# Patient Record
Sex: MALE | Race: WHITE | NOT HISPANIC OR LATINO | Employment: OTHER | URBAN - METROPOLITAN AREA
[De-identification: names, ages, dates, MRNs, and addresses within clinical notes are randomized per-mention and may not be internally consistent; named-entity substitution may affect disease eponyms.]

---

## 2017-01-03 RX ORDER — TADALAFIL 5 MG/1
5 TABLET ORAL DAILY PRN
Qty: 30 TABLET | Refills: 11 | Status: SHIPPED | OUTPATIENT
Start: 2017-01-03 | End: 2017-01-27 | Stop reason: SDUPTHER

## 2017-01-13 ENCOUNTER — CLINICAL SUPPORT (OUTPATIENT)
Dept: INTERNAL MEDICINE | Facility: CLINIC | Age: 62
End: 2017-01-13
Payer: COMMERCIAL

## 2017-01-13 DIAGNOSIS — J30.2 SEASONAL ALLERGIC RHINITIS DUE TO FUNGAL SPORES: ICD-10-CM

## 2017-01-13 DIAGNOSIS — J30.81 ALLERGIC RHINITIS DUE TO ANIMAL (CAT) (DOG) HAIR AND DANDER: ICD-10-CM

## 2017-01-13 DIAGNOSIS — J30.1 SEASONAL ALLERGIC RHINITIS DUE TO POLLEN: ICD-10-CM

## 2017-01-13 PROCEDURE — 95117 IMMUNOTHERAPY INJECTIONS: CPT | Mod: S$GLB,,, | Performed by: ALLERGY & IMMUNOLOGY

## 2017-01-13 PROCEDURE — 99499 UNLISTED E&M SERVICE: CPT | Mod: S$GLB,,, | Performed by: ALLERGY & IMMUNOLOGY

## 2017-01-16 NOTE — PROGRESS NOTES
Pt. Here for allergy injection currently doing well without symptoms or sequela.  Vaccine #1 Vial #1 0.50ml sq left upper arm +1 reaction  Vaccine #2 Vial #1 0.50ml sq left lower arm +1 reaction  Vaccine #3 Vial #1 0.50ml sq right arm +1 reaction  Pt. Tolerated well.

## 2017-01-17 ENCOUNTER — OFFICE VISIT (OUTPATIENT)
Dept: ALLERGY | Facility: CLINIC | Age: 62
End: 2017-01-17
Payer: COMMERCIAL

## 2017-01-17 VITALS
WEIGHT: 179.44 LBS | BODY MASS INDEX: 25.69 KG/M2 | SYSTOLIC BLOOD PRESSURE: 126 MMHG | HEIGHT: 70 IN | DIASTOLIC BLOOD PRESSURE: 74 MMHG | OXYGEN SATURATION: 99 % | HEART RATE: 54 BPM

## 2017-01-17 DIAGNOSIS — K21.9 GASTROESOPHAGEAL REFLUX DISEASE WITHOUT ESOPHAGITIS: ICD-10-CM

## 2017-01-17 DIAGNOSIS — J30.2 SEASONAL ALLERGIC RHINITIS DUE TO FUNGAL SPORES: ICD-10-CM

## 2017-01-17 DIAGNOSIS — J30.1 SEASONAL ALLERGIC RHINITIS DUE TO POLLEN: Primary | ICD-10-CM

## 2017-01-17 DIAGNOSIS — J30.81 ALLERGIC RHINITIS DUE TO ANIMAL (CAT) (DOG) HAIR AND DANDER: ICD-10-CM

## 2017-01-17 PROCEDURE — 1159F MED LIST DOCD IN RCRD: CPT | Mod: S$GLB,,, | Performed by: ALLERGY & IMMUNOLOGY

## 2017-01-17 PROCEDURE — 99999 PR PBB SHADOW E&M-EST. PATIENT-LVL III: CPT | Mod: PBBFAC,,, | Performed by: ALLERGY & IMMUNOLOGY

## 2017-01-17 PROCEDURE — 99214 OFFICE O/P EST MOD 30 MIN: CPT | Mod: S$GLB,,, | Performed by: ALLERGY & IMMUNOLOGY

## 2017-01-17 RX ORDER — METAXALONE 800 MG/1
TABLET ORAL
COMMUNITY
Start: 2017-01-05 | End: 2017-01-27 | Stop reason: SDUPTHER

## 2017-01-17 NOTE — PROGRESS NOTES
Subjective:       Patient ID: Bulmaro Nascimento is a 61 y.o. male.    Chief Complaint: Follow-up (new extracts)    HPI Comments: Pt. Is known to me in private practice he is new to the [facility] system. He is followed for allergic rhinitis and allergic conjunctivitis. He is on allergy injections at a maintenance dose at a monthly interval. He is well controlled on allergy immunotherapy. He does not require additional medication for management of his allergic rhinitis or allergic conjunctivitis symptoms. Pt would like to continue monthly allergy immunotherapy. His vaccine is due for remmetraTec.    Review of Systems   Constitutional: Negative.  Negative for activity change, appetite change, chills, diaphoresis, fatigue, fever and unexpected weight change.   HENT: Negative.  Negative for congestion, dental problem, drooling, ear discharge, ear pain, facial swelling, hearing loss, mouth sores, nosebleeds, postnasal drip, rhinorrhea, sinus pressure, sneezing, sore throat, tinnitus, trouble swallowing and voice change.    Eyes: Negative.  Negative for photophobia, pain, discharge, redness, itching and visual disturbance.   Respiratory: Negative.  Negative for apnea, cough, choking, chest tightness, shortness of breath, wheezing and stridor.    Cardiovascular: Negative.  Negative for chest pain, palpitations and leg swelling.   Gastrointestinal: Negative.  Negative for abdominal distention, abdominal pain, constipation, diarrhea, nausea and vomiting.   Endocrine: Negative.  Negative for cold intolerance, heat intolerance, polydipsia, polyphagia and polyuria.   Genitourinary: Negative.  Negative for decreased urine volume, difficulty urinating, dysuria, enuresis, frequency and urgency.   Musculoskeletal: Negative.  Negative for arthralgias, back pain, gait problem, joint swelling, myalgias, neck pain and neck stiffness.   Skin: Negative.  Negative for color change, pallor, rash and wound.   Allergic/Immunologic: Negative.   Negative for environmental allergies, food allergies and immunocompromised state.   Neurological: Negative.  Negative for dizziness, tremors, seizures, syncope, facial asymmetry, speech difficulty, weakness, light-headedness, numbness and headaches.   Hematological: Negative.  Negative for adenopathy. Does not bruise/bleed easily.   Psychiatric/Behavioral: Negative.  Negative for agitation, behavioral problems, confusion, decreased concentration, dysphoric mood, hallucinations, self-injury, sleep disturbance and suicidal ideas. The patient is not nervous/anxious and is not hyperactive.      Objective:   Physical Exam   Constitutional: He is oriented to person, place, and time. He appears well-developed and well-nourished. He is active and cooperative.  Non-toxic appearance. He does not have a sickly appearance. He does not appear ill. No distress.   HENT:   Head: Normocephalic and atraumatic. Head is without abrasion, without right periorbital erythema and without left periorbital erythema.   Right Ear: Hearing, tympanic membrane, external ear and ear canal normal. No lacerations. No drainage, swelling or tenderness. No foreign bodies. No mastoid tenderness. Tympanic membrane is not injected, not scarred, not perforated, not erythematous, not retracted and not bulging. Tympanic membrane mobility is normal. No middle ear effusion. No decreased hearing is noted.   Left Ear: Hearing, tympanic membrane, external ear and ear canal normal. No lacerations. No drainage, swelling or tenderness. No foreign bodies. No mastoid tenderness. Tympanic membrane is not injected, not scarred, not perforated, not erythematous, not retracted and not bulging. Tympanic membrane mobility is normal.  No middle ear effusion. No decreased hearing is noted.   Nose: Nose normal. No mucosal edema, rhinorrhea, nose lacerations, sinus tenderness, nasal deformity, septal deviation or nasal septal hematoma. No epistaxis.  No foreign bodies. Right  sinus exhibits no maxillary sinus tenderness and no frontal sinus tenderness. Left sinus exhibits no maxillary sinus tenderness and no frontal sinus tenderness.   Mouth/Throat: Uvula is midline, oropharynx is clear and moist and mucous membranes are normal. He does not have dentures. No oral lesions. No trismus in the jaw. No dental abscesses, uvula swelling, lacerations or dental caries. No oropharyngeal exudate, posterior oropharyngeal edema, posterior oropharyngeal erythema or tonsillar abscesses. No tonsillar exudate.   Eyes: Conjunctivae, EOM and lids are normal. Pupils are equal, round, and reactive to light. Right eye exhibits no chemosis, no discharge and no exudate. Left eye exhibits no chemosis, no discharge and no exudate. Right conjunctiva is not injected. Right conjunctiva has no hemorrhage. Left conjunctiva is not injected. Left conjunctiva has no hemorrhage. No scleral icterus.   Neck: Trachea normal, normal range of motion, full passive range of motion without pain and phonation normal. No tracheal tenderness and no muscular tenderness present. No rigidity. No tracheal deviation, no edema, no erythema and normal range of motion present. No thyroid mass and no thyromegaly present.   Cardiovascular: Normal rate, regular rhythm, normal heart sounds and normal pulses.  Exam reveals no gallop and no friction rub.    No murmur heard.  Pulmonary/Chest: Effort normal and breath sounds normal. No accessory muscle usage or stridor. No apnea, no tachypnea and no bradypnea. He has no decreased breath sounds. He has no wheezes. He has no rhonchi. He has no rales. He exhibits no tenderness.   Abdominal: Soft. Normal appearance and bowel sounds are normal. He exhibits no distension. There is no tenderness. There is no rigidity, no rebound, no guarding and no CVA tenderness.   Musculoskeletal: Normal range of motion. He exhibits no edema, tenderness or deformity.   Lymphadenopathy:        Head (right side): No  submental, no submandibular, no tonsillar, no preauricular, no posterior auricular and no occipital adenopathy present.        Head (left side): No submental, no submandibular, no tonsillar, no preauricular, no posterior auricular and no occipital adenopathy present.     He has no cervical adenopathy.        Right cervical: No superficial cervical, no deep cervical and no posterior cervical adenopathy present.       Left cervical: No superficial cervical, no deep cervical and no posterior cervical adenopathy present.        Right: No supraclavicular and no epitrochlear adenopathy present.        Left: No supraclavicular and no epitrochlear adenopathy present.   Neurological: He is alert and oriented to person, place, and time. He has normal strength. He is not disoriented. No cranial nerve deficit. He exhibits normal muscle tone. Coordination and gait normal.   Skin: Skin is warm, dry and intact. No abrasion, no bruising, no laceration, no lesion, no petechiae, no purpura and no rash noted. Rash is not macular, not papular, not maculopapular, not nodular, not pustular, not vesicular and not urticarial. He is not diaphoretic. No cyanosis or erythema. No pallor. Nails show no clubbing.   Psychiatric: He has a normal mood and affect. His speech is normal and behavior is normal. Judgment and thought content normal. His mood appears not anxious. His affect is not inappropriate. Cognition and memory are normal. He does not express impulsivity or inappropriate judgment. He expresses no homicidal and no suicidal ideation. He is attentive.   Nursing note and vitals reviewed.    Assessment:     1. Seasonal allergic rhinitis due to pollen    2. allergic rhinitis due to animal cat and dog dander    3. Gastroesophageal reflux disease without esophagitis    4. Seasonal allergic rhinitis due to fungal spores      Plan:   Bulmaro was seen today for follow-up.    Diagnoses and all orders for this visit:    Seasonal allergic rhinitis  due to pollen    allergic rhinitis due to animal cat and dog dander    Gastroesophageal reflux disease without esophagitis    Seasonal allergic rhinitis due to fungal spores    Continue current treatment plan  Call office if problems occur  Return in about 6 months (around 7/17/2017).    Discussed options and strategies  Reviewed medications risk v. Benefit  Reviewed pathophysiology  All questions answered  Emotional support provided  Pt verbalized understanding of all the above and treatment plan.      XI Alcantara

## 2017-01-24 ENCOUNTER — PATIENT MESSAGE (OUTPATIENT)
Dept: FAMILY MEDICINE | Facility: CLINIC | Age: 62
End: 2017-01-24

## 2017-01-27 ENCOUNTER — OFFICE VISIT (OUTPATIENT)
Dept: FAMILY MEDICINE | Facility: CLINIC | Age: 62
End: 2017-01-27
Payer: COMMERCIAL

## 2017-01-27 VITALS
HEART RATE: 56 BPM | SYSTOLIC BLOOD PRESSURE: 110 MMHG | WEIGHT: 178.56 LBS | DIASTOLIC BLOOD PRESSURE: 64 MMHG | OXYGEN SATURATION: 98 % | HEIGHT: 70 IN | BODY MASS INDEX: 25.56 KG/M2

## 2017-01-27 DIAGNOSIS — J30.81 ALLERGIC RHINITIS DUE TO ANIMAL (CAT) (DOG) HAIR AND DANDER: ICD-10-CM

## 2017-01-27 DIAGNOSIS — Z00.00 ENCOUNTER FOR PREVENTIVE HEALTH EXAMINATION: ICD-10-CM

## 2017-01-27 DIAGNOSIS — J30.2 SEASONAL ALLERGIC RHINITIS DUE TO FUNGAL SPORES: ICD-10-CM

## 2017-01-27 DIAGNOSIS — L57.8 ACTINIC SKIN DAMAGE: ICD-10-CM

## 2017-01-27 DIAGNOSIS — Z13.220 LIPID SCREENING: Primary | ICD-10-CM

## 2017-01-27 DIAGNOSIS — F45.8 BRUXISM: ICD-10-CM

## 2017-01-27 DIAGNOSIS — N40.0 BENIGN PROSTATIC HYPERPLASIA, PRESENCE OF LOWER URINARY TRACT SYMPTOMS UNSPECIFIED, UNSPECIFIED MORPHOLOGY: ICD-10-CM

## 2017-01-27 DIAGNOSIS — Z12.5 PROSTATE CANCER SCREENING: ICD-10-CM

## 2017-01-27 DIAGNOSIS — K21.9 GASTROESOPHAGEAL REFLUX DISEASE WITHOUT ESOPHAGITIS: ICD-10-CM

## 2017-01-27 PROCEDURE — 99999 PR PBB SHADOW E&M-EST. PATIENT-LVL IV: CPT | Mod: PBBFAC,,,

## 2017-01-27 PROCEDURE — 99396 PREV VISIT EST AGE 40-64: CPT | Mod: S$GLB,,,

## 2017-01-27 RX ORDER — HYDROGEN PEROXIDE 3 %
20 SOLUTION, NON-ORAL MISCELLANEOUS
Qty: 30 CAPSULE | Refills: 11 | Status: SHIPPED | OUTPATIENT
Start: 2017-01-27 | End: 2017-02-06 | Stop reason: SDUPTHER

## 2017-01-27 RX ORDER — LORAZEPAM 1 MG/1
1 TABLET ORAL NIGHTLY
Qty: 90 TABLET | Refills: 1 | Status: SHIPPED | OUTPATIENT
Start: 2017-01-27 | End: 2017-06-05 | Stop reason: SDUPTHER

## 2017-01-27 RX ORDER — TADALAFIL 5 MG/1
5 TABLET ORAL DAILY PRN
Qty: 30 TABLET | Refills: 11 | Status: SHIPPED | OUTPATIENT
Start: 2017-01-27 | End: 2017-02-01 | Stop reason: SDUPTHER

## 2017-01-27 RX ORDER — METAXALONE 800 MG/1
800 TABLET ORAL
Qty: 30 TABLET | Refills: 11 | Status: SHIPPED | OUTPATIENT
Start: 2017-01-27 | End: 2018-03-21

## 2017-01-27 NOTE — MR AVS SNAPSHOT
[facility]  [address] [place] [address]  Phone: [phone]  Fax: [phone]                  Bulmaro Nascimento   2017 1:00 PM   Office Visit    Description:  Male : 1955   Provider:  Carl Hodge Jr., MD   Department:  [facility] Medicine           Reason for Visit     Annual Exam           Diagnoses this Visit        Comments    Lipid screening    -  Primary     Prostate cancer screening         Bruxism         Gastroesophageal reflux disease without esophagitis         Encounter for preventive health examination         Benign prostatic hyperplasia, presence of lower urinary tract symptoms unspecified, unspecified morphology         Allergic rhinitis due to animal (cat) (dog) hair and dander         Seasonal allergic rhinitis due to fungal spores         Actinic skin damage                To Do List           Future Appointments        Provider Department Dept Phone    2017 10:45 AM [place] INJE[facility] [place] [facility] [phone]    3/17/2017 10:45 AM [place] [place] [facility] [phone]      Goals (5 Years of Data)     None      Follow-Up and Disposition     Return in about 1 year (around 2018).    Follow-up and Disposition History       These Medications        Disp Refills Start End    tadalafil (CIALIS) 5 MG tablet 30 tablet 11 2017     Take 1 tablet (5 mg total) by mouth daily as needed for Erectile Dysfunction. - Oral    Pharmacy: [id number] [place] [address] Ph #: [phone]       esomeprazole (NEXIUM) 20 MG capsule 30 capsule 11 2017     Take 1 capsule (20 mg total) by mouth before breakfast. - Oral    Pharmacy: [id number] [place] [address] Ph #: [phone]       lorazepam (ATIVAN) 1 MG tablet 90 tablet 1 2017     Take 1 tablet (1 mg total) by mouth every evening. - Oral    Pharmacy: [id number] - [place] - [address]  #: [phone]       metaxalone (SKELAXIN) 800 MG tablet 30 tablet 11 1/27/2017     Take 1 tablet (800 mg total) by mouth as needed. - Oral    Pharmacy: [id number] - [place] - [address] [address]  #: [phone]       ranitidine (ZANTAC) 300 MG tablet 30 tablet 11 1/27/2017 1/27/2018    Take 1 tablet (300 mg total) by mouth every evening. - Oral    Pharmacy: [id number] - [place] - [address]  #: [phone]         [facility]     [facility] Nurse [facility] - 24/7 Assistance  Registered nurses in the [facility] Center provide clinical advisement, health education, appointment booking, and other advisory services.  Call for this free service at [phone].             Medications           Message regarding Medications     Verify the changes and/or additions to your medication regime listed below are the same as discussed with your clinician today.  If any of these changes or additions are incorrect, please notify your healthcare provider.        START taking these NEW medications        Refills    ranitidine (ZANTAC) 300 MG tablet 11    Sig: Take 1 tablet (300 mg total) by mouth every evening.    Class: Normal    Route: Oral      CHANGE how you are taking these medications     Start Taking Instead of    esomeprazole (NEXIUM) 20 MG capsule esomeprazole (NEXIUM) 20 MG capsule    Dosage:  Take 1 capsule (20 mg total) by mouth before breakfast. Dosage:  Take 20 mg by mouth before breakfast.    Reason for Change:  Reorder     metaxalone (SKELAXIN) 800 MG tablet metaxalone (SKELAXIN) 800 MG tablet    Dosage:  Take 1 tablet (800 mg total) by mouth as needed. Dosage:  as needed.    Reason for Change:  Reorder            Verify that the below list of medications is an accurate representation of the medications you are currently taking.  If none reported, the list may be blank. If incorrect, please contact your healthcare provider. Carry this list with you in case of emergency.  "          Current Medications     esomeprazole (NEXIUM) 20 MG capsule Take 1 capsule (20 mg total) by mouth before breakfast.    tadalafil (CIALIS) 5 MG tablet Take 1 tablet (5 mg total) by mouth daily as needed for Erectile Dysfunction.    lorazepam (ATIVAN) 1 MG tablet Take 1 tablet (1 mg total) by mouth every evening.    metaxalone (SKELAXIN) 800 MG tablet Take 1 tablet (800 mg total) by mouth as needed.    ranitidine (ZANTAC) 300 MG tablet Take 1 tablet (300 mg total) by mouth every evening.           Clinical Reference Information           Vital Signs - Last Recorded  Most recent update: 1/27/2017  1:04 PM by Erica Zarate MA    BP Pulse Ht Wt SpO2 BMI    110/64 (BP Location: Right arm, Patient Position: Sitting, BP Method: Manual) (!) 56 5' 10" (1.778 m) 81 kg (178 lb 9.2 oz) 98% 25.62 kg/m2      Blood Pressure          Most Recent Value    BP  110/64      Allergies as of 1/27/2017     No Known Allergies      Immunizations Administered on Date of Encounter - 1/27/2017     None      Orders Placed During Today's Visit      Normal Orders This Visit    Ambulatory referral to Allergy     Ambulatory referral to Dermatology     Ambulatory referral to Urology     Future Labs/Procedures Expected by Expires    CBC auto differential  1/27/2017 1/27/2018    Comprehensive metabolic panel  1/27/2017 1/27/2018    Lipid panel  1/27/2017 1/27/2018    PSA, Screening  1/27/2017 3/28/2018      "

## 2017-01-27 NOTE — PROGRESS NOTES
Subjective:       Patient ID: Bulmaro Nascimento is a 61 y.o. male.    Chief Complaint: Annual Exam    HPI Data obtained from Agency for Healthcare and Research Quality (AHRQ):    GRADE A -The USPSTF recommends the service. There is high certainty that the net benefit is substantial. Offer or provide this service.    GRADE B - The USPSTF recommends the service. There is high certainty that the net benefit is moderate or there is moderate certainty that the net benefit is moderate to substantial. Offer or provide this service.    View All      13 - Recommended (A, B)    Grade Title Risk Info. Details   UTD  Colorectal Cancer: Screening --Adults aged 50 to 75 years     Low Risk  HIV: Screening - Adolescents and Adults     110/64 High Blood Pressure: Screening -- Adults 18 and Over     Recommended High Blood Pressure: Screening and Home Monitoring -- Adults     Denies  Alcohol Misuse: Screening and Behavioral Counseling Interventions in Primary Care -- Adults     Denies  Depression: Screening -- General adult population, including pregnant and postpartum women     UTD  Diabetes Mellitus (Type 2) andAbnormal Blood Glucose: Screening -- Adults aged 40 to 70 years who are overweight or obese     Yes  Healthful Diet and Physical Activity for CVD Disease Prevention: Counseling -- Adults with CVD Risk Factors     Low  Hepatitis B: Screening -- Nonpregnant Adolescents and Adults At High Risk     UTD  Hepatitis C Virus Infection: Screening--Adults at High Risk and Adults born between 1945 and 1965     Low  Latent Tuberculosis Infection: Screening -- Asymptomatic adults at increased risk for infection     Normal  Obesity: Screening for and Management of-- All Adults       Not a candidate  Statin Use for the Primary Prevention of CVD: Preventive Medicine -- Adults age 40 to 75 years with no history of CVD, 1 or more CVD risk factors, and a calculated 10-year CVD event risk of 10% or greater.              Review of Systems    Constitutional: Negative.    HENT: Negative.    Eyes: Negative.    Respiratory: Negative.    Cardiovascular: Negative.    Gastrointestinal: Negative.    Endocrine: Negative.    Genitourinary: Negative.    Musculoskeletal: Negative.    Skin: Negative.    Allergic/Immunologic: Negative.    Neurological: Negative.    Hematological: Negative.    Psychiatric/Behavioral: Positive for sleep disturbance.   All other systems reviewed and are negative.      Objective:      Vitals:    01/27/17 1301   BP: 110/64   Pulse: (!) 56     Physical Exam   Constitutional: He is oriented to person, place, and time. He appears well-developed and well-nourished. He is cooperative. No distress.   HENT:   Head: Normocephalic and atraumatic.   Right Ear: Hearing, tympanic membrane, external ear and ear canal normal.   Left Ear: Hearing, external ear and ear canal normal.   Nose: Nose normal.   Mouth/Throat: Oropharynx is clear and moist.   Eyes: Conjunctivae are normal. Pupils are equal, round, and reactive to light.   Neck: Normal range of motion. Neck supple. No thyromegaly present.   Cardiovascular: Normal rate, regular rhythm, normal heart sounds and intact distal pulses.    Pulmonary/Chest: Effort normal and breath sounds normal. No respiratory distress.   Musculoskeletal: Normal range of motion. He exhibits no edema or tenderness.   Lymphadenopathy:     He has no cervical adenopathy.   Neurological: He is alert and oriented to person, place, and time. He has normal strength. Coordination and gait normal.   Skin: Skin is warm, dry and intact. No cyanosis. Nails show no clubbing.   Psychiatric: He has a normal mood and affect. His speech is normal and behavior is normal. Judgment and thought content normal. Cognition and memory are normal.   Vitals reviewed.      Assessment:       1. Lipid screening    2. Prostate cancer screening    3. Bruxism    4. Gastroesophageal reflux disease without esophagitis    5. Encounter for preventive  health examination    6. Benign prostatic hyperplasia, presence of lower urinary tract symptoms unspecified, unspecified morphology    7. allergic rhinitis due to animal cat and dog dander    8. Seasonal allergic rhinitis due to fungal spores    9. Actinic skin damage        Plan:       Lipid screening  -     CBC auto differential; Future; Expected date: 1/27/17  -     Comprehensive metabolic panel; Future; Expected date: 1/27/17  -     Lipid panel; Future; Expected date: 1/27/17    Prostate cancer screening  -     PSA, Screening; Future; Expected date: 1/27/17  -     Ambulatory referral to Urology    Bruxism  -     lorazepam (ATIVAN) 1 MG tablet; Take 1 tablet (1 mg total) by mouth every evening.  Dispense: 90 tablet; Refill: 1    Gastroesophageal reflux disease without esophagitis    Encounter for preventive health examination    Benign prostatic hyperplasia, presence of lower urinary tract symptoms unspecified, unspecified morphology    allergic rhinitis due to animal cat and dog dander  -     Ambulatory referral to Allergy    Seasonal allergic rhinitis due to fungal spores  -     Ambulatory referral to Allergy    Actinic skin damage  -     Ambulatory referral to Dermatology    Other orders  -     tadalafil (CIALIS) 5 MG tablet; Take 1 tablet (5 mg total) by mouth daily as needed for Erectile Dysfunction.  Dispense: 30 tablet; Refill: 11  -     esomeprazole (NEXIUM) 20 MG capsule; Take 1 capsule (20 mg total) by mouth before breakfast.  Dispense: 30 capsule; Refill: 11  -     metaxalone (SKELAXIN) 800 MG tablet; Take 1 tablet (800 mg total) by mouth as needed.  Dispense: 30 tablet; Refill: 11  -     ranitidine (ZANTAC) 300 MG tablet; Take 1 tablet (300 mg total) by mouth every evening.  Dispense: 30 tablet; Refill: 11      Return in about 1 year (around 1/27/2018).

## 2017-02-01 ENCOUNTER — PATIENT MESSAGE (OUTPATIENT)
Dept: FAMILY MEDICINE | Facility: CLINIC | Age: 62
End: 2017-02-01

## 2017-02-01 ENCOUNTER — TELEPHONE (OUTPATIENT)
Dept: FAMILY MEDICINE | Facility: CLINIC | Age: 62
End: 2017-02-01

## 2017-02-01 RX ORDER — TADALAFIL 5 MG/1
5 TABLET ORAL DAILY PRN
Qty: 90 TABLET | Refills: 3 | Status: SHIPPED | OUTPATIENT
Start: 2017-02-01 | End: 2017-09-29 | Stop reason: SDUPTHER

## 2017-02-05 ENCOUNTER — PATIENT MESSAGE (OUTPATIENT)
Dept: FAMILY MEDICINE | Facility: CLINIC | Age: 62
End: 2017-02-05

## 2017-02-05 DIAGNOSIS — K21.9 GASTROESOPHAGEAL REFLUX DISEASE WITHOUT ESOPHAGITIS: Primary | ICD-10-CM

## 2017-02-06 RX ORDER — ESOMEPRAZOLE MAGNESIUM 40 MG/1
40 CAPSULE, DELAYED RELEASE ORAL
Qty: 90 CAPSULE | Refills: 0 | Status: SHIPPED | OUTPATIENT
Start: 2017-02-06 | End: 2018-03-21

## 2017-02-08 ENCOUNTER — CLINICAL SUPPORT (OUTPATIENT)
Dept: ALLERGY | Facility: CLINIC | Age: 62
End: 2017-02-08
Payer: COMMERCIAL

## 2017-02-08 DIAGNOSIS — J30.1 SEASONAL ALLERGIC RHINITIS DUE TO POLLEN, UNSPECIFIED CHRONICITY: ICD-10-CM

## 2017-02-08 DIAGNOSIS — J30.9 ACUTE ALLERGIC RHINITIS, UNSPECIFIED SEASONALITY, UNSPECIFIED TRIGGER: Primary | ICD-10-CM

## 2017-02-08 PROCEDURE — 99499 UNLISTED E&M SERVICE: CPT | Mod: S$GLB,,, | Performed by: ALLERGY & IMMUNOLOGY

## 2017-02-08 PROCEDURE — 99999 PR PBB SHADOW E&M-EST. PATIENT-LVL II: CPT | Mod: PBBFAC,,,

## 2017-02-08 PROCEDURE — 95165 ANTIGEN THERAPY SERVICES: CPT | Mod: S$GLB,,, | Performed by: ALLERGY & IMMUNOLOGY

## 2017-02-10 ENCOUNTER — CLINICAL SUPPORT (OUTPATIENT)
Dept: INTERNAL MEDICINE | Facility: CLINIC | Age: 62
End: 2017-02-10
Payer: COMMERCIAL

## 2017-02-10 DIAGNOSIS — J30.1 SEASONAL ALLERGIC RHINITIS DUE TO POLLEN: ICD-10-CM

## 2017-02-10 DIAGNOSIS — J30.2 SEASONAL ALLERGIC RHINITIS DUE TO FUNGAL SPORES: ICD-10-CM

## 2017-02-10 DIAGNOSIS — J30.81 ALLERGIC RHINITIS DUE TO ANIMAL (CAT) (DOG) HAIR AND DANDER: ICD-10-CM

## 2017-02-10 PROCEDURE — 95117 IMMUNOTHERAPY INJECTIONS: CPT | Mod: S$GLB,,, | Performed by: ALLERGY & IMMUNOLOGY

## 2017-02-10 PROCEDURE — 99499 UNLISTED E&M SERVICE: CPT | Mod: S$GLB,,, | Performed by: ALLERGY & IMMUNOLOGY

## 2017-02-13 NOTE — PROGRESS NOTES
Patient here for allergy injection. Currently doing well without symptoms or sequela.   Vaccine #1 Vial #1 0.50ml sq left upper arm +1 reaction  Vaccine #2 Vial #1 0.50ml sq left middle arm +1 reaction  Vaccine #3 Vial #1 0.50ml sq right arm +1 reaction  Pt. Tolerated well.

## 2017-03-13 ENCOUNTER — PATIENT MESSAGE (OUTPATIENT)
Dept: FAMILY MEDICINE | Facility: CLINIC | Age: 62
End: 2017-03-13

## 2017-03-15 ENCOUNTER — OFFICE VISIT (OUTPATIENT)
Dept: UROLOGY | Facility: CLINIC | Age: 62
End: 2017-03-15
Payer: COMMERCIAL

## 2017-03-15 VITALS
HEART RATE: 60 BPM | BODY MASS INDEX: 25.88 KG/M2 | TEMPERATURE: 98 F | RESPIRATION RATE: 18 BRPM | DIASTOLIC BLOOD PRESSURE: 70 MMHG | HEIGHT: 70 IN | SYSTOLIC BLOOD PRESSURE: 110 MMHG | WEIGHT: 180.75 LBS

## 2017-03-15 DIAGNOSIS — R97.20 ELEVATED PSA: Primary | ICD-10-CM

## 2017-03-15 DIAGNOSIS — N40.0 BENIGN NON-NODULAR PROSTATIC HYPERPLASIA WITHOUT LOWER URINARY TRACT SYMPTOMS: ICD-10-CM

## 2017-03-15 DIAGNOSIS — R35.0 URINARY FREQUENCY: ICD-10-CM

## 2017-03-15 DIAGNOSIS — R35.1 NOCTURIA: ICD-10-CM

## 2017-03-15 PROCEDURE — 1160F RVW MEDS BY RX/DR IN RCRD: CPT | Mod: S$GLB,,, | Performed by: UROLOGY

## 2017-03-15 PROCEDURE — 99213 OFFICE O/P EST LOW 20 MIN: CPT | Mod: S$GLB,,, | Performed by: UROLOGY

## 2017-03-15 PROCEDURE — 99999 PR PBB SHADOW E&M-EST. PATIENT-LVL III: CPT | Mod: PBBFAC,,, | Performed by: UROLOGY

## 2017-03-15 NOTE — LETTER
March 15, 2017      Carl Hodge Jr., MD  [address] [place] [address]           [place] [facility]  [address] [place] [address] [address]  [facility] [address]  Phone: [phone]  Fax: [phone]          Patient: Bulmaro Nascimento   MR Number: [id number]   YOB: 1955   Date of Visit: 3/15/2017       Dear Dr. Carl Hodge Jr.:    Thank you for referring Bulmaro Nascimento to me for evaluation. Attached you will find relevant portions of my assessment and plan of care.    If you have questions, please do not hesitate to call me. I look forward to following Bulmaro Nascimento along with you.    Sincerely,    Jadiel Lara MD    Enclosure  CC:  No Recipients    If you would like to receive this communication electronically, please contact [email or web address] or [phone] to request more information on [employer] Link access.    For providers and/or their staff who would like to refer a patient to [facility], please contact us through our one-stop-shop provider referral line, [facility], at [phone].    If you feel you have received this communication in error or would no longer like to receive these types of communications, please e-mail [email or web address]

## 2017-03-15 NOTE — PROGRESS NOTES
Subjective:       Patient ID: Bulmaro Nascimento is a 61 y.o. male.    Chief Complaint: Elevated PSA    Benign Prostatic Hypertrophy   This is a chronic problem. The current episode started more than 1 year ago. The problem has been gradually improving since onset. Irritative symptoms include frequency (x5) and nocturia (x1). Irritative symptoms do not include urgency. Obstructive symptoms include a slower stream. Obstructive symptoms do not include dribbling, incomplete emptying, an intermittent stream, straining or a weak stream. Pertinent negatives include no chills, dysuria, genital pain, hematuria, hesitancy, nausea or vomiting. AUA score is 8-19. He is not sexually active. Treatments tried: cialis 5 mg daily. The treatment provided no relief. He has been using treatment for 2 or more years.     Review of Systems   Constitutional: Negative for activity change, appetite change, chills, diaphoresis, fatigue, fever and unexpected weight change.   HENT: Negative for congestion, hearing loss, sinus pressure and trouble swallowing.    Eyes: Negative for photophobia, pain, discharge and visual disturbance.   Respiratory: Negative for apnea, cough and shortness of breath.    Cardiovascular: Negative for chest pain, palpitations and leg swelling.   Gastrointestinal: Negative for abdominal distention, abdominal pain, anal bleeding, blood in stool, constipation, diarrhea, nausea, rectal pain and vomiting.   Endocrine: Negative for cold intolerance, heat intolerance, polydipsia, polyphagia and polyuria.   Genitourinary: Positive for frequency (x5) and nocturia (x1). Negative for decreased urine volume, difficulty urinating, discharge, dysuria, enuresis, flank pain, genital sores, hematuria, hesitancy, incomplete emptying, penile pain, penile swelling, scrotal swelling, testicular pain and urgency.   Musculoskeletal: Negative for arthralgias, back pain and myalgias.   Skin: Negative for color change, pallor, rash and  wound.   Allergic/Immunologic: Negative for environmental allergies, food allergies and immunocompromised state.   Neurological: Negative for dizziness, seizures, weakness and headaches.   Hematological: Negative for adenopathy. Does not bruise/bleed easily.   Psychiatric/Behavioral: Negative.        Objective:      Physical Exam   Nursing note and vitals reviewed.  Constitutional: He is oriented to person, place, and time. He appears well-developed and well-nourished.   HENT:   Head: Normocephalic.   Nose: Nose normal.   Mouth/Throat: Oropharynx is clear and moist.   Eyes: Conjunctivae and EOM are normal. Pupils are equal, round, and reactive to light.   Neck: Normal range of motion. Neck supple.   Cardiovascular: Normal rate, regular rhythm, normal heart sounds and intact distal pulses.    Pulmonary/Chest: Effort normal and breath sounds normal.   Abdominal: Soft. Bowel sounds are normal.   Genitourinary: Rectum normal, testes normal and penis normal. Prostate is enlarged. Prostate is not tender. Cremasteric reflex is present.   Musculoskeletal: Normal range of motion.   Neurological: He is alert and oriented to person, place, and time. He has normal reflexes.   Skin: Skin is warm and dry.     Psychiatric: He has a normal mood and affect. His behavior is normal. Judgment and thought content normal.       Assessment:       1. Elevated PSA    2. Benign non-nodular prostatic hyperplasia without lower urinary tract symptoms    3. Nocturia    4. Urinary frequency        Plan:       Patient Instructions   Re-check PSA.  F/U yearly  Continue daily Cialis 5 mg

## 2017-03-15 NOTE — MR AVS SNAPSHOT
[facility]  [address] [place] [address]  [facility] [address]  Phone: [phone]  Fax: [phone]                  Bulmaro Nascimento   3/15/2017 2:30 PM   Office Visit    Description:  Male : 1955   Provider:  Jadiel Lara MD   Department:  [facility]           Reason for Visit     Elevated PSA           Diagnoses this Visit        Comments    Elevated PSA    -  Primary     Benign non-nodular prostatic hyperplasia without lower urinary tract symptoms         Nocturia         Urinary frequency                To Do List           Future Appointments        Provider Department Dept Phone    3/17/2017 10:45 AM [place] [phone]    3/14/2018 8:00 AM Jadiel Lara MD [facility] [phone]      Goals (5 Years of Data)     None      Follow-Up and Disposition     Return in about 1 year (around 3/15/2018).    Follow-up and Disposition History      [facility] On Call     [facility] On Call Nurse Care Line -  Assistance  Registered nurses in the [facility] On Call Center provide clinical advisement, health education, appointment booking, and other advisory services.  Call for this free service at [phone].             Medications           Message regarding Medications     Verify the changes and/or additions to your medication regime listed below are the same as discussed with your clinician today.  If any of these changes or additions are incorrect, please notify your healthcare provider.             Verify that the below list of medications is an accurate representation of the medications you are currently taking.  If none reported, the list may be blank. If incorrect, please contact your healthcare provider. Carry this list with you in case of emergency.           Current Medications     esomeprazole (NEXIUM) 40 MG capsule Take 1 capsule (40 mg total) by mouth before breakfast.    lorazepam (ATIVAN) 1 MG tablet Take 1     01/24/17 1710   Discharge Reassessment   Assessment Type Discharge Planning Reassessment   Can the patient answer the patient profile reliably? Yes, cognitively intact   How does the patient rate their overall health at the present time? Fair   Describe the patient's ability to walk at the present time. Minor restrictions or changes   How often would a person be available to care for the patient? Occasionally   Number of comorbid conditions (as recorded on the chart) None   During the past month, has the patient often been bothered by feeling down, depressed or hopeless? No   During the past month, has the patient often been bothered by little interest or pleasure in doing things? No   Discharge plan remains the same: No   Provided patient/caregiver education on the expected discharge date and the discharge plan Yes   Discharge Plan A Home;Home Health   Discharge Plan B Home   Change in patient condition or support system No   Explained to the the patient/caregiver why the discharge planned changed: Yes   Involved the patient/caregiver in establishing a new discharge plan: Yes      "tablet (1 mg total) by mouth every evening.    metaxalone (SKELAXIN) 800 MG tablet Take 1 tablet (800 mg total) by mouth as needed.    tadalafil (CIALIS) 5 MG tablet Take 1 tablet (5 mg total) by mouth daily as needed for Erectile Dysfunction.           Clinical Reference Information           Your Vitals Were     BP Pulse Temp Resp Height Weight    110/70 60 98.3 °F (36.8 °C) 18 5' 10" (1.778 m) 82 kg (180 lb 12.4 oz)    BMI                25.94 kg/m2          Blood Pressure          Most Recent Value    BP  110/70      Allergies as of 3/15/2017     No Known Allergies      Immunizations Administered on Date of Encounter - 3/15/2017     None      Orders Placed During Today's Visit     Future Labs/Procedures Expected by Expires    PSA, total and free  9/11/2017 5/14/2018      Instructions    Re-check PSA.  F/U yearly  Continue daily Cialis 5 mg       Language Assistance Services     ATTENTION: Language assistance services are available, free of charge. Please call [phone].      ATENCIÓN: Si jake barcenas, tiene a lam disposición servicios gratuitos de asistencia lingüística. Llame al [phone].     ABBEY Ý: N?u b?n nói Ti?ng Vi?t, có các d?ch v? h? tr? ngôn ng? mi?n phí dành cho b?n. G?i s? [phone].         [facility] complies with applicable Federal civil rights laws and does not discriminate on the basis of race, color, national origin, age, disability, or sex.        "

## 2017-03-17 ENCOUNTER — CLINICAL SUPPORT (OUTPATIENT)
Dept: INTERNAL MEDICINE | Facility: CLINIC | Age: 62
End: 2017-03-17
Payer: COMMERCIAL

## 2017-03-17 DIAGNOSIS — J30.81 ALLERGIC RHINITIS DUE TO ANIMAL (CAT) (DOG) HAIR AND DANDER: ICD-10-CM

## 2017-03-17 PROCEDURE — 99499 UNLISTED E&M SERVICE: CPT | Mod: S$GLB,,, | Performed by: FAMILY MEDICINE

## 2017-03-17 PROCEDURE — 95115 IMMUNOTHERAPY ONE INJECTION: CPT | Mod: S$GLB,,, | Performed by: FAMILY MEDICINE

## 2017-03-17 PROCEDURE — 99999 PR PBB SHADOW E&M-EST. PATIENT-LVL I: CPT | Mod: PBBFAC,,,

## 2017-04-18 ENCOUNTER — PATIENT MESSAGE (OUTPATIENT)
Dept: FAMILY MEDICINE | Facility: CLINIC | Age: 62
End: 2017-04-18

## 2017-04-18 DIAGNOSIS — Z01.00 EYE EXAM, ROUTINE: Primary | ICD-10-CM

## 2017-04-28 ENCOUNTER — CLINICAL SUPPORT (OUTPATIENT)
Dept: INTERNAL MEDICINE | Facility: CLINIC | Age: 62
End: 2017-04-28
Payer: COMMERCIAL

## 2017-04-28 DIAGNOSIS — J30.81 ALLERGIC RHINITIS DUE TO ANIMAL (CAT) (DOG) HAIR AND DANDER: ICD-10-CM

## 2017-04-28 PROCEDURE — 99999 PR PBB SHADOW E&M-EST. PATIENT-LVL I: CPT | Mod: PBBFAC,,,

## 2017-04-28 PROCEDURE — 99499 UNLISTED E&M SERVICE: CPT | Mod: S$GLB,,, | Performed by: FAMILY MEDICINE

## 2017-04-28 PROCEDURE — 95117 IMMUNOTHERAPY INJECTIONS: CPT | Mod: S$GLB,,, | Performed by: FAMILY MEDICINE

## 2017-04-28 NOTE — PROGRESS NOTES
Patient in for allergy injection. Stated no new meds, no problems with last injection.    0.35  ml of 1:1    given SQ in  Left Upper arm. Tolerated well.     0.35 ml of 1:1  Given SQ in Right Upper arm . Tolerated well.     Patient assessed after 30 minutes . No reaction noted.     Patient voice no complaints

## 2017-06-05 ENCOUNTER — PATIENT MESSAGE (OUTPATIENT)
Dept: FAMILY MEDICINE | Facility: CLINIC | Age: 62
End: 2017-06-05

## 2017-06-05 ENCOUNTER — CLINICAL SUPPORT (OUTPATIENT)
Dept: INTERNAL MEDICINE | Facility: CLINIC | Age: 62
End: 2017-06-05
Payer: COMMERCIAL

## 2017-06-05 DIAGNOSIS — F45.8 BRUXISM: ICD-10-CM

## 2017-06-05 DIAGNOSIS — J30.81 ALLERGIC RHINITIS DUE TO ANIMAL (CAT) (DOG) HAIR AND DANDER: ICD-10-CM

## 2017-06-05 PROCEDURE — 99499 UNLISTED E&M SERVICE: CPT | Mod: S$GLB,,, | Performed by: FAMILY MEDICINE

## 2017-06-05 PROCEDURE — 95117 IMMUNOTHERAPY INJECTIONS: CPT | Mod: S$GLB,,, | Performed by: FAMILY MEDICINE

## 2017-06-05 RX ORDER — LORAZEPAM 1 MG/1
1 TABLET ORAL NIGHTLY
Qty: 90 TABLET | Refills: 1 | Status: SHIPPED | OUTPATIENT
Start: 2017-06-05 | End: 2018-01-02 | Stop reason: SDUPTHER

## 2017-06-05 NOTE — PROGRESS NOTES
Patient in for allergy injection. Stated no new meds, no problems with last injection.    0.30 ml of 1:1  1 of 3   given SQ in Left Upper arm. Tolerated well.  0.30 ml  Of 1:1 2 of 3   Given SQ in Left Lower arm. Tolerated well.  0.30 ml  Of 1;1  3 OF 3 GIVEN sq in Right Upper arm. Tolerated well.      Patient assessed after 30 minutes . No reaction noted.     Patient voice no complaints

## 2017-06-19 ENCOUNTER — CLINICAL SUPPORT (OUTPATIENT)
Dept: INTERNAL MEDICINE | Facility: CLINIC | Age: 62
End: 2017-06-19
Payer: COMMERCIAL

## 2017-06-19 DIAGNOSIS — J30.81 ALLERGIC RHINITIS DUE TO ANIMAL (CAT) (DOG) HAIR AND DANDER: ICD-10-CM

## 2017-06-19 PROCEDURE — 95117 IMMUNOTHERAPY INJECTIONS: CPT | Mod: S$GLB,,, | Performed by: FAMILY MEDICINE

## 2017-06-19 PROCEDURE — 99499 UNLISTED E&M SERVICE: CPT | Mod: S$GLB,,, | Performed by: FAMILY MEDICINE

## 2017-06-19 NOTE — PROGRESS NOTES
Patient in for allergy injection. Stated no new meds, no problems with last injection.    0.35  ml of 1:1  1 of 3   given SQ in  Left Upper arm. Tolerated well.   0.35 ml of  1:1  2 of 3   Given SQ in Left Lower arm. Tolerated well.  0.35 ml of  1:1  3 of 3   Given SQ in Right Upper arm. Tolerated well.    Patient assessed after 30 minutes . No reaction noted.     Patient voice no complaints

## 2017-06-26 ENCOUNTER — CLINICAL SUPPORT (OUTPATIENT)
Dept: INTERNAL MEDICINE | Facility: CLINIC | Age: 62
End: 2017-06-26
Payer: COMMERCIAL

## 2017-06-26 DIAGNOSIS — J30.81 ALLERGIC RHINITIS DUE TO ANIMAL (CAT) (DOG) HAIR AND DANDER: ICD-10-CM

## 2017-06-26 PROCEDURE — 99499 UNLISTED E&M SERVICE: CPT | Mod: S$GLB,,, | Performed by: FAMILY MEDICINE

## 2017-06-26 PROCEDURE — 95117 IMMUNOTHERAPY INJECTIONS: CPT | Mod: S$GLB,,, | Performed by: FAMILY MEDICINE

## 2017-06-26 NOTE — PROGRESS NOTES
Patient in for allergy injection. Stated no new meds, no problems with last injection.    0.40  ml of 1:1  1 of 3   given SQ in Left Upper  arm. Tolerated well.   0.40 ml of 1:1   2 of 3   Given SQ in Left Lower arm. Tolerated well.  040 ml of 1:1    3 Of 3  Given SQ in Right Mid arm.   Tolerated well.    Patient assessed after 30 minutes . No reaction noted.     Patient voice no complaints

## 2017-06-28 NOTE — PROGRESS NOTES
Patient in for allergy injection. Stated no new meds, no problems with last injection.    0.35 ml of 1:1   given SQ in Left Upper  arm. Tolerated well.     Patient assessed after 30 minutes . No reaction noted.     Patient voice no complaints

## 2017-07-03 ENCOUNTER — CLINICAL SUPPORT (OUTPATIENT)
Dept: INTERNAL MEDICINE | Facility: CLINIC | Age: 62
End: 2017-07-03
Payer: COMMERCIAL

## 2017-07-03 ENCOUNTER — OFFICE VISIT (OUTPATIENT)
Dept: FAMILY MEDICINE | Facility: CLINIC | Age: 62
End: 2017-07-03
Payer: COMMERCIAL

## 2017-07-03 VITALS
SYSTOLIC BLOOD PRESSURE: 128 MMHG | BODY MASS INDEX: 25.85 KG/M2 | WEIGHT: 180.56 LBS | HEIGHT: 70 IN | RESPIRATION RATE: 14 BRPM | OXYGEN SATURATION: 97 % | DIASTOLIC BLOOD PRESSURE: 78 MMHG | HEART RATE: 60 BPM

## 2017-07-03 DIAGNOSIS — F45.8 BRUXISM: Primary | ICD-10-CM

## 2017-07-03 DIAGNOSIS — Z29.9 PREVENTIVE MEASURE: ICD-10-CM

## 2017-07-03 DIAGNOSIS — K21.9 GASTROESOPHAGEAL REFLUX DISEASE WITHOUT ESOPHAGITIS: ICD-10-CM

## 2017-07-03 DIAGNOSIS — J30.1 SEASONAL ALLERGIC RHINITIS DUE TO POLLEN: ICD-10-CM

## 2017-07-03 PROBLEM — R35.0 URINARY FREQUENCY: Status: RESOLVED | Noted: 2017-03-15 | Resolved: 2017-07-03

## 2017-07-03 PROBLEM — R35.1 NOCTURIA: Status: RESOLVED | Noted: 2017-03-15 | Resolved: 2017-07-03

## 2017-07-03 PROCEDURE — 99214 OFFICE O/P EST MOD 30 MIN: CPT | Mod: S$GLB,,,

## 2017-07-03 PROCEDURE — 95117 IMMUNOTHERAPY INJECTIONS: CPT | Mod: S$GLB,,, | Performed by: FAMILY MEDICINE

## 2017-07-03 PROCEDURE — 99999 PR PBB SHADOW E&M-EST. PATIENT-LVL III: CPT | Mod: PBBFAC,,,

## 2017-07-03 PROCEDURE — 99499 UNLISTED E&M SERVICE: CPT | Mod: S$GLB,,, | Performed by: ALLERGY & IMMUNOLOGY

## 2017-07-03 NOTE — PROGRESS NOTES
Patient here for allergy injections.  No complaints after last injection, no new meds.    0.45mL given SQ  Of 1:1 in Upper Left arm.  1/3.  Tolerated well.  0.45mL given SQ  Of 1:1 in Lower left arm.  2/3.  Tolerated well.  0.45mL given SQ of 1:1 in Upper Right arm.  3/3  Tolerated well.    Patient advised to wait in lobby for 30 minutes.    Sites assessed after 30 minutes.  0 reaction at all three injection sites.  Patient voiced no complaints and was free to leave clinic.

## 2017-07-03 NOTE — PROGRESS NOTES
Subjective:       Patient ID: Bulmaro Nascimento is a 62 y.o. male.    Chief Complaint: Follow-up    HPI The patient presents with concerns about a false diagnosis of apnea. He had a sleep study done because of bruxism and he had an AHI of 1.6, This was a home study performed by [employer] on May 17, 2017. He does not snore or quit breathing or quit breathing.      THE EPWORTH SLEEPINESS SCALE        How likely are you to doze off or fall asleep in the following situations, in contrast to feeling just tired? This refers to your usual way of life in recent times. Even if you have not done some of these things recently try to work out how they would have affected you. Use the following scale to choose the most appropriate number for each situation:       0 = no chance of dozing   1 = slight chance of dozing   2 = moderate chance of dozing   3 = high chance of dozing             SITUATION CHANCE OF DOZING   Sitting and reading _______0_____   Watching TV _____ 1____   Sitting inactive in a public place (e.g a theater or a meeting) ____0___   As a passenger in a car for an hour without a break ______0_____   Lying down to rest in the afternoon when circumstances permit ______2_____   Sitting and talking to someone ______0______   Sitting quietly after a lunch without alcohol _____ 0______   In a car, while stopped for a few minutes in traffic _____0______           To check your sleepiness score, total the points . Check your total score to see how sleepy you are.     Score 3       His allergies are under control with allergy shots and after sinus surgery.   His GERD is under control and he is under the care of a GI.       Review of Systems   Constitutional: Negative.    Respiratory: Negative.  Negative for shortness of breath.    Cardiovascular: Negative for chest pain.   Psychiatric/Behavioral: Negative.    All other systems reviewed and are negative.      Objective:      Vitals:    07/03/17 0756   BP: 128/78    Pulse: 60   Resp: 14     Physical Exam   Constitutional: He is oriented to person, place, and time. He appears well-developed and well-nourished. He is cooperative. No distress.   HENT:   Head: Normocephalic and atraumatic.   Right Ear: Hearing, tympanic membrane, external ear and ear canal normal.   Left Ear: Hearing, external ear and ear canal normal.   Nose: Nose normal.   Mouth/Throat: Oropharynx is clear and moist.   Eyes: Conjunctivae are normal. Pupils are equal, round, and reactive to light.   Neck: Normal range of motion. Neck supple. No thyromegaly present.   Cardiovascular: Normal rate, regular rhythm, normal heart sounds and intact distal pulses.    Pulmonary/Chest: Effort normal and breath sounds normal. No respiratory distress.   Musculoskeletal: Normal range of motion. He exhibits no edema or tenderness.   Lymphadenopathy:     He has no cervical adenopathy.   Neurological: He is alert and oriented to person, place, and time. He has normal strength. Coordination and gait normal.   Skin: Skin is warm, dry and intact. No cyanosis. Nails show no clubbing.   Psychiatric: He has a normal mood and affect. His speech is normal and behavior is normal. Judgment and thought content normal. Cognition and memory are normal.   Vitals reviewed.      Assessment:       1. Bruxism    2. Seasonal allergic rhinitis due to pollen    3. Gastroesophageal reflux disease without esophagitis    4. Preventive measure        Plan:       Bruxism    Seasonal allergic rhinitis due to pollen    Gastroesophageal reflux disease without esophagitis    Preventive measure  -     zoster vaccine live, PF, (ZOSTAVAX, PF,) 19,400 unit/0.65 mL injection; Inject 19,400 Units into the skin once.  Dispense: 1 vial; Refill: 0      Return in about 6 months (around 1/3/2018), or if symptoms worsen or fail to improve.        This patient does not have obstructive sleep apnea and therefore does not need treatment for obstructive sleep apnea

## 2017-07-10 ENCOUNTER — CLINICAL SUPPORT (OUTPATIENT)
Dept: INTERNAL MEDICINE | Facility: CLINIC | Age: 62
End: 2017-07-10
Payer: COMMERCIAL

## 2017-07-10 DIAGNOSIS — J30.1 SEASONAL ALLERGIC RHINITIS DUE TO POLLEN, UNSPECIFIED CHRONICITY: ICD-10-CM

## 2017-07-10 PROCEDURE — 99499 UNLISTED E&M SERVICE: CPT | Mod: S$GLB,,, | Performed by: ALLERGY & IMMUNOLOGY

## 2017-07-10 PROCEDURE — 95117 IMMUNOTHERAPY INJECTIONS: CPT | Mod: S$GLB,,, | Performed by: FAMILY MEDICINE

## 2017-07-10 NOTE — PROGRESS NOTES
Patient here for Allergy injections.  No new meds, no problems after last injection.    0.5mL of 1:1 Red, 1 of 3 given SQ in Upper Left arm.  Tolerated well, no bleeding noted.  0.5mL of 1:1 Red, 2 of 3 given SQ in Upper Right Arm.  Tolerated well, no bleeding noted.  0.5mL of 1:1 Red, 3 of 3 given SQ in Mid-Left arm.  Tolerated well, no bleeding noted.    Sites assessed after 30 minutes.  0 reaction noted at all three sites.  No other complaints voiced.

## 2017-07-27 ENCOUNTER — TELEPHONE (OUTPATIENT)
Dept: ALLERGY | Facility: CLINIC | Age: 62
End: 2017-07-27

## 2017-07-28 ENCOUNTER — OFFICE VISIT (OUTPATIENT)
Dept: ALLERGY | Facility: CLINIC | Age: 62
End: 2017-07-28
Payer: COMMERCIAL

## 2017-07-28 VITALS
WEIGHT: 178 LBS | BODY MASS INDEX: 25.48 KG/M2 | SYSTOLIC BLOOD PRESSURE: 120 MMHG | TEMPERATURE: 98 F | DIASTOLIC BLOOD PRESSURE: 70 MMHG | HEIGHT: 70 IN

## 2017-07-28 DIAGNOSIS — J32.0 MAXILLARY SINUSITIS, UNSPECIFIED CHRONICITY: ICD-10-CM

## 2017-07-28 DIAGNOSIS — J30.1 SEASONAL ALLERGIC RHINITIS DUE TO POLLEN, UNSPECIFIED CHRONICITY: ICD-10-CM

## 2017-07-28 DIAGNOSIS — J30.89 ALLERGIC RHINITIS DUE TO OTHER ALLERGEN: Primary | ICD-10-CM

## 2017-07-28 PROCEDURE — 99999 PR PBB SHADOW E&M-EST. PATIENT-LVL III: CPT | Mod: PBBFAC,,, | Performed by: ALLERGY & IMMUNOLOGY

## 2017-07-28 PROCEDURE — 99214 OFFICE O/P EST MOD 30 MIN: CPT | Mod: S$GLB,,, | Performed by: ALLERGY & IMMUNOLOGY

## 2017-07-28 RX ORDER — FLUTICASONE PROPIONATE 50 MCG
2 SPRAY, SUSPENSION (ML) NASAL 2 TIMES DAILY
Qty: 1 BOTTLE | Refills: 11 | Status: SHIPPED | OUTPATIENT
Start: 2017-07-28 | End: 2021-05-05

## 2017-07-28 RX ORDER — CEFDINIR 300 MG/1
CAPSULE ORAL
Qty: 60 CAPSULE | Refills: 0 | Status: SHIPPED | OUTPATIENT
Start: 2017-07-28 | End: 2018-03-21

## 2017-07-28 NOTE — PROGRESS NOTES
Subjective:       Patient ID: Bulmaro Nascimento is a 62 y.o. male.    Chief Complaint: Sinus Problem    Patient is known to me from private practice and the [facility] system. He is on allergy injections monthly with significant benefit. He would like to continue allergy injections as his rate of infection has significantly declined. However, he traveled to [place] in May to visit his son and got a sinus infection while there. He took Augmentin for 10 days and his symptoms resolved 100%. 7 days ago he began with symptoms of sinusitis again. He started Augmentin that he has for when he travels. He is currently doing well and symptoms are resolving he does have 3 additional days of Augmentin to finish the full 10 day course. He states he had spoken to Edwige Allan LPN. When he got his injection about getting sick twice. He stated Edwige told him about an injection he could get to boost his immune system so he doesn't get sick frequently. He is here to discuss this treatment plan. Please see intercommunications messages 7/5/17 with Edwige Allan LPN. Reviewed with patient his chart per [facility] policy is stored at [facility]. I would be able to retrieve his chart the following Wednesday and I would call to discuss what Edwige was referring to and review his past records. Patient verbalized understanding of all the above and treatment plan. Note: patient does internatinally travel frequently and has a history of recurrent chronic sinusitis. He was given Rx for Omnicef 30 days for travel today, for use only if needed. If needed the patient will immediately notify the office.       Review of Systems   Constitutional: Negative.  Negative for activity change, appetite change, chills, diaphoresis, fatigue, fever and unexpected weight change.   HENT: Negative.  Negative for congestion, dental problem, drooling, ear discharge, ear pain, facial swelling, hearing loss, mouth sores, nosebleeds, postnasal drip, rhinorrhea, sinus  pressure, sneezing, sore throat, tinnitus, trouble swallowing and voice change.    Eyes: Negative.  Negative for photophobia, pain, discharge, redness, itching and visual disturbance.   Respiratory: Negative.  Negative for apnea, cough, choking, chest tightness, shortness of breath, wheezing and stridor.    Cardiovascular: Negative.  Negative for chest pain, palpitations and leg swelling.   Gastrointestinal: Negative.  Negative for abdominal distention, abdominal pain, constipation, diarrhea, nausea and vomiting.   Endocrine: Negative.  Negative for cold intolerance, heat intolerance, polydipsia, polyphagia and polyuria.   Genitourinary: Negative.  Negative for decreased urine volume, difficulty urinating, dysuria, enuresis, frequency and urgency.   Musculoskeletal: Negative.  Negative for arthralgias, back pain, gait problem, joint swelling, myalgias, neck pain and neck stiffness.   Skin: Negative.  Negative for color change, pallor, rash and wound.   Allergic/Immunologic: Negative.  Negative for environmental allergies, food allergies and immunocompromised state.   Neurological: Negative.  Negative for dizziness, tremors, seizures, syncope, facial asymmetry, speech difficulty, weakness, light-headedness, numbness and headaches.   Hematological: Negative.  Negative for adenopathy. Does not bruise/bleed easily.   Psychiatric/Behavioral: Negative.  Negative for agitation, behavioral problems, confusion, decreased concentration, dysphoric mood, hallucinations, self-injury, sleep disturbance and suicidal ideas. The patient is not nervous/anxious and is not hyperactive.      Objective:   Physical Exam   Constitutional: He is oriented to person, place, and time. He appears well-developed and well-nourished. He is active and cooperative.  Non-toxic appearance. He does not have a sickly appearance. He does not appear ill. No distress.   HENT:   Head: Normocephalic and atraumatic. Head is without abrasion, without right  periorbital erythema and without left periorbital erythema.   Right Ear: Hearing, tympanic membrane, external ear and ear canal normal. No lacerations. No drainage, swelling or tenderness. No foreign bodies. No mastoid tenderness. Tympanic membrane is not injected, not scarred, not perforated, not erythematous, not retracted and not bulging. Tympanic membrane mobility is normal. No middle ear effusion. No decreased hearing is noted.   Left Ear: Hearing, tympanic membrane, external ear and ear canal normal. No lacerations. No drainage, swelling or tenderness. No foreign bodies. No mastoid tenderness. Tympanic membrane is not injected, not scarred, not perforated, not erythematous, not retracted and not bulging. Tympanic membrane mobility is normal.  No middle ear effusion. No decreased hearing is noted.   Nose: Nose normal. No mucosal edema, rhinorrhea, nose lacerations, sinus tenderness, nasal deformity, septal deviation or nasal septal hematoma. No epistaxis.  No foreign bodies. Right sinus exhibits no maxillary sinus tenderness and no frontal sinus tenderness. Left sinus exhibits no maxillary sinus tenderness and no frontal sinus tenderness.   Mouth/Throat: Uvula is midline, oropharynx is clear and moist and mucous membranes are normal. He does not have dentures. No oral lesions. No trismus in the jaw. No dental abscesses, uvula swelling, lacerations or dental caries. No oropharyngeal exudate, posterior oropharyngeal edema, posterior oropharyngeal erythema or tonsillar abscesses. No tonsillar exudate.   Eyes: Conjunctivae, EOM and lids are normal. Pupils are equal, round, and reactive to light. Right eye exhibits no chemosis, no discharge and no exudate. Left eye exhibits no chemosis, no discharge and no exudate. Right conjunctiva is not injected. Right conjunctiva has no hemorrhage. Left conjunctiva is not injected. Left conjunctiva has no hemorrhage. No scleral icterus.   Neck: Trachea normal, normal range of  motion, full passive range of motion without pain and phonation normal. No tracheal tenderness and no muscular tenderness present. No neck rigidity. No tracheal deviation, no edema, no erythema and normal range of motion present. No thyroid mass and no thyromegaly present.   Cardiovascular: Normal rate, regular rhythm, normal heart sounds and normal pulses.  Exam reveals no gallop and no friction rub.    No murmur heard.  Pulmonary/Chest: Effort normal and breath sounds normal. No accessory muscle usage or stridor. No apnea, no tachypnea and no bradypnea. He has no decreased breath sounds. He has no wheezes. He has no rhonchi. He has no rales. He exhibits no tenderness.   Abdominal: Soft. Normal appearance and bowel sounds are normal. He exhibits no distension. There is no tenderness. There is no rigidity, no rebound, no guarding and no CVA tenderness.   Musculoskeletal: Normal range of motion. He exhibits no edema, tenderness or deformity.   Lymphadenopathy:        Head (right side): No submental, no submandibular, no tonsillar, no preauricular, no posterior auricular and no occipital adenopathy present.        Head (left side): No submental, no submandibular, no tonsillar, no preauricular, no posterior auricular and no occipital adenopathy present.     He has no cervical adenopathy.        Right cervical: No superficial cervical, no deep cervical and no posterior cervical adenopathy present.       Left cervical: No superficial cervical, no deep cervical and no posterior cervical adenopathy present.        Right: No supraclavicular and no epitrochlear adenopathy present.        Left: No supraclavicular and no epitrochlear adenopathy present.   Neurological: He is alert and oriented to person, place, and time. He has normal strength. He is not disoriented. No cranial nerve deficit. He exhibits normal muscle tone. Coordination and gait normal.   Skin: Skin is warm and dry. No abrasion, no bruising, no laceration, no  lesion, no petechiae, no purpura and no rash noted. Rash is not macular, not papular, not maculopapular, not nodular, not pustular, not vesicular and not urticarial. He is not diaphoretic. No cyanosis or erythema. No pallor. Nails show no clubbing.   Psychiatric: He has a normal mood and affect. His speech is normal and behavior is normal. Judgment and thought content normal. His mood appears not anxious. His affect is not inappropriate. Cognition and memory are normal. He does not express impulsivity or inappropriate judgment. He expresses no homicidal and no suicidal ideation. He is attentive.   Nursing note and vitals reviewed.    Assessment:     1. Allergic rhinitis due to other allergen    2. Maxillary sinusitis, unspecified chronicity    3. Seasonal allergic rhinitis due to pollen, unspecified chronicity      Plan:   Bulmaro was seen today for sinus problem.    Diagnoses and all orders for this visit:    Allergic rhinitis due to other allergen  -     fluticasone (FLONASE) 50 mcg/actuation nasal spray; 2 sprays by Each Nare route 2 (two) times daily.    Maxillary sinusitis, unspecified chronicity  -     cefdinir (OMNICEF) 300 MG capsule; 2 tabs orally every 24 hours for 30 days    Seasonal allergic rhinitis due to pollen, unspecified chronicity      Return in about 3 months (around 10/28/2017) for as long as well, sooner if symptomatic.    Discussed options and strategies  Reviewed medications risk v. Benefit  Reviewed pathophysiology  All questions answered  Emotional support provided  Pt verbalized understanding of all the above and treatment plan.      XI Alcantara

## 2017-08-08 ENCOUNTER — TELEPHONE (OUTPATIENT)
Dept: ALLERGY | Facility: CLINIC | Age: 62
End: 2017-08-08

## 2017-08-08 NOTE — TELEPHONE ENCOUNTER
Reviewed with patient prior lab results of immune work up in 2009 from private practice. At that time patient IgG, IgM, IgA was normal. IgE was elevated consistent with allergic rhinitis. Hib, Tetanus, Diptheria titers were protective. Pneumococcal titer initially were not protective. Patient was given the pneumovax repeat titers post vaccination were protective.  In addition at that time he had chronic sinusitis with abnormal limited CT Scan of the sinus. The patient had sinus surgery which was successful. Symptoms of recurrent sinusitis resolved.    Currently Patient has had 2 episodes of sinusitis one occurring with over seas travel the second upon return to the [place] after being at a public venue. Both episodes of sinusitis resolved with 10 day course of antibiotics. It is normal for a person to have 1-2 infections a year.  Currently patient is feeling 100% well and all symptoms have resolved 100%. Discussed with patient should he have increased frequency of infection, difficult to resolve infection requiring longer courses of antibiotic. We should then consider repeat immune studies and limited CT scan of the sinus, if indicated. However at this time his symptoms do not give suspicion of immune dysfunction.    Patient verbalized understanding of the above. He agreed he felt comfortable evaluating symptoms for the rest of the year. Should things change we can then order additional immune lab work up and limited CT Scan of sinus.    Emotional support provided to patient with reassurance.    Alicia

## 2017-08-09 ENCOUNTER — CLINICAL SUPPORT (OUTPATIENT)
Dept: INTERNAL MEDICINE | Facility: CLINIC | Age: 62
End: 2017-08-09
Payer: COMMERCIAL

## 2017-08-09 DIAGNOSIS — J30.1 SEASONAL ALLERGIC RHINITIS DUE TO POLLEN, UNSPECIFIED CHRONICITY: ICD-10-CM

## 2017-08-09 PROCEDURE — 99499 UNLISTED E&M SERVICE: CPT | Mod: S$GLB,,, | Performed by: ALLERGY & IMMUNOLOGY

## 2017-08-09 PROCEDURE — 95117 IMMUNOTHERAPY INJECTIONS: CPT | Mod: S$GLB,,, | Performed by: FAMILY MEDICINE

## 2017-08-09 NOTE — PROGRESS NOTES
Patient here for Allergy injections.  No new meds, no problems after last injection.    0.5mL of 1:1 Red, 1 of 3 given SQ in Upper Left arm.  Tolerated well, no bleeding noted.  0.5mL of 1:1 Red, 2 of 3 given SQ in Upper Right Arm.  Tolerated well, no bleeding noted.  0.5mL of 1:1 Red, 3 of 3 given SQ in Mid-Left arm.  Tolerated well, no bleeding noted.    Sites assessed after 30 minutes.    1/3  Upper Left Arm, 2+ induration, no wheal, no erythema.  2/3  Upper Right Arm, 1+ induration, no wheal, no erythema.  3/3  Lower Left Arm, 1+ inudration, no wheal, no erythema.    No complaints voiced.

## 2017-09-08 ENCOUNTER — CLINICAL SUPPORT (OUTPATIENT)
Dept: INTERNAL MEDICINE | Facility: CLINIC | Age: 62
End: 2017-09-08
Payer: COMMERCIAL

## 2017-09-08 DIAGNOSIS — J30.1 SEASONAL ALLERGIC RHINITIS DUE TO POLLEN, UNSPECIFIED CHRONICITY: ICD-10-CM

## 2017-09-08 PROCEDURE — 95117 IMMUNOTHERAPY INJECTIONS: CPT | Mod: S$GLB,,, | Performed by: FAMILY MEDICINE

## 2017-09-08 PROCEDURE — 99499 UNLISTED E&M SERVICE: CPT | Mod: S$GLB,,, | Performed by: ALLERGY & IMMUNOLOGY

## 2017-09-08 NOTE — PROGRESS NOTES
Patient here for Allergy injections.  No new meds, no problems after last injection.    0.5mL of 1:1 Red, 1 of 3 given SQ in Upper Left arm.  Tolerated well, no bleeding noted.  0.5mL of 1:1 Red, 2 of 3 given SQ in Upper Right Arm.  Tolerated well, no bleeding noted.  0.5mL of 1:1 Red, 3 of 3 given SQ in Mid-Left arm.  Tolerated well, no bleeding noted.    Sites assessed after 30 minutes.    1/3  Upper Left Arm, 0 reaction noted.  2/3  Upper Right Arm, 0 reaction noted.  3/3  Lower Left Arm, o reaction noted.    No complaints voiced.

## 2017-09-29 RX ORDER — TADALAFIL 5 MG/1
5 TABLET ORAL DAILY PRN
Qty: 90 TABLET | Refills: 3 | Status: SHIPPED | OUTPATIENT
Start: 2017-09-29 | End: 2018-12-19 | Stop reason: SDUPTHER

## 2017-10-13 ENCOUNTER — CLINICAL SUPPORT (OUTPATIENT)
Dept: INTERNAL MEDICINE | Facility: CLINIC | Age: 62
End: 2017-10-13
Payer: COMMERCIAL

## 2017-10-13 DIAGNOSIS — J30.1 SEASONAL ALLERGIC RHINITIS DUE TO POLLEN, UNSPECIFIED CHRONICITY: ICD-10-CM

## 2017-10-13 PROCEDURE — 99499 UNLISTED E&M SERVICE: CPT | Mod: S$GLB,,, | Performed by: ALLERGY & IMMUNOLOGY

## 2017-10-13 PROCEDURE — 95117 IMMUNOTHERAPY INJECTIONS: CPT | Mod: S$GLB,,, | Performed by: FAMILY MEDICINE

## 2017-10-13 NOTE — PROGRESS NOTES
Patient here for Allergy injections.  No new meds, no problems after last injection.    0.5mL of 1:1 Red, 1 of 3 given SQ in Upper Left arm.  Tolerated well, no bleeding noted.  0.5mL of 1:1 Red, 2 of 3 given SQ in Mid Left Arm.  Tolerated well, no bleeding noted.  0.5mL of 1:1 Red, 3 of 3 given SQ in Upper Right arm.  Tolerated well, no bleeding noted.    Sites assessed after 30 minutes.    1/3  Upper Left Arm, 0 reaction noted.  2/3  Mid left arm 0 reaction noted.  3/3  Upper right arm, o reaction noted.    No complaints voiced.

## 2017-11-01 ENCOUNTER — PATIENT MESSAGE (OUTPATIENT)
Dept: FAMILY MEDICINE | Facility: CLINIC | Age: 62
End: 2017-11-01

## 2017-11-02 ENCOUNTER — PATIENT MESSAGE (OUTPATIENT)
Dept: FAMILY MEDICINE | Facility: CLINIC | Age: 62
End: 2017-11-02

## 2017-11-03 RX ORDER — ZOLPIDEM TARTRATE 5 MG/1
5 TABLET ORAL NIGHTLY PRN
Qty: 30 TABLET | Refills: 2 | Status: SHIPPED | OUTPATIENT
Start: 2017-11-03 | End: 2018-03-21

## 2017-11-13 ENCOUNTER — CLINICAL SUPPORT (OUTPATIENT)
Dept: INTERNAL MEDICINE | Facility: CLINIC | Age: 62
End: 2017-11-13
Payer: COMMERCIAL

## 2017-11-13 DIAGNOSIS — J30.1 SEASONAL ALLERGIC RHINITIS DUE TO POLLEN, UNSPECIFIED CHRONICITY: ICD-10-CM

## 2017-11-13 PROCEDURE — 99499 UNLISTED E&M SERVICE: CPT | Mod: S$GLB,,, | Performed by: ALLERGY & IMMUNOLOGY

## 2017-11-13 PROCEDURE — 95117 IMMUNOTHERAPY INJECTIONS: CPT | Mod: S$GLB,,, | Performed by: FAMILY MEDICINE

## 2017-11-13 NOTE — PROGRESS NOTES
Patient here for Allergy injections.  No new meds, no problems after last injection.    0.5mL of 1:1 Red, 1 of 3 given SQ in mid right arm.  Tolerated well, no bleeding noted.  0.5mL of 1:1 Red, 2 of 3 given SQ in Upper left Arm.  Tolerated well, no bleeding noted.  0.5mL of 1:1 Red, 3 of 3 given SQ in Mid left arm.  Tolerated well, no bleeding noted.    Sites assessed after 30 minutes.    1/3  Mid right  0 reaction noted.  2/3  Upper left arm 1+ reaction noted.  3/3  Mid left arm,0 reaction noted.    No complaints voiced.

## 2017-12-06 ENCOUNTER — PATIENT MESSAGE (OUTPATIENT)
Dept: FAMILY MEDICINE | Facility: CLINIC | Age: 62
End: 2017-12-06

## 2017-12-06 DIAGNOSIS — M79.604 RIGHT LEG PAIN: Primary | ICD-10-CM

## 2017-12-11 ENCOUNTER — CLINICAL SUPPORT (OUTPATIENT)
Dept: INTERNAL MEDICINE | Facility: CLINIC | Age: 62
End: 2017-12-11
Payer: COMMERCIAL

## 2017-12-11 DIAGNOSIS — J30.1 SEASONAL ALLERGIC RHINITIS DUE TO POLLEN, UNSPECIFIED CHRONICITY: ICD-10-CM

## 2017-12-11 PROCEDURE — 99499 UNLISTED E&M SERVICE: CPT | Mod: S$GLB,,, | Performed by: ALLERGY & IMMUNOLOGY

## 2017-12-11 PROCEDURE — 95117 IMMUNOTHERAPY INJECTIONS: CPT | Mod: S$GLB,,, | Performed by: FAMILY MEDICINE

## 2017-12-11 NOTE — PROGRESS NOTES
Patient here for Allergy injections.  No new meds, no problems after last injection.    No complaints voiced    0.5mL of 1:1 Red, 1 of 3 given SQ in left upper arm  Tolerated well, no bleeding noted.  0.5mL of 1:1 Red, 2 of 3 given SQ in left mid arm.  Tolerated well, no bleeding noted.  0.5mL of 1:1 Red, 3 of 3 given SQ in right upper arm  Tolerated well, no bleeding noted.    After 30 minutes of observation, no reactions noted from either site.          .

## 2017-12-12 ENCOUNTER — OFFICE VISIT (OUTPATIENT)
Dept: SPORTS MEDICINE | Facility: CLINIC | Age: 62
End: 2017-12-12
Payer: COMMERCIAL

## 2017-12-12 ENCOUNTER — HOSPITAL ENCOUNTER (OUTPATIENT)
Dept: RADIOLOGY | Facility: HOSPITAL | Age: 62
Discharge: HOME | End: 2017-12-12
Attending: ORTHOPAEDIC SURGERY
Payer: COMMERCIAL

## 2017-12-12 VITALS
DIASTOLIC BLOOD PRESSURE: 68 MMHG | SYSTOLIC BLOOD PRESSURE: 148 MMHG | BODY MASS INDEX: 25.48 KG/M2 | HEART RATE: 59 BPM | WEIGHT: 178 LBS | HEIGHT: 70 IN

## 2017-12-12 DIAGNOSIS — M25.561 RIGHT KNEE PAIN, UNSPECIFIED CHRONICITY: ICD-10-CM

## 2017-12-12 DIAGNOSIS — M79.671 RIGHT FOOT PAIN: ICD-10-CM

## 2017-12-12 DIAGNOSIS — M17.11 PRIMARY OSTEOARTHRITIS OF RIGHT KNEE: ICD-10-CM

## 2017-12-12 DIAGNOSIS — M25.561 RIGHT KNEE PAIN, UNSPECIFIED CHRONICITY: Primary | ICD-10-CM

## 2017-12-12 DIAGNOSIS — M19.079 1ST MTP ARTHRITIS: ICD-10-CM

## 2017-12-12 DIAGNOSIS — M11.20 CHONDROCALCINOSIS: ICD-10-CM

## 2017-12-12 PROCEDURE — 99204 OFFICE O/P NEW MOD 45 MIN: CPT | Mod: S$GLB,,, | Performed by: ORTHOPAEDIC SURGERY

## 2017-12-12 PROCEDURE — 73564 X-RAY EXAM KNEE 4 OR MORE: CPT | Mod: 26,RT,, | Performed by: RADIOLOGY

## 2017-12-12 PROCEDURE — 73630 X-RAY EXAM OF FOOT: CPT | Mod: 26,RT,, | Performed by: RADIOLOGY

## 2017-12-12 PROCEDURE — 73562 X-RAY EXAM OF KNEE 3: CPT | Mod: 26,59,LT, | Performed by: RADIOLOGY

## 2017-12-12 PROCEDURE — 73630 X-RAY EXAM OF FOOT: CPT | Mod: TC,PO,RT

## 2017-12-12 PROCEDURE — 73564 X-RAY EXAM KNEE 4 OR MORE: CPT | Mod: TC,PO,RT

## 2017-12-12 PROCEDURE — 99999 PR PBB SHADOW E&M-EST. PATIENT-LVL III: CPT | Mod: PBBFAC,,, | Performed by: ORTHOPAEDIC SURGERY

## 2017-12-12 RX ORDER — INDOMETHACIN 75 MG/1
75 CAPSULE, EXTENDED RELEASE ORAL 2 TIMES DAILY WITH MEALS
Qty: 60 CAPSULE | Refills: 2 | Status: SHIPPED | OUTPATIENT
Start: 2017-12-12 | End: 2019-01-08

## 2017-12-12 NOTE — PROGRESS NOTES
"CC: Right knee and Right toe pain    62 y.o. Male who presents as a new patient to me. [employer] . Complaint today is right knee pain x 3 weeks with atraumatic onset. He swims 3xWeek for 30 minutes and believes he has "overdone it". Uses fins when he swims. He stopped swimming 2wk ago due to pain. Diffuse and achey in nature. No pain with daily activity otherwise, but general soreness afterwards. Better with rest. Denies injection history to the right knee. History of BL Knee arthroscopic debridements - years ago.  Current pain is more arthritic in nature. He also complains of pain along the dorsal 1st MTPJ that began 3 weeks ago as well. This pain has become daily with walking.  Partially attributed to use a fins when swimming. Here today to discuss diagnosis and treatment options.      Negative Mechanical symptoms  Negative Subjective instability.    Negative for smoking.   Negative for diabetes.     Pain Score:   5    REVIEW OF SYSTEMS:   Constitution: Negative. Negative for chills, fever and night sweats.    Hematologic/Lymphatic: Negative for bleeding problem. Does not bruise/bleed easily.   Skin: Negative for dry skin, itching and rash.   Musculoskeletal: Negative for falls. Positive for right knee and great toe pain.     PAST MEDICAL HISTORY:   Past Medical History:   Diagnosis Date    Allergy     Anxiety 7/8/2015    Bruxism     Gastroesophageal reflux disease without esophagitis 6/8/2015       PAST SURGICAL HISTORY:   Past Surgical History:   Procedure Laterality Date    COLONOSCOPY      KNEE SURGERY Bilateral     SHOULDER SURGERY Right     SINUS SURGERY         FAMILY HISTORY:   Family History   Problem Relation Age of Onset    Thyroid disease Mother     Diabetes Father     Cancer Father      Prostate     Heart disease Neg Hx        SOCIAL HISTORY:   Social History     Social History    Marital status:      Spouse name: N/A    Number of children: N/A    Years " "of education: N/A     Occupational History    Not on file.     Social History Main Topics    Smoking status: Never Smoker    Smokeless tobacco: Not on file    Alcohol use 0.6 oz/week     1 Cans of beer per week      Comment: socially 2 per month    Drug use: No    Sexual activity: Not Currently     Partners: Female     Other Topics Concern    Not on file     Social History Narrative    Lives in [place] alone. 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 He swims for 30 minutes about 3 days or 4 days a weeks.        MEDICATIONS:     Current Outpatient Prescriptions:     cefdinir (OMNICEF) 300 MG capsule, 2 tabs orally every 24 hours for 30 days, Disp: 60 capsule, Rfl: 0    esomeprazole (NEXIUM) 40 MG capsule, Take 1 capsule (40 mg total) by mouth before breakfast., Disp: 90 capsule, Rfl: 0    fluticasone (FLONASE) 50 mcg/actuation nasal spray, 2 sprays by Each Nare route 2 (two) times daily., Disp: 1 Bottle, Rfl: 11    indomethacin (INDOCIN SR) 75 mg CpSR CR capsule, Take 1 capsule (75 mg total) by mouth 2 (two) times daily with meals., Disp: 60 capsule, Rfl: 2    lorazepam (ATIVAN) 1 MG tablet, Take 1 tablet (1 mg total) by mouth every evening., Disp: 90 tablet, Rfl: 1    metaxalone (SKELAXIN) 800 MG tablet, Take 1 tablet (800 mg total) by mouth as needed., Disp: 30 tablet, Rfl: 11    tadalafil (CIALIS) 5 MG tablet, Take 1 tablet (5 mg total) by mouth daily as needed for Erectile Dysfunction., Disp: 90 tablet, Rfl: 3    zolpidem (AMBIEN) 5 MG Tab, Take 1 tablet (5 mg total) by mouth nightly as needed., Disp: 30 tablet, Rfl: 2    Current Facility-Administered Medications:     Allergy Mix, , Subcutaneous, 1 time in Clinic/HOD, Lacy Perez MD    ALLERGIES:   Review of patient's allergies indicates:  No Known Allergies     PHYSICAL EXAMINATION:  BP (!) 148/68   Pulse (!) 59   Ht 5' 10" (1.778 m)   Wt 80.7 kg (178 lb)   BMI 25.54 kg/m² "   General: Well-developed well-nourished 62 y.o. malein no acute distress   Cardiovascular: Regular rhythm by palpation of distal pulse, normal color and temperature, no concerning varicosities on symptomatic side   Lungs: No labored breathing or wheezing appreciated   Neuro: Alert and oriented ×3   Psychiatric: well oriented to person, place and time, demonstrates normal mood and affect   Skin: No rashes, lesions or ulcers, normal temperature, turgor, and texture on involved extremity    Ortho/SPM Exam   Focal examination of the right knee demonstrates no swelling or effusion.  Good quadriceps bulk and strength.  Neutral standing alignment.  Active range of motion is full from 0-135°.  Ligamentously stable.  Central patellofemoral tracking.  Negative patellar grind.  Minimal tenderness over the medial and lateral joint spaces.  Negative Slava's test.  Negative Apley grind test.    Physical examination of the right forefoot and great toe demonstrates swelling, bony prominence and mild erythema over the dorsal first MTP joint.  Pain with passive range of motion at the terminal limits only otherwise denies mid range pain.  Positive first MTP grind test.  Mild tenderness over the dorsal bony prominence.  Ligament is stable.  No tenderness over the plantar plate.  Pes planus on standing    IMAGING:    X-rays including standing, weight bearing AP and flexion bilateral knees, RIGHT knee lateral and sunrise views and 3 standing views of the foot were ordered and images reviewed by me show:    Mild to moderate tricompartmental degenerative arthritis with chondrocalcinosis    X-rays of the right foot were obtained today and reviewed by me showing: Degenerative arthritis of the first MTP joint with joint space loss greater than 50%, small dorsal osteophyte; pes planus, mild hallux valgus      ASSESSMENT:      ICD-10-CM ICD-9-CM   1. Right knee pain, unspecified chronicity M25.561 719.46   2. Right foot pain M79.671 729.5    3. 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 Primary osteoarthritis of right knee M17.11 715.16   5. Chondrocalcinosis M11.20 275.49     712.30     PLAN:     Findings were discussed with the patient.  In regards to his knee, treatment options were reviewed.  No concern for symptomatic unstable meniscus pathology at this time. We discussed the potential therapeutic benefit of steroid injection.  Also discussed modification of exercise activity to include low-impact cardio exercise - elliptical, stationary or mobile bike, or swimming without fins.  The patient elects to use a stronger anti-inflammatory medication with GI precautions.  Can consider therapeutic injection in the future.    In regards to his great toe, treatment options also discussed.  Both operative and nonoperative.  We have elected for a Salgado's extension rigid orthotic insert, oral anti-inflammatory medication, and modification of exercise activity to include discontinuation of his swelling fins.  Can consider injection in the future.     All questions answered.  I'll see him back in 6-8 weeks as needed.        Procedures

## 2018-01-02 DIAGNOSIS — F45.8 BRUXISM: ICD-10-CM

## 2018-01-02 RX ORDER — LORAZEPAM 1 MG/1
1 TABLET ORAL NIGHTLY
Qty: 90 TABLET | Refills: 1 | Status: SHIPPED | OUTPATIENT
Start: 2018-01-02 | End: 2018-03-21 | Stop reason: SDUPTHER

## 2018-01-15 ENCOUNTER — CLINICAL SUPPORT (OUTPATIENT)
Dept: INTERNAL MEDICINE | Facility: CLINIC | Age: 63
End: 2018-01-15
Payer: COMMERCIAL

## 2018-01-15 DIAGNOSIS — J30.1 SEASONAL ALLERGIC RHINITIS DUE TO POLLEN, UNSPECIFIED CHRONICITY: ICD-10-CM

## 2018-01-15 PROCEDURE — 99499 UNLISTED E&M SERVICE: CPT | Mod: S$GLB,,, | Performed by: ALLERGY & IMMUNOLOGY

## 2018-01-15 PROCEDURE — 95117 IMMUNOTHERAPY INJECTIONS: CPT | Mod: S$GLB,,, | Performed by: FAMILY MEDICINE

## 2018-01-22 NOTE — PROGRESS NOTES
CC: Right knee and Right toe pain    62 y.o. Male who returns today as follow up. [employer] . LOV his history and exam was consistent with 1st MTP arthritis and R Knee OA. He is taking the indomethacin PRN. D/C'd prior swimming activities and is doing a low-impact cardio regiment. Right MTP arthritis is his main complaint today which has become a daily issue with walking; achey in nature.  No Salgado's extension orthotic, although previously discussed. Right knee pain is tolerable, but the discomfort is daily and generally medially based pain. Both complaints better with rest. Denies injection history to the right knee or toe. History of BL Knee arthroscopic debridements - years ago.     Negative for smoking.   Negative for diabetes.     REVIEW OF SYSTEMS:   Constitution: Negative. Negative for chills, fever and night sweats.    Hematologic/Lymphatic: Negative for bleeding problem. Does not bruise/bleed easily.   Skin: Negative for dry skin, itching and rash.   Musculoskeletal: Negative for falls. Positive for right knee and great toe pain.     PAST MEDICAL HISTORY:   Past Medical History:   Diagnosis Date    Allergy     Anxiety 7/8/2015    Bruxism     Gastroesophageal reflux disease without esophagitis 6/8/2015       PAST SURGICAL HISTORY:   Past Surgical History:   Procedure Laterality Date    COLONOSCOPY      KNEE SURGERY Bilateral     SHOULDER SURGERY Right     SINUS SURGERY         FAMILY HISTORY:   Family History   Problem Relation Age of Onset    Thyroid disease Mother     Diabetes Father     Cancer Father      Prostate     Heart disease Neg Hx        SOCIAL HISTORY:   Social History     Social History    Marital status:      Spouse name: N/A    Number of children: N/A    Years of education: N/A     Occupational History    Not on file.     Social History Main Topics    Smoking status: Never Smoker    Smokeless tobacco: Not on file    Alcohol use 0.6 oz/week  "    1 Cans of beer per week      Comment: socially 2 per month    Drug use: No    Sexual activity: Not Currently     Partners: Female     Other Topics Concern    Not on file     Social History Narrative    Lives in [place] alone. 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 He swims for 30 minutes about 3 days or 4 days a weeks.        MEDICATIONS:     Current Outpatient Prescriptions:     cefdinir (OMNICEF) 300 MG capsule, 2 tabs orally every 24 hours for 30 days, Disp: 60 capsule, Rfl: 0    esomeprazole (NEXIUM) 40 MG capsule, Take 1 capsule (40 mg total) by mouth before breakfast., Disp: 90 capsule, Rfl: 0    fluticasone (FLONASE) 50 mcg/actuation nasal spray, 2 sprays by Each Nare route 2 (two) times daily., Disp: 1 Bottle, Rfl: 11    indomethacin (INDOCIN SR) 75 mg CpSR CR capsule, Take 1 capsule (75 mg total) by mouth 2 (two) times daily with meals., Disp: 60 capsule, Rfl: 2    LORazepam (ATIVAN) 1 MG tablet, Take 1 tablet (1 mg total) by mouth every evening., Disp: 90 tablet, Rfl: 1    metaxalone (SKELAXIN) 800 MG tablet, Take 1 tablet (800 mg total) by mouth as needed., Disp: 30 tablet, Rfl: 11    tadalafil (CIALIS) 5 MG tablet, Take 1 tablet (5 mg total) by mouth daily as needed for Erectile Dysfunction., Disp: 90 tablet, Rfl: 3    zolpidem (AMBIEN) 5 MG Tab, Take 1 tablet (5 mg total) by mouth nightly as needed., Disp: 30 tablet, Rfl: 2    Current Facility-Administered Medications:     Allergy Mix, , Subcutaneous, 1 time in Clinic/HOD, Lacy Perez MD    ALLERGIES:   Review of patient's allergies indicates:  No Known Allergies     PHYSICAL EXAMINATION:  Ht 5' 10" (1.778 m)   Wt 80.7 kg (178 lb)   BMI 25.54 kg/m²   General: Well-developed well-nourished 62 y.o. malein no acute distress   Cardiovascular: Regular rhythm by palpation of distal pulse, normal color and temperature, no concerning varicosities on symptomatic side "   Lungs: No labored breathing or wheezing appreciated   Neuro: Alert and oriented ×3   Psychiatric: well oriented to person, place and time, demonstrates normal mood and affect   Skin: No rashes, lesions or ulcers, normal temperature, turgor, and texture on involved extremity    Ortho/SPM Exam   Focal examination of the right knee demonstrates no swelling or effusion.  Good quadriceps bulk and strength.  Neutral standing alignment.  Active range of motion is full from 0-135°.  Ligamentously stable.  Central patellofemoral tracking.  Negative patellar grind.  Moderate tenderness over the medial joint line.  Positive Slava's test for medially based pain.  No mechanical symptoms    Physical examination of the right forefoot and great toe demonstrates swelling, bony prominence and mild erythema over the dorsal first MTP joint.  Pain with passive range of motion at the terminal limits only otherwise denies mid range pain.  Positive first MTP grind test.  Mild tenderness over the dorsal bony prominence.  Ligament is stable.  No tenderness over the plantar plate.  Pes planus on standing    IMAGING:    X-rays including standing, weight bearing AP and flexion bilateral knees, RIGHT knee lateral and sunrise views and 3 standing views of the foot were ordered and images reviewed by me show:    Mild to moderate tricompartmental degenerative arthritis with chondrocalcinosis    X-rays of the right foot were obtained today and reviewed by me showing:    Degenerative arthritis of the first MTP joint with joint space loss greater than 50%, small dorsal osteophyte; pes planus, mild hallux valgus    ASSESSMENT:      ICD-10-CM ICD-9-CM   1. 1st MTP arthritis M19.079 716.97   2. Primary osteoarthritis of right knee M17.11 715.16   3. Pseudogout of knee, right M11.261 275.49     712.16         PLAN:     Again discussed treatment options for both the right knee and right first MTP joint, both operative and nonoperative.  Discussed  potential utility of MRI for the right knee.  He wishes to proceed with a steroid injection in the knee with continued activity modification and quadriceps strengthening exercises.  In regards to his great toe, he will follow up on the Salgado's extension orthotic and a local steroid injection was provided at that joint as well.  If he continues to have problems with the toe, I will get him over to see our foot and ankle specialist Dr. Suraj Clark to discuss surgical options.    As discussed before, swimming activities with fins in particular are not recommended given his first MTP diagnosis.      Large Joint Aspiration/Injection  Date/Time: 1/23/2018 11:27 AM  Performed by: RIANA WEI  Authorized by: RIANA WEI     Consent Done?:  Yes (Verbal)  Indications:  Pain  Procedure site marked: Yes    Timeout: Prior to procedure the correct patient, procedure, and site was verified      Location:  Knee  Site:  R knee  Prep: Patient was prepped and draped in usual sterile fashion    Ultrasonic Guidance for needle placement: No  Needle size:  22 G  Approach:  Lateral  Medications:  40 mg triamcinolone acetonide 40 mg/mL  Patient tolerance:  Patient tolerated the procedure well with no immediate complications  Small Joint Injection - Right 1st MTP joint  Date/Time: 1/23/2018 11:27 AM  Performed by: RIANA WEI  Authorized by: RIANA WEI     Indications:  Pain  Site marked: The procedure site was marked    Timeout: Prior to procedure the correct patient, procedure, and site was verified      Location:  Great toe  Site:  R great MTP  Prep: Patient was prepped and draped in usual sterile fashion    Needle size:  25 G  Approach:  Dorsal  Medications:  10 mg triamcinolone acetonide 10 mg/mL; 10 mg triamcinolone acetonide 10 mg/mL  Patient tolerance:  Patient tolerated the procedure well with no immediate complications

## 2018-01-23 ENCOUNTER — OFFICE VISIT (OUTPATIENT)
Dept: SPORTS MEDICINE | Facility: CLINIC | Age: 63
End: 2018-01-23
Payer: COMMERCIAL

## 2018-01-23 VITALS — WEIGHT: 178 LBS | BODY MASS INDEX: 25.48 KG/M2 | HEIGHT: 70 IN

## 2018-01-23 DIAGNOSIS — M17.11 PRIMARY OSTEOARTHRITIS OF RIGHT KNEE: ICD-10-CM

## 2018-01-23 DIAGNOSIS — M19.079 1ST MTP ARTHRITIS: Primary | ICD-10-CM

## 2018-01-23 DIAGNOSIS — M11.261 PSEUDOGOUT OF KNEE, RIGHT: ICD-10-CM

## 2018-01-23 PROCEDURE — 20610 DRAIN/INJ JOINT/BURSA W/O US: CPT | Mod: RT,S$GLB,, | Performed by: ORTHOPAEDIC SURGERY

## 2018-01-23 PROCEDURE — 99999 PR PBB SHADOW E&M-EST. PATIENT-LVL III: CPT | Mod: PBBFAC,,, | Performed by: ORTHOPAEDIC SURGERY

## 2018-01-23 PROCEDURE — 20600 DRAIN/INJ JOINT/BURSA W/O US: CPT | Mod: 51,T5,S$GLB, | Performed by: ORTHOPAEDIC SURGERY

## 2018-01-23 PROCEDURE — 99213 OFFICE O/P EST LOW 20 MIN: CPT | Mod: 25,S$GLB,, | Performed by: ORTHOPAEDIC SURGERY

## 2018-01-23 RX ORDER — TRIAMCINOLONE ACETONIDE 40 MG/ML
40 INJECTION, SUSPENSION INTRA-ARTICULAR; INTRAMUSCULAR
Status: DISCONTINUED | OUTPATIENT
Start: 2018-01-23 | End: 2018-01-23 | Stop reason: HOSPADM

## 2018-01-23 RX ADMIN — TRIAMCINOLONE ACETONIDE 40 MG: 40 INJECTION, SUSPENSION INTRA-ARTICULAR; INTRAMUSCULAR at 11:01

## 2018-02-05 ENCOUNTER — TELEPHONE (OUTPATIENT)
Dept: FAMILY MEDICINE | Facility: CLINIC | Age: 63
End: 2018-02-05

## 2018-02-05 ENCOUNTER — PATIENT MESSAGE (OUTPATIENT)
Dept: ALLERGY | Facility: CLINIC | Age: 63
End: 2018-02-05

## 2018-02-05 DIAGNOSIS — J30.1 ACUTE SEASONAL ALLERGIC RHINITIS DUE TO POLLEN: Primary | ICD-10-CM

## 2018-02-05 NOTE — TELEPHONE ENCOUNTER
Pt requesting annual allergy referral:   Lacy Perez MD @ [facility]  Dx: allergy vaccines

## 2018-02-07 ENCOUNTER — OFFICE VISIT (OUTPATIENT)
Dept: ALLERGY | Facility: CLINIC | Age: 63
End: 2018-02-07
Payer: COMMERCIAL

## 2018-02-07 DIAGNOSIS — B34.9 ACUTE VIRAL SYNDROME: Primary | ICD-10-CM

## 2018-02-07 DIAGNOSIS — J30.1 CHRONIC SEASONAL ALLERGIC RHINITIS DUE TO POLLEN: Chronic | ICD-10-CM

## 2018-02-07 DIAGNOSIS — H69.93 DISORDER OF BOTH EUSTACHIAN TUBES: ICD-10-CM

## 2018-02-07 DIAGNOSIS — Z51.6 ALLERGY DESENSITIZATION THERAPY: Chronic | ICD-10-CM

## 2018-02-07 DIAGNOSIS — R51.9 FREQUENT HEADACHES: ICD-10-CM

## 2018-02-07 DIAGNOSIS — J30.81 CHRONIC ALLERGIC RHINITIS DUE TO ANIMAL HAIR AND DANDER: Chronic | ICD-10-CM

## 2018-02-07 DIAGNOSIS — J30.2 CHRONIC SEASONAL ALLERGIC RHINITIS DUE TO FUNGAL SPORES: Chronic | ICD-10-CM

## 2018-02-07 PROCEDURE — 99999 PR PBB SHADOW E&M-EST. PATIENT-LVL II: CPT | Mod: PBBFAC,,, | Performed by: ALLERGY & IMMUNOLOGY

## 2018-02-07 PROCEDURE — 3008F BODY MASS INDEX DOCD: CPT | Mod: S$GLB,,, | Performed by: ALLERGY & IMMUNOLOGY

## 2018-02-07 PROCEDURE — 99214 OFFICE O/P EST MOD 30 MIN: CPT | Mod: S$GLB,,, | Performed by: ALLERGY & IMMUNOLOGY

## 2018-02-07 RX ORDER — METHYLPREDNISOLONE 4 MG/1
TABLET ORAL
Qty: 1 PACKAGE | Refills: 0 | Status: SHIPPED | OUTPATIENT
Start: 2018-02-07 | End: 2018-02-28

## 2018-02-07 RX ORDER — CEFDINIR 300 MG/1
300 CAPSULE ORAL 2 TIMES DAILY
Qty: 60 CAPSULE | Refills: 0 | Status: SHIPPED | OUTPATIENT
Start: 2018-02-07 | End: 2018-03-09

## 2018-02-07 NOTE — LETTER
February 7, 2018      Carl Hodge Jr., MD  [address] [facility] [address]           [place] [facility]  [address] [facility] [facility] [address]  [place] [address]  Phone: [phone]          Patient: Bulmaro Nascimento   MR Number: [id number]   YOB: 1955   Date of Visit: 2/7/2018       Dear Dr. Carl Hodge Jr.:    Thank you for referring Bulmaro Nascimento to me for evaluation. Attached you will find relevant portions of my assessment and plan of care.    If you have questions, please do not hesitate to call me. I look forward to following Bulmaro Nascimento along with you.    Sincerely,    Lacy Perez MD    Enclosure  CC:  No Recipients    If you would like to receive this communication electronically, please contact [email or web address] or [phone] to request more information on [employer] access.    For providers and/or their staff who would like to refer a patient to [facility], please contact us through our one-stop-shop provider referral line, [facility], at [phone].    If you feel you have received this communication in error or would no longer like to receive these types of communications, please e-mail [email or web address]

## 2018-02-07 NOTE — PROGRESS NOTES
Referring physician: Dr. Carl Hodge *    Primary Care Physician: Carl Hodge MD    Chief Complaint: Other (feels he is coming down with an sinus infection.  Nasal congestion, h/a, sinus pressure, ears popping)    Patient is known to me. The last visit was a lot of 2017  Patient is accompanied: No  Diagnosis at previous visit:  1. Allergic rhinitis due to other allergen    2. Maxillary sinusitis, unspecified chronicity    3. Seasonal allergic rhinitis due to pollen, unspecified chronicity          Subjective:         HPI    Bulmaro Nascimento is a 62 y.o. male here for a follow-up visit related to recent exacerbations of respiratory symptoms.  Patient states that he thinks he caught something from [place]. On 2017 he started having nasal congestion, sinus pressure, headaches, ears popping, and scratchy throat.  His symptoms have persisted so he started on Omnicef which he had left over from a previous sinus infection and Qnasl 80 which was .  He is here to be evaluated because he wants to be well for his trip to [place] on 2018 to visit her son who has been living there for the past 3 years.  Prior to the onset of these symptoms patient states he was doing well and on no medicine and he was getting his allergen specific immunotherapy injections on a monthly basis.  His allergen vaccines  tomorrow so he will need these remixed by [facility].    Summary of 2017 visit with [facility]:  Patient is known to me from private practice and the [facility] system. He is on allergy injections monthly with significant benefit. He would like to continue allergy injections as his rate of infection has significantly declined. However, he traveled to [place] in May to visit his son and got a sinus infection while there. He took Augmentin for 10 days and his symptoms resolved 100%. 7 days ago he began with symptoms of sinusitis again. He started  Augmentin that he has for when he travels. He is currently doing well and symptoms are resolving he does have 3 additional days of Augmentin to finish the full 10 day course. He states he had spoken to Edwige Allan LPN. When he got his injection about getting sick twice. He stated Edwige told him about an injection he could get to boost his immune system so he doesn't get sick frequently. He is here to discuss this treatment plan. Please see intercommunications messages 7/5/17 with Edwige Allan LPN. Reviewed with patient his chart per [facility] policy is stored at [facility]. I would be able to retrieve his chart the following Wednesday and I would call to discuss what Edwige was referring to and review his past records. Patient verbalized understanding of all the above and treatment plan. Note: patient does internatinally travel frequently and has a history of recurrent chronic sinusitis. He was given Rx for Omnicef 30 days for travel today, for use only if needed. If needed the patient will immediately notify the office.   Assessment:  1. Allergic rhinitis due to other allergen    2. Maxillary sinusitis, unspecified chronicity    3. Seasonal allergic rhinitis due to pollen, unspecified chronicity    Plan:  Allergic rhinitis due to other allergen  -     fluticasone (FLONASE) 50 mcg/actuation nasal spray; 2 sprays by Each Nare route 2 (two) times daily.  Maxillary sinusitis, unspecified chronicity  -     cefdinir (OMNICEF) 300 MG capsule; 2 tabs orally every 24 hours for 30 days  Seasonal allergic rhinitis due to pollen, unspecified chronicity         Review of Systems  Constitutional: Negative for changes in appetite, unintentional weight loss, fever, chills and fatigue.   HENT: Negative for facial pain, nose bleeds, , postnasal drip, throat clearing, and voice change. Negative for ear discharge, ear pain, facial swelling, sore throat and trouble swallowing. positive for nasal congestion, sinus pressure and ears  popping; positive for scratchy throat   Eyes: Negative for occular discharge, redness, itching and visual disturbance.  Respiratory: Negative for chest tightness, shortness of breath, wheezing, dyspnea on exertion, sputum production and cough.   Cardiovascular: Negative for chest pain, palpatations and leg swelling.  Gastrointestinal: Negative for abdominal distension, abdominal pain, constipation, diarrhea, nausea,and vomiting.   Genitourinary: Negative for difficulty urinating.   Musculoskeletal: Negative for arthralgias, gait problems, joint swelling, myalgias and back pain.   Neurological: Negative for dizziness, syncope, weakness, light-headedness.  positive for headache   Hematological: Negative for adenopathy, does not bruise or bleed easily.  Psychiatric/Behavioral: Negative for agitation, anxiety, behavioral problems, confusion, and insomnia.  Skin: Negative for rash.     PMH:  Reviewed with the patient and current for this visit    Family History:  Reviewed with the patient and current for this visit    Social History:  Reviewed with the patient and current for this visit     Environmental History:  Reviewed with the patient and current for this visit          Objective:      Skin Test results: patient had had positive prick skin test to cat, tree and grass pollens, and cat allergen.      Immunotherapy: patient has been on allergen specific immunotherapy since 12/2005; his vaccines were reformulated to stronger allergen concentrations of August 2009 because of frequent symptoms.  He is on a maintenance dose of vial #1 red top 1:1 of 3 vaccines at 0.5 cc at a monthly interval.  His last injection was today at this visit.      Physical Exam  General:patient is well developed and well nourished, in no acute distress.  Mental Status:  Alert, oriented and cooperative  Head and Face: normocephalic   Allergic shiners: No  Eyes:   Pupils: ERRLA: Scleral conjunctiva: clear; Cornea: clear; Palprebal conjunctiva:  normal: Eyelid Skin: normal  Ears:Tympanic membrane Left:intact and normal light reflex; Right:intact and normal light reflex; External canals normal bilaterally.  Nose:  Nares: patent; Mucosa : Boggy and congested; Nasal septum: midline;Turbinates are enlarged but do not occlude the nasal airway.  Mouth/Pharynx: tonsils present; posterior pharyngeal wall normal; tongue normal; teeth normal; voice quality normal.  Neck:  Cervical lymph nodes: small, non-tender, freely moveable both anterior and posterior cervical chain; Trachea: midline; Masses: none  Lungs: Air movement is good; respiratory effort is good; no respiratory distress; breath sounds are vesicular in all lung fields; no wheezing; normal expiratory time; no cough.  Heart: regular rate and rhythm with mild respiratory variation; A1 and P2 are normal; no murmurs or gallops.  Abdomen:exam not done  Extremities: no cyanosis, clubbing or edema  Skin:no rashes or lesions present; skin hydrated and supple.            Assessment:       1. Acute viral syndrome     2. Chronic allergic rhinitis due to animal hair and dander: Exacerbation related to viral syndrome     3. Chronic seasonal allergic rhinitis due to fungal spores: Exacerbation related to viral syndrome     4. Chronic seasonal allergic rhinitis due to pollen: Exacerbation related to viral syndrome     5. Disorder of both eustachian tubes: Symptomatic related to viral syndrome     6. Frequent headaches     7. Allergy desensitization therapy: With significant benefit             Plan:         Return for re-visit: Follow-up in about 1 year (around 2/7/2019).    Immunotherapy: Yes; patient will continue his immunotherapy monthly for the next year.  He understands that the first dose of the remixed vaccine is at 0.35 cc; he understands return in the next week to receive the full dose of 0.5 cc and then his interval may be extended to monthly again.  With    Lab or X-ray: No    Outside medical records  requested: No        Patient Instructions   1.  Patient is to start Flonase 2 sprays in each nostril twice daily until symptoms resolve.  Then the patient is to continue 2 sprays in each nostril every 24 hours for the next 3-4 weeks.  2.  Patient is to continue Omnicef 300 mg 2 tablets every 24 hours for the next 10 days.  3.  If his problems persist, patient is to contact me.  I have ordered oral steroids with the patient to take to [place] on this visit in case he has problems there.  He will also take his left over Omnicef with him.  4.  A revisit in approximately one year.  Patient understands I will repeat retiring in June 2018.  We will make that appointment in one year to see Dr. Kirkpatrick.      25 minutes spent face to face with this patient. 50% of time spent counseling this patient about the medical conditions (pathophysiology), the options and strategies for treatments, and the risks and benefits of treatments.    Patient education content included:  the impact of viral infections on nasal/sinus physiology was reviewed.  Nasal sinus physiology reviewed.  The role of inflammatory changes in symptom production was reviewed.  The role of sinusitis in producing postnasal drip and cough was reviewed.  The role of sinobronchial pathways in producing cough was reviewed. The role of reflux disease or gastroesophageal or laryngeal pharyngeal in producing cough was reviewed.  The role of using anti-inflammatory drugs to reduce the level of inflammation and thus the symptoms was reviewed.  The handout for rhinitis and sinusitis was reviewed with the patient.  Patient expressed understanding of these concepts and questions were answered.          Lacy Perez MD

## 2018-02-07 NOTE — PATIENT INSTRUCTIONS
1.  Patient is to start Flonase 2 sprays in each nostril twice daily until symptoms resolve.  Then the patient is to continue 2 sprays in each nostril every 24 hours for the next 3-4 weeks.  2.  Patient is to continue Omnicef 300 mg 2 tablets every 24 hours for the next 10 days.  3.  If his problems persist, patient is to contact me.  I have ordered oral steroids with the patient to take to [place] on this visit in case he has problems there.  He will also take his left over Omnicef with him.  4.  A revisit in approximately one year.  Patient understands I will repeat retiring in June 2018.  We will make that appointment in one year to see Dr. Kirkpatrick.

## 2018-02-22 ENCOUNTER — OFFICE VISIT (OUTPATIENT)
Dept: ALLERGY | Facility: CLINIC | Age: 63
End: 2018-02-22
Payer: COMMERCIAL

## 2018-02-22 DIAGNOSIS — J30.2 CHRONIC SEASONAL ALLERGIC RHINITIS DUE TO FUNGAL SPORES: ICD-10-CM

## 2018-02-22 DIAGNOSIS — J30.1 SEASONAL ALLERGIC RHINITIS DUE TO POLLEN, UNSPECIFIED CHRONICITY: ICD-10-CM

## 2018-02-22 DIAGNOSIS — J30.81 CHRONIC ALLERGIC RHINITIS DUE TO ANIMAL HAIR AND DANDER: ICD-10-CM

## 2018-02-22 PROCEDURE — 99499 UNLISTED E&M SERVICE: CPT | Mod: S$GLB,,, | Performed by: ALLERGY & IMMUNOLOGY

## 2018-02-22 PROCEDURE — 99999 PR PBB SHADOW E&M-EST. PATIENT-LVL I: CPT | Mod: PBBFAC,,, | Performed by: ALLERGY & IMMUNOLOGY

## 2018-02-22 PROCEDURE — 95165 ANTIGEN THERAPY SERVICES: CPT | Mod: S$GLB,,, | Performed by: ALLERGY & IMMUNOLOGY

## 2018-02-22 NOTE — PROGRESS NOTES
Patient is currently receiving allergen specific immunotherapy with significant benefit and wants to continue immunotherapy at this time. Therefore a request to remix the current vaccine formulas was entered into [facility];  Mixing will be done by [facility].Three vaccines with 36 doses to be mixed.

## 2018-02-23 ENCOUNTER — PATIENT MESSAGE (OUTPATIENT)
Dept: FAMILY MEDICINE | Facility: CLINIC | Age: 63
End: 2018-02-23

## 2018-02-23 DIAGNOSIS — D22.9 MELANOCYTIC NEVUS, UNSPECIFIED LOCATION: ICD-10-CM

## 2018-02-23 DIAGNOSIS — N40.0 BENIGN NON-NODULAR PROSTATIC HYPERPLASIA WITHOUT LOWER URINARY TRACT SYMPTOMS: Primary | ICD-10-CM

## 2018-03-14 ENCOUNTER — CLINICAL SUPPORT (OUTPATIENT)
Dept: INTERNAL MEDICINE | Facility: CLINIC | Age: 63
End: 2018-03-14
Payer: COMMERCIAL

## 2018-03-14 ENCOUNTER — OFFICE VISIT (OUTPATIENT)
Dept: UROLOGY | Facility: CLINIC | Age: 63
End: 2018-03-14
Payer: COMMERCIAL

## 2018-03-14 VITALS
HEART RATE: 64 BPM | WEIGHT: 178 LBS | DIASTOLIC BLOOD PRESSURE: 67 MMHG | HEIGHT: 70 IN | BODY MASS INDEX: 25.48 KG/M2 | RESPIRATION RATE: 17 BRPM | SYSTOLIC BLOOD PRESSURE: 119 MMHG | OXYGEN SATURATION: 98 %

## 2018-03-14 DIAGNOSIS — J30.1 SEASONAL ALLERGIC RHINITIS DUE TO POLLEN, UNSPECIFIED CHRONICITY: ICD-10-CM

## 2018-03-14 DIAGNOSIS — Z80.42 FAMILY HISTORY OF PROSTATE CANCER IN FATHER: ICD-10-CM

## 2018-03-14 DIAGNOSIS — Z51.6 ALLERGY DESENSITIZATION THERAPY: Primary | ICD-10-CM

## 2018-03-14 DIAGNOSIS — R39.198 SLOW URINARY STREAM: ICD-10-CM

## 2018-03-14 DIAGNOSIS — R35.1 NOCTURIA: ICD-10-CM

## 2018-03-14 DIAGNOSIS — N40.0 BENIGN NON-NODULAR PROSTATIC HYPERPLASIA WITHOUT LOWER URINARY TRACT SYMPTOMS: Primary | ICD-10-CM

## 2018-03-14 LAB
BILIRUB UR QL STRIP: NEGATIVE
CLARITY UR REFRACT.AUTO: CLEAR
COLOR UR AUTO: YELLOW
GLUCOSE UR QL STRIP: NEGATIVE
HGB UR QL STRIP: NEGATIVE
KETONES UR QL STRIP: NEGATIVE
LEUKOCYTE ESTERASE UR QL STRIP: NEGATIVE
NITRITE UR QL STRIP: NEGATIVE
PH UR STRIP: 7 [PH] (ref 5–8)
PROT UR QL STRIP: NEGATIVE
SP GR UR STRIP: 1.01 (ref 1–1.03)
URN SPEC COLLECT METH UR: NORMAL
UROBILINOGEN UR STRIP-ACNC: NEGATIVE EU/DL

## 2018-03-14 PROCEDURE — 99214 OFFICE O/P EST MOD 30 MIN: CPT | Mod: S$GLB,,, | Performed by: UROLOGY

## 2018-03-14 PROCEDURE — 95117 IMMUNOTHERAPY INJECTIONS: CPT | Mod: S$GLB,,, | Performed by: FAMILY MEDICINE

## 2018-03-14 PROCEDURE — 99999 PR PBB SHADOW E&M-EST. PATIENT-LVL III: CPT | Mod: PBBFAC,,, | Performed by: UROLOGY

## 2018-03-14 PROCEDURE — 81003 URINALYSIS AUTO W/O SCOPE: CPT

## 2018-03-14 PROCEDURE — 99499 UNLISTED E&M SERVICE: CPT | Mod: S$GLB,,, | Performed by: ALLERGY & IMMUNOLOGY

## 2018-03-14 RX ORDER — EPINEPHRINE 0.3 MG/.3ML
1 INJECTION SUBCUTANEOUS ONCE
Qty: 0.3 ML | Refills: 0 | Status: SHIPPED | OUTPATIENT
Start: 2018-03-14 | End: 2019-04-05 | Stop reason: ALTCHOICE

## 2018-03-14 NOTE — LETTER
March 14, 2018      Carl Hodge Jr., MD  [address] [place] [address]           [place] - Urology  [address] [place] [address] [address]  [facility] [address]  Phone: [phone]  Fax: [phone]          Patient: Bulmaro Nascimento   MR Number: [id number]   YOB: 1955   Date of Visit: 3/14/2018       Dear Dr. Carl Hodge Jr.:    Thank you for referring Bulmaro Nascimento to me for evaluation. Attached you will find relevant portions of my assessment and plan of care.    If you have questions, please do not hesitate to call me. I look forward to following Bulmaro Nascimento along with you.    Sincerely,    Jadiel Lara MD    Enclosure  CC:  No Recipients    If you would like to receive this communication electronically, please contact [email or web address] or [phone] to request more information on [employer] Link access.    For providers and/or their staff who would like to refer a patient to [facility], please contact us through our one-stop-shop provider referral line, [facility], at [phone].    If you feel you have received this communication in error or would no longer like to receive these types of communications, please e-mail [email or web address]

## 2018-03-14 NOTE — PROGRESS NOTES
Subjective:       Patient ID: Bulmaro Nascimento is a 62 y.o. male.    Chief Complaint: Elevated PSA    63 yo WM with history of BPH.  Doing well with daily Cialis.      Benign Prostatic Hypertrophy   This is a chronic problem. The current episode started more than 1 year ago. The problem has been waxing and waning since onset. Irritative symptoms include frequency (q 2-3 hrs) and nocturia (0-1 x). Irritative symptoms do not include urgency. Obstructive symptoms include dribbling and a slower stream. Obstructive symptoms do not include incomplete emptying, an intermittent stream, straining or a weak stream. Pertinent negatives include no chills, dysuria, genital pain, hematuria, hesitancy, nausea or vomiting. AUA score is 0-7. He is sexually active. Nothing aggravates the symptoms. Treatments tried: Daily Cialis. The treatment provided significant relief. He has been using treatment for 2 or more years.     Review of Systems   Constitutional: Negative for activity change, appetite change, chills, diaphoresis, fatigue, fever and unexpected weight change.   HENT: Negative for congestion, hearing loss, sinus pressure and trouble swallowing.    Eyes: Negative for photophobia, pain, discharge and visual disturbance.   Respiratory: Negative for apnea, cough and shortness of breath.    Cardiovascular: Negative for chest pain, palpitations and leg swelling.   Gastrointestinal: Negative for abdominal distention, abdominal pain, anal bleeding, blood in stool, constipation, diarrhea, nausea, rectal pain and vomiting.   Endocrine: Negative for cold intolerance, heat intolerance, polydipsia, polyphagia and polyuria.   Genitourinary: Positive for frequency (q 2-3 hrs) and nocturia (0-1 x). Negative for decreased urine volume, difficulty urinating, discharge, dysuria, enuresis, flank pain, genital sores, hematuria, hesitancy, incomplete emptying, penile pain, penile swelling, scrotal swelling, testicular pain and urgency.    Musculoskeletal: Negative for arthralgias, back pain and myalgias.   Skin: Negative for color change, pallor, rash and wound.   Allergic/Immunologic: Negative for environmental allergies, food allergies and immunocompromised state.   Neurological: Negative for dizziness, seizures, weakness and headaches.   Hematological: Negative for adenopathy. Does not bruise/bleed easily.   Psychiatric/Behavioral: Negative.        Objective:      Physical Exam   Nursing note and vitals reviewed.  Constitutional: He is oriented to person, place, and time. He appears well-developed and well-nourished.   HENT:   Head: Normocephalic.   Nose: Nose normal.   Mouth/Throat: Oropharynx is clear and moist.   Eyes: Conjunctivae and EOM are normal. Pupils are equal, round, and reactive to light.   Neck: Normal range of motion. Neck supple.   Cardiovascular: Normal rate, regular rhythm, normal heart sounds and intact distal pulses.    Pulmonary/Chest: Effort normal and breath sounds normal.   Abdominal: Soft. Bowel sounds are normal.   Genitourinary: Rectum normal, testes normal and penis normal. Prostate is enlarged. Prostate is not tender. Circumcised.   Musculoskeletal: Normal range of motion.   Neurological: He is alert and oriented to person, place, and time. He has normal reflexes.   Skin: Skin is warm and dry.     Psychiatric: He has a normal mood and affect. His behavior is normal. Judgment and thought content normal.       Assessment:       1. Benign non-nodular prostatic hyperplasia without lower urinary tract symptoms    2. Slow urinary stream        Plan:       Patient Instructions   Check PSA  Continue Daily Cialis 5 mg

## 2018-03-14 NOTE — PROGRESS NOTES
Patient here for Allergy injections.  No new meds, no problems after last injection.  New vials used.  Backed up to 0.3mL of all vaccines        0.3mL of 1:1 Red, 1 of 3 given SQ in left upper arm  Tolerated well, no bleeding noted.  0.3mL of 1:1 Red, 2 of 3 given SQ in left mid arm.  Tolerated well, no bleeding noted.  0.3mL of 1:1 Red, 3 of 3 given SQ in right upper arm  Tolerated well, no bleeding noted.    After 30 minutes of observation, Sites 1/3 and 3/3 no reaction.  Site 2/3 1+ erythema only.    No complaints voiced.

## 2018-03-15 ENCOUNTER — PATIENT MESSAGE (OUTPATIENT)
Dept: ALLERGY | Facility: CLINIC | Age: 63
End: 2018-03-15

## 2018-03-20 ENCOUNTER — TELEPHONE (OUTPATIENT)
Dept: ALLERGY | Facility: CLINIC | Age: 63
End: 2018-03-20

## 2018-03-20 NOTE — TELEPHONE ENCOUNTER
Called pharmacy.  Had questions re:  Number of Auvi Q to dispense.  Told them one krissy of two, but also added 3 refills.  Pharmacist told me she would process the request.\

## 2018-03-20 NOTE — TELEPHONE ENCOUNTER
----- Message from Denise Palma MA sent at 3/20/2018 11:05 AM CDT -----  Contact: [phone]/[facility]  Pharmacy would to speak with someone regarding clarification on the pt's Auvi Q rx. Please advise.    Thanks

## 2018-03-21 ENCOUNTER — CLINICAL SUPPORT (OUTPATIENT)
Dept: INTERNAL MEDICINE | Facility: CLINIC | Age: 63
End: 2018-03-21
Payer: COMMERCIAL

## 2018-03-21 ENCOUNTER — OFFICE VISIT (OUTPATIENT)
Dept: FAMILY MEDICINE | Facility: CLINIC | Age: 63
End: 2018-03-21
Payer: COMMERCIAL

## 2018-03-21 VITALS
DIASTOLIC BLOOD PRESSURE: 80 MMHG | WEIGHT: 178.19 LBS | HEIGHT: 70 IN | HEART RATE: 60 BPM | SYSTOLIC BLOOD PRESSURE: 120 MMHG | BODY MASS INDEX: 25.51 KG/M2 | OXYGEN SATURATION: 99 %

## 2018-03-21 DIAGNOSIS — F45.8 BRUXISM: ICD-10-CM

## 2018-03-21 DIAGNOSIS — G89.29 CHRONIC PAIN OF RIGHT KNEE: ICD-10-CM

## 2018-03-21 DIAGNOSIS — M25.561 CHRONIC PAIN OF RIGHT KNEE: ICD-10-CM

## 2018-03-21 DIAGNOSIS — K21.9 GASTROESOPHAGEAL REFLUX DISEASE WITHOUT ESOPHAGITIS: ICD-10-CM

## 2018-03-21 DIAGNOSIS — Z00.00 ENCOUNTER FOR PREVENTIVE HEALTH EXAMINATION: Primary | ICD-10-CM

## 2018-03-21 DIAGNOSIS — Z13.220 LIPID SCREENING: ICD-10-CM

## 2018-03-21 DIAGNOSIS — J30.1 SEASONAL ALLERGIC RHINITIS DUE TO POLLEN, UNSPECIFIED CHRONICITY: ICD-10-CM

## 2018-03-21 DIAGNOSIS — J30.1 ACUTE SEASONAL ALLERGIC RHINITIS DUE TO POLLEN: ICD-10-CM

## 2018-03-21 PROCEDURE — 99396 PREV VISIT EST AGE 40-64: CPT | Mod: S$GLB,,,

## 2018-03-21 PROCEDURE — 99499 UNLISTED E&M SERVICE: CPT | Mod: S$GLB,,, | Performed by: ALLERGY & IMMUNOLOGY

## 2018-03-21 PROCEDURE — 95117 IMMUNOTHERAPY INJECTIONS: CPT | Mod: S$GLB,,, | Performed by: FAMILY MEDICINE

## 2018-03-21 PROCEDURE — 99999 PR PBB SHADOW E&M-EST. PATIENT-LVL IV: CPT | Mod: PBBFAC,,,

## 2018-03-21 RX ORDER — LORAZEPAM 1 MG/1
1 TABLET ORAL NIGHTLY
Qty: 90 TABLET | Refills: 1 | Status: SHIPPED | OUTPATIENT
Start: 2018-03-21 | End: 2018-06-14 | Stop reason: SDUPTHER

## 2018-03-21 NOTE — PROGRESS NOTES
Subjective:       Patient ID: Bulmaro Nascimento is a 62 y.o. male.    Chief Complaint: Annual Exam    HPI Data obtained from Agency for [employer] and Research Quality (AHRQ):    GRADE A -The USPSTF recommends the service. There is high certainty that the net benefit is substantial. Offer or provide this service.    GRADE B - The USPSTF recommends the service. There is high certainty that the net benefit is moderate or there is moderate certainty that the net benefit is moderate to substantial. Offer or provide this service.     View All     14 - Recommended (A, B)    Grade Title Risk Info. Details   UTD  Colorectal Cancer: Screening --Adults aged 50 to 75 years     Low  HIV: Screening - Adolescents and Adults     120/80 High Blood Pressure: Screening and Home Monitoring -- Adults     Low  Syphilis: Screening --Asymptomatic, nonpregnant adults and adolescents who are at increased risk for syphilis infection     Denies  Alcohol Misuse: Screening and Behavioral Counseling Interventions in Primary Care -- Adults     Denies  Depression: Screening -- General adult population, including pregnant and postpartum women     UTD  Diabetes Mellitus (Type 2) andAbnormal Blood Glucose: Screening -- Adults aged 40 to 70 years who are overweight or obese     Yes  Healthful Diet and Physical Activity for CVD Disease Prevention: Counseling -- Adults with CVD Risk Factors     Low  Hepatitis B: Screening -- Nonpregnant Adolescents and Adults At High Risk     UTD  Hepatitis C Virus Infection: Screening--Adults at High Risk and Adults born between 1945 and 1965     Low  Latent Tuberculosis Infection: Screening -- Asymptomatic adults at increased risk for infection     Normal  Obesity: Screening for and Management of-- All Adults       Denies  Sexually Transmitted Infections: Behavioral Counseling -- Sexually Active Adolescents and Adults     Not a escobar Statin Use for the Primary Prevention of CVD: Preventive Medicine -- Adults age 40  to 75 years with no history of CVD, 1 or more CVD risk factors, and a calculated 10-year CVD event risk of 10% or greater.             Review of Systems   Constitutional: Negative.    HENT: Negative.    Eyes: Negative.    Respiratory: Negative.    Cardiovascular: Negative.    Gastrointestinal: Negative.    Endocrine: Negative.    Genitourinary: Negative.    Musculoskeletal: Negative.    Skin: Negative.    Allergic/Immunologic: Negative.    Neurological: Negative.    Hematological: Negative.    Psychiatric/Behavioral: Negative.    All other systems reviewed and are negative.      Objective:      Vitals:    03/21/18 0745   BP: 120/80   Pulse: 60     Physical Exam   Constitutional: He is oriented to person, place, and time. He appears well-developed and well-nourished. He is cooperative. No distress.   HENT:   Head: Normocephalic and atraumatic.   Right Ear: Hearing, tympanic membrane, external ear and ear canal normal.   Left Ear: Hearing, external ear and ear canal normal.   Nose: Nose normal.   Mouth/Throat: Oropharynx is clear and moist.   Eyes: Conjunctivae are normal. Pupils are equal, round, and reactive to light.   Neck: Normal range of motion. Neck supple. No thyromegaly present.   Cardiovascular: Normal rate, regular rhythm, normal heart sounds and intact distal pulses.    Pulmonary/Chest: Effort normal and breath sounds normal. No respiratory distress.   Musculoskeletal: Normal range of motion. He exhibits no edema or tenderness.   Lymphadenopathy:     He has no cervical adenopathy.   Neurological: He is alert and oriented to person, place, and time. He has normal strength. Coordination and gait normal.   Skin: Skin is warm, dry and intact. No cyanosis. Nails show no clubbing.   Psychiatric: He has a normal mood and affect. His speech is normal and behavior is normal. Judgment and thought content normal. Cognition and memory are normal.   Vitals reviewed.      Assessment:       1. Encounter for preventive  health examination    2. Bruxism    3. Acute seasonal allergic rhinitis due to pollen    4. Gastroesophageal reflux disease without esophagitis    5. Chronic pain of right knee    6. Lipid screening        Plan:       Encounter for preventive health examination    Bruxism  -     LORazepam (ATIVAN) 1 MG tablet; Take 1 tablet (1 mg total) by mouth every evening.  Dispense: 90 tablet; Refill: 1    Acute seasonal allergic rhinitis due to pollen    Gastroesophageal reflux disease without esophagitis    Chronic pain of right knee  -     Ambulatory Referral to Physical/Occupational Therapy    Lipid screening  -     CBC auto differential; Future; Expected date: 03/21/2018  -     Comprehensive metabolic panel; Future; Expected date: 03/21/2018  -     Lipid panel; Future; Expected date: 03/21/2018    Other orders  -     ranitidine (ZANTAC) 150 MG tablet; Take 1 tablet (150 mg total) by mouth 2 (two) times daily.  Dispense: 180 tablet; Refill: 3      Follow-up in about 1 year (around 3/21/2019).

## 2018-03-21 NOTE — PROGRESS NOTES
Patient here for Allergy injections.  No new meds, no problems after last injection.  New vials used.  Backed up to 0.3mL of all vaccines.  OK to progress to 0.5mL per Dr. Perez.    0.5mL of 1:1 Red, 1 of 3 given SQ in left upper arm  Tolerated well, no bleeding noted.  0.5mL of 1:1 Red, 2 of 3 given SQ in left mid arm.  Tolerated well, no bleeding noted.  0.5mL of 1:1 Red, 3 of 3 given SQ in right upper arm  Tolerated well, sm. Amt of bleeding noted.  Band Aid applied.      After 30 minutes of observation, Sites 1/3, 1+, Site 2/3 2+, Site 3/3, 1+.    No complaints voiced.

## 2018-03-28 NOTE — PROGRESS NOTES
CC: Right knee and Right toe pain    62 y.o. Male who returns today for follow up. [employer] . LOV his history and exam was consistent with 1st MTP arthritis and R Knee OA. Operative and non operative treatments for both were discussed previously. 1st MTP steroid injection his given him 90% relief which is longstanding. Has not pursued getting a Salgado's extension orthotic. As for the the knee injection, this gave him 80% relief for about 8 weeks. Gradual return of medially based symptoms recently. Here today requesting repeat knee injection prior to his trip to [place] next Friday. Following a low-impact cardio regiment.    History of BL Knee arthroscopic debridements - years ago.   Negative for smoking.   Negative for diabetes.     REVIEW OF SYSTEMS:   Constitution: Negative. Negative for chills, fever and night sweats.    Hematologic/Lymphatic: Negative for bleeding problem. Does not bruise/bleed easily.   Skin: Negative for dry skin, itching and rash.   Musculoskeletal: Negative for falls. Positive for right knee and great toe pain.     PAST MEDICAL HISTORY:   Past Medical History:   Diagnosis Date    Allergy     Anxiety 7/8/2015    Bruxism     Gastroesophageal reflux disease without esophagitis 6/8/2015       PAST SURGICAL HISTORY:   Past Surgical History:   Procedure Laterality Date    COLONOSCOPY      KNEE SURGERY Bilateral     SHOULDER SURGERY Right     SINUS SURGERY         FAMILY HISTORY:   Family History   Problem Relation Age of Onset    Thyroid disease Mother     Diabetes Father     Cancer Father      Prostate     Heart disease Neg Hx     Prostate cancer Neg Hx     Kidney disease Neg Hx        SOCIAL HISTORY:   Social History     Social History    Marital status:      Spouse name: N/A    Number of children: N/A    Years of education: N/A     Occupational History    Not on file.     Social History Main Topics    Smoking status: Never Smoker     "Smokeless tobacco: Never Used    Alcohol use 0.6 oz/week     1 Cans of beer per week      Comment: socially 2 per month    Drug use: No    Sexual activity: Not Currently     Partners: Female     Other Topics Concern    Not on file     Social History Narrative    Lives in [place] alone. 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 He swam for minutes with flippers about 3 days or 4 days a weeks until he was advised to stop in 2018.        MEDICATIONS:     Current Outpatient Prescriptions:     fluticasone (FLONASE) 50 mcg/actuation nasal spray, 2 sprays by Each Nare route 2 (two) times daily., Disp: 1 Bottle, Rfl: 11    indomethacin (INDOCIN SR) 75 mg CpSR CR capsule, Take 1 capsule (75 mg total) by mouth 2 (two) times daily with meals., Disp: 60 capsule, Rfl: 2    LORazepam (ATIVAN) 1 MG tablet, Take 1 tablet (1 mg total) by mouth every evening., Disp: 90 tablet, Rfl: 1    ranitidine (ZANTAC) 150 MG tablet, Take 1 tablet (150 mg total) by mouth 2 (two) times daily., Disp: 180 tablet, Rfl: 3    tadalafil (CIALIS) 5 MG tablet, Take 1 tablet (5 mg total) by mouth daily as needed for Erectile Dysfunction., Disp: 90 tablet, Rfl: 3    EPINEPHrine (AUVI-Q) 0.3 mg/0.3 mL AtIn, Inject 0.3 mLs (0.3 mg total) into the muscle once., Disp: 0.3 mL, Rfl: 0    Current Facility-Administered Medications:     Allergy Mix, , Subcutaneous, 1 time in Clinic/HOD, Lacy Perez MD    ALLERGIES:   Review of patient's allergies indicates:  No Known Allergies     PHYSICAL EXAMINATION:  /70   Pulse 60   Ht 5' 10" (1.778 m)   Wt 80.7 kg (178 lb)   BMI 25.54 kg/m²   General: Well-developed well-nourished 62 y.o. malein no acute distress   Cardiovascular: Regular rhythm by palpation of distal pulse, normal color and temperature, no concerning varicosities on symptomatic side   Lungs: No labored breathing or wheezing appreciated   Neuro: Alert and oriented ×3 "   Psychiatric: well oriented to person, place and time, demonstrates normal mood and affect   Skin: No rashes, lesions or ulcers, normal temperature, turgor, and texture on involved extremity    Ortho/SPM Exam   Repeat examination of the right knee demonstrates no swelling or effusion.  Good quadriceps bulk and strength.  Neutral standing alignment.  Active range of motion is full from 0-135°.  Ligamentously stable.  Central patellofemoral tracking.  Negative patellar grind.  Moderate tenderness over the medial joint line.  Positive Slava's test for medially based pain.     Minimal pain on exam to the right great toe.  Dorsal bony prominence as previously noted.  No pain in mid range.    IMAGING:    X-rays including standing, weight bearing AP and flexion bilateral knees, RIGHT knee lateral and sunrise views and 3 standing views of the foot were ordered and images reviewed by me show:    Mild to moderate tricompartmental degenerative arthritis with chondrocalcinosis    X-rays of the right foot were obtained today and reviewed by me showing:    Degenerative arthritis of the first MTP joint with joint space loss greater than 50%, small dorsal osteophyte; pes planus, mild hallux valgus    ASSESSMENT:      ICD-10-CM ICD-9-CM   1. Primary osteoarthritis of right knee M17.11 715.16   2. Pseudogout of knee, right M11.261 275.49     712.16   3. 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 RTC after upcoming trip. If pain persists or recurs despite these measures, next step is MRI to assess for sig recurrent meniscal pathology    If he continues to have problems with the toe, I will get him over to see our foot and ankle specialist Dr. Suraj Clark to discuss surgical options.        Large Joint Aspiration/Injection  Date/Time: 3/29/2018 2:07 PM  Performed by: RIANA WEI  Authorized by: RIANA WEI     Consent Done?:  Yes (Verbal)  Indications:  Pain  Procedure site  marked: Yes    Timeout: Prior to procedure the correct patient, procedure, and site was verified      Location:  Knee  Site:  R knee  Prep: Patient was prepped and draped in usual sterile fashion    Ultrasonic Guidance for needle placement: No  Needle size:  22 G  Approach:  Lateral  Medications:  40 mg triamcinolone acetonide 40 mg/mL  Patient tolerance:  Patient tolerated the procedure well with no immediate complications

## 2018-03-29 ENCOUNTER — OFFICE VISIT (OUTPATIENT)
Dept: SPORTS MEDICINE | Facility: CLINIC | Age: 63
End: 2018-03-29
Payer: COMMERCIAL

## 2018-03-29 VITALS
HEART RATE: 60 BPM | BODY MASS INDEX: 25.48 KG/M2 | WEIGHT: 178 LBS | SYSTOLIC BLOOD PRESSURE: 125 MMHG | HEIGHT: 70 IN | DIASTOLIC BLOOD PRESSURE: 70 MMHG

## 2018-03-29 DIAGNOSIS — M19.079 1ST MTP ARTHRITIS: ICD-10-CM

## 2018-03-29 DIAGNOSIS — M11.261 PSEUDOGOUT OF KNEE, RIGHT: ICD-10-CM

## 2018-03-29 DIAGNOSIS — M17.11 PRIMARY OSTEOARTHRITIS OF RIGHT KNEE: Primary | ICD-10-CM

## 2018-03-29 PROCEDURE — 20610 DRAIN/INJ JOINT/BURSA W/O US: CPT | Mod: RT,S$GLB,, | Performed by: ORTHOPAEDIC SURGERY

## 2018-03-29 PROCEDURE — 99213 OFFICE O/P EST LOW 20 MIN: CPT | Mod: 25,S$GLB,, | Performed by: ORTHOPAEDIC SURGERY

## 2018-03-29 PROCEDURE — 99999 PR PBB SHADOW E&M-EST. PATIENT-LVL III: CPT | Mod: PBBFAC,,, | Performed by: ORTHOPAEDIC SURGERY

## 2018-03-29 RX ADMIN — TRIAMCINOLONE ACETONIDE 40 MG: 40 INJECTION, SUSPENSION INTRA-ARTICULAR; INTRAMUSCULAR at 02:03

## 2018-03-31 RX ORDER — TRIAMCINOLONE ACETONIDE 40 MG/ML
40 INJECTION, SUSPENSION INTRA-ARTICULAR; INTRAMUSCULAR
Status: DISCONTINUED | OUTPATIENT
Start: 2018-03-29 | End: 2018-03-31 | Stop reason: HOSPADM

## 2018-04-30 ENCOUNTER — CLINICAL SUPPORT (OUTPATIENT)
Dept: INTERNAL MEDICINE | Facility: CLINIC | Age: 63
End: 2018-04-30
Payer: COMMERCIAL

## 2018-04-30 DIAGNOSIS — J30.1 SEASONAL ALLERGIC RHINITIS DUE TO POLLEN: ICD-10-CM

## 2018-04-30 PROCEDURE — 99499 UNLISTED E&M SERVICE: CPT | Mod: S$GLB,,, | Performed by: ALLERGY & IMMUNOLOGY

## 2018-04-30 PROCEDURE — 95117 IMMUNOTHERAPY INJECTIONS: CPT | Mod: S$GLB,,, | Performed by: FAMILY MEDICINE

## 2018-04-30 NOTE — PROGRESS NOTES
Patient here for Allergy injections.  No new meds, no problems after last injection.    0.5mL of 1:1 Red, 1 of 3 given SQ in left upper arm  Tolerated well, no bleeding noted.  0.5mL of 1:1 Red, 2 of 3 given SQ in left mid arm.  Tolerated well, no bleeding noted.  0.5mL of 1:1 Red, 3 of 3 given SQ in right upper arm  Tolerated well, sm. Amt of bleeding noted.  Band Aid applied.      After 30 minutes of observation, Sites 1/3, 1+, Site 2/3 1+, Site 3/3, 0 reaction.    No complaints voiced.

## 2018-06-01 ENCOUNTER — CLINICAL SUPPORT (OUTPATIENT)
Dept: INTERNAL MEDICINE | Facility: CLINIC | Age: 63
End: 2018-06-01
Payer: COMMERCIAL

## 2018-06-01 DIAGNOSIS — J30.1 SEASONAL ALLERGIC RHINITIS DUE TO POLLEN: ICD-10-CM

## 2018-06-01 PROCEDURE — 95117 IMMUNOTHERAPY INJECTIONS: CPT | Mod: S$GLB,,, | Performed by: FAMILY MEDICINE

## 2018-06-01 PROCEDURE — 99499 UNLISTED E&M SERVICE: CPT | Mod: S$GLB,,, | Performed by: ALLERGY & IMMUNOLOGY

## 2018-06-01 NOTE — PROGRESS NOTES
Patient here for Allergy injections.  No new meds, no problems after last injection.    0.5mL of 1:1 Red, 1 of 3 given SQ in left upper arm  Tolerated well  0.5mL of 1:1 Red, 2 of 3 given SQ in left mid arm.  Tolerated well.  0.5mL of 1:1 Red, 3 of 3 given SQ in right upper arm  Tolerated well.    After 30 minutes of observation, Sites 1/3, 2+ erythema, Site 2/3 1+ erythema, Site 3/3, 0 reaction.    No complaints voiced.

## 2018-06-14 ENCOUNTER — OFFICE VISIT (OUTPATIENT)
Dept: FAMILY MEDICINE | Facility: CLINIC | Age: 63
End: 2018-06-14
Payer: COMMERCIAL

## 2018-06-14 VITALS
BODY MASS INDEX: 26.12 KG/M2 | HEIGHT: 70 IN | OXYGEN SATURATION: 98 % | SYSTOLIC BLOOD PRESSURE: 136 MMHG | DIASTOLIC BLOOD PRESSURE: 70 MMHG | TEMPERATURE: 98 F | WEIGHT: 182.44 LBS | HEART RATE: 55 BPM

## 2018-06-14 DIAGNOSIS — F45.8 BRUXISM: Primary | ICD-10-CM

## 2018-06-14 DIAGNOSIS — K21.9 GASTROESOPHAGEAL REFLUX DISEASE WITHOUT ESOPHAGITIS: ICD-10-CM

## 2018-06-14 DIAGNOSIS — M25.561 CHRONIC PAIN OF RIGHT KNEE: ICD-10-CM

## 2018-06-14 DIAGNOSIS — G47.00 INSOMNIA, UNSPECIFIED TYPE: ICD-10-CM

## 2018-06-14 DIAGNOSIS — N40.0 BENIGN NON-NODULAR PROSTATIC HYPERPLASIA WITHOUT LOWER URINARY TRACT SYMPTOMS: ICD-10-CM

## 2018-06-14 DIAGNOSIS — B00.1 RECURRENT COLD SORES: ICD-10-CM

## 2018-06-14 DIAGNOSIS — J30.1 ACUTE SEASONAL ALLERGIC RHINITIS DUE TO POLLEN: ICD-10-CM

## 2018-06-14 DIAGNOSIS — G89.29 CHRONIC PAIN OF RIGHT KNEE: ICD-10-CM

## 2018-06-14 PROBLEM — R97.20 ELEVATED PSA MEASUREMENT: Status: ACTIVE | Noted: 2017-02-06

## 2018-06-14 PROBLEM — R97.20 ELEVATED PSA MEASUREMENT: Status: RESOLVED | Noted: 2017-02-06 | Resolved: 2018-06-14

## 2018-06-14 PROCEDURE — 3008F BODY MASS INDEX DOCD: CPT | Mod: CPTII,S$GLB,, | Performed by: NURSE PRACTITIONER

## 2018-06-14 PROCEDURE — 99999 PR PBB SHADOW E&M-EST. PATIENT-LVL IV: CPT | Mod: PBBFAC,,, | Performed by: NURSE PRACTITIONER

## 2018-06-14 PROCEDURE — 99214 OFFICE O/P EST MOD 30 MIN: CPT | Mod: S$GLB,,, | Performed by: NURSE PRACTITIONER

## 2018-06-14 RX ORDER — LORAZEPAM 1 MG/1
1 TABLET ORAL NIGHTLY
Qty: 90 TABLET | Refills: 1 | Status: SHIPPED | OUTPATIENT
Start: 2018-06-14 | End: 2018-12-14 | Stop reason: SDUPTHER

## 2018-06-14 RX ORDER — HYDROGEN PEROXIDE 3 %
20 SOLUTION, NON-ORAL MISCELLANEOUS
COMMUNITY
End: 2018-07-24 | Stop reason: SDUPTHER

## 2018-06-14 RX ORDER — VALACYCLOVIR HYDROCHLORIDE 1 G/1
TABLET, FILM COATED ORAL
COMMUNITY
Start: 2018-06-08 | End: 2020-08-04

## 2018-06-14 NOTE — PROGRESS NOTES
Subjective:       Patient ID: Bulmaro Nascimento is a 63 y.o. male.    Chief Complaint: Establish Care and Medication Refill    ###NEW PATIENT to me - old patient of Dr. Hodge####      Patient is a 63-year-old white male with BPH and a history of elevated PSA that is followed by urology, chronic ALLERGIES that is followed by ALLERGY specialist, chronic right knee pain that is followed by orthopedic specialist, recurrent cold sores, chronic GERD, bruxism and insomnia that is here today to establish care and get medication refill.    Patient has BPH with a history of elevated PSA that is followed by urology, Dr. Lara.  Elevated PSA was in February 2017 and has had a normal measurement since that time.  He had his follow-up with Dr. Lara in March 2018 however his PSA level is not up-to-date.  Advised patient we will have to get blood drawn for PSA if he does not have a level done at home - patient states he thought he saw PSA level for this year.    Patient has chronic ALLERGIES that is managed by [facility] ALLERGY specialist.    Patient has bruxism and insomnia that is controlled on Ativan 1 mg at bedtime.    Patient has recurrent cold sores and takes Valtrex as needed.    Patient has chronic right knee pain that is being followed by a specialist.    Patient has chronic GERD.  He reports in the past Dr. Hodge had him rotating Zantac 150 twice daily for a few months and then Nexium 20 mg for a few months.  Patient reports that the Zantac is no longer effective.  Patient reports he did have an EGD done in 2012 that was normal.  Advised patient to stop the Zantac and just continue with Nexium 20 mg daily.              Previous Medications    EPINEPHRINE (AUVI-Q) 0.3 MG/0.3 ML ATIN    Inject 0.3 mLs (0.3 mg total) into the muscle once.    ESOMEPRAZOLE (NEXIUM) 20 MG CAPSULE    Take 20 mg by mouth before breakfast.    FLUTICASONE (FLONASE) 50 MCG/ACTUATION NASAL SPRAY    2 sprays by Each Nare route 2 (two) times  daily.    INDOMETHACIN (INDOCIN SR) 75 MG CPSR CR CAPSULE    Take 1 capsule (75 mg total) by mouth 2 (two) times daily with meals.    LORAZEPAM (ATIVAN) 1 MG TABLET    Take 1 tablet (1 mg total) by mouth every evening.    RANITIDINE (ZANTAC) 150 MG TABLET    Take 1 tablet (150 mg total) by mouth 2 (two) times daily.    TADALAFIL (CIALIS) 5 MG TABLET    Take 1 tablet (5 mg total) by mouth daily as needed for Erectile Dysfunction.    VALACYCLOVIR (VALTREX) 1000 MG TABLET           Past Medical History:   Diagnosis Date    Allergy     Anxiety 7/8/2015    Benign non-nodular prostatic hyperplasia without lower urinary tract symptoms 2/11/2016    Bruxism     Chronic pain of right knee 3/21/2018    Elevated PSA measurement 02/06/2017    Followed by Dr. Lara    Elevated PSA measurement     Family history of prostate cancer in father 3/14/2018    Gastroesophageal reflux disease without esophagitis 6/8/2015    Insomnia 6/8/2015    Recurrent cold sores 6/14/2018    Seasonal allergic rhinitis due to pollen 12/17/2016       Past Surgical History:   Procedure Laterality Date    COLONOSCOPY  07/11/2012    normal - Dr. Regan - repeat in 10 years    KNEE SURGERY Bilateral 1992    knee cap alignment with clean up    SHOULDER SURGERY Right     SINUS SURGERY         Family History   Problem Relation Age of Onset    Thyroid disease Mother     Diabetes Father     Cancer Father 75        Prostate     Thyroid disease Sister     Cancer Brother 50        bladder cancer    No Known Problems Sister     No Known Problems Brother     No Known Problems Brother     Heart disease Neg Hx     Prostate cancer Neg Hx     Kidney disease Neg Hx        Social History     Social History    Marital status:      Spouse name: N/A    Number of children: N/A    Years of education: N/A     Occupational History          Social History Main Topics    Smoking status: Never Smoker    Smokeless tobacco: Never Used  "   Alcohol use 0.6 oz/week     1 Cans of beer per week      Comment: once a month    Drug use: No    Sexual activity: Not Currently     Partners: Female     Other Topics Concern    None     Social History Narrative    Lives in [place] alone. 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       Review of Systems   Constitutional: Negative for activity change and unexpected weight change.   HENT: Negative for hearing loss, rhinorrhea and trouble swallowing.    Eyes: Negative for discharge and visual disturbance.   Respiratory: Negative for chest tightness and wheezing.    Cardiovascular: Negative for chest pain and palpitations.   Gastrointestinal: Negative for blood in stool, constipation, diarrhea and vomiting.   Endocrine: Negative for polydipsia and polyuria.   Genitourinary: Negative for difficulty urinating, hematuria and urgency.   Musculoskeletal: Positive for arthralgias. Negative for joint swelling and neck pain.   Neurological: Negative for weakness and headaches.   Psychiatric/Behavioral: Negative for confusion and dysphoric mood.         Objective:     Vitals:    06/14/18 0840   BP: 136/70   BP Location: Left arm   Patient Position: Sitting   BP Method: Medium (Manual)   Pulse: (!) 55   Temp: 97.8 °F (36.6 °C)   TempSrc: Oral   SpO2: 98%   Weight: 82.7 kg (182 lb 6.9 oz)   Height: 5' 10" (1.778 m)          Physical Exam   Constitutional: He is oriented to person, place, and time. He appears well-developed and well-nourished. He is cooperative. No distress.   HENT:   Head: Normocephalic and atraumatic.   Right Ear: Hearing, tympanic membrane, external ear and ear canal normal.   Left Ear: Hearing, external ear and ear canal normal.   Nose: Nose normal.   Mouth/Throat: Oropharynx is clear and moist. No oropharyngeal exudate. "   Eyes: Conjunctivae and EOM are normal. Pupils are equal, round, and reactive to light. Right eye exhibits no discharge. Left eye exhibits no discharge. No scleral icterus.   Neck: Normal range of motion. Neck supple. No tracheal deviation present. No thyromegaly present.   Cardiovascular: Normal rate, regular rhythm, normal heart sounds and intact distal pulses.    No murmur heard.  Pulmonary/Chest: Effort normal and breath sounds normal. No respiratory distress.   Abdominal: Soft. He exhibits no distension.   Musculoskeletal: Normal range of motion. He exhibits no edema or tenderness.   Lymphadenopathy:     He has no cervical adenopathy.   Neurological: He is alert and oriented to person, place, and time. He has normal strength. Coordination and gait normal.   Skin: Skin is warm, dry and intact. No rash noted. No cyanosis. Nails show no clubbing.   Psychiatric: He has a normal mood and affect. His speech is normal and behavior is normal. Judgment and thought content normal. Cognition and memory are normal.   Vitals reviewed.        Assessment:         ICD-10-CM ICD-9-CM   1. Bruxism F45.8 306.8   2. Recurrent cold sores B00.1 054.9   3. Insomnia, unspecified type G47.00 780.52   4. Acute seasonal allergic rhinitis due to pollen J30.1 477.0   5. Gastroesophageal reflux disease without esophagitis K21.9 530.81   6. Chronic pain of right knee M25.561 719.46    G89.29 338.29   7. Benign non-nodular prostatic hyperplasia without lower urinary tract symptoms N40.0 600.90       Plan:       Bruxism  -  Controlled on Ativan at present dose  -     LORazepam (ATIVAN) 1 MG tablet; Take 1 tablet (1 mg total) by mouth every evening.  Dispense: 90 tablet; Refill: 1    Recurrent cold sores  -  Takes Valtrex when necessary    Insomnia, unspecified type  -  Controlled on Ativan at present dose  -     LORazepam (ATIVAN) 1 MG tablet; Take 1 tablet (1 mg total) by mouth every evening.  Dispense: 90 tablet; Refill: 1    Acute seasonal  allergic rhinitis due to pollen  -  Followed by ALLERGY specialist    Gastroesophageal reflux disease without esophagitis  -  Stop the Zantac due to ineffectiveness and take Nexium daily    Chronic pain of right knee  -  Followed by specialist    Benign non-nodular prostatic hyperplasia without lower urinary tract symptoms  -  Followed by specialists.  Patient did see his urologist in March 2018 but the PSA level was not done according to lab reports.  Patient thinks that he saw a PSA level in his lab results at home so he will double check and send me the report if he had one.  If not, patient will return to lab for PSA level.      Follow-up in about 6 months (around 12/14/2018) for med check only.     Patient's Medications   New Prescriptions    No medications on file   Previous Medications    EPINEPHRINE (AUVI-Q) 0.3 MG/0.3 ML ATIN    Inject 0.3 mLs (0.3 mg total) into the muscle once.    ESOMEPRAZOLE (NEXIUM) 20 MG CAPSULE    Take 20 mg by mouth before breakfast.    FLUTICASONE (FLONASE) 50 MCG/ACTUATION NASAL SPRAY    2 sprays by Each Nare route 2 (two) times daily.    INDOMETHACIN (INDOCIN SR) 75 MG CPSR CR CAPSULE    Take 1 capsule (75 mg total) by mouth 2 (two) times daily with meals.    TADALAFIL (CIALIS) 5 MG TABLET    Take 1 tablet (5 mg total) by mouth daily as needed for Erectile Dysfunction.    VALACYCLOVIR (VALTREX) 1000 MG TABLET       Modified Medications    Modified Medication Previous Medication    LORAZEPAM (ATIVAN) 1 MG TABLET LORazepam (ATIVAN) 1 MG tablet       Take 1 tablet (1 mg total) by mouth every evening.    Take 1 tablet (1 mg total) by mouth every evening.   Discontinued Medications    RANITIDINE (ZANTAC) 150 MG TABLET    Take 1 tablet (150 mg total) by mouth 2 (two) times daily.

## 2018-06-15 ENCOUNTER — PATIENT MESSAGE (OUTPATIENT)
Dept: FAMILY MEDICINE | Facility: CLINIC | Age: 63
End: 2018-06-15

## 2018-07-02 ENCOUNTER — CLINICAL SUPPORT (OUTPATIENT)
Dept: INTERNAL MEDICINE | Facility: CLINIC | Age: 63
End: 2018-07-02
Payer: COMMERCIAL

## 2018-07-02 DIAGNOSIS — J30.1 SEASONAL ALLERGIC RHINITIS DUE TO POLLEN: ICD-10-CM

## 2018-07-02 PROCEDURE — 95117 IMMUNOTHERAPY INJECTIONS: CPT | Mod: S$GLB,,, | Performed by: FAMILY MEDICINE

## 2018-07-02 PROCEDURE — 99499 UNLISTED E&M SERVICE: CPT | Mod: S$GLB,,, | Performed by: ALLERGY & IMMUNOLOGY

## 2018-07-02 NOTE — PROGRESS NOTES
Patient here for Allergy injections.  No new meds, no problems after last injection.    0.5mL of 1:1 Red, 1 of 3 given SQ in left upper arm  Tolerated well  0.5mL of 1:1 Red, 2 of 3 given SQ in left mid arm.  Tolerated well.  0.5mL of 1:1 Red, 3 of 3 given SQ in right upper arm  Tolerated well.    After 30 minutes of observation, Sites 1/3, 1+ erythema, Site 2/3 1+ erythema, Site 3/3, 0 reaction.    No complaints voiced.

## 2018-07-03 ENCOUNTER — TELEPHONE (OUTPATIENT)
Dept: UROLOGY | Facility: CLINIC | Age: 63
End: 2018-07-03

## 2018-07-03 NOTE — TELEPHONE ENCOUNTER
----- Message from Kesha Gambino NP sent at 7/3/2018 12:48 PM CDT -----  Normal PSA of 2.4 ng/mL. Repeat in 1 year. Please inform patient.

## 2018-07-23 ENCOUNTER — PATIENT MESSAGE (OUTPATIENT)
Dept: SPORTS MEDICINE | Facility: CLINIC | Age: 63
End: 2018-07-23

## 2018-07-24 ENCOUNTER — HOSPITAL ENCOUNTER (OUTPATIENT)
Dept: RADIOLOGY | Facility: HOSPITAL | Age: 63
Discharge: HOME | End: 2018-07-24
Attending: ORTHOPAEDIC SURGERY
Payer: COMMERCIAL

## 2018-07-24 ENCOUNTER — OFFICE VISIT (OUTPATIENT)
Dept: SPORTS MEDICINE | Facility: CLINIC | Age: 63
End: 2018-07-24
Payer: COMMERCIAL

## 2018-07-24 ENCOUNTER — HOSPITAL ENCOUNTER (OUTPATIENT)
Dept: RADIOLOGY | Facility: HOSPITAL | Age: 63
Discharge: HOME | End: 2018-07-24
Attending: STUDENT IN AN ORGANIZED HEALTH CARE EDUCATION/TRAINING PROGRAM
Payer: COMMERCIAL

## 2018-07-24 VITALS
HEIGHT: 70 IN | HEART RATE: 55 BPM | SYSTOLIC BLOOD PRESSURE: 151 MMHG | DIASTOLIC BLOOD PRESSURE: 77 MMHG | BODY MASS INDEX: 25.77 KG/M2 | WEIGHT: 180 LBS

## 2018-07-24 DIAGNOSIS — K21.9 GASTROESOPHAGEAL REFLUX DISEASE WITHOUT ESOPHAGITIS: ICD-10-CM

## 2018-07-24 DIAGNOSIS — M54.50 LUMBAR PAIN: ICD-10-CM

## 2018-07-24 DIAGNOSIS — M47.816 LUMBAR SPONDYLOSIS: Primary | ICD-10-CM

## 2018-07-24 PROCEDURE — 72120 X-RAY BEND ONLY L-S SPINE: CPT | Mod: TC,FY,PO

## 2018-07-24 PROCEDURE — 99999 PR PBB SHADOW E&M-EST. PATIENT-LVL III: CPT | Mod: PBBFAC,,, | Performed by: ORTHOPAEDIC SURGERY

## 2018-07-24 PROCEDURE — 99213 OFFICE O/P EST LOW 20 MIN: CPT | Mod: 59,S$GLB,, | Performed by: ORTHOPAEDIC SURGERY

## 2018-07-24 PROCEDURE — 3008F BODY MASS INDEX DOCD: CPT | Mod: CPTII,S$GLB,, | Performed by: ORTHOPAEDIC SURGERY

## 2018-07-24 PROCEDURE — 72100 X-RAY EXAM L-S SPINE 2/3 VWS: CPT | Mod: 26,,, | Performed by: RADIOLOGY

## 2018-07-24 PROCEDURE — 72120 X-RAY BEND ONLY L-S SPINE: CPT | Mod: 26,,, | Performed by: RADIOLOGY

## 2018-07-24 RX ORDER — HYDROGEN PEROXIDE 3 %
20 SOLUTION, NON-ORAL MISCELLANEOUS
Qty: 30 CAPSULE | Refills: 1 | Status: SHIPPED | OUTPATIENT
Start: 2018-07-24 | End: 2019-01-08 | Stop reason: ALTCHOICE

## 2018-07-24 RX ORDER — CELECOXIB 200 MG/1
200 CAPSULE ORAL 2 TIMES DAILY
Qty: 60 CAPSULE | Refills: 2 | Status: SHIPPED | OUTPATIENT
Start: 2018-07-24 | End: 2019-01-08

## 2018-07-24 RX ORDER — METHOCARBAMOL 500 MG/1
1000 TABLET, FILM COATED ORAL 3 TIMES DAILY PRN
Qty: 90 TABLET | Refills: 0 | OUTPATIENT
Start: 2018-07-24 | End: 2018-07-24 | Stop reason: SDUPTHER

## 2018-07-24 RX ORDER — METHOCARBAMOL 500 MG/1
1000 TABLET, FILM COATED ORAL 3 TIMES DAILY PRN
Qty: 90 TABLET | Refills: 0 | Status: SHIPPED | OUTPATIENT
Start: 2018-07-24 | End: 2018-08-23

## 2018-07-24 NOTE — PROGRESS NOTES
CC: Low back pain      63 y.o. Male who is well known to me. Previously seen for knee and great toe pain. Doing well in that regard. Primary complaint today is low back pain x 2 weeks. Atraumatic onset. Pain localizes lumbar spine without any radicular component. Worse with extension. Better with rest, and heat. Denies neck pain or radicular symptoms. Treatment thus far has included activity modifications, rest, and oral medication.  Here today to discuss diagnosis and treatment options.     PMHx notable for prior knee and foot pain.   Negative for tobacco.   Negative for diabetes.     Pain Score:   6    PAST MEDICAL HISTORY:   Past Medical History:   Diagnosis Date    Allergy     Anxiety 7/8/2015    Benign non-nodular prostatic hyperplasia without lower urinary tract symptoms 2/11/2016    Bruxism     Chronic pain of right knee 3/21/2018    Elevated PSA measurement 02/06/2017    Followed by Dr. Lara    Elevated PSA measurement     Family history of prostate cancer in father 3/14/2018    Gastroesophageal reflux disease without esophagitis 6/8/2015    Insomnia 6/8/2015    Recurrent cold sores 6/14/2018    Seasonal allergic rhinitis due to pollen 12/17/2016       PAST SURGICAL HISTORY:  Past Surgical History:   Procedure Laterality Date    COLONOSCOPY  07/11/2012    normal - Dr. Regan - repeat in 10 years    KNEE SURGERY Bilateral 1992    knee cap alignment with clean up    SHOULDER SURGERY Right     SINUS SURGERY         FAMILY HISTORY:  Family History   Problem Relation Age of Onset    Thyroid disease Mother     Diabetes Father     Cancer Father 75        Prostate     Thyroid disease Sister     Cancer Brother 50        bladder cancer    No Known Problems Sister     No Known Problems Brother     No Known Problems Brother     Heart disease Neg Hx     Prostate cancer Neg Hx     Kidney disease Neg Hx        MEDICATIONS:    Current Outpatient Prescriptions:     esomeprazole (NEXIUM) 20 MG  "capsule, Take 1 capsule (20 mg total) by mouth before breakfast., Disp: 30 capsule, Rfl: 1    fluticasone (FLONASE) 50 mcg/actuation nasal spray, 2 sprays by Each Nare route 2 (two) times daily., Disp: 1 Bottle, Rfl: 11    indomethacin (INDOCIN SR) 75 mg CpSR CR capsule, Take 1 capsule (75 mg total) by mouth 2 (two) times daily with meals., Disp: 60 capsule, Rfl: 2    LORazepam (ATIVAN) 1 MG tablet, Take 1 tablet (1 mg total) by mouth every evening., Disp: 90 tablet, Rfl: 1    tadalafil (CIALIS) 5 MG tablet, Take 1 tablet (5 mg total) by mouth daily as needed for Erectile Dysfunction., Disp: 90 tablet, Rfl: 3    valACYclovir (VALTREX) 1000 MG tablet, , Disp: , Rfl:     celecoxib (CELEBREX) 200 MG capsule, Take 1 capsule (200 mg total) by mouth 2 (two) times daily., Disp: 60 capsule, Rfl: 2    EPINEPHrine (AUVI-Q) 0.3 mg/0.3 mL AtIn, Inject 0.3 mLs (0.3 mg total) into the muscle once., Disp: 0.3 mL, Rfl: 0    methocarbamol (ROBAXIN) 500 MG Tab, Take 2 tablets (1,000 mg total) by mouth 3 (three) times daily as needed., Disp: 90 tablet, Rfl: 0    Current Facility-Administered Medications:     Allergy Mix, , Subcutaneous, 1 time in Clinic/HOD, Lacy Perez MD    ALLERGIES:  Review of patient's allergies indicates:  No Known Allergies    REVIEW OF SYSTEMS:  Constitution: Negative. Negative for chills, fever and night sweats.    Hematologic/Lymphatic: Negative for bleeding problem. Does not bruise/bleed easily.   Skin: Negative for dry skin, itching and rash.   Musculoskeletal: Negative for falls. Positive for right shoulder pain and  muscle weakness.     PHYSICAL EXAMINATION:  Vitals:  BP (!) 151/77   Pulse (!) 55   Ht 5' 10" (1.778 m)   Wt 81.6 kg (180 lb)   BMI 25.83 kg/m²    General: Well-developed well-nourished 63 y.o. malein no acute distress   Cardiovascular: Regular rhythm by palpation of distal pulse, normal color and temperature, no concerning varicosities on symptomatic side   Lungs: No " labored breathing or wheezing appreciated   Neuro: Alert and oriented ×3   Psychiatric: well oriented to person, place and time, demonstrates normal mood and affect   Skin: No rashes, lesions or ulcers, normal temperature, turgor, and texture on uninvolved extremity    Ortho/SPM Exam    Examination of the lumbar spine demonstrates moderate paraspinal lumbar tenderness to palpation. Normal range of motion to the L-spine, mild limitation with lumbar flexion while standing. Popliteal angle is 10` bilateral. BLE motor strength 5/5 in all myotomes. SILT +ve. Normal symmetric reflexes 2+ to the bilateral lower extremities      IMAGING:  Xrays including AP, Lat of the L/S are ordered / images reviewed by me: Mild DJD predominantly at the L3-4 level with endplate changes..  The lumbosacral disc space is narrowed.  The other disc spaces are well maintained.  No fracture, spondylolisthesis or bone destruction identified.       ASSESSMENT:      ICD-10-CM ICD-9-CM   1. Lumbar spondylosis M47.816 721.3   2. Lumbar pain M54.5 724.2   3. Gastroesophageal reflux disease without esophagitis K21.9 530.81       PLAN:     -Findings and treatment options were discussed with the patient - symptoms likely related to paraspinal muscle spasms and lumbar DDD without radicular component.  -Plan to begin Celebrex / Methocarbamol / PT for ROM and strength  -RTC 4 weeks for repeat evaluation  -Discussed right knee pain in detail as well - will consider MRI if patient remains symptomatic upon RTC  -All questions answered      Procedures

## 2018-07-25 ENCOUNTER — PATIENT MESSAGE (OUTPATIENT)
Dept: SPORTS MEDICINE | Facility: CLINIC | Age: 63
End: 2018-07-25

## 2018-07-30 PROBLEM — M53.3 SI (SACROILIAC) PAIN: Status: ACTIVE | Noted: 2018-07-30

## 2018-08-03 ENCOUNTER — CLINICAL SUPPORT (OUTPATIENT)
Dept: INTERNAL MEDICINE | Facility: CLINIC | Age: 63
End: 2018-08-03
Payer: COMMERCIAL

## 2018-08-03 DIAGNOSIS — J30.1 SEASONAL ALLERGIC RHINITIS DUE TO POLLEN: ICD-10-CM

## 2018-08-03 PROCEDURE — 99499 UNLISTED E&M SERVICE: CPT | Mod: S$GLB,,, | Performed by: ALLERGY & IMMUNOLOGY

## 2018-08-03 PROCEDURE — 95117 IMMUNOTHERAPY INJECTIONS: CPT | Mod: S$GLB,,, | Performed by: FAMILY MEDICINE

## 2018-08-03 NOTE — PROGRESS NOTES
Patient here for Allergy injections.  No new meds, no problems after last injection.    0.5mL of 1:1 Red, 1 of 3 given SQ in left upper arm  Tolerated well  0.5mL of 1:1 Red, 2 of 3 given SQ in left mid arm.  Tolerated well.  0.5mL of 1:1 Red, 3 of 3 given SQ in right upper arm  Tolerated well.    After 30 minutes of observation, Sites 1/3, 2+ induration, Site 2/3 2+ induration, Site 3/3, 0 reaction.    No complaints voiced.

## 2018-08-08 ENCOUNTER — TELEPHONE (OUTPATIENT)
Dept: SPORTS MEDICINE | Facility: CLINIC | Age: 63
End: 2018-08-08

## 2018-08-08 NOTE — TELEPHONE ENCOUNTER
Patient adv he is doing better will cb if needed.      ----- Message from Yared Myers sent at 8/8/2018 11:41 AM CDT -----  Contact: Self/ [phone]  Patient was retuning a missed phone call.

## 2018-08-31 ENCOUNTER — CLINICAL SUPPORT (OUTPATIENT)
Dept: INTERNAL MEDICINE | Facility: CLINIC | Age: 63
End: 2018-08-31
Payer: COMMERCIAL

## 2018-08-31 DIAGNOSIS — J30.1 SEASONAL ALLERGIC RHINITIS DUE TO POLLEN: ICD-10-CM

## 2018-08-31 PROCEDURE — 95117 IMMUNOTHERAPY INJECTIONS: CPT | Mod: S$GLB,,, | Performed by: FAMILY MEDICINE

## 2018-08-31 PROCEDURE — 99499 UNLISTED E&M SERVICE: CPT | Mod: S$GLB,,, | Performed by: ALLERGY & IMMUNOLOGY

## 2018-08-31 NOTE — PROGRESS NOTES
Patient here for Allergy injections.  No new meds, no problems after last injection.    0.5mL of 1:1 Red, 1 of 3 given SQ in left upper arm  Tolerated well  0.5mL of 1:1 Red, 2 of 3 given SQ in right arm.  Tolerated well.  0.5mL of 1:1 Red, 3 of 3 given SQ in mid left arm. Tolerated well.    After 30 minutes of observation, Sites 1/3, 1+ induration, Site 2/3 2+ induration, Site 3/3, 1+ reaction.    No complaints voiced.

## 2018-09-28 ENCOUNTER — CLINICAL SUPPORT (OUTPATIENT)
Dept: INTERNAL MEDICINE | Facility: CLINIC | Age: 63
End: 2018-09-28
Payer: COMMERCIAL

## 2018-09-28 DIAGNOSIS — J30.9 CHRONIC ALLERGIC RHINITIS: ICD-10-CM

## 2018-09-28 PROCEDURE — 95117 IMMUNOTHERAPY INJECTIONS: CPT | Mod: S$GLB,,, | Performed by: FAMILY MEDICINE

## 2018-09-28 PROCEDURE — 99499 UNLISTED E&M SERVICE: CPT | Mod: S$GLB,,, | Performed by: ALLERGY & IMMUNOLOGY

## 2018-09-28 NOTE — PROGRESS NOTES
Patient here for Allergy injections.  No new meds, no problems after last injection.    0.5mL of 1:1 Red, 1 of 3 given SQ in left upper arm  Tolerated well  0.5mL of 1:1 Red, 2 of 3 given SQ in right arm.  Tolerated well.  0.5mL of 1:1 Red, 3 of 3 given SQ in mid left arm. Tolerated well.    After 30 minutes of observation, Sites 1/3, 1+ induration, Site 2/3 2+ induration, Site 3/3, 0 reaction.    No complaints voiced.

## 2018-10-01 ENCOUNTER — PATIENT MESSAGE (OUTPATIENT)
Dept: FAMILY MEDICINE | Facility: CLINIC | Age: 63
End: 2018-10-01

## 2018-10-01 DIAGNOSIS — Z11.3 SCREEN FOR STD (SEXUALLY TRANSMITTED DISEASE): Primary | ICD-10-CM

## 2018-10-01 NOTE — TELEPHONE ENCOUNTER
Betty - contact patient to schedule lab appt for STD screening - he will need blood and urine appt.

## 2018-10-03 DIAGNOSIS — Z20.2 EXPOSURE TO STD: Primary | ICD-10-CM

## 2018-10-17 ENCOUNTER — PATIENT MESSAGE (OUTPATIENT)
Dept: FAMILY MEDICINE | Facility: CLINIC | Age: 63
End: 2018-10-17

## 2018-10-26 ENCOUNTER — CLINICAL SUPPORT (OUTPATIENT)
Dept: INTERNAL MEDICINE | Facility: CLINIC | Age: 63
End: 2018-10-26
Payer: COMMERCIAL

## 2018-10-26 DIAGNOSIS — J30.1 SEASONAL ALLERGIC RHINITIS DUE TO POLLEN: ICD-10-CM

## 2018-10-26 PROCEDURE — 95117 IMMUNOTHERAPY INJECTIONS: CPT | Mod: S$GLB,,, | Performed by: FAMILY MEDICINE

## 2018-10-26 PROCEDURE — 99499 UNLISTED E&M SERVICE: CPT | Mod: S$GLB,,, | Performed by: ALLERGY & IMMUNOLOGY

## 2018-10-26 NOTE — PROGRESS NOTES
Patient here for Allergy injections.  No new meds, no problems after last injection.    0.5mL of 1:1 Red, 1 of 3 given SQ in left upper arm  Tolerated well  0.5mL of 1:1 Red, 2 of 3 given SQ in mid left arm.  Tolerated well.  0.5mL of 1:1 Red, 3 of 3 given SQ in right arm. Tolerated well.    After 30 minutes of observation, Sites 1/3, 0 reaction noted, Site 2/3 2+ induration, Site 3/3, 0 reaction.    No complaints voiced.

## 2018-11-30 ENCOUNTER — CLINICAL SUPPORT (OUTPATIENT)
Dept: INTERNAL MEDICINE | Facility: CLINIC | Age: 63
End: 2018-11-30
Payer: COMMERCIAL

## 2018-11-30 DIAGNOSIS — J30.1 SEASONAL ALLERGIC RHINITIS DUE TO POLLEN: ICD-10-CM

## 2018-11-30 PROCEDURE — 95117 IMMUNOTHERAPY INJECTIONS: CPT | Mod: S$GLB,,, | Performed by: FAMILY MEDICINE

## 2018-11-30 PROCEDURE — 99499 UNLISTED E&M SERVICE: CPT | Mod: S$GLB,,, | Performed by: ALLERGY & IMMUNOLOGY

## 2018-12-14 ENCOUNTER — PATIENT MESSAGE (OUTPATIENT)
Dept: UROLOGY | Facility: CLINIC | Age: 63
End: 2018-12-14

## 2018-12-14 DIAGNOSIS — G47.00 INSOMNIA, UNSPECIFIED TYPE: ICD-10-CM

## 2018-12-14 DIAGNOSIS — F45.8 BRUXISM: ICD-10-CM

## 2018-12-14 RX ORDER — LORAZEPAM 1 MG/1
1 TABLET ORAL NIGHTLY
Qty: 30 TABLET | Refills: 0 | Status: SHIPPED | OUTPATIENT
Start: 2018-12-14 | End: 2019-01-08 | Stop reason: ALTCHOICE

## 2018-12-19 ENCOUNTER — TELEPHONE (OUTPATIENT)
Dept: ALLERGY | Facility: CLINIC | Age: 63
End: 2018-12-19

## 2018-12-19 ENCOUNTER — TELEPHONE (OUTPATIENT)
Dept: UROLOGY | Facility: CLINIC | Age: 63
End: 2018-12-19

## 2018-12-19 RX ORDER — TADALAFIL 5 MG/1
5 TABLET ORAL DAILY PRN
Qty: 90 TABLET | Refills: 3 | Status: SHIPPED | OUTPATIENT
Start: 2018-12-19 | End: 2020-01-13

## 2018-12-19 NOTE — TELEPHONE ENCOUNTER
Called pt, no answer, left  msg.  Patient has IT inj appt on 12./28.  Clinic is not open that day.  Offered 12/27 at 9:45 at [place].  Cancelled appt on 12/28 and scheduled 9:45 on 12/27.  Asked pt to call us to confirm.

## 2018-12-20 ENCOUNTER — PATIENT MESSAGE (OUTPATIENT)
Dept: UROLOGY | Facility: CLINIC | Age: 63
End: 2018-12-20

## 2019-01-02 ENCOUNTER — CLINICAL SUPPORT (OUTPATIENT)
Dept: INTERNAL MEDICINE | Facility: CLINIC | Age: 64
End: 2019-01-02
Payer: COMMERCIAL

## 2019-01-02 DIAGNOSIS — J30.1 SEASONAL ALLERGIC RHINITIS DUE TO POLLEN: ICD-10-CM

## 2019-01-02 PROCEDURE — 95117 IMMUNOTHERAPY INJECTIONS: CPT | Mod: S$GLB,,, | Performed by: FAMILY MEDICINE

## 2019-01-02 PROCEDURE — 95117 PR IMMU2THERAPY, 2+ INJECTIONS: ICD-10-PCS | Mod: S$GLB,,, | Performed by: FAMILY MEDICINE

## 2019-01-02 PROCEDURE — 99499 NO LOS: ICD-10-PCS | Mod: S$GLB,,, | Performed by: ALLERGY & IMMUNOLOGY

## 2019-01-02 PROCEDURE — 99499 UNLISTED E&M SERVICE: CPT | Mod: S$GLB,,, | Performed by: ALLERGY & IMMUNOLOGY

## 2019-01-03 ENCOUNTER — PATIENT MESSAGE (OUTPATIENT)
Dept: FAMILY MEDICINE | Facility: CLINIC | Age: 64
End: 2019-01-03

## 2019-01-08 ENCOUNTER — OFFICE VISIT (OUTPATIENT)
Dept: FAMILY MEDICINE | Facility: CLINIC | Age: 64
End: 2019-01-08
Payer: COMMERCIAL

## 2019-01-08 VITALS
HEIGHT: 70 IN | TEMPERATURE: 98 F | DIASTOLIC BLOOD PRESSURE: 72 MMHG | OXYGEN SATURATION: 98 % | SYSTOLIC BLOOD PRESSURE: 126 MMHG | BODY MASS INDEX: 25.73 KG/M2 | WEIGHT: 179.69 LBS | HEART RATE: 60 BPM | RESPIRATION RATE: 16 BRPM

## 2019-01-08 DIAGNOSIS — N40.0 BENIGN PROSTATIC HYPERPLASIA WITHOUT LOWER URINARY TRACT SYMPTOMS: ICD-10-CM

## 2019-01-08 DIAGNOSIS — Z13.1 DIABETES MELLITUS SCREENING: ICD-10-CM

## 2019-01-08 DIAGNOSIS — Z13.220 SCREENING CHOLESTEROL LEVEL: ICD-10-CM

## 2019-01-08 DIAGNOSIS — Z13.0 SCREENING FOR DEFICIENCY ANEMIA: ICD-10-CM

## 2019-01-08 DIAGNOSIS — F45.8 BRUXISM: Primary | ICD-10-CM

## 2019-01-08 DIAGNOSIS — K21.9 CHRONIC GERD: ICD-10-CM

## 2019-01-08 DIAGNOSIS — G47.00 INSOMNIA, UNSPECIFIED TYPE: ICD-10-CM

## 2019-01-08 DIAGNOSIS — Z13.29 THYROID DISORDER SCREEN: ICD-10-CM

## 2019-01-08 PROCEDURE — 99999 PR PBB SHADOW E&M-EST. PATIENT-LVL V: CPT | Mod: PBBFAC,,, | Performed by: NURSE PRACTITIONER

## 2019-01-08 PROCEDURE — 3008F BODY MASS INDEX DOCD: CPT | Mod: CPTII,S$GLB,, | Performed by: NURSE PRACTITIONER

## 2019-01-08 PROCEDURE — 99999 PR PBB SHADOW E&M-EST. PATIENT-LVL V: ICD-10-PCS | Mod: PBBFAC,,, | Performed by: NURSE PRACTITIONER

## 2019-01-08 PROCEDURE — 99214 PR OFFICE/OUTPT VISIT, EST, LEVL IV, 30-39 MIN: ICD-10-PCS | Mod: S$GLB,,, | Performed by: NURSE PRACTITIONER

## 2019-01-08 PROCEDURE — 99214 OFFICE O/P EST MOD 30 MIN: CPT | Mod: S$GLB,,, | Performed by: NURSE PRACTITIONER

## 2019-01-08 PROCEDURE — 3008F PR BODY MASS INDEX (BMI) DOCUMENTED: ICD-10-PCS | Mod: CPTII,S$GLB,, | Performed by: NURSE PRACTITIONER

## 2019-01-08 RX ORDER — MIRTAZAPINE 30 MG/1
30 TABLET, FILM COATED ORAL NIGHTLY
Qty: 30 TABLET | Refills: 2 | Status: SHIPPED | OUTPATIENT
Start: 2019-01-08 | End: 2019-02-12

## 2019-01-08 NOTE — PROGRESS NOTES
"Subjective:       Patient ID: Bulmaro Nascimento is a 63 y.o. male.    Chief Complaint: Follow-up (refill/discuss medications)    Patient is a 63-year-old white male with BPH and a history of elevated PSA that is followed by urology, chronic ALLERGIES that is followed by ALLERGY specialist, chronic right knee pain that is followed by orthopedic specialist, recurrent cold sores, chronic GERD, bruxism and insomnia that is here today to to discuss medication for Bruxism and Insomnia as well as refill for chronic GERD.    Patient has bruxism and insomnia that is controlled on Ativan 1 mg at bedtime. Patient is here today with the following concern that he messaged me with over portal: " to review my prescription/use of Ativan for sleep and to control my nighttime jaw clenching. My girlfriend is a Pediatric Radiologist at [facility] [employer] [facility] and her daughter is a Psychiatrist in [place]. She (daughter) cannot ethically treat me as a patient however she has recommended that I stop using Ativan because of future possible Alzheimer's issues associated with the use of this drug. She instead, suggested I try Mirtazapine to control the nighttime anxiety and my jaw clenching (with a slow measured dose withdrawal from the Ativan while initiating the Mirtazapine treatment. I would like to discuss this with you see if this new treatment would be a possibility with you and your opinion on the subject. 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 I'm concerned now about my long-term use of Ativan."    Advised patient that I am in agreement with the above discussion.  Long-term benzodiazepine use is NOT recommended as as he is getting old there are much safer alternatives available to replace the high risk medication as Ativan can increase risks of falls, high risk of " dependency, altered metabolism and daytime sedation.  Advised patient I would like to wean off the Ativan and start the replacement Mirtazapine.  Advised patient that if effective for sleep but having problems with muscle tension because of the bruxism - we could also add on a muscle relaxer but would like to wait and reassess first the current planned change in medication.    Patient has chronic GERD.  He reports in the past Dr. Hodge had him rotating Zantac 150 twice daily for a few months and then Nexium 20 mg for a few months.  Patient reports he did have an EGD done in 2012 that was normal.  Due for refill on the Zantac.  Advised patient that long-ter PPI use (Nexium) is discouraged as increases risk of osteoporosis and bone fractures so my suggestion is to only use the Zantac for chronic GERD for daily control and use OTC NEXIUM ONLY as needed for breakthrough symptoms.        Current Outpatient Medications   Medication Sig Dispense Refill    esomeprazole (NEXIUM) 20 MG capsule Take 1 capsule (20 mg total) by mouth before breakfast. 30 capsule 1    fluticasone (FLONASE) 50 mcg/actuation nasal spray 2 sprays by Each Nare route 2 (two) times daily. 1 Bottle 11    LORazepam (ATIVAN) 1 MG tablet Take 1 tablet (1 mg total) by mouth every evening. 30 tablet 0    tadalafil (CIALIS) 5 MG tablet Take 1 tablet (5 mg total) by mouth daily as needed for Erectile Dysfunction. 90 tablet 3    valACYclovir (VALTREX) 1000 MG tablet       EPINEPHrine (AUVI-Q) 0.3 mg/0.3 mL AtIn Inject 0.3 mLs (0.3 mg total) into the muscle once. 0.3 mL 0     Current Facility-Administered Medications   Medication Dose Route Frequency Provider Last Rate Last Dose    Allergy Mix   Subcutaneous 1 time in Clinic/HOD Lacy Perez MD           Past Medical History:   Diagnosis Date    Allergy     Anxiety 7/8/2015    Benign non-nodular prostatic hyperplasia without lower urinary tract symptoms 2/11/2016    Bruxism     Chronic pain of  right knee 3/21/2018    Elevated PSA measurement 02/06/2017    Followed by Dr. Lara    Elevated PSA measurement     Family history of prostate cancer in father 3/14/2018    Gastroesophageal reflux disease without esophagitis 6/8/2015    Insomnia 6/8/2015    Recurrent cold sores 6/14/2018    Seasonal allergic rhinitis due to pollen 12/17/2016       Past Surgical History:   Procedure Laterality Date    COLONOSCOPY  07/11/2012    normal - Dr. Regan - repeat in 10 years    KNEE SURGERY Bilateral 1992    knee cap alignment with clean up    SHOULDER SURGERY Right     SINUS SURGERY         Family History   Problem Relation Age of Onset    Thyroid disease Mother     Diabetes Father     Cancer Father 75        Prostate     Thyroid disease Sister     Cancer Brother 50        bladder cancer    No Known Problems Sister     No Known Problems Brother     No Known Problems Brother     Heart disease Neg Hx     Prostate cancer Neg Hx     Kidney disease Neg Hx        Social History     Socioeconomic History    Marital status:      Spouse name: None    Number of children: None    Years of education: None    Highest education level: None   Social Needs    Financial resource strain: None    Food insecurity - worry: None    Food insecurity - inability: None    Transportation needs - medical: None    Transportation needs - non-medical: None   Occupational History    Occupation:    Tobacco Use    Smoking status: Never Smoker    Smokeless tobacco: Never Used   Substance and Sexual Activity    Alcohol use: Yes     Alcohol/week: 0.6 oz     Types: 1 Cans of beer per week     Comment: once a month    Drug use: No    Sexual activity: Not Currently     Partners: Female   Other Topics Concern    None   Social History Narrative    Lives in [facility]. 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 He swam for minutes with flippers  "about 3 days or 4 days a weeks until he was advised to stop in 2018 due to knee problem.       Review of Systems   Constitutional: Negative for activity change, appetite change, fatigue, fever and unexpected weight change.   HENT: Negative for congestion, ear pain, mouth sores, nosebleeds, postnasal drip, rhinorrhea, sinus pressure, sneezing, sore throat, trouble swallowing and voice change.    Eyes: Negative.    Respiratory: Negative for cough, chest tightness and shortness of breath.    Cardiovascular: Negative for chest pain, palpitations and leg swelling.   Gastrointestinal: Negative.  Negative for abdominal pain, blood in stool, constipation, diarrhea, nausea and vomiting.   Endocrine: Negative.    Genitourinary: Negative for difficulty urinating, dysuria, flank pain, hematuria and urgency.   Musculoskeletal: Negative for arthralgias, back pain, gait problem, joint swelling, myalgias and neck pain.   Skin: Negative for color change, rash and wound.   Allergic/Immunologic: Negative for immunocompromised state.   Neurological: Negative for dizziness, tremors, seizures, syncope, speech difficulty and headaches.   Hematological: Negative for adenopathy. Does not bruise/bleed easily.   Psychiatric/Behavioral: Negative for behavioral problems, dysphoric mood, sleep disturbance and suicidal ideas. The patient is not nervous/anxious.          Objective:     Vitals:    01/08/19 1036   BP: 126/72   BP Location: Left arm   Patient Position: Sitting   BP Method: Large (Manual)   Pulse: 60   Resp: 16   Temp: 98.4 °F (36.9 °C)   TempSrc: Oral   SpO2: 98%   Weight: 81.5 kg (179 lb 10.8 oz)   Height: 5' 10" (1.778 m)          Physical Exam   Constitutional: He is oriented to person, place, and time. He appears well-developed and well-nourished. He is cooperative. No distress.   HENT:   Head: Normocephalic and atraumatic.   Right Ear: Hearing, tympanic membrane, external ear and ear canal normal.   Left Ear: Hearing, external " ear and ear canal normal.   Nose: Nose normal.   Mouth/Throat: Oropharynx is clear and moist. No oropharyngeal exudate.   Eyes: Conjunctivae and EOM are normal. Pupils are equal, round, and reactive to light. Right eye exhibits no discharge. Left eye exhibits no discharge. No scleral icterus.   Neck: Normal range of motion. Neck supple. No tracheal deviation present. No thyromegaly present.   Cardiovascular: Normal rate, regular rhythm, normal heart sounds and intact distal pulses.   No murmur heard.  Pulmonary/Chest: Effort normal and breath sounds normal. No respiratory distress.   Abdominal: Soft. He exhibits no distension.   Musculoskeletal: Normal range of motion. He exhibits no edema or tenderness.   Lymphadenopathy:     He has no cervical adenopathy.   Neurological: He is alert and oriented to person, place, and time. He has normal strength. Coordination and gait normal.   Skin: Skin is warm, dry and intact. No rash noted. No cyanosis. Nails show no clubbing.   Psychiatric: He has a normal mood and affect. His speech is normal and behavior is normal. Judgment and thought content normal. Cognition and memory are normal.   Vitals reviewed.        Assessment:         ICD-10-CM ICD-9-CM   1. Bruxism F45.8 306.8   2. Insomnia, unspecified type G47.00 780.52   3. Chronic GERD K21.9 530.81   4. Screening for deficiency anemia Z13.0 V78.1   5. Thyroid disorder screen Z13.29 V77.0   6. Diabetes mellitus screening Z13.1 V77.1   7. Screening cholesterol level Z13.220 V77.91   8. Benign prostatic hyperplasia without lower urinary tract symptoms N40.0 600.00       Plan:       Bruxism  -  Wean off the Ativan 1 mg daily and onto the Mirtazapine daily.  Decrease the Ativan 1 mg to 1/2 tablet daily for 1 week while starting the Mirtazapine 30 mg 1/2 tablet daily.  Then after 7 days, stop the Ativan completely and increase the Mirtazapine to whole tablet daily. Will take several weeks for the body to adjust to changes in  medication so please allow time for this.  Will reassess in 8 weeks.  May consider adding on a muscle relaxer if needed.  -     mirtazapine (REMERON) 30 MG tablet; Take 1 tablet (30 mg total) by mouth every evening.  Dispense: 30 tablet; Refill: 2    Insomnia, unspecified type  -     mirtazapine (REMERON) 30 MG tablet; Take 1 tablet (30 mg total) by mouth every evening.  Dispense: 30 tablet; Refill: 2    Chronic GERD  -  Stay on Zantac and use Nexium only for break-through  -     ranitidine (ZANTAC) 150 MG tablet; Take 1 tablet (150 mg total) by mouth 2 (two) times daily.  Dispense: 180 tablet; Refill: 3    Screening for deficiency anemia  -     CBC auto differential; Future; Expected date: 01/08/2019    Thyroid disorder screen  -     TSH; Future; Expected date: 01/08/2019    Diabetes mellitus screening  -     Comprehensive metabolic panel; Future; Expected date: 01/08/2019    Screening cholesterol level  -     Lipid panel; Future; Expected date: 01/08/2019    Benign prostatic hyperplasia without lower urinary tract symptoms  -  Followed by Dr. Lara but asking to have PSA at time of other wellness labs.  -     PSA, total and free; Future; Expected date: 01/08/2019      Follow-up in about 2 months (around 3/21/2019) for fasting labs and wellness exam.        Medication List           Accurate as of 1/8/19 11:49 AM. If you have any questions, ask your nurse or doctor.               START taking these medications    mirtazapine 30 MG tablet  Commonly known as:  REMERON  Take 1 tablet (30 mg total) by mouth every evening.  Started by:  Samira Pimentel NP     ranitidine 150 MG tablet  Commonly known as:  ZANTAC  Take 1 tablet (150 mg total) by mouth 2 (two) times daily.  Started by:  Samira Pimentel NP        CONTINUE taking these medications    EPINEPHrine 0.3 mg/0.3 mL Atin  Commonly known as:  AUVI-Q  Inject 0.3 mLs (0.3 mg total) into the muscle once.     fluticasone 50 mcg/actuation nasal spray  Commonly known as:   FLONASE  2 sprays by Each Nare route 2 (two) times daily.     tadalafil 5 MG tablet  Commonly known as:  CIALIS  Take 1 tablet (5 mg total) by mouth daily as needed for Erectile Dysfunction.     valACYclovir 1000 MG tablet  Commonly known as:  VALTREX        STOP taking these medications    celecoxib 200 MG capsule  Commonly known as:  CeleBREX  Stopped by:  Samira Pimentel NP     esomeprazole 20 MG capsule  Commonly known as:  NEXIUM  Stopped by:  Samira Pimentel NP     indomethacin 75 mg Cpsr CR capsule  Commonly known as:  INDOCIN SR  Stopped by:  Samira Pimentel NP     LORazepam 1 MG tablet  Commonly known as:  ATIVAN  Stopped by:  Samira Pimentel NP           Where to Get Your Medications      These medications were sent to [id number] - [place] - [id number] [id number]    Phone:  [phone]   · mirtazapine 30 MG tablet  · ranitidine 150 MG tablet

## 2019-01-10 ENCOUNTER — PATIENT MESSAGE (OUTPATIENT)
Dept: FAMILY MEDICINE | Facility: CLINIC | Age: 64
End: 2019-01-10

## 2019-01-10 RX ORDER — METAXALONE 800 MG/1
800 TABLET ORAL NIGHTLY PRN
Qty: 30 TABLET | Refills: 1 | Status: SHIPPED | OUTPATIENT
Start: 2019-01-10 | End: 2019-01-20

## 2019-01-11 ENCOUNTER — PATIENT MESSAGE (OUTPATIENT)
Dept: FAMILY MEDICINE | Facility: CLINIC | Age: 64
End: 2019-01-11

## 2019-01-14 ENCOUNTER — CLINICAL SUPPORT (OUTPATIENT)
Dept: ALLERGY | Facility: CLINIC | Age: 64
End: 2019-01-14
Payer: COMMERCIAL

## 2019-01-14 DIAGNOSIS — J30.9 CHRONIC ALLERGIC RHINITIS: ICD-10-CM

## 2019-01-14 PROCEDURE — 95165 PR PROFES SVC,IMMUNOTHER,SINGLE/MULT AGS: ICD-10-PCS | Mod: S$GLB,,, | Performed by: ALLERGY & IMMUNOLOGY

## 2019-01-14 PROCEDURE — 99499 UNLISTED E&M SERVICE: CPT | Mod: S$GLB,,, | Performed by: ALLERGY & IMMUNOLOGY

## 2019-01-14 PROCEDURE — 99499 NO LOS: ICD-10-PCS | Mod: S$GLB,,, | Performed by: ALLERGY & IMMUNOLOGY

## 2019-01-14 PROCEDURE — 95165 ANTIGEN THERAPY SERVICES: CPT | Mod: S$GLB,,, | Performed by: ALLERGY & IMMUNOLOGY

## 2019-02-01 ENCOUNTER — CLINICAL SUPPORT (OUTPATIENT)
Dept: INTERNAL MEDICINE | Facility: CLINIC | Age: 64
End: 2019-02-01
Payer: COMMERCIAL

## 2019-02-01 DIAGNOSIS — Z51.6 ALLERGY DESENSITIZATION THERAPY: ICD-10-CM

## 2019-02-01 PROCEDURE — 95117 IMMUNOTHERAPY INJECTIONS: CPT | Mod: S$GLB,,, | Performed by: FAMILY MEDICINE

## 2019-02-01 PROCEDURE — 95117 PR IMMU2THERAPY, 2+ INJECTIONS: ICD-10-PCS | Mod: S$GLB,,, | Performed by: FAMILY MEDICINE

## 2019-02-01 PROCEDURE — 99499 UNLISTED E&M SERVICE: CPT | Mod: S$GLB,,, | Performed by: ALLERGY & IMMUNOLOGY

## 2019-02-01 PROCEDURE — 99499 NO LOS: ICD-10-PCS | Mod: S$GLB,,, | Performed by: ALLERGY & IMMUNOLOGY

## 2019-02-08 ENCOUNTER — CLINICAL SUPPORT (OUTPATIENT)
Dept: INTERNAL MEDICINE | Facility: CLINIC | Age: 64
End: 2019-02-08
Payer: COMMERCIAL

## 2019-02-08 DIAGNOSIS — J30.1 SEASONAL ALLERGIC RHINITIS DUE TO POLLEN: ICD-10-CM

## 2019-02-08 PROCEDURE — 99499 UNLISTED E&M SERVICE: CPT | Mod: S$GLB,,, | Performed by: ALLERGY & IMMUNOLOGY

## 2019-02-08 PROCEDURE — 95117 IMMUNOTHERAPY INJECTIONS: CPT | Mod: S$GLB,,, | Performed by: FAMILY MEDICINE

## 2019-02-08 PROCEDURE — 95117 PR IMMU2THERAPY, 2+ INJECTIONS: ICD-10-PCS | Mod: S$GLB,,, | Performed by: FAMILY MEDICINE

## 2019-02-08 PROCEDURE — 99499 NO LOS: ICD-10-PCS | Mod: S$GLB,,, | Performed by: ALLERGY & IMMUNOLOGY

## 2019-02-10 ENCOUNTER — PATIENT MESSAGE (OUTPATIENT)
Dept: FAMILY MEDICINE | Facility: CLINIC | Age: 64
End: 2019-02-10

## 2019-02-10 DIAGNOSIS — F45.8 BRUXISM: Primary | ICD-10-CM

## 2019-02-10 DIAGNOSIS — R51.9 WORSENING HEADACHES: ICD-10-CM

## 2019-02-11 RX ORDER — LORAZEPAM 0.5 MG/1
0.5 TABLET ORAL NIGHTLY
Qty: 30 TABLET | Refills: 1 | Status: SHIPPED | OUTPATIENT
Start: 2019-02-11 | End: 2019-04-05 | Stop reason: ALTCHOICE

## 2019-02-12 ENCOUNTER — TELEPHONE (OUTPATIENT)
Dept: NEUROLOGY | Facility: CLINIC | Age: 64
End: 2019-02-12

## 2019-02-12 ENCOUNTER — OFFICE VISIT (OUTPATIENT)
Dept: ALLERGY | Facility: CLINIC | Age: 64
End: 2019-02-12
Payer: COMMERCIAL

## 2019-02-12 ENCOUNTER — PATIENT MESSAGE (OUTPATIENT)
Dept: FAMILY MEDICINE | Facility: CLINIC | Age: 64
End: 2019-02-12

## 2019-02-12 VITALS
HEIGHT: 70 IN | SYSTOLIC BLOOD PRESSURE: 110 MMHG | BODY MASS INDEX: 25.81 KG/M2 | DIASTOLIC BLOOD PRESSURE: 70 MMHG | WEIGHT: 180.31 LBS

## 2019-02-12 DIAGNOSIS — Z51.6 ALLERGY DESENSITIZATION THERAPY: ICD-10-CM

## 2019-02-12 DIAGNOSIS — J30.9 CHRONIC ALLERGIC RHINITIS: Primary | ICD-10-CM

## 2019-02-12 DIAGNOSIS — H10.13 ALLERGIC CONJUNCTIVITIS, BILATERAL: ICD-10-CM

## 2019-02-12 PROCEDURE — 99999 PR PBB SHADOW E&M-EST. PATIENT-LVL III: CPT | Mod: PBBFAC,,, | Performed by: ALLERGY & IMMUNOLOGY

## 2019-02-12 PROCEDURE — 99214 PR OFFICE/OUTPT VISIT, EST, LEVL IV, 30-39 MIN: ICD-10-PCS | Mod: S$GLB,,, | Performed by: ALLERGY & IMMUNOLOGY

## 2019-02-12 PROCEDURE — 99999 PR PBB SHADOW E&M-EST. PATIENT-LVL III: ICD-10-PCS | Mod: PBBFAC,,, | Performed by: ALLERGY & IMMUNOLOGY

## 2019-02-12 PROCEDURE — 3008F BODY MASS INDEX DOCD: CPT | Mod: CPTII,S$GLB,, | Performed by: ALLERGY & IMMUNOLOGY

## 2019-02-12 PROCEDURE — 99214 OFFICE O/P EST MOD 30 MIN: CPT | Mod: S$GLB,,, | Performed by: ALLERGY & IMMUNOLOGY

## 2019-02-12 PROCEDURE — 3008F PR BODY MASS INDEX (BMI) DOCUMENTED: ICD-10-PCS | Mod: CPTII,S$GLB,, | Performed by: ALLERGY & IMMUNOLOGY

## 2019-02-12 RX ORDER — CEFDINIR 300 MG/1
300 CAPSULE ORAL 2 TIMES DAILY
Qty: 20 CAPSULE | Refills: 0 | Status: SHIPPED | OUTPATIENT
Start: 2019-02-12 | End: 2019-02-22

## 2019-02-12 NOTE — TELEPHONE ENCOUNTER
----- Message from Samira Pimentel NP sent at 2/12/2019  9:41 AM CST -----  Hi Dr. 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 I had consulted you about him in October 2018 because he was asking about the role of botox with worsening headaches.  I had advised patient of your advice back in October 2018 (you can read previous message under chart review 10/17/2018) and he is now messaging me asking me for referral to see you that was offered back in October due the the headaches continuing to worsen and now interfering with work. Your next appointment is not available until July so I advised I would message you to see if you have any other suggestions or collegeaues that you would recommend if you can not get him in sooner?      Thanks,  MARSHALL Hogan

## 2019-02-12 NOTE — LETTER
February 12, 2019      Lacy Perez MD  [address]  [place] [address]           [place] [facility]  [address] [facility]  [place] [address]  Phone: [phone]          Patient: Bulmaro Nascimento   MR Number: [id number]   YOB: 1955   Date of Visit: 2/12/2019       Dear Dr. Lacy Perez:    Thank you for referring Bulmaro Nascimento to me for evaluation. Attached you will find relevant portions of my assessment and plan of care.    If you have questions, please do not hesitate to call me. I look forward to following Bulmaro Nascimento along with you.    Sincerely,    Maria Guadalupe Kirkpatrick MD    Enclosure  CC:  No Recipients    If you would like to receive this communication electronically, please contact [email or web address] or [phone] to request more information on [facility] access.    For providers and/or their staff who would like to refer a patient to [facility], please contact us through our one-stop-shop provider referral line, Beulah Martin, at [phone].    If you feel you have received this communication in error or would no longer like to receive these types of communications, please e-mail [email or web address]

## 2019-02-12 NOTE — TELEPHONE ENCOUNTER
I will try to see if I can see him sooner, but if the main question is still botox for the treatment of TMJ, I think Dr. Jaimes might be a better fit and have sooner availability.

## 2019-02-12 NOTE — PROGRESS NOTES
Chief Complaint: Allergic Rhinitis       Subjective:         HPI    Bulmaro Nascimento is a 63 y.o. male here for continued evaluation of chronic allergic rhinitis to pets, mold, trees, weeds, grass on IT maintenance vial 1:1 0.5cc monthly. He was last seen 2/7/18 by Dr Perez and is new to me. He gets 3 shots C/TR in upper left, M in lower left and GR/W in right. He has had no reactions to shots, no itch, no hives, no swelling, no SOB, no wheeze, no dizziness. He definitely feels they have helped. Been on for about 9 years. Prior would have 3-5 sinus infections per year not has less then one. He does not take any daily allergy meds. He uses Flonase prn if more congested or heavier drip. He has daily PND in AM for couple hours then clears but some days is worse. Does seem to be worse with weather change.  He is about to travel and in past would have antibiotics in case since has H/o such bad infections. He does not take unless has symptoms for more then 5 days not resolving.     Review of Systems  Constitutional: Negative for changes in appetite, unintentional weight loss, fever, chills and fatigue.   HENT: Negative for facial pain, nose bleeds, , postnasal drip, throat clearing, and voice change. Negative for ear discharge, ear pain, facial swelling, sore throat and trouble swallowing. positive for nasal congestion, sinus pressure and ears popping; positive for scratchy throat   Eyes: Negative for occular discharge, redness, itching and visual disturbance.  Respiratory: Negative for chest tightness, shortness of breath, wheezing, dyspnea on exertion, sputum production and cough.   Cardiovascular: Negative for chest pain, palpatations and leg swelling.  Gastrointestinal: Negative for abdominal distension, abdominal pain, constipation, diarrhea, nausea,and vomiting.   Genitourinary: Negative for difficulty urinating.   Musculoskeletal: Negative for arthralgias, gait problems, joint swelling, myalgias and back pain.    Neurological: Negative for dizziness, syncope, weakness, light-headedness.  positive for headache   Hematological: Negative for adenopathy, does not bruise or bleed easily.  Psychiatric/Behavioral: Negative for agitation, anxiety, behavioral problems, confusion, and insomnia.  Skin: Negative for rash.     PMH:  Reviewed with the patient and current for this visit    Family History:  Reviewed with the patient and current for this visit    Social History:  Reviewed with the patient and current for this visit     Environmental History:  Reviewed with the patient and current for this visit          Objective:      Skin Test results: patient had positive prick skin test to cat, tree and grass pollens, and cat allergen.      Immunotherapy: patient has been on allergen specific immunotherapy since 12/2005; his vaccines were reformulated to stronger allergen concentrations of August 2009 because of frequent symptoms.  He is on a maintenance dose of vial #1 red top 1:1 of 3 vaccines at 0.5 cc at a monthly interval.  .      Physical Exam  General:patient is well developed and well nourished, in no acute distress.  Mental Status:  Alert, oriented and cooperative  Head and Face: normocephalic   Allergic shiners: No  Eyes:   Pupils: ERRLA: Scleral conjunctiva: clear; Cornea: clear; Palprebal conjunctiva: normal: Eyelid Skin: normal  Ears:Tympanic membrane Left:intact and normal light reflex; Right:intact and normal light reflex; External canals normal bilaterally.  Nose:  Nares: patent; Mucosa : Boggy and congested; Nasal septum: midline;Turbinates are enlarged but do not occlude the nasal airway.  Mouth/Pharynx: tonsils present; posterior pharyngeal wall normal; tongue normal; teeth normal; voice quality normal.  Neck:  Cervical lymph nodes: small, non-tender, freely moveable both anterior and posterior cervical chain; Trachea: midline; Masses: none  Lungs: Air movement is good; respiratory effort is good; no respiratory  distress; breath sounds are vesicular in all lung fields; no wheezing; normal expiratory time; no cough.  Heart: regular rate and rhythm with mild respiratory variation; A1 and P2 are normal; no murmurs or gallops.  Abdomen:exam not done  Extremities: no cyanosis, clubbing or edema  Skin:no rashes or lesions present; skin hydrated and supple.            Assessment:       1. Chronic allergic rhinitis     2. Allergic conjunctivitis, bilateral     3. Allergy desensitization therapy             Plan:       1. Continue IT for this year paying close attention to any symptoms and consider stopping IT after this year  2. fluticasone 2 SEN daily as needed  3. omnicef prescribed to take out of town in case of sinus infection            Maria Guadalupe Kirkpatrick MD

## 2019-03-08 ENCOUNTER — CLINICAL SUPPORT (OUTPATIENT)
Dept: INTERNAL MEDICINE | Facility: CLINIC | Age: 64
End: 2019-03-08
Payer: COMMERCIAL

## 2019-03-08 DIAGNOSIS — J30.9 CHRONIC ALLERGIC RHINITIS: ICD-10-CM

## 2019-03-08 PROCEDURE — 99499 NO LOS: ICD-10-PCS | Mod: S$GLB,,, | Performed by: ALLERGY & IMMUNOLOGY

## 2019-03-08 PROCEDURE — 99499 UNLISTED E&M SERVICE: CPT | Mod: S$GLB,,, | Performed by: ALLERGY & IMMUNOLOGY

## 2019-03-08 PROCEDURE — 95117 PR IMMU2THERAPY, 2+ INJECTIONS: ICD-10-PCS | Mod: S$GLB,,, | Performed by: FAMILY MEDICINE

## 2019-03-08 PROCEDURE — 95117 IMMUNOTHERAPY INJECTIONS: CPT | Mod: S$GLB,,, | Performed by: FAMILY MEDICINE

## 2019-03-15 ENCOUNTER — CLINICAL SUPPORT (OUTPATIENT)
Dept: INTERNAL MEDICINE | Facility: CLINIC | Age: 64
End: 2019-03-15
Payer: COMMERCIAL

## 2019-03-15 DIAGNOSIS — J30.1 SEASONAL ALLERGIC RHINITIS DUE TO POLLEN: ICD-10-CM

## 2019-03-15 PROCEDURE — 99499 UNLISTED E&M SERVICE: CPT | Mod: S$GLB,,, | Performed by: ALLERGY & IMMUNOLOGY

## 2019-03-15 PROCEDURE — 95117 IMMUNOTHERAPY INJECTIONS: CPT | Mod: S$GLB,,, | Performed by: FAMILY MEDICINE

## 2019-03-15 PROCEDURE — 99499 NO LOS: ICD-10-PCS | Mod: S$GLB,,, | Performed by: ALLERGY & IMMUNOLOGY

## 2019-03-15 PROCEDURE — 95117 PR IMMU2THERAPY, 2+ INJECTIONS: ICD-10-PCS | Mod: S$GLB,,, | Performed by: FAMILY MEDICINE

## 2019-03-20 ENCOUNTER — OFFICE VISIT (OUTPATIENT)
Dept: UROLOGY | Facility: CLINIC | Age: 64
End: 2019-03-20
Payer: COMMERCIAL

## 2019-03-20 VITALS — BODY MASS INDEX: 25.77 KG/M2 | HEIGHT: 70 IN | WEIGHT: 180 LBS

## 2019-03-20 DIAGNOSIS — N40.0 BENIGN NON-NODULAR PROSTATIC HYPERPLASIA WITHOUT LOWER URINARY TRACT SYMPTOMS: Primary | ICD-10-CM

## 2019-03-20 DIAGNOSIS — R35.0 URINARY FREQUENCY: ICD-10-CM

## 2019-03-20 DIAGNOSIS — R35.1 NOCTURIA: ICD-10-CM

## 2019-03-20 PROCEDURE — 99214 PR OFFICE/OUTPT VISIT, EST, LEVL IV, 30-39 MIN: ICD-10-PCS | Mod: S$GLB,,, | Performed by: UROLOGY

## 2019-03-20 PROCEDURE — 3008F BODY MASS INDEX DOCD: CPT | Mod: CPTII,S$GLB,, | Performed by: UROLOGY

## 2019-03-20 PROCEDURE — 99999 PR PBB SHADOW E&M-EST. PATIENT-LVL III: CPT | Mod: PBBFAC,,, | Performed by: UROLOGY

## 2019-03-20 PROCEDURE — 99214 OFFICE O/P EST MOD 30 MIN: CPT | Mod: S$GLB,,, | Performed by: UROLOGY

## 2019-03-20 PROCEDURE — 3008F PR BODY MASS INDEX (BMI) DOCUMENTED: ICD-10-PCS | Mod: CPTII,S$GLB,, | Performed by: UROLOGY

## 2019-03-20 PROCEDURE — 99999 PR PBB SHADOW E&M-EST. PATIENT-LVL III: ICD-10-PCS | Mod: PBBFAC,,, | Performed by: UROLOGY

## 2019-03-20 RX ORDER — METAXALONE 800 MG/1
TABLET ORAL
COMMUNITY
Start: 2019-03-12 | End: 2019-04-05 | Stop reason: SDUPTHER

## 2019-03-20 RX ORDER — CELECOXIB 200 MG/1
CAPSULE ORAL
COMMUNITY
Start: 2019-02-12 | End: 2020-06-30 | Stop reason: ALTCHOICE

## 2019-03-20 NOTE — PROGRESS NOTES
Subjective:       Patient ID: Bulmaro Nascimento is a 63 y.o. male.    Chief Complaint: Benign Prostatic Hypertrophy    63 yo WM with history of BPH.  Doing well with daily Cialis.      Benign Prostatic Hypertrophy   This is a chronic problem. The current episode started more than 1 year ago. The problem has been gradually improving since onset. Irritative symptoms include frequency (x 5-6) and nocturia (x1). Irritative symptoms do not include urgency. Obstructive symptoms include a slower stream. Obstructive symptoms do not include dribbling, incomplete emptying, an intermittent stream, straining or a weak stream. Pertinent negatives include no chills, dysuria, genital pain, hematuria, hesitancy, nausea or vomiting. AUA score is 0-7. He is sexually active. Nothing aggravates the symptoms. Treatments tried: Daily Cialis. The treatment provided no relief. He has been using treatment for 2 or more years.     Review of Systems   Constitutional: Negative for activity change, appetite change, chills, diaphoresis, fatigue, fever and unexpected weight change.   HENT: Negative for congestion, hearing loss, sinus pressure and trouble swallowing.    Eyes: Negative for photophobia, pain, discharge and visual disturbance.   Respiratory: Negative for apnea, cough and shortness of breath.    Cardiovascular: Negative for chest pain, palpitations and leg swelling.   Gastrointestinal: Negative for abdominal distention, abdominal pain, anal bleeding, blood in stool, constipation, diarrhea, nausea, rectal pain and vomiting.   Endocrine: Negative for cold intolerance, heat intolerance, polydipsia, polyphagia and polyuria.   Genitourinary: Positive for frequency (x 5-6) and nocturia (x1). Negative for decreased urine volume, difficulty urinating, discharge, dysuria, enuresis, flank pain, genital sores, hematuria, hesitancy, incomplete emptying, penile pain, penile swelling, scrotal swelling, testicular pain and urgency.    Musculoskeletal: Negative for arthralgias, back pain and myalgias.   Skin: Negative for color change, pallor, rash and wound.   Allergic/Immunologic: Negative for environmental allergies, food allergies and immunocompromised state.   Neurological: Negative for dizziness, seizures, weakness and headaches.   Hematological: Negative for adenopathy. Does not bruise/bleed easily.   Psychiatric/Behavioral: Negative.        Objective:      Physical Exam   Nursing note and vitals reviewed.  Constitutional: He is oriented to person, place, and time. He appears well-developed and well-nourished.   HENT:   Head: Normocephalic.   Nose: Nose normal.   Mouth/Throat: Oropharynx is clear and moist.   Eyes: Conjunctivae and EOM are normal. Pupils are equal, round, and reactive to light.   Neck: Normal range of motion. Neck supple.   Cardiovascular: Normal rate, regular rhythm, normal heart sounds and intact distal pulses.    Pulmonary/Chest: Effort normal and breath sounds normal.   Abdominal: Soft. Bowel sounds are normal.   Genitourinary: Rectum normal, testes normal and penis normal. Prostate is enlarged. Prostate is not tender.   Musculoskeletal: Normal range of motion.   Neurological: He is alert and oriented to person, place, and time. He has normal reflexes.   Skin: Skin is warm and dry.     Psychiatric: He has a normal mood and affect. His behavior is normal. Judgment and thought content normal.       Assessment:       1. Benign non-nodular prostatic hyperplasia without lower urinary tract symptoms    2. Nocturia    3. Urinary frequency        Plan:       Patient Instructions   Continue Saw Palmetto and Cialis 5 mg daily.  F/U 1 year

## 2019-03-22 ENCOUNTER — CLINICAL SUPPORT (OUTPATIENT)
Dept: INTERNAL MEDICINE | Facility: CLINIC | Age: 64
End: 2019-03-22
Payer: COMMERCIAL

## 2019-03-22 DIAGNOSIS — J30.1 SEASONAL ALLERGIC RHINITIS DUE TO POLLEN: ICD-10-CM

## 2019-03-22 PROCEDURE — 95117 IMMUNOTHERAPY INJECTIONS: CPT | Mod: S$GLB,,, | Performed by: FAMILY MEDICINE

## 2019-03-22 PROCEDURE — 95117 PR IMMU2THERAPY, 2+ INJECTIONS: ICD-10-PCS | Mod: S$GLB,,, | Performed by: FAMILY MEDICINE

## 2019-03-22 PROCEDURE — 99499 NO LOS: ICD-10-PCS | Mod: S$GLB,,, | Performed by: ALLERGY & IMMUNOLOGY

## 2019-03-22 PROCEDURE — 99499 UNLISTED E&M SERVICE: CPT | Mod: S$GLB,,, | Performed by: ALLERGY & IMMUNOLOGY

## 2019-04-05 ENCOUNTER — CLINICAL SUPPORT (OUTPATIENT)
Dept: INTERNAL MEDICINE | Facility: CLINIC | Age: 64
End: 2019-04-05
Payer: COMMERCIAL

## 2019-04-05 ENCOUNTER — OFFICE VISIT (OUTPATIENT)
Dept: FAMILY MEDICINE | Facility: CLINIC | Age: 64
End: 2019-04-05
Payer: COMMERCIAL

## 2019-04-05 VITALS
WEIGHT: 182.56 LBS | DIASTOLIC BLOOD PRESSURE: 70 MMHG | SYSTOLIC BLOOD PRESSURE: 122 MMHG | BODY MASS INDEX: 25.56 KG/M2 | HEIGHT: 71 IN | HEART RATE: 60 BPM | OXYGEN SATURATION: 99 % | TEMPERATURE: 98 F

## 2019-04-05 DIAGNOSIS — G89.29 CHRONIC PAIN OF RIGHT KNEE: ICD-10-CM

## 2019-04-05 DIAGNOSIS — B00.1 RECURRENT COLD SORES: ICD-10-CM

## 2019-04-05 DIAGNOSIS — Z00.00 ANNUAL PHYSICAL EXAM: Primary | ICD-10-CM

## 2019-04-05 DIAGNOSIS — J30.1 SEASONAL ALLERGIC RHINITIS DUE TO POLLEN: ICD-10-CM

## 2019-04-05 DIAGNOSIS — E05.90 SUBCLINICAL HYPERTHYROIDISM: ICD-10-CM

## 2019-04-05 DIAGNOSIS — F45.8 BRUXISM: ICD-10-CM

## 2019-04-05 DIAGNOSIS — N40.0 BENIGN NON-NODULAR PROSTATIC HYPERPLASIA WITHOUT LOWER URINARY TRACT SYMPTOMS: ICD-10-CM

## 2019-04-05 DIAGNOSIS — K21.9 CHRONIC GERD: ICD-10-CM

## 2019-04-05 DIAGNOSIS — G89.29 CHRONIC NONINTRACTABLE HEADACHE, UNSPECIFIED HEADACHE TYPE: ICD-10-CM

## 2019-04-05 DIAGNOSIS — M25.561 CHRONIC PAIN OF RIGHT KNEE: ICD-10-CM

## 2019-04-05 DIAGNOSIS — R51.9 CHRONIC NONINTRACTABLE HEADACHE, UNSPECIFIED HEADACHE TYPE: ICD-10-CM

## 2019-04-05 PROBLEM — M53.3 SI (SACROILIAC) PAIN: Status: RESOLVED | Noted: 2018-07-30 | Resolved: 2019-04-05

## 2019-04-05 PROCEDURE — 99396 PR PREVENTIVE VISIT,EST,40-64: ICD-10-PCS | Mod: S$GLB,,, | Performed by: NURSE PRACTITIONER

## 2019-04-05 PROCEDURE — 99999 PR PBB SHADOW E&M-EST. PATIENT-LVL I: CPT | Mod: PBBFAC,,,

## 2019-04-05 PROCEDURE — 99999 PR PBB SHADOW E&M-EST. PATIENT-LVL IV: ICD-10-PCS | Mod: PBBFAC,,, | Performed by: NURSE PRACTITIONER

## 2019-04-05 PROCEDURE — 95117 PR IMMU2THERAPY, 2+ INJECTIONS: ICD-10-PCS | Mod: S$GLB,,, | Performed by: FAMILY MEDICINE

## 2019-04-05 PROCEDURE — 99396 PREV VISIT EST AGE 40-64: CPT | Mod: S$GLB,,, | Performed by: NURSE PRACTITIONER

## 2019-04-05 PROCEDURE — 95117 IMMUNOTHERAPY INJECTIONS: CPT | Mod: S$GLB,,, | Performed by: FAMILY MEDICINE

## 2019-04-05 PROCEDURE — 99999 PR PBB SHADOW E&M-EST. PATIENT-LVL I: ICD-10-PCS | Mod: PBBFAC,,,

## 2019-04-05 PROCEDURE — 99499 NO LOS: ICD-10-PCS | Mod: S$GLB,,, | Performed by: ALLERGY & IMMUNOLOGY

## 2019-04-05 PROCEDURE — 99999 PR PBB SHADOW E&M-EST. PATIENT-LVL IV: CPT | Mod: PBBFAC,,, | Performed by: NURSE PRACTITIONER

## 2019-04-05 PROCEDURE — 99499 UNLISTED E&M SERVICE: CPT | Mod: S$GLB,,, | Performed by: ALLERGY & IMMUNOLOGY

## 2019-04-05 RX ORDER — METAXALONE 800 MG/1
800 TABLET ORAL 2 TIMES DAILY PRN
Qty: 60 TABLET | Refills: 3 | Status: SHIPPED | OUTPATIENT
Start: 2019-04-05 | End: 2020-08-04 | Stop reason: SDUPTHER

## 2019-04-05 NOTE — PROGRESS NOTES
Subjective:       Patient ID: Bulmaro Nascimento is a 63 y.o. male.    Chief Complaint: Annual Exam    Patient is a 63-year-old white male with BPH and a history of elevated PSA that is followed by urology, chronic ALLERGIES that is followed by ALLERGY specialist, chronic right knee pain that is followed by orthopedic specialist, recurrent cold sores, chronic GERD, bruxism with chronic headaches and insomnia that is here today for wellness exam with fasting lab results.     Patient has bruxism and insomnia that was controlled on Ativan 1 mg at bedtime in the past. Patient was concerned with the risk of future possible Alzheimer's issues associated with the use of this drug so we tried Mirtazapine at night. Patient did not tolerate the Mirtazapine at night so I changed patient to Robaxin muscle relaxer at night for the Bruxism.  Patient reports he was able to wean off of the Ativan and has been off for 6 weeks and only taking the Robaxin prn.    Patient also has chronic headaches that he associated with Bruxism.  He reports seeing a dentist and trying the Botox injections to treat but reports the headaches are still present and the Botox did not help - has appointment with Neurologist to evaluate.     Patient has chronic GERD.  Patient reports he did have an EGD done in 2012 that was normal.  Due for refill on the Zantac.  Advised patient that long-ter PPI use (Nexium) is discouraged as increases risk of osteoporosis and bone fractures so my suggestion is to only use the Zantac for chronic GERD for daily control and use OTC NEXIUM ONLY as needed for breakthrough symptoms.    Patient has BPH with history of elevated PSA that is followed by [facility].    Patient has chronic Allergies that is managed by [facility] and reports he has an Allergy injection scheduled today.    Patient has chronic right knee pain that was followed by Orthopedic MD in past.  Reports that the knee pain is mostly resolved and  takes Celebrex only as needed.    Patient has cold sores that takes Valtrex prn.    WELLNESS LABS:  -  CBC WNL  -  CMP WNL  -  Cholesterol WNL  -  TSH is mildly low at 0.226 but free T4 is normal = Subclinical Hyperthroidism and asymptomatic.    Health Maintenance:  -  Up to date.          Lab Visit on 03/15/2019   Component Date Value Ref Range Status    WBC 03/15/2019 5.36  3.90 - 12.70 K/uL Final    RBC 03/15/2019 4.84  4.60 - 6.20 M/uL Final    Hemoglobin 03/15/2019 15.2  14.0 - 18.0 g/dL Final    Hematocrit 03/15/2019 44.4  40.0 - 54.0 % Final    MCV 03/15/2019 92  82 - 98 fL Final    MCH 03/15/2019 31.4* 27.0 - 31.0 pg Final    MCHC 03/15/2019 34.2  32.0 - 36.0 g/dL Final    RDW 03/15/2019 13.4  11.5 - 14.5 % Final    Platelets 03/15/2019 244  150 - 350 K/uL Final    MPV 03/15/2019 11.4  9.2 - 12.9 fL Final    Gran # (ANC) 03/15/2019 3.0  1.8 - 7.7 K/uL Final    Lymph # 03/15/2019 1.7  1.0 - 4.8 K/uL Final    Mono # 03/15/2019 0.5  0.3 - 1.0 K/uL Final    Eos # 03/15/2019 0.2  0.0 - 0.5 K/uL Final    Baso # 03/15/2019 0.03  0.00 - 0.20 K/uL Final    Gran% 03/15/2019 55.3  38.0 - 73.0 % Final    Lymph% 03/15/2019 31.2  18.0 - 48.0 % Final    Mono% 03/15/2019 10.1  4.0 - 15.0 % Final    Eosinophil% 03/15/2019 2.8  0.0 - 8.0 % Final    Basophil% 03/15/2019 0.6  0.0 - 1.9 % Final    Differential Method 03/15/2019 Automated   Final    Sodium 03/15/2019 143  136 - 145 mmol/L Final    Potassium 03/15/2019 4.4  3.5 - 5.1 mmol/L Final    Chloride 03/15/2019 106  95 - 110 mmol/L Final    CO2 03/15/2019 29  23 - 29 mmol/L Final    Glucose 03/15/2019 91  70 - 110 mg/dL Final    BUN, Bld 03/15/2019 19  2 - 20 mg/dL Final    Creatinine 03/15/2019 0.94  0.50 - 1.40 mg/dL Final    Calcium 03/15/2019 9.8  8.7 - 10.5 mg/dL Final    Total Protein 03/15/2019 6.9  6.0 - 8.4 g/dL Final    Albumin 03/15/2019 4.1  3.5 - 5.2 g/dL Final    Total Bilirubin 03/15/2019 0.9  0.1 - 1.0 mg/dL Final    Comment:  For infants and newborns, interpretation of results should be based  on gestational age, weight and in agreement with clinical  observations.  Premature Infant recommended reference ranges:  Up to 24 hours.............<8.0 mg/dL  Up to 48 hours............<12.0 mg/dL  3-5 days..................<15.0 mg/dL  6-29 days.................<15.0 mg/dL      Alkaline Phosphatase 03/15/2019 64  38 - 126 U/L Final    AST 03/15/2019 33  15 - 46 U/L Final    ALT 03/15/2019 21  10 - 44 U/L Final    Anion Gap 03/15/2019 8  8 - 16 mmol/L Final    eGFR if African American 03/15/2019 >60.0  >60 mL/min/1.73 m^2 Final    eGFR if non African American 03/15/2019 >60.0  >60 mL/min/1.73 m^2 Final    Comment: Calculation used to obtain the estimated glomerular filtration  rate (eGFR) is the CKD-EPI equation.       Cholesterol 03/15/2019 175  120 - 199 mg/dL Final    Comment: The National Cholesterol Education Program (NCEP) has set the  following guidelines (reference ranges) for Cholesterol:  Optimal.....................<200 mg/dL  Borderline High.............200-239 mg/dL  High........................> or = 240 mg/dL      Triglycerides 03/15/2019 91  30 - 150 mg/dL Final    Comment: The National Cholesterol Education Program (NCEP) has set the  following guidelines (reference values) for triglycerides:  Normal......................<150 mg/dL  Borderline High.............150-199 mg/dL  High........................200-499 mg/dL      HDL 03/15/2019 49  40 - 75 mg/dL Final    Comment: The National Cholesterol Education Program (NCEP) has set the  following guidelines (reference values) for HDL Cholesterol:  Low...............<40 mg/dL  Optimal...........>60 mg/dL      LDL Cholesterol 03/15/2019 107.8  63.0 - 159.0 mg/dL Final    Comment: The National Cholesterol Education Program (NCEP) has set the  following guidelines (reference values) for LDL Cholesterol:  Optimal.......................<130 mg/dL  Borderline  High...............130-159 mg/dL  High..........................160-189 mg/dL  Very High.....................>190 mg/dL      HDL/Chol Ratio 03/15/2019 28.0  20.0 - 50.0 % Final    Total Cholesterol/HDL Ratio 03/15/2019 3.6  2.0 - 5.0 Final    Non-HDL Cholesterol 03/15/2019 126  mg/dL Final    Comment: Risk category and Non-HDL cholesterol goals:  Coronary heart disease (CHD)or equivalent (10-year risk of CHD >20%):  Non-HDL cholesterol goal     <130 mg/dL  Two or more CHD risk factors and 10-year risk of CHD <= 20%:  Non-HDL cholesterol goal     <160 mg/dL  0 to 1 CHD risk factor:  Non-HDL cholesterol goal     <190 mg/dL      TSH 03/15/2019 0.226* 0.400 - 4.000 uIU/mL Final    Comment: Warning:  Heterophilic antibodies in serum or plasma of   certain individuals are known to cause interference with   immunoassays. These antibodies may be present in blood samples   from individuals regularly exposed to animal or who have been   treated with animal products.  Patients taking high doses of supplemental biotin may have  negatively biased results.       PSA Total 03/15/2019 2.7  0.00 - 4.00 ng/mL Final    Comment: PSA Expected levels:  Hormonal Therapy: <0.05 ng/ml  Prostatectomy: <0.01 ng/ml  Radiation Therapy: <1.00 ng/ml      PSA, Free 03/15/2019 0.78  0.01 - 1.50 ng/mL Final    PSA, Free Pct 03/15/2019 28.89  Not established % Final    Free T4 03/15/2019 1.02  0.71 - 1.51 ng/dL Final         Current Outpatient Medications   Medication Sig Dispense Refill    celecoxib (CELEBREX) 200 MG capsule       fluticasone (FLONASE) 50 mcg/actuation nasal spray 2 sprays by Each Nare route 2 (two) times daily. 1 Bottle 11    metaxalone (SKELAXIN) 800 MG tablet       ranitidine (ZANTAC) 150 MG tablet Take 1 tablet (150 mg total) by mouth 2 (two) times daily. 180 tablet 3    tadalafil (CIALIS) 5 MG tablet Take 1 tablet (5 mg total) by mouth daily as needed for Erectile Dysfunction. 90 tablet 3    valACYclovir (VALTREX)  1000 MG tablet        Current Facility-Administered Medications   Medication Dose Route Frequency Provider Last Rate Last Dose    Allergy Mix   Subcutaneous 1 time in Clinic/HOD Lacy Perez MD        Allergy Mix   Subcutaneous 1 time in Clinic/HOD Maria Guadalupe Kirkpatrick MD           Past Medical History:   Diagnosis Date    Allergy     Anxiety 7/8/2015    Benign non-nodular prostatic hyperplasia without lower urinary tract symptoms 2/11/2016    Bruxism     Chronic pain of right knee 3/21/2018    Elevated PSA measurement 02/06/2017    Followed by Dr. Lara    Elevated PSA measurement     Family history of prostate cancer in father 3/14/2018    Gastroesophageal reflux disease without esophagitis 6/8/2015    Insomnia 6/8/2015    Recurrent cold sores 6/14/2018    Seasonal allergic rhinitis due to pollen 12/17/2016       Past Surgical History:   Procedure Laterality Date    COLONOSCOPY  07/11/2012    normal - Dr. Regan - repeat in 10 years    KNEE SURGERY Bilateral 1992    knee cap alignment with clean up    SHOULDER SURGERY Right     SINUS SURGERY         Family History   Problem Relation Age of Onset    Thyroid disease Mother     Diabetes Father     Cancer Father 75        Prostate     Thyroid disease Sister     Cancer Brother 50        bladder cancer    No Known Problems Sister     No Known Problems Brother     No Known Problems Brother     Heart disease Neg Hx     Prostate cancer Neg Hx     Kidney disease Neg Hx        Social History     Socioeconomic History    Marital status:      Spouse name: Not on file    Number of children: Not on file    Years of education: Not on file    Highest education level: Not on file   Occupational History    Occupation:    Social Needs    Financial resource strain: Not on file    Food insecurity:     Worry: Not on file     Inability: Not on file    Transportation needs:     Medical: Not on file     Non-medical: Not on file    Tobacco Use    Smoking status: Never Smoker    Smokeless tobacco: Never Used   Substance and Sexual Activity    Alcohol use: Yes     Alcohol/week: 0.6 oz     Types: 1 Cans of beer per week     Comment: once a month    Drug use: No    Sexual activity: Not Currently     Partners: Female   Lifestyle    Physical activity:     Days per week: Not on file     Minutes per session: Not on file    Stress: Not on file   Relationships    Social connections:     Talks on phone: Not on file     Gets together: Not on file     Attends Cheondoism service: Not on file     Active member of club or organization: Not on file     Attends meetings of clubs or organizations: Not on file     Relationship status: Not on file   Other Topics Concern    Not on file   Social History Narrative    Lives in [place] alone. 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       Review of Systems   Constitutional: Negative for activity change and unexpected weight change.   HENT: Negative for hearing loss, rhinorrhea and trouble swallowing.    Eyes: Negative for discharge and visual disturbance.   Respiratory: Negative for chest tightness and wheezing.    Cardiovascular: Negative for chest pain and palpitations.   Gastrointestinal: Negative for blood in stool, constipation, diarrhea and vomiting.   Endocrine: Negative for polydipsia and polyuria.   Genitourinary: Negative for difficulty urinating, hematuria and urgency.   Musculoskeletal: Negative for arthralgias, joint swelling and neck pain.   Neurological: Positive for headaches. Negative for weakness.   Psychiatric/Behavioral: Negative for confusion and dysphoric mood.         Objective:     Vitals:    04/05/19 0955   BP: 122/70   Pulse: 60   Temp: 98.2 °F (36.8 °C)   TempSrc: Oral   SpO2: 99%   Weight: 82.8 kg  "(182 lb 8.7 oz)   Height: 5' 11" (1.803 m)          Physical Exam   Constitutional: He is oriented to person, place, and time. He appears well-developed and well-nourished. He is cooperative. No distress.   HENT:   Head: Normocephalic and atraumatic.   Right Ear: Hearing, tympanic membrane, external ear and ear canal normal.   Left Ear: Hearing, external ear and ear canal normal.   Nose: Nose normal.   Mouth/Throat: Oropharynx is clear and moist. No oropharyngeal exudate.   Eyes: Pupils are equal, round, and reactive to light. Conjunctivae and EOM are normal. Right eye exhibits no discharge. Left eye exhibits no discharge. No scleral icterus.   Neck: Normal range of motion. Neck supple. No tracheal deviation present. No thyromegaly present.   Cardiovascular: Normal rate, regular rhythm, normal heart sounds and intact distal pulses.   No murmur heard.  Pulmonary/Chest: Effort normal and breath sounds normal. No respiratory distress.   Abdominal: Soft. He exhibits no distension.   Musculoskeletal: Normal range of motion. He exhibits no edema or tenderness.   Lymphadenopathy:     He has no cervical adenopathy.   Neurological: He is alert and oriented to person, place, and time. He has normal strength. Coordination and gait normal.   Skin: Skin is warm, dry and intact. No rash noted. No cyanosis. Nails show no clubbing.   Psychiatric: He has a normal mood and affect. His speech is normal and behavior is normal. Judgment and thought content normal. Cognition and memory are normal.   Vitals reviewed.        Assessment:         ICD-10-CM ICD-9-CM   1. Annual physical exam Z00.00 V70.0   2. Bruxism F45.8 306.8   3. Chronic nonintractable headache, unspecified headache type R51 784.0   4. Benign non-nodular prostatic hyperplasia without lower urinary tract symptoms N40.0 600.90   5. Recurrent cold sores B00.1 054.9   6. Chronic pain of right knee M25.561 719.46    G89.29 338.29   7. Chronic GERD K21.9 530.81   8. Seasonal " allergic rhinitis due to pollen J30.1 477.0   9. Subclinical hyperthyroidism E05.90 242.90       Plan:       Annual physical exam  Health Maintenance Summary     PROSTATE-SPECIFIC ANTIGEN Next Due 3/15/2020     Done 3/15/2019 PSA, TOTAL AND FREE    Done 7/3/2018 PSA, TOTAL AND FREE    Done 3/31/2017 PSA, TOTAL AND FREE    Done 2/6/2017 PSA, SCREENING     Done 3/1/2016 PSA, SCREENING    Patient has more history with this topic...   Colonoscopy Next Due 7/11/2022     Done 7/11/2012 rpt in 10yrs   Lipid Panel Next Due 3/15/2024     Done 3/15/2019 LIPID PANEL    Done 3/27/2018 LIPID PANEL    Done 2/6/2017 LIPID PANEL    Done 3/2/2016 LIPID PANEL    Done 3/1/2016 LIPID PANEL    Patient has more history with this topic...   TETANUS VACCINE Next Due 11/7/2026     Done 11/7/2016 Imm Admin: Tdap   Hepatitis C Screening This plan is no longer active.     Done 10/2/2018 HEPATITIS PANEL, ACUTE    Done 4/27/2015    Influenza Vaccine This plan is no longer active.     Done 10/4/2018 Imm Admin: Influenza    Done 10/4/2018 Imm Admin: Influenza - Quadrivalent    Done 11/22/2017 Imm Admin: Influenza - Quadrivalent - MDCK - PF    Done 11/7/2016 Imm Admin: Influenza - Quadrivalent - PF    Done 10/27/2015 Imm Admin: Influenza - Quadrivalent    Patient has more history with this topic...   Zoster Vaccine This plan is no longer active.     Done 2/9/2019 Imm Admin: Zoster    Done 12/1/2014          Bruxism  -  May continue the Skelaxin as needed.  -     metaxalone (SKELAXIN) 800 MG tablet; Take 1 tablet (800 mg total) by mouth 2 (two) times daily as needed (bruxism).  Dispense: 60 tablet; Refill: 3    Chronic nonintractable headache, unspecified headache type  -  Tried Botox without relief.  Has appt with neurologist to further evaluate    Benign non-nodular prostatic hyperplasia without lower urinary tract symptoms  -  Followed by Dr. Lara.    Recurrent cold sores  -  Takes Valtrex prn    Chronic pain of right knee  -  Takes Celebrex  prn    Chronic GERD  -  Controlled on Zantac     Seasonal allergic rhinitis due to pollen  -  Followed by Allergist and has an injection scheduled for today    Subclinical hyperthyroidism  -  Asymptomatic.  Will monitor and recheck full thyroid panel in 6 months.  -     TSH; Future; Expected date: 04/05/2019  -     T4, free; Future; Expected date: 04/05/2019  -     T4; Future; Expected date: 04/05/2019  -     T3; Future; Expected date: 04/05/2019  -     T3, free; Future; Expected date: 04/05/2019  -     Thyroid peroxidase antibody; Future; Expected date: 04/05/2019  -     Thyrotropin receptor antibody; Future; Expected date: 04/05/2019      Follow up in about 6 months (around 10/5/2019) for fasting labs and follow up visit.     Patient's Medications   New Prescriptions    No medications on file   Previous Medications    CELECOXIB (CELEBREX) 200 MG CAPSULE        FLUTICASONE (FLONASE) 50 MCG/ACTUATION NASAL SPRAY    2 sprays by Each Nare route 2 (two) times daily.    RANITIDINE (ZANTAC) 150 MG TABLET    Take 1 tablet (150 mg total) by mouth 2 (two) times daily.    TADALAFIL (CIALIS) 5 MG TABLET    Take 1 tablet (5 mg total) by mouth daily as needed for Erectile Dysfunction.    VALACYCLOVIR (VALTREX) 1000 MG TABLET       Modified Medications    Modified Medication Previous Medication    METAXALONE (SKELAXIN) 800 MG TABLET metaxalone (SKELAXIN) 800 MG tablet       Take 1 tablet (800 mg total) by mouth 2 (two) times daily as needed (bruxism).       Discontinued Medications    EPINEPHRINE (AUVI-Q) 0.3 MG/0.3 ML ATIN    Inject 0.3 mLs (0.3 mg total) into the muscle once.    LORAZEPAM (ATIVAN) 0.5 MG TABLET    Take 1 tablet (0.5 mg total) by mouth every evening.

## 2019-05-03 ENCOUNTER — CLINICAL SUPPORT (OUTPATIENT)
Dept: INTERNAL MEDICINE | Facility: CLINIC | Age: 64
End: 2019-05-03
Payer: COMMERCIAL

## 2019-05-03 DIAGNOSIS — J30.9 CHRONIC ALLERGIC RHINITIS: ICD-10-CM

## 2019-05-03 PROCEDURE — 95117 IMMUNOTHERAPY INJECTIONS: CPT | Mod: S$GLB,,, | Performed by: FAMILY MEDICINE

## 2019-05-03 PROCEDURE — 99499 NO LOS: ICD-10-PCS | Mod: S$GLB,,, | Performed by: ALLERGY & IMMUNOLOGY

## 2019-05-03 PROCEDURE — 99499 UNLISTED E&M SERVICE: CPT | Mod: S$GLB,,, | Performed by: ALLERGY & IMMUNOLOGY

## 2019-05-03 PROCEDURE — 95117 PR IMMU2THERAPY, 2+ INJECTIONS: ICD-10-PCS | Mod: S$GLB,,, | Performed by: FAMILY MEDICINE

## 2019-05-15 NOTE — PROGRESS NOTES
CC: Right knee    64 y.o. Male who returns today for follow up. [employer] . Prior diagnosis of R Knee chondromalacia. Complaint today is recurrent R knee activity related pain and swelling. Localizes more laterally today. Intermittent mechanical symptoms. No instability. Additional treatment has included 2 steroid injections (mosrt recent injection provided approx 80% relief for 8wk), oral mediations and activity modifications.      History of BL Knee arthroscopic debridements - years ago.   Negative for smoking.   Negative for diabetes.     REVIEW OF SYSTEMS:   Constitution: Negative. Negative for chills, fever and night sweats.    Hematologic/Lymphatic: Negative for bleeding problem. Does not bruise/bleed easily.   Skin: Negative for dry skin, itching and rash.   Musculoskeletal: Negative for falls. Positive for right knee.    PAST MEDICAL HISTORY:   Past Medical History:   Diagnosis Date    Allergy     Anxiety 7/8/2015    Benign non-nodular prostatic hyperplasia without lower urinary tract symptoms 2/11/2016    Bruxism     Chronic pain of right knee 3/21/2018    Elevated PSA measurement 02/06/2017    Followed by Dr. 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 Regan - repeat in 10 years    KNEE SURGERY Bilateral 1992    knee cap alignment with clean up    SHOULDER SURGERY Right     SINUS SURGERY         FAMILY HISTORY:   Family History   Problem Relation Age of Onset    Thyroid disease Mother     Diabetes Father     Cancer Father 75        Prostate      Thyroid disease Sister     Cancer Brother 50        bladder cancer    No Known Problems Sister     No Known Problems Brother     No Known Problems Brother     Heart disease Neg Hx     Prostate cancer Neg Hx     Kidney disease Neg Hx        SOCIAL HISTORY:   Social History     Socioeconomic History    Marital status:      Spouse name: Not on file    Number of children: Not on file    Years of education: Not on file    Highest education level: Not on file   Occupational History    Occupation:    Social Needs    Financial resource strain: Not on file    Food insecurity:     Worry: Not on file     Inability: Not on file    Transportation needs:     Medical: Not on file     Non-medical: Not on file   Tobacco Use    Smoking status: Never Smoker    Smokeless tobacco: Never Used   Substance and Sexual Activity    Alcohol use: Yes     Alcohol/week: 0.6 oz     Types: 1 Cans of beer per week     Comment: once a month    Drug use: No    Sexual activity: Not Currently     Partners: Female   Lifestyle    Physical activity:     Days per week: Not on file     Minutes per session: Not on file    Stress: Not on file   Relationships    Social connections:     Talks on phone: Not on file     Gets together: Not on file     Attends Restorationism service: Not on file     Active member of club or organization: Not on file     Attends meetings of clubs or organizations: Not on file     Relationship status: Not on file   Other Topics Concern    Not on file   Social History Narrative    Lives in [place] alone. 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       MEDICATIONS:     Current Outpatient Medications:     celecoxib (CELEBREX) 200 MG capsule, , Disp: , Rfl:     esomeprazole (NEXIUM) 40 MG capsule, Take 1 capsule (40  "mg total) by mouth before breakfast., Disp: 30 capsule, Rfl: 1    fluticasone (FLONASE) 50 mcg/actuation nasal spray, 2 sprays by Each Nare route 2 (two) times daily., Disp: 1 Bottle, Rfl: 11    indomethacin (INDOCIN SR) 75 mg CpSR CR capsule, Take 1 capsule (75 mg total) by mouth 2 (two) times daily with meals. Take with food., Disp: 60 capsule, Rfl: 1    metaxalone (SKELAXIN) 800 MG tablet, Take 1 tablet (800 mg total) by mouth 2 (two) times daily as needed (bruxism)., Disp: 60 tablet, Rfl: 3    ranitidine (ZANTAC) 150 MG tablet, Take 1 tablet (150 mg total) by mouth 2 (two) times daily., Disp: 180 tablet, Rfl: 3    tadalafil (CIALIS) 5 MG tablet, Take 1 tablet (5 mg total) by mouth daily as needed for Erectile Dysfunction., Disp: 90 tablet, Rfl: 3    valACYclovir (VALTREX) 1000 MG tablet, , Disp: , Rfl:     Current Facility-Administered Medications:     Allergy Mix, , Subcutaneous, 1 time in Clinic/HOD, Lacy Perez MD    Allergy Mix, , Subcutaneous, 1 time in Clinic/HOD, Maria Guadalupe Kirkpatrick MD    ALLERGIES:   Review of patient's allergies indicates:  No Known Allergies     PHYSICAL EXAMINATION:  /71   Pulse (!) 58   Ht 5' 11" (1.803 m)   Wt 82.6 kg (182 lb)   BMI 25.38 kg/m²   General: Well-developed well-nourished 64 y.o. malein no acute distress   Cardiovascular: Regular rhythm by palpation of distal pulse, normal color and temperature, no concerning varicosities on symptomatic side   Lungs: No labored breathing or wheezing appreciated   Neuro: Alert and oriented ×3   Psychiatric: well oriented to person, place and time, demonstrates normal mood and affect   Skin: No rashes, lesions or ulcers, normal temperature, turgor, and texture on involved extremity    Ortho/SPM Exam   Repeat examination of the right knee trace effusion.  Good quadriceps bulk and strength.  Central patellofemoral tracking. Negative patellar grind. No significant crepitus.  Neutral standing alignment.  Active range of " motion is full from 0-135°, pain with forced flexion. No pain with forced extension.  Moderate tenderness over the medial joint line. Ligamentously stable.      IMAGING:  X-rays including standing, weight bearing AP and flexion bilateral knees, RIGHT knee lateral and sunrise views and 3 standing views of the foot were ordered and images reviewed by me show:    Mild degen changes with chondrocalcinosis. Well maintained joint space otherwise.     ASSESSMENT:      ICD-10-CM ICD-9-CM   1. Pseudogout of knee, right M11.261 275.49     712.16   2. Right knee pain, unspecified chronicity M25.561 719.46       PLAN:     -Findings and treatment options were discussed with the patient to include repeat injection vs advanced imaging.   -Due to extent and duration of symptoms despite conservative measures, we will proceed with an MRI at this time to assess for the degree of degenerative changes and for any meniscal involvement.   -Will follow up via telephone with results and discuss treatment options at that time.  -All questions answered       Procedures

## 2019-05-16 ENCOUNTER — OFFICE VISIT (OUTPATIENT)
Dept: SPORTS MEDICINE | Facility: CLINIC | Age: 64
End: 2019-05-16
Payer: COMMERCIAL

## 2019-05-16 ENCOUNTER — TELEPHONE (OUTPATIENT)
Dept: ALLERGY | Facility: CLINIC | Age: 64
End: 2019-05-16

## 2019-05-16 ENCOUNTER — HOSPITAL ENCOUNTER (OUTPATIENT)
Dept: RADIOLOGY | Facility: HOSPITAL | Age: 64
Discharge: HOME | End: 2019-05-16
Attending: ORTHOPAEDIC SURGERY
Payer: COMMERCIAL

## 2019-05-16 VITALS
WEIGHT: 182 LBS | HEIGHT: 71 IN | SYSTOLIC BLOOD PRESSURE: 121 MMHG | DIASTOLIC BLOOD PRESSURE: 71 MMHG | HEART RATE: 58 BPM | BODY MASS INDEX: 25.48 KG/M2

## 2019-05-16 DIAGNOSIS — M25.561 RIGHT KNEE PAIN, UNSPECIFIED CHRONICITY: ICD-10-CM

## 2019-05-16 DIAGNOSIS — M11.261 PSEUDOGOUT OF KNEE, RIGHT: Primary | ICD-10-CM

## 2019-05-16 PROCEDURE — 99213 OFFICE O/P EST LOW 20 MIN: CPT | Mod: S$GLB,,, | Performed by: ORTHOPAEDIC SURGERY

## 2019-05-16 PROCEDURE — 73564 X-RAY EXAM KNEE 4 OR MORE: CPT | Mod: 26,RT,, | Performed by: RADIOLOGY

## 2019-05-16 PROCEDURE — 73564 XR KNEE ORTHO RIGHT WITH FLEXION: ICD-10-PCS | Mod: 26,RT,, | Performed by: RADIOLOGY

## 2019-05-16 PROCEDURE — 73562 XR KNEE ORTHO RIGHT WITH FLEXION: ICD-10-PCS | Mod: 26,59,RT, | Performed by: RADIOLOGY

## 2019-05-16 PROCEDURE — 3008F PR BODY MASS INDEX (BMI) DOCUMENTED: ICD-10-PCS | Mod: CPTII,S$GLB,, | Performed by: ORTHOPAEDIC SURGERY

## 2019-05-16 PROCEDURE — 73562 X-RAY EXAM OF KNEE 3: CPT | Mod: TC,FY,PO,RT

## 2019-05-16 PROCEDURE — 99999 PR PBB SHADOW E&M-EST. PATIENT-LVL III: ICD-10-PCS | Mod: PBBFAC,,, | Performed by: ORTHOPAEDIC SURGERY

## 2019-05-16 PROCEDURE — 3008F BODY MASS INDEX DOCD: CPT | Mod: CPTII,S$GLB,, | Performed by: ORTHOPAEDIC SURGERY

## 2019-05-16 PROCEDURE — 99213 PR OFFICE/OUTPT VISIT, EST, LEVL III, 20-29 MIN: ICD-10-PCS | Mod: S$GLB,,, | Performed by: ORTHOPAEDIC SURGERY

## 2019-05-16 PROCEDURE — 73562 X-RAY EXAM OF KNEE 3: CPT | Mod: 26,59,RT, | Performed by: RADIOLOGY

## 2019-05-16 PROCEDURE — 99999 PR PBB SHADOW E&M-EST. PATIENT-LVL III: CPT | Mod: PBBFAC,,, | Performed by: ORTHOPAEDIC SURGERY

## 2019-05-16 NOTE — TELEPHONE ENCOUNTER
----- Message from Glenna Aguirre sent at 5/15/2019  5:52 PM CDT -----  Contact: PT Portal Request  Appointment Request From: Bulmaro Nascimento    With Provider: Allergy Shot    Preferred Date Range: 5/31/2019 - 5/31/2019    Preferred Times: Friday Morning    Reason for visit: allergy Shot    Comments:  monthly allergy shot scheduling

## 2019-05-22 ENCOUNTER — HOSPITAL ENCOUNTER (OUTPATIENT)
Dept: RADIOLOGY | Facility: HOSPITAL | Age: 64
Discharge: HOME | End: 2019-05-22
Attending: ORTHOPAEDIC SURGERY
Payer: COMMERCIAL

## 2019-05-22 DIAGNOSIS — M25.561 RIGHT KNEE PAIN, UNSPECIFIED CHRONICITY: ICD-10-CM

## 2019-05-22 PROCEDURE — 73721 MRI JNT OF LWR EXTRE W/O DYE: CPT | Mod: TC,RT

## 2019-05-22 PROCEDURE — 73721 MRI KNEE WITHOUT CONTRAST RIGHT: ICD-10-PCS | Mod: 26,RT,, | Performed by: RADIOLOGY

## 2019-05-22 PROCEDURE — 73721 MRI JNT OF LWR EXTRE W/O DYE: CPT | Mod: 26,RT,, | Performed by: RADIOLOGY

## 2019-05-23 ENCOUNTER — PATIENT MESSAGE (OUTPATIENT)
Dept: SPORTS MEDICINE | Facility: CLINIC | Age: 64
End: 2019-05-23

## 2019-05-23 RX ORDER — ESOMEPRAZOLE MAGNESIUM 40 MG/1
40 CAPSULE, DELAYED RELEASE ORAL
Qty: 30 CAPSULE | Refills: 1 | Status: SHIPPED | OUTPATIENT
Start: 2019-05-23 | End: 2019-12-12

## 2019-05-23 RX ORDER — INDOMETHACIN 75 MG/1
75 CAPSULE, EXTENDED RELEASE ORAL 2 TIMES DAILY WITH MEALS
Qty: 60 CAPSULE | Refills: 1 | Status: SHIPPED | OUTPATIENT
Start: 2019-05-23 | End: 2019-06-22

## 2019-05-28 ENCOUNTER — OFFICE VISIT (OUTPATIENT)
Dept: SPORTS MEDICINE | Facility: CLINIC | Age: 64
End: 2019-05-28
Payer: COMMERCIAL

## 2019-05-28 VITALS
SYSTOLIC BLOOD PRESSURE: 128 MMHG | WEIGHT: 182 LBS | BODY MASS INDEX: 25.48 KG/M2 | HEIGHT: 71 IN | DIASTOLIC BLOOD PRESSURE: 71 MMHG | HEART RATE: 56 BPM

## 2019-05-28 DIAGNOSIS — S83.231A COMPLEX TEAR OF MEDIAL MENISCUS OF RIGHT KNEE AS CURRENT INJURY, INITIAL ENCOUNTER: Primary | ICD-10-CM

## 2019-05-28 DIAGNOSIS — M22.41 PATELLA, CHONDROMALACIA, RIGHT: ICD-10-CM

## 2019-05-28 DIAGNOSIS — M17.11 PRIMARY OSTEOARTHRITIS OF RIGHT KNEE: ICD-10-CM

## 2019-05-28 PROCEDURE — 99999 PR PBB SHADOW E&M-EST. PATIENT-LVL III: ICD-10-PCS | Mod: PBBFAC,,, | Performed by: ORTHOPAEDIC SURGERY

## 2019-05-28 PROCEDURE — 3008F BODY MASS INDEX DOCD: CPT | Mod: CPTII,S$GLB,, | Performed by: ORTHOPAEDIC SURGERY

## 2019-05-28 PROCEDURE — 3008F PR BODY MASS INDEX (BMI) DOCUMENTED: ICD-10-PCS | Mod: CPTII,S$GLB,, | Performed by: ORTHOPAEDIC SURGERY

## 2019-05-28 PROCEDURE — 99213 OFFICE O/P EST LOW 20 MIN: CPT | Mod: S$GLB,,, | Performed by: ORTHOPAEDIC SURGERY

## 2019-05-28 PROCEDURE — 99999 PR PBB SHADOW E&M-EST. PATIENT-LVL III: CPT | Mod: PBBFAC,,, | Performed by: ORTHOPAEDIC SURGERY

## 2019-05-28 PROCEDURE — 99213 PR OFFICE/OUTPT VISIT, EST, LEVL III, 20-29 MIN: ICD-10-PCS | Mod: S$GLB,,, | Performed by: ORTHOPAEDIC SURGERY

## 2019-05-28 NOTE — PROGRESS NOTES
CC: Right knee    64 y.o. Male who returns today to review the MRI of his R Knee. No new complaints.     Previous History: Retired [employer] . Prior diagnosis of R Knee chondromalacia. Complaint today is recurrent R knee activity related pain and swelling. Localizes more laterally today. Intermittent mechanical symptoms. No instability. Additional treatment has included 2 steroid injections (mosrt recent injection provided approx 80% relief for 8wk), oral mediations and activity modifications.      History of BL Knee arthroscopic debridements - years ago.   Negative for smoking.   Negative for diabetes.     REVIEW OF SYSTEMS:   Constitution: Negative. Negative for chills, fever and night sweats.    Hematologic/Lymphatic: Negative for bleeding problem. Does not bruise/bleed easily.   Skin: Negative for dry skin, itching and rash.   Musculoskeletal: Negative for falls. Positive for right knee.    PAST MEDICAL HISTORY:   Past Medical History:   Diagnosis Date    Allergy     Anxiety 7/8/2015    Benign non-nodular prostatic hyperplasia without lower urinary tract symptoms 2/11/2016    Bruxism     Chronic pain of right knee 3/21/2018    Elevated PSA measurement 02/06/2017    Followed by Dr. 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 Regan - repeat in 10 years    KNEE SURGERY Bilateral 1992    knee cap alignment with clean up    SHOULDER SURGERY Right     SINUS SURGERY         FAMILY HISTORY:   Family History   Problem Relation Age of Onset    Thyroid disease Mother      Diabetes Father     Cancer Father 75        Prostate     Thyroid disease Sister     Cancer Brother 50        bladder cancer    No Known Problems Sister     No Known Problems Brother     No Known Problems Brother     Heart disease Neg Hx     Prostate cancer Neg Hx     Kidney disease Neg Hx        SOCIAL HISTORY:   Social History     Socioeconomic History    Marital status:      Spouse name: Not on file    Number of children: Not on file    Years of education: Not on file    Highest education level: Not on file   Occupational History    Occupation:    Social Needs    Financial resource strain: Not on file    Food insecurity:     Worry: Not on file     Inability: Not on file    Transportation needs:     Medical: Not on file     Non-medical: Not on file   Tobacco Use    Smoking status: Never Smoker    Smokeless tobacco: Never Used   Substance and Sexual Activity    Alcohol use: Yes     Alcohol/week: 0.6 oz     Types: 1 Cans of beer per week     Comment: once a month    Drug use: No    Sexual activity: Not Currently     Partners: Female   Lifestyle    Physical activity:     Days per week: Not on file     Minutes per session: Not on file    Stress: Not on file   Relationships    Social connections:     Talks on phone: Not on file     Gets together: Not on file     Attends Adventist service: Not on file     Active member of club or organization: Not on file     Attends meetings of clubs or organizations: Not on file     Relationship status: Not on file   Other Topics Concern    Not on file   Social History Narrative    Lives in [place] alone. 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       MEDICATIONS:     Current Outpatient Medications:     celecoxib (CELEBREX) 200 MG capsule, , Disp: , Rfl:  "    esomeprazole (NEXIUM) 40 MG capsule, Take 1 capsule (40 mg total) by mouth before breakfast., Disp: 30 capsule, Rfl: 1    fluticasone (FLONASE) 50 mcg/actuation nasal spray, 2 sprays by Each Nare route 2 (two) times daily., Disp: 1 Bottle, Rfl: 11    indomethacin (INDOCIN SR) 75 mg CpSR CR capsule, Take 1 capsule (75 mg total) by mouth 2 (two) times daily with meals. Take with food., Disp: 60 capsule, Rfl: 1    metaxalone (SKELAXIN) 800 MG tablet, Take 1 tablet (800 mg total) by mouth 2 (two) times daily as needed (bruxism)., Disp: 60 tablet, Rfl: 3    ranitidine (ZANTAC) 150 MG tablet, Take 1 tablet (150 mg total) by mouth 2 (two) times daily., Disp: 180 tablet, Rfl: 3    tadalafil (CIALIS) 5 MG tablet, Take 1 tablet (5 mg total) by mouth daily as needed for Erectile Dysfunction., Disp: 90 tablet, Rfl: 3    valACYclovir (VALTREX) 1000 MG tablet, , Disp: , Rfl:     Current Facility-Administered Medications:     Allergy Mix, , Subcutaneous, 1 time in Clinic/HOD, Lacy Perez MD    Allergy Mix, , Subcutaneous, 1 time in Clinic/HOD, Maria Guadalupe Kirkpatrick MD    ALLERGIES:   Review of patient's allergies indicates:  No Known Allergies     PHYSICAL EXAMINATION:  /71   Pulse (!) 56   Ht 5' 11" (1.803 m)   Wt 82.6 kg (182 lb)   BMI 25.38 kg/m²   General: Well-developed well-nourished 64 y.o. malein no acute distress   Cardiovascular: Regular rhythm by palpation of distal pulse, normal color and temperature, no concerning varicosities on symptomatic side   Lungs: No labored breathing or wheezing appreciated   Neuro: Alert and oriented ×3   Psychiatric: well oriented to person, place and time, demonstrates normal mood and affect   Skin: No rashes, lesions or ulcers, normal temperature, turgor, and texture on involved extremity    Ortho/SPM Exam   Repeat examination of the right knee shows trace effusion.  Point tenderness over the lateral patellofemoral articulation and lateral patellar facet.  Mild " lateral joint line tenderness.  Mild medial joint line tenderness. Generalized nonspecific pain with Slava's testing.  Good quadriceps bulk and strength.  Central patellofemoral tracking. + patellar grind. + patellofemoral crepitus.  Neutral standing alignment.  Active range of motion is full from 0-135°, pain anterolateral with forced flexion. No pain with forced extension. Ligamentously stable.      IMAGING:  X-rays including standing, weight bearing AP and flexion bilateral knees, RIGHT knee lateral and sunrise views and 3 standing views of the foot were ordered and images reviewed by me show:    Advanced patellofemoral degenerative changes with well-maintained medial and lateral joint spaces otherwise.  Chondrocalcinosis present.    MRI Right Knee:   1. Medial meniscus tear.   2. Full-thickness patellofemoral cartilage loss.   3. Small interstitial tear of PCL.   4. Small joint effusion with small partially ruptured Baker's cyst    ASSESSMENT:      ICD-10-CM ICD-9-CM   1. Complex tear of medial meniscus of right knee as current injury, initial encounter S83.231A 836.0   2. Patella, chondromalacia, right M22.41 717.7   3. Primary osteoarthritis of right knee M17.11 715.16       PLAN:     MRI was reviewed with the patient today. His primary symptomatic issue is advanced chondral wear and osteoarthritis of the patellofemoral compartment involving primarily the lateral patellar facet and central eminence.  He has positive lateral patellar tilt with a tight lateral pericapsular tissue. He has also been mildly symptomatic from his medial meniscus tear. Treatment options again reviewed.  These include arthroscopy for debridement, chondroplasty, and limited lateral parapatellar release.  This would be a limited goals option in light of his underlying degenerative arthritis in the PF compartment.  At this time he wishes to pursue continue non operative treatment.  We discussed modulation of his exercise regimen to  include limited deep knee bends.  Discussed quadriceps strengthening.  He has not tried viscosupplementation at this time I think he is a good candidate for that.  We will place an order for Euflexxa. Continue to take oral anti-inflammatory medication as needed. Return to clinic for the 1st Euflexxa injection.      Procedures

## 2019-05-31 ENCOUNTER — PATIENT MESSAGE (OUTPATIENT)
Dept: SPORTS MEDICINE | Facility: CLINIC | Age: 64
End: 2019-05-31

## 2019-05-31 ENCOUNTER — TELEPHONE (OUTPATIENT)
Dept: SPORTS MEDICINE | Facility: CLINIC | Age: 64
End: 2019-05-31

## 2019-05-31 ENCOUNTER — CLINICAL SUPPORT (OUTPATIENT)
Dept: INTERNAL MEDICINE | Facility: CLINIC | Age: 64
End: 2019-05-31
Payer: COMMERCIAL

## 2019-05-31 DIAGNOSIS — M17.11 PRIMARY OSTEOARTHRITIS OF RIGHT KNEE: Primary | ICD-10-CM

## 2019-05-31 DIAGNOSIS — J30.1 SEASONAL ALLERGIC RHINITIS DUE TO POLLEN: ICD-10-CM

## 2019-05-31 PROCEDURE — 99499 UNLISTED E&M SERVICE: CPT | Mod: S$GLB,,, | Performed by: ALLERGY & IMMUNOLOGY

## 2019-05-31 PROCEDURE — 95117 IMMUNOTHERAPY INJECTIONS: CPT | Mod: S$GLB,,, | Performed by: INTERNAL MEDICINE

## 2019-05-31 PROCEDURE — 99499 NO LOS: ICD-10-PCS | Mod: S$GLB,,, | Performed by: ALLERGY & IMMUNOLOGY

## 2019-05-31 PROCEDURE — 95117 PR IMMU2THERAPY, 2+ INJECTIONS: ICD-10-PCS | Mod: S$GLB,,, | Performed by: INTERNAL MEDICINE

## 2019-06-13 ENCOUNTER — PATIENT MESSAGE (OUTPATIENT)
Dept: FAMILY MEDICINE | Facility: CLINIC | Age: 64
End: 2019-06-13

## 2019-06-14 ENCOUNTER — PATIENT MESSAGE (OUTPATIENT)
Dept: FAMILY MEDICINE | Facility: CLINIC | Age: 64
End: 2019-06-14

## 2019-06-17 ENCOUNTER — OFFICE VISIT (OUTPATIENT)
Dept: FAMILY MEDICINE | Facility: CLINIC | Age: 64
End: 2019-06-17
Payer: COMMERCIAL

## 2019-06-17 VITALS
HEART RATE: 64 BPM | TEMPERATURE: 98 F | RESPIRATION RATE: 18 BRPM | BODY MASS INDEX: 25.48 KG/M2 | WEIGHT: 182 LBS | HEIGHT: 71 IN | SYSTOLIC BLOOD PRESSURE: 138 MMHG | DIASTOLIC BLOOD PRESSURE: 70 MMHG | OXYGEN SATURATION: 98 %

## 2019-06-17 DIAGNOSIS — F45.8 BRUXISM: ICD-10-CM

## 2019-06-17 DIAGNOSIS — G47.00 INSOMNIA, UNSPECIFIED TYPE: Primary | ICD-10-CM

## 2019-06-17 PROCEDURE — 99213 PR OFFICE/OUTPT VISIT, EST, LEVL III, 20-29 MIN: ICD-10-PCS | Mod: S$GLB,,, | Performed by: NURSE PRACTITIONER

## 2019-06-17 PROCEDURE — 99213 OFFICE O/P EST LOW 20 MIN: CPT | Mod: S$GLB,,, | Performed by: NURSE PRACTITIONER

## 2019-06-17 PROCEDURE — 99999 PR PBB SHADOW E&M-EST. PATIENT-LVL V: CPT | Mod: PBBFAC,,, | Performed by: NURSE PRACTITIONER

## 2019-06-17 PROCEDURE — 3008F BODY MASS INDEX DOCD: CPT | Mod: CPTII,S$GLB,, | Performed by: NURSE PRACTITIONER

## 2019-06-17 PROCEDURE — 3008F PR BODY MASS INDEX (BMI) DOCUMENTED: ICD-10-PCS | Mod: CPTII,S$GLB,, | Performed by: NURSE PRACTITIONER

## 2019-06-17 PROCEDURE — 99999 PR PBB SHADOW E&M-EST. PATIENT-LVL V: ICD-10-PCS | Mod: PBBFAC,,, | Performed by: NURSE PRACTITIONER

## 2019-06-17 RX ORDER — ESZOPICLONE 3 MG/1
3 TABLET, FILM COATED ORAL NIGHTLY
Qty: 30 TABLET | Refills: 2 | Status: SHIPPED | OUTPATIENT
Start: 2019-06-17 | End: 2019-07-17

## 2019-06-19 ENCOUNTER — OFFICE VISIT (OUTPATIENT)
Dept: SLEEP MEDICINE | Facility: CLINIC | Age: 64
End: 2019-06-19
Payer: COMMERCIAL

## 2019-06-19 VITALS
WEIGHT: 184.63 LBS | HEART RATE: 57 BPM | DIASTOLIC BLOOD PRESSURE: 74 MMHG | SYSTOLIC BLOOD PRESSURE: 137 MMHG | BODY MASS INDEX: 25.85 KG/M2 | HEIGHT: 71 IN

## 2019-06-19 DIAGNOSIS — G47.30 SLEEP APNEA, UNSPECIFIED TYPE: Primary | ICD-10-CM

## 2019-06-19 PROCEDURE — 3008F PR BODY MASS INDEX (BMI) DOCUMENTED: ICD-10-PCS | Mod: CPTII,S$GLB,, | Performed by: PSYCHIATRY & NEUROLOGY

## 2019-06-19 PROCEDURE — 99204 OFFICE O/P NEW MOD 45 MIN: CPT | Mod: S$GLB,,, | Performed by: PSYCHIATRY & NEUROLOGY

## 2019-06-19 PROCEDURE — 99999 PR PBB SHADOW E&M-EST. PATIENT-LVL III: CPT | Mod: PBBFAC,,, | Performed by: PSYCHIATRY & NEUROLOGY

## 2019-06-19 PROCEDURE — 3008F BODY MASS INDEX DOCD: CPT | Mod: CPTII,S$GLB,, | Performed by: PSYCHIATRY & NEUROLOGY

## 2019-06-19 PROCEDURE — 99999 PR PBB SHADOW E&M-EST. PATIENT-LVL III: ICD-10-PCS | Mod: PBBFAC,,, | Performed by: PSYCHIATRY & NEUROLOGY

## 2019-06-19 PROCEDURE — 99204 PR OFFICE/OUTPT VISIT, NEW, LEVL IV, 45-59 MIN: ICD-10-PCS | Mod: S$GLB,,, | Performed by: PSYCHIATRY & NEUROLOGY

## 2019-06-19 NOTE — PATIENT INSTRUCTIONS
SLEEP LAB (Larisa or [facility]) will contact you to schedulethe sleep study. Their number is [phone] (ext 2). Please call them if you do not hear from them in 2 weeks from now.  The [facility] Lab is located on 7th floor of the [facility]; [place] lab is located in [facility].    SLEEP CLINIC (my assistant) will call you when the sleep study results are ready - if you have not heard from us by 2 weeks from the date of the study, please call [phone] (ext 1) or you can use [facility] to contact me.    You are advised to abstain from driving should you feel sleepy or drowsy.

## 2019-06-19 NOTE — LETTER
June 20, 2019      [place]  [address] [address]  [address]  [facility] [address]           [facility]  [address] [address]  Phone: [phone]  Fax: [phone]          Patient: Bulmaro Nascimento   MR Number: [id number]   YOB: 1955   Date of Visit: 6/19/2019       Dear Samira Pimentel:    Thank you for referring Bulmaro Nascimento to me for evaluation. Attached you will find relevant portions of my assessment and plan of care.    If you have questions, please do not hesitate to call me. I look forward to following Bulmaro Nascimento along with you.    Sincerely,    Mindy Proctor MD    Enclosure  CC:  No Recipients    If you would like to receive this communication electronically, please contact [email or web address] or [phone] to request more information on [employer] access.    For providers and/or their staff who would like to refer a patient to [facility], please contact us through our one-stop-shop provider referral line, [facility] , at [phone].    If you feel you have received this communication in error or would no longer like to receive these types of communications, please e-mail [email or web address]

## 2019-06-19 NOTE — PROGRESS NOTES
Bulmaro Nascimento  was seen at the request of  Samira Pimentel NP for sleep evaluation.    06/19/2019 INITIAL HISTORY OF PRESENT ILLNESS:  Bulmaro Nascimento is a 64 y.o. male is here to be evaluated for a sleep disorder.       CHIEF COMPLAINT:      He presents with tooth grinding .  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     failed OA for bruxism; also failed Ativan (was addictive but helped); failed Lexapro, Mirtazapine,   Tizanidine. Failed Botox injections.     Skelaxine and Lunesta worked best.    The patient's complaints include excessive daytime sleepiness, fatigued  snoring and interrupted sleep since  .    Bulmaro Nascimento denied  excessive daytime sleepiness, excessive daytime fatigue and interrupted sleep.      For   EPWORTH SLEEPINESS SCALE 6/17/2019   Sitting and reading 0   Watching TV 1   Sitting, inactive in a public place (e.g. a theatre or a meeting) 0   As a passenger in a car for an hour without a break 1   Lying down to rest in the afternoon when circumstances permit 1   Sitting and talking to someone 0   Sitting quietly after a lunch without alcohol 0   In a car, while stopped for a few minutes in traffic 0   Total score 3       No flowsheet data found.  No flowsheet data found.      SLEEP ROUTINE AND LIFESTYLE 06/19/2019 :  Sleep Clinic New Patient 6/17/2019   What time do you go to bed on a week day? (Give a range) 9-10 PM   What time do you go to bed on a day off? (Give a range) 9-10 PM   How long does it take you to fall asleep? (Give a range) 15 min   On average, how many times per night do you wake up? 1-2   How long does it take you to fall back into sleep? (Give a range) 10 Minutes   What time do you wake up to start your day on a week day? (Give a range) 4-7 AM   What time do you wake up to start your day on a day off? (Give a range) 4-7 AM   What time do you get out of bed? (Give a range) 4-7 AM   On average, how many hours do you sleep? 4-6 hours   On average, how  many naps do you take per day? 1   Rate your sleep quality from 0 to 5 (0-poor, 5-great). 2   Have you experienced:  N/a   How much weight have you lost or gained (in lbs.) in the last year? 5   On average, how many times per night do you go to the bathroom?  1   Have you ever had a sleep study/CPAP machine/surgery for sleep apnea? Yes   Have you ever had a CPAP machine for sleep apnea? Yes   Have you ever had surgery for sleep apnea? No       Sleep Clinic ROS  6/17/2019   Difficulty breathing through the nose?  No   Sore throat or dry mouth in the morning? No   Irregular or very fast heart beat?  No   Shortness of breath?  No   Acid reflux? Yes   Body aches and pains?  Yes   Morning headaches? Yes   Dizziness? No   Mood changes?  Sometimes   Do you exercise?  Yes   Do you feel like moving your legs a lot?  No          Denies symptoms concerning for parasomnia        Social History:      Occupation: law  Bed partner: girlfriend  Exercise routine: yes  Caffeine:       PREVIOUS SLEEP STUDIES:     HST  5/2015: Significant Obstructive sleep apnea (JAROD) with AHI (apnea hypopnea Index) of 9.1 (at some portions 36 - does not specify position or stage) and SaO2 of 89% (weight  175 lbs).          DME:       PAST MEDICAL HISTORY:    Active Ambulatory Problems     Diagnosis Date Noted    Chronic GERD 06/08/2015    Insomnia 06/08/2015    Benign non-nodular prostatic hyperplasia without lower urinary tract symptoms 02/11/2016    Seasonal allergic rhinitis due to pollen 12/17/2016    Bruxism 01/27/2017    Nocturia 03/15/2017    Urinary frequency 03/15/2017    Slow urinary stream 03/14/2018    Family history of prostate cancer in father 03/14/2018    Chronic pain of right knee 03/21/2018    Recurrent cold sores 06/14/2018    Subclinical hyperthyroidism 04/05/2019     Resolved Ambulatory Problems     Diagnosis Date Noted    Sleep apnea 06/08/2015    Benign localized hyperplasia of prostate with urinary obstruction and  other lower urinary tract symptoms (LUTS)(600.21) 06/08/2015    Sinusitis, chronic 06/08/2015    Anxiety 07/08/2015    allergic rhinitis due to animal cat and dog dander 12/17/2016    Seasonal allergic rhinitis due to fungal spores 12/17/2016    Elevated PSA measurement 02/06/2017    SI (sacroiliac) pain 07/30/2018     Past Medical History:   Diagnosis Date    Allergy     Anxiety 7/8/2015    Benign non-nodular prostatic hyperplasia without lower urinary tract symptoms 2/11/2016    Bruxism     Chronic pain of right knee 3/21/2018    Elevated PSA measurement 02/06/2017    Elevated PSA measurement     Family history of prostate cancer in father 3/14/2018    Gastroesophageal reflux disease without esophagitis 6/8/2015    Insomnia 6/8/2015    Recurrent cold sores 6/14/2018    Seasonal allergic rhinitis due to pollen 12/17/2016    SI (sacroiliac) pain 7/30/2018    Subclinical hyperthyroidism 4/5/2019                PAST SURGICAL HISTORY:    Past Surgical History:   Procedure Laterality Date    COLONOSCOPY  07/11/2012    normal - Dr. Regan - repeat in 10 years    KNEE SURGERY Bilateral 1992    knee cap alignment with clean up    SHOULDER SURGERY Right     SINUS SURGERY           FAMILY HISTORY:                Family History   Problem Relation Age of Onset    Thyroid disease Mother     Diabetes Father     Cancer Father 75        Prostate     Thyroid disease Sister     Cancer Brother 50        bladder cancer    No Known Problems Sister     No Known Problems Brother     No Known Problems Brother     Heart disease Neg Hx     Prostate cancer Neg Hx     Kidney disease Neg Hx        SOCIAL HISTORY:          Tobacco:   Social History     Tobacco Use   Smoking Status Never Smoker   Smokeless Tobacco Never Used       alcohol use:    Social History     Substance and Sexual Activity   Alcohol Use Yes    Alcohol/week: 0.6 oz    Types: 1 Cans of beer per week    Frequency: Monthly or less     "Drinks per session: 1 or 2    Binge frequency: Never    Comment: once a month                   ALLERGIES:  Review of patient's allergies indicates:  No Known Allergies    CURRENT MEDICATIONS:    Current Outpatient Medications   Medication Sig Dispense Refill    celecoxib (CELEBREX) 200 MG capsule       esomeprazole (NEXIUM) 40 MG capsule Take 1 capsule (40 mg total) by mouth before breakfast. 30 capsule 1    eszopiclone (LUNESTA) 3 mg Tab Take 1 tablet (3 mg total) by mouth every evening. 30 tablet 2    fluticasone (FLONASE) 50 mcg/actuation nasal spray 2 sprays by Each Nare route 2 (two) times daily. 1 Bottle 11    indomethacin (INDOCIN SR) 75 mg CpSR CR capsule Take 1 capsule (75 mg total) by mouth 2 (two) times daily with meals. Take with food. 60 capsule 1    metaxalone (SKELAXIN) 800 MG tablet Take 1 tablet (800 mg total) by mouth 2 (two) times daily as needed (bruxism). 60 tablet 3    ranitidine (ZANTAC) 150 MG tablet Take 1 tablet (150 mg total) by mouth 2 (two) times daily. 180 tablet 3    tadalafil (CIALIS) 5 MG tablet Take 1 tablet (5 mg total) by mouth daily as needed for Erectile Dysfunction. 90 tablet 3    valACYclovir (VALTREX) 1000 MG tablet        Current Facility-Administered Medications   Medication Dose Route Frequency Provider Last Rate Last Dose    Allergy Mix   Subcutaneous 1 time in Clinic/HOD Maria Guadalupe Kirkpatrick MD                        H/o sinus surgery and allergy shots once a year    PHYSICAL EXAM:  /74 (BP Location: Left arm, Patient Position: Sitting, BP Method: Medium (Automatic))   Pulse (!) 57   Ht 5' 11" (1.803 m)   Wt 83.7 kg (184 lb 9.6 oz)   BMI 25.75 kg/m²   GENERAL: Overweight body habitus, well groomed.  HEENT:   HEENT:  Conjunctivae are non-erythematous; Pupils equal, round, and reactive to light; Nose is symmetrical; Nasal mucosa is pink and moist; Septum is midline; Inferior turbinates are hypertrophied; Nasal airflow is normal; Posterior pharynx is " "pink; Modified Mallampati:II; Posterior palate is normal; Tonsils not visualized; Uvula is normal and pink;Tongue is enlarged; Dentition is fair; No TMJ tenderness; Jaw opening and protrusion without click and without discomfort.  NECK: Supple. Neck circumference is 16 inches. No thyromegaly. No palpable nodes.     SKIN: On face and neck: No abrasions, no rashes, no lesions.  No subcutaneous nodules are palpable.  RESPIRATORY: Chest is clear to auscultation.  Normal chest expansion and non-labored breathing at rest.  CARDIOVASCULAR: Normal S1, S2.  No murmurs, gallops or rubs. No carotid bruits bilaterally.  No edema. No clubbing. No cyanosis.    NEURO: Oriented to time, place and person. Normal attention span and concentration. Gait normal.    PSYCH: Affect is full. Mood is normal  MUSCULOSKELETAL: Moves 4 extremities. Gait normal.         Using [facility]: yes      ASSESSMENT:    1. Sleep Apnea NEC. Previous mild JAROD in 2015. The patient symptomatically has  excessive daytime sleepiness, excessive daytime fatigue and interrupted sleep  with exam findings of "a crowded oral airway and elevated body mass index. The patient has medical co-morbidities of Bruxism,  which can be worsened by JAROD. This warrants further investigation for possible obstructive sleep apnea.    2. Bruxism - current on Lunesta 3 mg and Taking Skelaxin (finds it less sedating).      PLAN:    Diagnostic: HST (if fails - we do in lab). The nature of this procedure and its indication was discussed with the patient. he would  like to come discuss PSG results.    Weight loss strategies were discussed in detail         More than 15 minutes of this 30 minutes visit was spent in counseling: during our discussion today, we talked about the etiology of  JAROD as well as the potential ramifications of untreated sleep apnea, which could include daytime sleepiness, hypertension, heart disease and/or stroke.  We discussed potential treatment options, which " could include weight loss, body positioning, continuous positive airway pressure (CPAP), or referral for surgical consideration. Meanwhile, he  is urged to avoid supine sleep, weight gain and alcoholic beverages since all of these can worsen JAROD.     Precautions: The patient was advised to abstain from driving should he feel sleepy or drowsy.        Thank you for allowing me the opportunity to participate in the care of your patient.    This visit summary will be sent to referring provider via inbasket

## 2019-06-24 ENCOUNTER — TELEPHONE (OUTPATIENT)
Dept: SLEEP MEDICINE | Facility: OTHER | Age: 64
End: 2019-06-24

## 2019-06-28 ENCOUNTER — PATIENT MESSAGE (OUTPATIENT)
Dept: SLEEP MEDICINE | Facility: CLINIC | Age: 64
End: 2019-06-28

## 2019-06-28 ENCOUNTER — CLINICAL SUPPORT (OUTPATIENT)
Dept: INTERNAL MEDICINE | Facility: CLINIC | Age: 64
End: 2019-06-28
Payer: COMMERCIAL

## 2019-06-28 DIAGNOSIS — J30.9 CHRONIC ALLERGIC RHINITIS: ICD-10-CM

## 2019-06-28 PROCEDURE — 99499 UNLISTED E&M SERVICE: CPT | Mod: S$GLB,,, | Performed by: ALLERGY & IMMUNOLOGY

## 2019-06-28 PROCEDURE — 99499 NO LOS: ICD-10-PCS | Mod: S$GLB,,, | Performed by: ALLERGY & IMMUNOLOGY

## 2019-06-28 PROCEDURE — 95117 IMMUNOTHERAPY INJECTIONS: CPT | Mod: S$GLB,,, | Performed by: FAMILY MEDICINE

## 2019-06-28 PROCEDURE — 95117 PR IMMU2THERAPY, 2+ INJECTIONS: ICD-10-PCS | Mod: S$GLB,,, | Performed by: FAMILY MEDICINE

## 2019-07-01 ENCOUNTER — TELEPHONE (OUTPATIENT)
Dept: SLEEP MEDICINE | Facility: CLINIC | Age: 64
End: 2019-07-01

## 2019-07-01 ENCOUNTER — OFFICE VISIT (OUTPATIENT)
Dept: SPORTS MEDICINE | Facility: CLINIC | Age: 64
End: 2019-07-01
Payer: COMMERCIAL

## 2019-07-01 VITALS
HEART RATE: 56 BPM | WEIGHT: 180 LBS | SYSTOLIC BLOOD PRESSURE: 141 MMHG | BODY MASS INDEX: 25.77 KG/M2 | DIASTOLIC BLOOD PRESSURE: 71 MMHG | HEIGHT: 70 IN

## 2019-07-01 DIAGNOSIS — M17.11 PRIMARY OSTEOARTHRITIS OF RIGHT KNEE: Primary | ICD-10-CM

## 2019-07-01 PROCEDURE — 20610 PR DRAIN/INJECT LARGE JOINT/BURSA: ICD-10-PCS | Mod: RT,S$GLB,, | Performed by: PHYSICIAN ASSISTANT

## 2019-07-01 PROCEDURE — 99999 PR PBB SHADOW E&M-EST. PATIENT-LVL III: ICD-10-PCS | Mod: PBBFAC,,, | Performed by: PHYSICIAN ASSISTANT

## 2019-07-01 PROCEDURE — 99499 NO LOS: ICD-10-PCS | Mod: S$GLB,,, | Performed by: PHYSICIAN ASSISTANT

## 2019-07-01 PROCEDURE — 99499 UNLISTED E&M SERVICE: CPT | Mod: S$GLB,,, | Performed by: PHYSICIAN ASSISTANT

## 2019-07-01 PROCEDURE — 99999 PR PBB SHADOW E&M-EST. PATIENT-LVL III: CPT | Mod: PBBFAC,,, | Performed by: PHYSICIAN ASSISTANT

## 2019-07-01 PROCEDURE — 20610 DRAIN/INJ JOINT/BURSA W/O US: CPT | Mod: RT,S$GLB,, | Performed by: PHYSICIAN ASSISTANT

## 2019-07-01 NOTE — PROGRESS NOTES
Patient is here for follow up of knee arthritis. Pt is requesting right knee Euflexxa injection #1.  [facility] Has failed other conservative modalities including NSAIDS, activity modification, weight loss.    The prior shot was tolerated well.    PHYSICAL EXAMINATION:     General: The patient is alert and oriented x 3. Mood is pleasant.   Observation of ears, eyes and nose reveals no gross abnormalities. No   labored breathing observed.     No signs of infection or adverse reaction to knee.    PROCEDURE NOTE:.  Aspiration/Injection Procedure right knee    A time out was performed, including verification of patient ID, procedure, site and side, availability of information and equipment, review of safety issues, and agreement with consent, the procedure site was marked.    Aspiration:  After time out was performed, the patient was prepped aseptically with povidone-iodine swabsticks. 6cc's of normal joint fluid was aspirated from the Superolateral  aspect of the right Knee Joint using a 22g x 1.5 needle in sterile fashion without complication.     Injection:  Following aspiration, a diagnostic and therapeutic injection of 2cc Euflexxa was given under sterile technique in the supine position.     Bulmaro Nascimento had no adverse reactions to the medication. Pain decreased. He was instructed to apply ice to the joint for 20 minutes and avoid strenuous activities for 24-36 hours following the injection. He was warned of possible blood sugar and/or blood pressure changes during that time. Following that time, he can resume regular activities.    RTC 1 week for 2nd injection.  All questions were answered, pt will contact us for questions or concerns in the interim.

## 2019-07-05 ENCOUNTER — PATIENT MESSAGE (OUTPATIENT)
Dept: SLEEP MEDICINE | Facility: CLINIC | Age: 64
End: 2019-07-05

## 2019-07-08 ENCOUNTER — PATIENT MESSAGE (OUTPATIENT)
Dept: UROLOGY | Facility: CLINIC | Age: 64
End: 2019-07-08

## 2019-07-08 ENCOUNTER — OFFICE VISIT (OUTPATIENT)
Dept: SPORTS MEDICINE | Facility: CLINIC | Age: 64
End: 2019-07-08
Payer: COMMERCIAL

## 2019-07-08 VITALS
HEIGHT: 70 IN | SYSTOLIC BLOOD PRESSURE: 118 MMHG | HEART RATE: 57 BPM | BODY MASS INDEX: 25.77 KG/M2 | WEIGHT: 180 LBS | DIASTOLIC BLOOD PRESSURE: 64 MMHG

## 2019-07-08 DIAGNOSIS — M17.11 PRIMARY OSTEOARTHRITIS OF RIGHT KNEE: Primary | ICD-10-CM

## 2019-07-08 PROCEDURE — 20610 PR DRAIN/INJECT LARGE JOINT/BURSA: ICD-10-PCS | Mod: RT,S$GLB,, | Performed by: PHYSICIAN ASSISTANT

## 2019-07-08 PROCEDURE — 99999 PR PBB SHADOW E&M-EST. PATIENT-LVL III: CPT | Mod: PBBFAC,,, | Performed by: PHYSICIAN ASSISTANT

## 2019-07-08 PROCEDURE — 99999 PR PBB SHADOW E&M-EST. PATIENT-LVL III: ICD-10-PCS | Mod: PBBFAC,,, | Performed by: PHYSICIAN ASSISTANT

## 2019-07-08 PROCEDURE — 99499 UNLISTED E&M SERVICE: CPT | Mod: S$GLB,,, | Performed by: PHYSICIAN ASSISTANT

## 2019-07-08 PROCEDURE — 99499 NO LOS: ICD-10-PCS | Mod: S$GLB,,, | Performed by: PHYSICIAN ASSISTANT

## 2019-07-08 PROCEDURE — 20610 DRAIN/INJ JOINT/BURSA W/O US: CPT | Mod: RT,S$GLB,, | Performed by: PHYSICIAN ASSISTANT

## 2019-07-08 NOTE — PROGRESS NOTES
Patient is here for follow up of knee arthritis. Pt is requesting right knee Euflexxa injection #2.  [facility] Has failed other conservative modalities including NSAIDS, activity modification, weight loss.    The prior shot was tolerated well.    PHYSICAL EXAMINATION:     General: The patient is alert and oriented x 3. Mood is pleasant.   Observation of ears, eyes and nose reveals no gross abnormalities. No   labored breathing observed.     No signs of infection or adverse reaction to knee.    PROCEDURE NOTE:.  Injection Procedure right knee  A time out was performed, including verification of patient ID, procedure, site and side, availability of information and equipment, review of safety issues, and agreement with consent, the procedure site was marked.    After time out was performed, the patient was prepped aseptically with povidone-iodine swabsticks. A diagnostic and therapeutic injection of 2cc Euflexxa was given under sterile technique using a 22g x 1.5 needle from the Superolateral  aspect of the right Knee Joint in the supine position.      Bulmaro Nascimento had no adverse reactions to the medication. Pain decreased. He was instructed to apply ice to the joint for 20 minutes and avoid strenuous activities for 24-36 hours following the injection. He was warned of possible blood sugar and/or blood pressure changes during that time. Following that time, he can resume regular activities.    He was reminded to call the clinic immediately for any adverse side effects as explained in clinic today.    RTC 1 week for 3rd injection.  All questions were answered, pt will contact us for questions or concerns in the interim.

## 2019-07-09 RX ORDER — TADALAFIL 20 MG/1
20 TABLET ORAL DAILY
Qty: 10 TABLET | Refills: 11 | Status: SHIPPED | OUTPATIENT
Start: 2019-07-09 | End: 2019-07-09 | Stop reason: SDUPTHER

## 2019-07-09 RX ORDER — TADALAFIL 20 MG/1
20 TABLET ORAL DAILY
Qty: 10 TABLET | Refills: 11 | Status: SHIPPED | OUTPATIENT
Start: 2019-07-09 | End: 2020-07-08

## 2019-07-10 ENCOUNTER — TELEPHONE (OUTPATIENT)
Dept: SLEEP MEDICINE | Facility: CLINIC | Age: 64
End: 2019-07-10

## 2019-07-10 DIAGNOSIS — G47.33 OSA (OBSTRUCTIVE SLEEP APNEA): Primary | ICD-10-CM

## 2019-07-10 NOTE — TELEPHONE ENCOUNTER
I need a Full Face Mask ... (the nasal mask did not work well for me earlier). Thank you again.          You  Bulmaro Nascimento Yesterday (5:26 PM)               Dear Mr. Nascimento       I will be happy to do it for you.   Could you please specify if you would prefer a nasal or a full face mask? I need to indicate in my prescription.         Sincerely,     MD Kike Yu MA routed conversation to You 5 days ago      Bulmaro Nascimento  You 5 days ago         Dr. Proctor:     Can your office provide a prescription for CPAP Supplies to [facility] [place], [place]? Having returned to CPAP machine use after a three year absence my prior prescription from [facility] is outdated. Their fax number is [phone]. This request follows my 2019 point with you. I have a follow-up appointment with you on 2019.     If [facility] provides CPAP supplies at your [place] office location then I would prefer to obtain them there instead. Thank you for your help.    -Bulmaro Nascimento         1955; [address] [place] [id number]          Responsible Party

## 2019-07-15 ENCOUNTER — PATIENT MESSAGE (OUTPATIENT)
Dept: SLEEP MEDICINE | Facility: CLINIC | Age: 64
End: 2019-07-15

## 2019-07-15 ENCOUNTER — OFFICE VISIT (OUTPATIENT)
Dept: SPORTS MEDICINE | Facility: CLINIC | Age: 64
End: 2019-07-15
Payer: COMMERCIAL

## 2019-07-15 VITALS
HEIGHT: 70 IN | BODY MASS INDEX: 25.77 KG/M2 | HEART RATE: 62 BPM | WEIGHT: 180 LBS | SYSTOLIC BLOOD PRESSURE: 115 MMHG | DIASTOLIC BLOOD PRESSURE: 67 MMHG

## 2019-07-15 DIAGNOSIS — M17.11 PRIMARY OSTEOARTHRITIS OF RIGHT KNEE: Primary | ICD-10-CM

## 2019-07-15 PROCEDURE — 99999 PR PBB SHADOW E&M-EST. PATIENT-LVL III: ICD-10-PCS | Mod: PBBFAC,,, | Performed by: PHYSICIAN ASSISTANT

## 2019-07-15 PROCEDURE — 20610 DRAIN/INJ JOINT/BURSA W/O US: CPT | Mod: RT,S$GLB,, | Performed by: PHYSICIAN ASSISTANT

## 2019-07-15 PROCEDURE — 99999 PR PBB SHADOW E&M-EST. PATIENT-LVL III: CPT | Mod: PBBFAC,,, | Performed by: PHYSICIAN ASSISTANT

## 2019-07-15 PROCEDURE — 99499 UNLISTED E&M SERVICE: CPT | Mod: S$GLB,,, | Performed by: PHYSICIAN ASSISTANT

## 2019-07-15 PROCEDURE — 20610 PR DRAIN/INJECT LARGE JOINT/BURSA: ICD-10-PCS | Mod: RT,S$GLB,, | Performed by: PHYSICIAN ASSISTANT

## 2019-07-15 PROCEDURE — 99499 NO LOS: ICD-10-PCS | Mod: S$GLB,,, | Performed by: PHYSICIAN ASSISTANT

## 2019-07-15 NOTE — PROGRESS NOTES
Patient is here for follow up of knee arthritis. Pt is requesting right knee Euflexxa injection #3.  [facility] reviewed per encounter record. Has failed other conservative modalities including NSAIDS, activity modification, weight loss.    The prior shot was tolerated well.    PHYSICAL EXAMINATION:     General: The patient is alert and oriented x 3. Mood is pleasant.   Observation of ears, eyes and nose reveals no gross abnormalities. No   labored breathing observed.     No signs of infection or adverse reaction to knee.    PROCEDURE NOTE:.  Injection Procedure right knee  A time out was performed, including verification of patient ID, procedure, site and side, availability of information and equipment, review of safety issues, and agreement with consent, the procedure site was marked.    After time out was performed, the patient was prepped aseptically with povidone-iodine swabsticks. A diagnostic and therapeutic injection of 2cc Euflexxa was given under sterile technique using a 22g x 1.5 needle from the Superolateral  aspect of the right Knee Joint in the supine position.      Bulmaro Nascimento had no adverse reactions to the medication. Pain decreased. He was instructed to apply ice to the joint for 20 minutes and avoid strenuous activities for 24-36 hours following the injection. He was warned of possible blood sugar and/or blood pressure changes during that time. Following that time, he can resume regular activities.    He was reminded to call the clinic immediately for any adverse side effects as explained in clinic today.    RTC in 3 months with Juani Ellington PA-C for follow up right knee.  All questions were answered, pt will contact us for questions or concerns in the interim.

## 2019-07-17 ENCOUNTER — TELEPHONE (OUTPATIENT)
Dept: SLEEP MEDICINE | Facility: OTHER | Age: 64
End: 2019-07-17

## 2019-07-26 ENCOUNTER — CLINICAL SUPPORT (OUTPATIENT)
Dept: INTERNAL MEDICINE | Facility: CLINIC | Age: 64
End: 2019-07-26
Payer: COMMERCIAL

## 2019-07-26 DIAGNOSIS — J30.1 SEASONAL ALLERGIC RHINITIS DUE TO POLLEN: ICD-10-CM

## 2019-07-26 PROCEDURE — 99499 UNLISTED E&M SERVICE: CPT | Mod: S$GLB,,, | Performed by: ALLERGY & IMMUNOLOGY

## 2019-07-26 PROCEDURE — 99499 NO LOS: ICD-10-PCS | Mod: S$GLB,,, | Performed by: ALLERGY & IMMUNOLOGY

## 2019-07-26 PROCEDURE — 95117 PR IMMU2THERAPY, 2+ INJECTIONS: ICD-10-PCS | Mod: S$GLB,,, | Performed by: FAMILY MEDICINE

## 2019-07-26 PROCEDURE — 95117 IMMUNOTHERAPY INJECTIONS: CPT | Mod: S$GLB,,, | Performed by: FAMILY MEDICINE

## 2019-08-01 ENCOUNTER — TELEPHONE (OUTPATIENT)
Dept: UROLOGY | Facility: CLINIC | Age: 64
End: 2019-08-01

## 2019-08-28 ENCOUNTER — CLINICAL SUPPORT (OUTPATIENT)
Dept: INTERNAL MEDICINE | Facility: CLINIC | Age: 64
End: 2019-08-28
Payer: COMMERCIAL

## 2019-08-28 DIAGNOSIS — J30.1 SEASONAL ALLERGIC RHINITIS DUE TO POLLEN: ICD-10-CM

## 2019-08-28 PROCEDURE — 99499 UNLISTED E&M SERVICE: CPT | Mod: S$GLB,,, | Performed by: ALLERGY & IMMUNOLOGY

## 2019-08-28 PROCEDURE — 99499 NO LOS: ICD-10-PCS | Mod: S$GLB,,, | Performed by: ALLERGY & IMMUNOLOGY

## 2019-08-28 PROCEDURE — 95117 PR IMMU2THERAPY, 2+ INJECTIONS: ICD-10-PCS | Mod: S$GLB,,, | Performed by: FAMILY MEDICINE

## 2019-08-28 PROCEDURE — 95117 IMMUNOTHERAPY INJECTIONS: CPT | Mod: S$GLB,,, | Performed by: FAMILY MEDICINE

## 2019-09-04 ENCOUNTER — OFFICE VISIT (OUTPATIENT)
Dept: SLEEP MEDICINE | Facility: CLINIC | Age: 64
End: 2019-09-04
Payer: COMMERCIAL

## 2019-09-04 VITALS
HEART RATE: 58 BPM | SYSTOLIC BLOOD PRESSURE: 126 MMHG | WEIGHT: 184.5 LBS | BODY MASS INDEX: 26.41 KG/M2 | HEIGHT: 70 IN | DIASTOLIC BLOOD PRESSURE: 74 MMHG

## 2019-09-04 DIAGNOSIS — G47.00 INSOMNIA, UNSPECIFIED TYPE: Primary | ICD-10-CM

## 2019-09-04 PROCEDURE — 99999 PR PBB SHADOW E&M-EST. PATIENT-LVL III: ICD-10-PCS | Mod: PBBFAC,,, | Performed by: PSYCHIATRY & NEUROLOGY

## 2019-09-04 PROCEDURE — 99214 OFFICE O/P EST MOD 30 MIN: CPT | Mod: S$GLB,,, | Performed by: PSYCHIATRY & NEUROLOGY

## 2019-09-04 PROCEDURE — 3008F PR BODY MASS INDEX (BMI) DOCUMENTED: ICD-10-PCS | Mod: CPTII,S$GLB,, | Performed by: PSYCHIATRY & NEUROLOGY

## 2019-09-04 PROCEDURE — 99999 PR PBB SHADOW E&M-EST. PATIENT-LVL III: CPT | Mod: PBBFAC,,, | Performed by: PSYCHIATRY & NEUROLOGY

## 2019-09-04 PROCEDURE — 99214 PR OFFICE/OUTPT VISIT, EST, LEVL IV, 30-39 MIN: ICD-10-PCS | Mod: S$GLB,,, | Performed by: PSYCHIATRY & NEUROLOGY

## 2019-09-04 PROCEDURE — 3008F BODY MASS INDEX DOCD: CPT | Mod: CPTII,S$GLB,, | Performed by: PSYCHIATRY & NEUROLOGY

## 2019-09-04 RX ORDER — ESZOPICLONE 3 MG/1
3 TABLET, FILM COATED ORAL NIGHTLY
Qty: 30 TABLET | Refills: 5 | Status: SHIPPED | OUTPATIENT
Start: 2019-09-04 | End: 2019-10-04

## 2019-09-04 NOTE — PROGRESS NOTES
Bulmaro Nascimento  was seen at the request of  No ref. provider found for sleep evaluation.    06/19/2019 INITIAL HISTORY OF PRESENT ILLNESS:  Bulmaro Nascimento is a 64 y.o. male is here to be evaluated for a sleep disorder.       CHIEF COMPLAINT:      He presents with tooth grinding .  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     failed OA for bruxism; also failed Ativan (was addictive but helped); failed Lexapro, Mirtazapine,   Tizanidine. Failed Botox injections.     Skelaxine and Lunesta worked best.    Ambien - did not work.    The patient's complaints include excessive daytime sleepiness, fatigued  snoring and interrupted sleep since  .    Bulmaro Nascimento denied  excessive daytime sleepiness, excessive daytime fatigue and interrupted sleep.      EPWORTH SLEEPINESS SCALE 6/17/2019   Sitting and reading 0   Watching TV 1   Sitting, inactive in a public place (e.g. a theatre or a meeting) 0   As a passenger in a car for an hour without a break 1   Lying down to rest in the afternoon when circumstances permit 1   Sitting and talking to someone 0   Sitting quietly after a lunch without alcohol 0   In a car, while stopped for a few minutes in traffic 0   Total score 3       PHQ9 9/3/2019   Little interest or pleasure in doing things: Not at all   Feeling down, depressed or hopeless: Several days   Trouble falling asleep, staying asleep, or sleeping too much: Several days   Feeling tired or having little energy: Several days   Poor appetite or overeating: Several days   Feeling bad about yourself- or that you are a failure or have let yourself or family down Several days   Trouble concentrating on things, such as reading the newspaper or watching television: Not at all   Moving or speaking so slowly that other people could have noticed. Or the opposite- being so fidgety or restless that you have been moving around a lot more than usual: Not at all   Thoughts that you would be better off dead or  hurting yourself in some way: Not at all   If you indicated you have experienced any of the aforementioned problems, how difficult have these problems made it for you to do your work, take care of things at home or get along with other people? Very difficult   Total Score 5     GAD7 9/3/2019   Feeling nervous, anxious, on edge Not at all   Not being able to stop or control worrying Not at all   Worrying too much about different things Several days   Trouble relaxing Not at all   Being so restless that its hard to sit still Not at all   Becoming easily annoyed or irritable More than half the days   Feeling afraid as if something awful might happen Not at all   If you marked you are experiencing any of the aforementioned problems, how difficult have these made it for you to do your work, take care of things at home, or get along with other people? Very difficult   FLAQUITO-7 Score 3         SLEEP ROUTINE AND LIFESTYLE 09/04/2019 :  Sleep Clinic New Patient 6/17/2019   What time do you go to bed on a week day? (Give a range) 9-10 PM   What time do you go to bed on a day off? (Give a range) 9-10 PM   How long does it take you to fall asleep? (Give a range) 15 min   On average, how many times per night do you wake up? 1-2   How long does it take you to fall back into sleep? (Give a range) 10 Minutes   What time do you wake up to start your day on a week day? (Give a range) 4-7 AM   What time do you wake up to start your day on a day off? (Give a range) 4-7 AM   What time do you get out of bed? (Give a range) 4-7 AM   On average, how many hours do you sleep? 4-6 hours   On average, how many naps do you take per day? 1   Rate your sleep quality from 0 to 5 (0-poor, 5-great). 2   Have you experienced:  N/a   How much weight have you lost or gained (in lbs.) in the last year? 5   On average, how many times per night do you go to the bathroom?  1   Have you ever had a sleep study/CPAP machine/surgery for sleep apnea? Yes   Have  you ever had a CPAP machine for sleep apnea? Yes   Have you ever had surgery for sleep apnea? No       Sleep Clinic ROS  6/17/2019   Difficulty breathing through the nose?  No   Sore throat or dry mouth in the morning? No   Irregular or very fast heart beat?  No   Shortness of breath?  No   Acid reflux? Yes   Body aches and pains?  Yes   Morning headaches? Yes   Dizziness? No   Mood changes?  Sometimes   Do you exercise?  Yes   Do you feel like moving your legs a lot?  No          Denies symptoms concerning for parasomnia        INTERVAL HISTORY:    09/04/2019  The patient has not presented any new complaints since the previous visit.   Using 5 cm H2O (no APAP capability)  Used 4 hrs per night; tried it without the sleep aid  It did not help his bruxism unfortunately.  AHI 3.3 (improved from 9/hr on his 2015 AHI on HST)  Likes his Dreamwear full mask    DME: [facility]    Reports difficulty exhaling - APR was off.    CBD has not helped him.    Tried genetic tests for anxiety and sleep    He states that without Lunesta he feels he stays in a very superficial sleep        Social History:      Occupation: law  Bed partner: girlfriend  Exercise routine: yes  Caffeine:       PREVIOUS SLEEP STUDIES:     HST  5/2015: Significant Obstructive sleep apnea (JAROD) with AHI (apnea hypopnea Index) of 9.1 (at some portions 36 - does not specify position or stage) and SaO2 of 89% (weight  175 lbs).          DME:       PAST MEDICAL HISTORY:    Active Ambulatory Problems     Diagnosis Date Noted    Chronic GERD 06/08/2015    Insomnia 06/08/2015    Benign non-nodular prostatic hyperplasia without lower urinary tract symptoms 02/11/2016    Seasonal allergic rhinitis due to pollen 12/17/2016    Bruxism 01/27/2017    Nocturia 03/15/2017    Urinary frequency 03/15/2017    Slow urinary stream 03/14/2018    Family history of prostate cancer in father 03/14/2018    Chronic pain of right knee 03/21/2018    Recurrent cold sores 06/14/2018     Subclinical hyperthyroidism 04/05/2019     Resolved Ambulatory Problems     Diagnosis Date Noted    Sleep apnea 06/08/2015    Benign localized hyperplasia of prostate with urinary obstruction and other lower urinary tract symptoms (LUTS)(600.21) 06/08/2015    Sinusitis, chronic 06/08/2015    Anxiety 07/08/2015    allergic rhinitis due to animal cat and dog dander 12/17/2016    Seasonal allergic rhinitis due to fungal spores 12/17/2016    Elevated PSA measurement 02/06/2017    SI (sacroiliac) pain 07/30/2018     Past Medical History:   Diagnosis Date    Allergy     Anxiety 7/8/2015    Benign non-nodular prostatic hyperplasia without lower urinary tract symptoms 2/11/2016    Bruxism     Chronic pain of right knee 3/21/2018    Elevated PSA measurement 02/06/2017    Elevated PSA measurement     Family history of prostate cancer in father 3/14/2018    Gastroesophageal reflux disease without esophagitis 6/8/2015    Insomnia 6/8/2015    Recurrent cold sores 6/14/2018    Seasonal allergic rhinitis due to pollen 12/17/2016    SI (sacroiliac) pain 7/30/2018    Subclinical hyperthyroidism 4/5/2019                PAST SURGICAL HISTORY:    Past Surgical History:   Procedure Laterality Date    COLONOSCOPY  07/11/2012    normal - Dr. Regan - repeat in 10 years    KNEE SURGERY Bilateral 1992    knee cap alignment with clean up    SHOULDER SURGERY Right     SINUS SURGERY           FAMILY HISTORY:                Family History   Problem Relation Age of Onset    Thyroid disease Mother     Diabetes Father     Cancer Father 75        Prostate     Thyroid disease Sister     Cancer Brother 50        bladder cancer    No Known Problems Sister     No Known Problems Brother     No Known Problems Brother     Heart disease Neg Hx     Prostate cancer Neg Hx     Kidney disease Neg Hx        SOCIAL HISTORY:          Tobacco:   Social History     Tobacco Use   Smoking Status Never Smoker   Smokeless  "Tobacco Never Used       alcohol use:    Social History     Substance and Sexual Activity   Alcohol Use Yes    Alcohol/week: 0.6 oz    Types: 1 Cans of beer per week    Frequency: Monthly or less    Drinks per session: 1 or 2    Binge frequency: Never    Comment: once a month                   ALLERGIES:  Review of patient's allergies indicates:  No Known Allergies    CURRENT MEDICATIONS:    Current Outpatient Medications   Medication Sig Dispense Refill    celecoxib (CELEBREX) 200 MG capsule       esomeprazole (NEXIUM) 40 MG capsule Take 1 capsule (40 mg total) by mouth before breakfast. 30 capsule 1    fluticasone (FLONASE) 50 mcg/actuation nasal spray 2 sprays by Each Nare route 2 (two) times daily. 1 Bottle 11    metaxalone (SKELAXIN) 800 MG tablet Take 1 tablet (800 mg total) by mouth 2 (two) times daily as needed (bruxism). 60 tablet 3    ranitidine (ZANTAC) 150 MG tablet Take 1 tablet (150 mg total) by mouth 2 (two) times daily. 180 tablet 3    tadalafil (CIALIS) 20 MG Tab Take 1 tablet (20 mg total) by mouth once daily. 10 tablet 11    tadalafil (CIALIS) 5 MG tablet Take 1 tablet (5 mg total) by mouth daily as needed for Erectile Dysfunction. 90 tablet 3    valACYclovir (VALTREX) 1000 MG tablet        Current Facility-Administered Medications   Medication Dose Route Frequency Provider Last Rate Last Dose    Allergy Mix   Subcutaneous 1 time in Clinic/HOD Maria Guadalupe Kirkpatrick MD                        H/o sinus surgery and allergy shots once a year    PHYSICAL EXAM:  /74   Pulse (!) 58   Ht 5' 10" (1.778 m)   Wt 83.7 kg (184 lb 8.4 oz)   BMI 26.48 kg/m²   GENERAL: Overweight body habitus, well groomed.  HEENT:   HEENT:  Conjunctivae are non-erythematous; Pupils equal, round, and reactive to light; Nose is symmetrical; Nasal mucosa is pink and moist; Septum is midline; Inferior turbinates are hypertrophied; Nasal airflow is normal; Posterior pharynx is pink; Modified Mallampati:II; " "Posterior palate is normal; Tonsils not visualized; Uvula is normal and pink;Tongue is enlarged; Dentition is fair; No TMJ tenderness; Jaw opening and protrusion without click and without discomfort.  NECK: Supple. Neck circumference is 16 inches. No thyromegaly. No palpable nodes.     SKIN: On face and neck: No abrasions, no rashes, no lesions.  No subcutaneous nodules are palpable.  RESPIRATORY: Chest is clear to auscultation.  Normal chest expansion and non-labored breathing at rest.  CARDIOVASCULAR: Normal S1, S2.  No murmurs, gallops or rubs. No carotid bruits bilaterally.  No edema. No clubbing. No cyanosis.    NEURO: Oriented to time, place and person. Normal attention span and concentration. Gait normal.    PSYCH: Affect is full. Mood is normal  MUSCULOSKELETAL: Moves 4 extremities. Gait normal.         Using [facility]: yes      ASSESSMENT:    1. Sleep Apnea NEC. Previous mild JAROD in 2015. The patient symptomatically has  excessive daytime sleepiness, excessive daytime fatigue and interrupted sleep  with exam findings of "a crowded oral airway and elevated body mass index. The patient has medical co-morbidities of Bruxism,  which can be worsened by JAROD. Did not get improvement on CPAP 5, but only used for 4 hrs at a time - hard to exhale    2. Bruxism - current on Lunesta 3 mg and stopped Skelaxic      PLAN:    Let's re-introduce Lunesta 3 mg  till you can get the [facility]and till you get used to the machine  I turned EPR feature on at 3 to help w exhalation  Belsomra - please remind in one month    Gene sight - genetic test for meds for anxiety and depression (300$ gamal range)  ____________________________________________________________    Dr. Jagdeep Ortiz sees headache patients.  _________________________________________  Planning to do PSG in lab when get  Medicare next April        More than 15 minutes of this 30 minutes visit was spent in counseling: during our " discussion today, we talked about the etiology of  JAROD as well as the potential ramifications of untreated sleep apnea, which could include daytime sleepiness, hypertension, heart disease and/or stroke.  We discussed potential treatment options, which could include weight loss, body positioning, continuous positive airway pressure (CPAP), or referral for surgical consideration. Meanwhile, he  is urged to avoid supine sleep, weight gain and alcoholic beverages since all of these can worsen JAROD.     Precautions: The patient was advised to abstain from driving should he feel sleepy or drowsy.        Thank you for allowing me the opportunity to participate in the care of your patient.    This visit summary will be sent to referring provider via inbasket

## 2019-09-04 NOTE — PATIENT INSTRUCTIONS
Let's re-introduce Lunesta 3 mg  till you can get the sleep study (Medicare wuill start next April )and till you get used to the machine  I turned EPR feature on at 3 to help w exhalation  Belsomra - please remind in one month    Gene sight - genetic test for meds for anxiety and depression (300$ gamal range)  ____________________________________________________________    Dr. Jagdeep Ortiz sees headache patients.  ______________________________________________       Heidy Ricci, Ph.D.  [address]  [address]  [phone]  [email or web address]      9/4/2019      Dear Bulmaro Nascimento,    You have been referred to CBT-i (Cognitive Behavioral Therapy for Insomnia) by your Sleep Clinic provider.  CBT-i is a manualized evidence-based treatment (CBT-i) for adult patients with insomnia.  Evidence-based means that this treatment has been scientifically proven to be effective for treating insomnia.  Treatment with CBT-i will be offered in a group format.      Treatment Information:   Insomnia-focused.  This treatment specifically focuses primarily on insomnia.   Therapy only.  Medication management is not included in this treatment.  Please discuss your medications with your referring provider.   Time-limited. This means that services with Dr. Ricci are concluded when treatment ends.    CBT-i group will run for 6 weekly sessions.   Structured. CBT-i involves manualized treatment with structured and pre-determined topics, discussions, and practice assignments tailored to treat insomnia.   Active Treatment. Once admitted to CBT-i, treatment requires your consistent attendance and commitment to daily practice assignments.  Practice assignments include daily sleep diaries and changes in thoughts and behaviors related to sleep.      If you would like more detailed information about CBT-i, please visit the following  websites:   https://www.acponline.org/acp-newsroom/gij-ngghgqvtbl-oiffcqlhh-behavioral-therapy-as-initial-treatment-for-chronic-insomnia   https://www.sleepfoundation.org/articles/cognitive-behavioral-therapy-insomnia      How to Join CBT-i:  If you are interested in attending CBT-i, please call the Department of Psychiatry appointment line ([phone]) and request to speak to Ifeoma Toro RN.  Please indicate which cohort you would like to attend.  Each group will be capped at 6-8 members.    [facility] Start Date End Date List of Dates   5 09/04/2019 10/09/2019 09/04, 09/11, 09/18, 09/25, 10/02, 10/09     6 10/30/2019 12/11/2019 10/30, 11/06, 11/13, 11/20, (SKIP 11/27 THANKSGIVING WEEK), 12/4, 12/11         Billing & Insurance:  Please check with your insurance about coverage for the group therapy sessions.  The CPT code is 07019 for a group session.  You can call your insurance directly or contact our patient , Nataliia Funk, at [phone].      Location:   CBT-i will be located on the 4th floor of the [facility] in the Department of Psychiatry at [facility] main campus on [facility].     For the first session, Dr. Ricci will escort you from the waiting room to the group therapy room.   For subsequent sessions, you may walk to the group therapy room on your own.  Please check in at the  before you walk back to room #[id number].  When you exit the waiting room, you will take your second right past the soda machine through the fire door (you may open this).  You will cross the hallway above the atrium and open the door to a hallway with elevators.  Past the elevators to the left is a glass door with an intercom on the left.  Press the button so the  can buzz you in.  Room #[id number] is located in the back of the long hallway on the right.      Questions:  If you have any questions please call the Department of Psychiatry appointment line ([phone]) and request to  speak to Ifeoma Toro RN.  She will direct you to Dr. Ricci if needed.        We hope to speak to you soon!  _____________________________

## 2019-09-16 ENCOUNTER — PATIENT MESSAGE (OUTPATIENT)
Dept: SLEEP MEDICINE | Facility: CLINIC | Age: 64
End: 2019-09-16

## 2019-09-16 ENCOUNTER — PATIENT OUTREACH (OUTPATIENT)
Dept: ADMINISTRATIVE | Facility: OTHER | Age: 64
End: 2019-09-16

## 2019-09-16 ENCOUNTER — OFFICE VISIT (OUTPATIENT)
Dept: NEUROLOGY | Facility: CLINIC | Age: 64
End: 2019-09-16
Payer: COMMERCIAL

## 2019-09-16 ENCOUNTER — LAB VISIT (OUTPATIENT)
Dept: LAB | Facility: HOSPITAL | Age: 64
End: 2019-09-16
Attending: PSYCHIATRY & NEUROLOGY
Payer: COMMERCIAL

## 2019-09-16 VITALS
HEART RATE: 55 BPM | DIASTOLIC BLOOD PRESSURE: 75 MMHG | WEIGHT: 183 LBS | SYSTOLIC BLOOD PRESSURE: 140 MMHG | HEIGHT: 70 IN | BODY MASS INDEX: 26.2 KG/M2

## 2019-09-16 DIAGNOSIS — G89.29 CHRONIC NONINTRACTABLE HEADACHE, UNSPECIFIED HEADACHE TYPE: Primary | ICD-10-CM

## 2019-09-16 DIAGNOSIS — R51.9 CHRONIC NONINTRACTABLE HEADACHE, UNSPECIFIED HEADACHE TYPE: Primary | ICD-10-CM

## 2019-09-16 DIAGNOSIS — F41.9 ANXIETY: ICD-10-CM

## 2019-09-16 DIAGNOSIS — R51.9 CHRONIC NONINTRACTABLE HEADACHE, UNSPECIFIED HEADACHE TYPE: ICD-10-CM

## 2019-09-16 DIAGNOSIS — G89.29 CHRONIC NONINTRACTABLE HEADACHE, UNSPECIFIED HEADACHE TYPE: ICD-10-CM

## 2019-09-16 LAB
ALBUMIN SERPL BCP-MCNC: 4.1 G/DL (ref 3.5–5.2)
ALP SERPL-CCNC: 72 U/L (ref 55–135)
ALT SERPL W/O P-5'-P-CCNC: 18 U/L (ref 10–44)
ANION GAP SERPL CALC-SCNC: 6 MMOL/L (ref 8–16)
AST SERPL-CCNC: 24 U/L (ref 10–40)
BILIRUB SERPL-MCNC: 0.3 MG/DL (ref 0.1–1)
BUN SERPL-MCNC: 13 MG/DL (ref 8–23)
CALCIUM SERPL-MCNC: 9.7 MG/DL (ref 8.7–10.5)
CHLORIDE SERPL-SCNC: 106 MMOL/L (ref 95–110)
CO2 SERPL-SCNC: 28 MMOL/L (ref 23–29)
CREAT SERPL-MCNC: 1 MG/DL (ref 0.5–1.4)
EST. GFR  (AFRICAN AMERICAN): >60 ML/MIN/1.73 M^2
EST. GFR  (NON AFRICAN AMERICAN): >60 ML/MIN/1.73 M^2
GLUCOSE SERPL-MCNC: 99 MG/DL (ref 70–110)
POTASSIUM SERPL-SCNC: 4.5 MMOL/L (ref 3.5–5.1)
PROT SERPL-MCNC: 6.9 G/DL (ref 6–8.4)
SODIUM SERPL-SCNC: 140 MMOL/L (ref 136–145)

## 2019-09-16 PROCEDURE — 3008F BODY MASS INDEX DOCD: CPT | Mod: CPTII,S$GLB,, | Performed by: PSYCHIATRY & NEUROLOGY

## 2019-09-16 PROCEDURE — 99204 OFFICE O/P NEW MOD 45 MIN: CPT | Mod: S$GLB,,, | Performed by: PSYCHIATRY & NEUROLOGY

## 2019-09-16 PROCEDURE — 99999 PR PBB SHADOW E&M-EST. PATIENT-LVL IV: ICD-10-PCS | Mod: PBBFAC,,, | Performed by: PSYCHIATRY & NEUROLOGY

## 2019-09-16 PROCEDURE — 99204 PR OFFICE/OUTPT VISIT, NEW, LEVL IV, 45-59 MIN: ICD-10-PCS | Mod: S$GLB,,, | Performed by: PSYCHIATRY & NEUROLOGY

## 2019-09-16 PROCEDURE — 80053 COMPREHEN METABOLIC PANEL: CPT

## 2019-09-16 PROCEDURE — 3008F PR BODY MASS INDEX (BMI) DOCUMENTED: ICD-10-PCS | Mod: CPTII,S$GLB,, | Performed by: PSYCHIATRY & NEUROLOGY

## 2019-09-16 PROCEDURE — 36415 COLL VENOUS BLD VENIPUNCTURE: CPT

## 2019-09-16 PROCEDURE — 99999 PR PBB SHADOW E&M-EST. PATIENT-LVL IV: CPT | Mod: PBBFAC,,, | Performed by: PSYCHIATRY & NEUROLOGY

## 2019-09-16 RX ORDER — VENLAFAXINE HYDROCHLORIDE 37.5 MG/1
CAPSULE, EXTENDED RELEASE ORAL
Qty: 60 CAPSULE | Refills: 3 | Status: SHIPPED | OUTPATIENT
Start: 2019-09-16 | End: 2019-12-12

## 2019-09-16 NOTE — PROGRESS NOTES
Neurology Clinic Visit  Primary Care Provider: Samira Pimentel NP   Referring Provider: Other   Date of Visit: 09/16/2019       chief complaint:   Chief Complaint   Patient presents with    Headache       History of Present Illness  Bulmaro Nascimento is a 64 y.o. right handed male I have been asked to consult for evaluation of headaches.  Patient reports headaches that he have started to notice since around December 2018.  He reports that the headaches are primarily by temporal but sometimes bifrontal.  Headaches are daily and it is a constant pain that fluctuates throughout the day.  He reports rare occasions of nausea but no vomiting.  He felt that his headaches were related to mouth clenching at night for which he uses a mouth guard.  He has also tried CPAP but there has been no improvement of the headaches.  He has also had dental Botox which he did not feel improved the headaches either.  He denies any abnormal movements are mouth clenching during the day.  He does report that he was on Ativan at that time for 12 to 15 years the helped him sleep at night but he stopped that at the end of last year right before he started to pay attention to the headaches.  He does report having anxiety.  He denies depression.  He is currently seeing a sleep medicine  provider who started him on Lunesta for sleep.  He reports he sleeps about 6 hr per night with the Lunesta.  He does report some mild neck pain and discomfort that he describes mostly as tension but he does not notice any decreased range of motion.  He takes primarily in said reports headaches.      Patient Active Problem List    Diagnosis Date Noted    Subclinical hyperthyroidism 04/05/2019    Recurrent cold sores 06/14/2018    Chronic pain of right knee 03/21/2018    Slow urinary stream 03/14/2018    Family history of prostate cancer in father 03/14/2018    Nocturia 03/15/2017    Urinary frequency 03/15/2017    Bruxism 01/27/2017    Seasonal allergic  rhinitis due to pollen 12/17/2016    Benign non-nodular prostatic hyperplasia without lower urinary tract symptoms 02/11/2016    Chronic GERD 06/08/2015    Insomnia 06/08/2015     Past Medical History:   Diagnosis Date    Allergy     Anxiety 7/8/2015    Benign non-nodular prostatic hyperplasia without lower urinary tract symptoms 2/11/2016    Bruxism     Chronic pain of right knee 3/21/2018    Elevated PSA measurement 02/06/2017    Followed by Dr. 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 Regan - repeat in 10 years    KNEE SURGERY Bilateral 1992    knee cap alignment with clean up    SHOULDER SURGERY Right     SINUS SURGERY       Family History   Problem Relation Age of Onset    Thyroid disease Mother     Diabetes Father     Cancer Father 75        Prostate     Thyroid disease Sister     Cancer Brother 50        bladder cancer    No Known Problems Sister     No Known Problems Brother     No Known Problems Brother     Heart disease Neg Hx     Prostate cancer Neg Hx     Kidney disease Neg Hx          Current Outpatient Medications   Medication Sig    celecoxib (CELEBREX) 200 MG capsule     eszopiclone (LUNESTA) 3 mg Tab Take 1 tablet (3 mg total) by mouth every evening.    fluticasone (FLONASE) 50 mcg/actuation nasal spray 2 sprays by Each Nare route 2 (two) times daily.    ranitidine (ZANTAC) 150 MG tablet Take 1 tablet (150 mg total) by mouth 2 (two) times daily.    tadalafil (CIALIS) 20 MG Tab Take 1 tablet (20 mg total) by mouth once daily.    tadalafil (CIALIS) 5 MG tablet Take 1  tablet (5 mg total) by mouth daily as needed for Erectile Dysfunction.    valACYclovir (VALTREX) 1000 MG tablet     esomeprazole (NEXIUM) 40 MG capsule Take 1 capsule (40 mg total) by mouth before breakfast.    metaxalone (SKELAXIN) 800 MG tablet Take 1 tablet (800 mg total) by mouth 2 (two) times daily as needed (bruxism).    venlafaxine (EFFEXOR-XR) 37.5 MG 24 hr capsule Take 1 tablet by mouth at daily for one week; then increase to 2 tablets by mouth daily.     Current Facility-Administered Medications   Medication    Allergy Mix       Scheduled Meds:   Allergy Mix   Subcutaneous 1 time in Clinic/HOD         Review of patient's allergies indicates:  No Known Allergies  Social History     Socioeconomic History    Marital status:      Spouse name: Not on file    Number of children: Not on file    Years of education: Not on file    Highest education level: Not on file   Occupational History    Occupation:    Social Needs    Financial resource strain: Not hard at all    Food insecurity:     Worry: Never true     Inability: Never true    Transportation needs:     Medical: No     Non-medical: No   Tobacco Use    Smoking status: Never Smoker    Smokeless tobacco: Never Used   Substance and Sexual Activity    Alcohol use: Yes     Alcohol/week: 0.6 oz     Types: 1 Cans of beer per week     Frequency: Monthly or less     Drinks per session: 1 or 2     Binge frequency: Never     Comment: once a month    Drug use: No    Sexual activity: Not Currently     Partners: Female   Lifestyle    Physical activity:     Days per week: 3 days     Minutes per session: 120 min    Stress: Only a little   Relationships    Social connections:     Talks on phone: More than three times a week     Gets together: Once a week     Attends Rastafarian service: Not on file     Active member of club or organization: No     Attends meetings of clubs or organizations: Never     Relationship status:    Other  "Topics Concern    Not on file   Social History Narrative    Lives in [place] alone. 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       Review of Systems      Constitutional: negative  Eyes: negative  Ears, nose, mouth, throat, and face: negative  Respiratory: negative  Cardiovascular: negative  Gastrointestinal: negative  Genitourinary:negative  Integument/breast: negative  Hematologic/lymphatic: negative  Musculoskeletal:negative  Neurological: negative  Behavioral/Psych: negative  Endocrine: negative  Allergic/Immunologic: negative    Objective:  Vital signs in last 24 hours:    Vitals:    09/16/19 1005   BP: (!) 140/75   Pulse: (!) 55   Weight: 83 kg (182 lb 15.7 oz)   Height: 5' 10" (1.778 m)       Body mass index is 26.26 kg/m².     General: no acute distress, well nourished, well-groomed  CVS: RRR, no murmur, gallops or rubs  Respiratory: Clear to ausculation  Extremities: no edema    Neurological Examination:    HIGHER INTEGRATIVE FUNCTIONS:  -Normal attention span and concentration; immediately responds to questions and commands  -Oriented to time, place and person  -Recent and remote memory intact  -Language normal (no aphasia or dysarthria)  -Normal fund of knowledge    CN:  -PERRLA, visual fields full, unable to visualize optic discs due to small pupils on fundus exam   -EOMI with normal saccades and smooth pursuit  -Facial sensation intact bilaterally  -Facial strength/movement intact bilaterally  -Hearing intact to voice  -Palate elevates symmetrically  -Normal shoulder shrug and head turn  -Tongue protrudes midline    MOTOR: (left/right graded 1-5)  -UE: 5/5 deltoids; 5/5 biceps, triceps; 5/5 wrist flexors, extensors; 5/5 interosseous; 5/5   -LEs: 5/5 hip flexion, extension; 5/5 knee flexion, extension; 5/5 ankle " flexion, extension  -Tone: normal  -No pronator drift, no orbiting    SENSORY:  -Light touch, temperature sensation intact bilaterally    REFLEXES:  -2+ upper and lower bilaterally  -Flexor plantar reflex bilaterally  -No clonus    COORDINATION:  -FNF, HKS, TON intact bilaterally    GAIT:  -Normal casual gait         Assessment/Plan:    1. Chronic headaches  2. Anxiety    Plan:  MRI of the brain given the onset of headaches  Start Effexor for headaches and anxiety  Limit same NSAID or same prn to no more than 3 days a week to avoid the risk of rebound headache    Side effects discussed with patient. He understood.  I discussed assessment and plan with patient and answered the questions that he had.     Part of patient note might have been created using Dragon Dictation.  Any errors in syntax or even information may not have been identified and edited on initial review prior to signing this note.

## 2019-09-16 NOTE — LETTER
September 16, 2019      Other  [address]  [place]  [facility] [address]           [facility]  [address], [address]  [place] [address]  Phone: [phone]  Fax: [phone]          Patient: Bulmaro Nascimento   MR Number: [id number]   YOB: 1955   Date of Visit: 9/16/2019       Dear Other:    Thank you for referring Bulmaro Nascimento to me for evaluation. Attached you will find relevant portions of my assessment and plan of care.    If you have questions, please do not hesitate to call me. I look forward to following Bulmaro Nascimento along with you.    Sincerely,    Rico Gordon MD    Enclosure  CC:  No Recipients    If you would like to receive this communication electronically, please contact [email or web address] or [phone] to request more information on [employer] Link access.    For providers and/or their staff who would like to refer a patient to [facility], please contact us through our one-stop-shop provider referral line, [facility], at [phone].    If you feel you have received this communication in error or would no longer like to receive these types of communications, please e-mail [email or web address]

## 2019-09-21 ENCOUNTER — PATIENT MESSAGE (OUTPATIENT)
Dept: SLEEP MEDICINE | Facility: CLINIC | Age: 64
End: 2019-09-21

## 2019-09-24 ENCOUNTER — PATIENT MESSAGE (OUTPATIENT)
Dept: NEUROLOGY | Facility: CLINIC | Age: 64
End: 2019-09-24

## 2019-09-27 ENCOUNTER — CLINICAL SUPPORT (OUTPATIENT)
Dept: INTERNAL MEDICINE | Facility: CLINIC | Age: 64
End: 2019-09-27
Payer: COMMERCIAL

## 2019-09-27 DIAGNOSIS — Z51.6 ALLERGY DESENSITIZATION THERAPY: ICD-10-CM

## 2019-09-27 PROCEDURE — 95117 PR IMMU2THERAPY, 2+ INJECTIONS: ICD-10-PCS | Mod: S$GLB,,, | Performed by: FAMILY MEDICINE

## 2019-09-27 PROCEDURE — 95117 IMMUNOTHERAPY INJECTIONS: CPT | Mod: S$GLB,,, | Performed by: FAMILY MEDICINE

## 2019-09-27 PROCEDURE — 99499 NO LOS: ICD-10-PCS | Mod: S$GLB,,, | Performed by: ALLERGY & IMMUNOLOGY

## 2019-09-27 PROCEDURE — 99499 UNLISTED E&M SERVICE: CPT | Mod: S$GLB,,, | Performed by: ALLERGY & IMMUNOLOGY

## 2019-10-07 ENCOUNTER — PATIENT MESSAGE (OUTPATIENT)
Dept: ALLERGY | Facility: CLINIC | Age: 64
End: 2019-10-07

## 2019-10-08 RX ORDER — EPINEPHRINE 0.3 MG/.3ML
1 INJECTION SUBCUTANEOUS ONCE AS NEEDED
Qty: 2 DEVICE | Refills: 3 | Status: SHIPPED | OUTPATIENT
Start: 2019-10-08 | End: 2020-08-04

## 2019-10-09 ENCOUNTER — PATIENT MESSAGE (OUTPATIENT)
Dept: NEUROLOGY | Facility: CLINIC | Age: 64
End: 2019-10-09

## 2019-10-09 DIAGNOSIS — R51.9 NONINTRACTABLE HEADACHE, UNSPECIFIED CHRONICITY PATTERN, UNSPECIFIED HEADACHE TYPE: Primary | ICD-10-CM

## 2019-10-10 RX ORDER — LORAZEPAM 1 MG/1
TABLET ORAL
Qty: 2 TABLET | Refills: 0 | Status: SHIPPED | OUTPATIENT
Start: 2019-10-10 | End: 2019-12-12

## 2019-10-11 ENCOUNTER — PATIENT MESSAGE (OUTPATIENT)
Dept: NEUROLOGY | Facility: CLINIC | Age: 64
End: 2019-10-11

## 2019-10-11 DIAGNOSIS — M50.20 CERVICAL DISC HERNIATION: ICD-10-CM

## 2019-10-11 DIAGNOSIS — G89.29 CHRONIC NECK PAIN: Primary | ICD-10-CM

## 2019-10-11 DIAGNOSIS — M54.2 CHRONIC NECK PAIN: Primary | ICD-10-CM

## 2019-10-14 ENCOUNTER — TELEPHONE (OUTPATIENT)
Dept: ORTHOPEDICS | Facility: CLINIC | Age: 64
End: 2019-10-14

## 2019-10-14 DIAGNOSIS — M50.30 DDD (DEGENERATIVE DISC DISEASE), CERVICAL: Primary | ICD-10-CM

## 2019-10-30 ENCOUNTER — CLINICAL SUPPORT (OUTPATIENT)
Dept: INTERNAL MEDICINE | Facility: CLINIC | Age: 64
End: 2019-10-30
Payer: COMMERCIAL

## 2019-10-30 DIAGNOSIS — Z51.6 ALLERGY DESENSITIZATION THERAPY: ICD-10-CM

## 2019-10-30 PROCEDURE — 95117 IMMUNOTHERAPY INJECTIONS: CPT | Mod: S$GLB,,, | Performed by: FAMILY MEDICINE

## 2019-10-30 PROCEDURE — 99499 NO LOS: ICD-10-PCS | Mod: S$GLB,,, | Performed by: ALLERGY & IMMUNOLOGY

## 2019-10-30 PROCEDURE — 99499 UNLISTED E&M SERVICE: CPT | Mod: S$GLB,,, | Performed by: ALLERGY & IMMUNOLOGY

## 2019-10-30 PROCEDURE — 95117 PR IMMU2THERAPY, 2+ INJECTIONS: ICD-10-PCS | Mod: S$GLB,,, | Performed by: FAMILY MEDICINE

## 2019-10-31 ENCOUNTER — TELEPHONE (OUTPATIENT)
Dept: NEUROLOGY | Facility: CLINIC | Age: 64
End: 2019-10-31

## 2019-10-31 NOTE — TELEPHONE ENCOUNTER
I called patient in regards to where he wants to have his MRI done. There was no answer and I left a message.

## 2019-11-05 ENCOUNTER — PATIENT MESSAGE (OUTPATIENT)
Dept: NEUROLOGY | Facility: CLINIC | Age: 64
End: 2019-11-05

## 2019-11-13 ENCOUNTER — OFFICE VISIT (OUTPATIENT)
Dept: SPORTS MEDICINE | Facility: CLINIC | Age: 64
End: 2019-11-13
Payer: COMMERCIAL

## 2019-11-13 VITALS
WEIGHT: 175 LBS | HEIGHT: 70 IN | SYSTOLIC BLOOD PRESSURE: 118 MMHG | DIASTOLIC BLOOD PRESSURE: 68 MMHG | BODY MASS INDEX: 25.05 KG/M2 | HEART RATE: 66 BPM

## 2019-11-13 DIAGNOSIS — M17.11 PRIMARY OSTEOARTHRITIS OF RIGHT KNEE: Primary | ICD-10-CM

## 2019-11-13 DIAGNOSIS — M94.261 CHONDROMALACIA OF RIGHT KNEE: ICD-10-CM

## 2019-11-13 DIAGNOSIS — S83.241D OTHER TEAR OF MEDIAL MENISCUS OF RIGHT KNEE AS CURRENT INJURY, SUBSEQUENT ENCOUNTER: ICD-10-CM

## 2019-11-13 DIAGNOSIS — M17.12 PRIMARY OSTEOARTHRITIS OF LEFT KNEE: ICD-10-CM

## 2019-11-13 PROCEDURE — 99999 PR PBB SHADOW E&M-EST. PATIENT-LVL III: CPT | Mod: PBBFAC,,, | Performed by: PHYSICIAN ASSISTANT

## 2019-11-13 PROCEDURE — 99214 OFFICE O/P EST MOD 30 MIN: CPT | Mod: S$GLB,,, | Performed by: PHYSICIAN ASSISTANT

## 2019-11-13 PROCEDURE — 3008F BODY MASS INDEX DOCD: CPT | Mod: CPTII,S$GLB,, | Performed by: PHYSICIAN ASSISTANT

## 2019-11-13 PROCEDURE — 3008F PR BODY MASS INDEX (BMI) DOCUMENTED: ICD-10-PCS | Mod: CPTII,S$GLB,, | Performed by: PHYSICIAN ASSISTANT

## 2019-11-13 PROCEDURE — 99214 PR OFFICE/OUTPT VISIT, EST, LEVL IV, 30-39 MIN: ICD-10-PCS | Mod: S$GLB,,, | Performed by: PHYSICIAN ASSISTANT

## 2019-11-13 PROCEDURE — 99999 PR PBB SHADOW E&M-EST. PATIENT-LVL III: ICD-10-PCS | Mod: PBBFAC,,, | Performed by: PHYSICIAN ASSISTANT

## 2019-11-13 NOTE — PROGRESS NOTES
CC: Right knee    64 y.o. Male who returns today for follow up right knee. He finished right knee Euflexxa series on 7/15/19. He noticed an increase in swelling in his right knee 3 weeks ago, which is when he made his appointment for today.Swelling resolved with icing and taking celebrex for 3 days. He reports effusions in his right knee that increase with activity and are resolved by ice and NSAIDS. He reports receiving approximately 3 months of relief from the right knee Euflexxa series and would like to receive them again when they are approved.     Previous History: Retired [employer] and [employer] . Prior diagnosis of R Knee chondromalacia. Complaint today is recurrent R knee activity related pain and swelling. Localizes more laterally today. Intermittent mechanical symptoms. No instability. Additional treatment has included 2 steroid injections (mosrt recent injection provided approx 80% relief for 8wk), oral mediations and activity modifications.      History of BL Knee arthroscopic debridements - years ago.   Negative for smoking.   Negative for diabetes.     REVIEW OF SYSTEMS:   Constitution: Negative. Negative for chills, fever and night sweats.    Hematologic/Lymphatic: Negative for bleeding problem. Does not bruise/bleed easily.   Skin: Negative for dry skin, itching and rash.   Musculoskeletal: Negative for falls. Positive for right knee.    PAST MEDICAL HISTORY:   Past Medical History:   Diagnosis Date    Allergy     Anxiety 7/8/2015    Benign non-nodular prostatic hyperplasia without lower urinary tract symptoms 2/11/2016    Bruxism     Chronic pain of right knee 3/21/2018    Elevated PSA measurement 02/06/2017    Followed by Dr. Laar    Elevated PSA measurement     Family history of prostate cancer in father 3/14/2018    Gastroesophageal reflux disease without esophagitis 6/8/2015    Insomnia 6/8/2015    Recurrent cold sores 6/14/2018    Seasonal allergic rhinitis due to pollen  12/17/2016    SI (sacroiliac) pain 7/30/2018    Subclinical hyperthyroidism 4/5/2019       PAST SURGICAL HISTORY:   Past Surgical History:   Procedure Laterality Date    COLONOSCOPY  07/11/2012    normal - Dr. Regan - repeat in 10 years    KNEE SURGERY Bilateral 1992    knee cap alignment with clean up    SHOULDER SURGERY Right     SINUS SURGERY         FAMILY HISTORY:   Family History   Problem Relation Age of Onset    Thyroid disease Mother     Diabetes Father     Cancer Father 75        Prostate     Thyroid disease Sister     Cancer Brother 50        bladder cancer    No Known Problems Sister     No Known Problems Brother     No Known Problems Brother     Heart disease Neg Hx     Prostate cancer Neg Hx     Kidney disease Neg Hx        SOCIAL HISTORY:   Social History     Socioeconomic History    Marital status:      Spouse name: Not on file    Number of children: Not on file    Years of education: Not on file    Highest education level: Not on file   Occupational History    Occupation:    Social Needs    Financial resource strain: Not hard at all    Food insecurity:     Worry: Never true     Inability: Never true    Transportation needs:     Medical: No     Non-medical: No   Tobacco Use    Smoking status: Never Smoker    Smokeless tobacco: Never Used   Substance and Sexual Activity    Alcohol use: Yes     Alcohol/week: 1.0 standard drinks     Types: 1 Cans of beer per week     Frequency: Monthly or less     Drinks per session: 1 or 2     Binge frequency: Never     Comment: once a month    Drug use: No    Sexual activity: Not Currently     Partners: Female   Lifestyle    Physical activity:     Days per week: 3 days     Minutes per session: 120 min    Stress: Only a little   Relationships    Social connections:     Talks on phone: More than three times a week     Gets together: Once a week     Attends Mandaen service: Not on file     Active member of club or  organization: No     Attends meetings of clubs or organizations: Never     Relationship status:    Other Topics Concern    Not on file   Social History Narrative    Lives in [place] alone. 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       MEDICATIONS:     Current Outpatient Medications:     celecoxib (CELEBREX) 200 MG capsule, , Disp: , Rfl:     fluticasone (FLONASE) 50 mcg/actuation nasal spray, 2 sprays by Each Nare route 2 (two) times daily., Disp: 1 Bottle, Rfl: 11    ranitidine (ZANTAC) 150 MG tablet, Take 1 tablet (150 mg total) by mouth 2 (two) times daily., Disp: 180 tablet, Rfl: 3    tadalafil (CIALIS) 20 MG Tab, Take 1 tablet (20 mg total) by mouth once daily., Disp: 10 tablet, Rfl: 11    tadalafil (CIALIS) 5 MG tablet, Take 1 tablet (5 mg total) by mouth daily as needed for Erectile Dysfunction., Disp: 90 tablet, Rfl: 3    valACYclovir (VALTREX) 1000 MG tablet, , Disp: , Rfl:     EPINEPHrine (EPIPEN) 0.3 mg/0.3 mL AtIn, Inject 0.3 mLs (0.3 mg total) into the muscle once as needed., Disp: 2 Device, Rfl: 3    esomeprazole (NEXIUM) 40 MG capsule, Take 1 capsule (40 mg total) by mouth before breakfast., Disp: 30 capsule, Rfl: 1    LORazepam (ATIVAN) 1 MG tablet, Take 1 tablet by mouth 1 hour prior to MRI. May repeat once if needed., Disp: 2 tablet, Rfl: 0    metaxalone (SKELAXIN) 800 MG tablet, Take 1 tablet (800 mg total) by mouth 2 (two) times daily as needed (bruxism). (Patient not taking: Reported on 11/13/2019), Disp: 60 tablet, Rfl: 3    venlafaxine (EFFEXOR-XR) 37.5 MG 24 hr capsule, Take 1 tablet by mouth at daily for one week; then increase to 2 tablets by mouth daily., Disp: 60 capsule, Rfl: 3    Current Facility-Administered Medications:     Allergy Mix, , Subcutaneous, 1 time in Clinic/HOD,  "Maria Guadalupe Kirkpatrick MD    ALLERGIES:   Review of patient's allergies indicates:  No Known Allergies     PHYSICAL EXAMINATION:  /68   Pulse 66   Ht 5' 10" (1.778 m)   Wt 79.4 kg (175 lb)   BMI 25.11 kg/m²   General: Well-developed well-nourished 64 y.o. malein no acute distress   Cardiovascular: Regular rhythm by palpation of distal pulse, normal color and temperature, no concerning varicosities on symptomatic side   Lungs: No labored breathing or wheezing appreciated   Neuro: Alert and oriented ×3   Psychiatric: well oriented to person, place and time, demonstrates normal mood and affect   Skin: No rashes, lesions or ulcers, normal temperature, turgor, and texture on involved extremity    General    Vitals reviewed.  Constitutional: He is oriented to person, place, and time. He appears well-developed and well-nourished. No distress.   Neck: Normal range of motion.   Cardiovascular: Normal rate and regular rhythm.    Pulmonary/Chest: Effort normal. No respiratory distress.   Neurological: He is alert and oriented to person, place, and time.   Psychiatric: He has a normal mood and affect. His behavior is normal. Thought content normal.              Repeat examination of the right knee shows trace effusion.  Point tenderness over the lateral patellofemoral articulation and lateral patellar facet.  No joint line tenderness. +  Slava's testing for medial joint line pain.  Good quadriceps bulk and strength.  Central patellofemoral tracking. + patellar grind. + patellofemoral crepitus.  Neutral standing alignment.  Active range of motion is full from 0-135°, pain anterolateral with forced flexion. No pain with forced extension. Ligamentously stable.      IMAGING:  X-rays including standing, weight bearing AP and flexion bilateral knees, RIGHT knee lateral and sunrise views and 3 standing views of the foot were ordered and images reviewed by me show:    Advanced patellofemoral degenerative changes with " well-maintained medial and lateral joint spaces otherwise.  Chondrocalcinosis present.    MRI Right Knee:   1. Medial meniscus tear.   2. Full-thickness patellofemoral cartilage loss.   3. Small interstitial tear of PCL.   4. Small joint effusion with small partially ruptured Baker's cyst    ASSESSMENT:      ICD-10-CM ICD-9-CM   1. Primary osteoarthritis of right knee M17.11 715.16   2. Primary osteoarthritis of left knee M17.12 715.16   3. Chondromalacia of right knee M94.261 717.7   4. Other tear of medial meniscus of right knee as current injury, subsequent encounter S83.241D V58.89     836.0       PLAN:     1. I made the decision to obtain old records of the patient including previous notes and imaging. I independently reviewed and interpreted the radiographs and/or MRIs today as well as prior imaging.    2. His primary symptomatic issue is advanced chondral wear and osteoarthritis of the patellofemoral compartment involving primarily the lateral patellar facet and central eminence.  He has positive lateral patellar tilt with a tight lateral pericapsular tissue. He has also been mildly symptomatic from his medial meniscus tear. Treatment options again reviewed.  These include arthroscopy for debridement, chondroplasty, and limited lateral parapatellar release.  This would be a limited goals option in light of his underlying degenerative arthritis in the PF compartment.  At this time he wishes to pursue continue non operative treatment.  We discussed modulation of his exercise regimen to include limited deep knee bends.  Discussed quadriceps strengthening.      3.  placed for bilateral Euflexxa series    4. Continue to take oral anti-inflammatory medication as needed.     5. Return to clinic in 2 months for bilateral  Euflexxa series in January 6. The total face-to-face encounter time with this patient was 25 minutes and greater than 50% of of the encounter time was spent counseling the patient and  coordinating care. Significant time was also spent educating the patient, giving detail post-visit instructions, and discussing risks and benefits of treatment. Patient also had several specific questions that were answered during the visit today.

## 2019-11-19 ENCOUNTER — OFFICE VISIT (OUTPATIENT)
Dept: SPINE | Facility: CLINIC | Age: 64
End: 2019-11-19
Payer: COMMERCIAL

## 2019-11-19 ENCOUNTER — APPOINTMENT (OUTPATIENT)
Dept: RADIOLOGY | Facility: OTHER | Age: 64
End: 2019-11-19
Attending: PHYSICIAN ASSISTANT
Payer: COMMERCIAL

## 2019-11-19 VITALS — BODY MASS INDEX: 25.05 KG/M2 | WEIGHT: 175 LBS | HEIGHT: 70 IN

## 2019-11-19 DIAGNOSIS — M50.30 DDD (DEGENERATIVE DISC DISEASE), CERVICAL: ICD-10-CM

## 2019-11-19 DIAGNOSIS — M50.30 DDD (DEGENERATIVE DISC DISEASE), CERVICAL: Primary | ICD-10-CM

## 2019-11-19 DIAGNOSIS — M54.2 NECK PAIN: ICD-10-CM

## 2019-11-19 PROCEDURE — 99999 PR PBB SHADOW E&M-EST. PATIENT-LVL IV: CPT | Mod: PBBFAC,,, | Performed by: PHYSICIAN ASSISTANT

## 2019-11-19 PROCEDURE — 72050 XR CERVICAL SPINE AP LAT WITH FLEX EXTEN: ICD-10-PCS | Mod: 26,,, | Performed by: RADIOLOGY

## 2019-11-19 PROCEDURE — 99244 PR OFFICE CONSULTATION,LEVEL IV: ICD-10-PCS | Mod: S$GLB,,, | Performed by: PHYSICIAN ASSISTANT

## 2019-11-19 PROCEDURE — 99244 OFF/OP CNSLTJ NEW/EST MOD 40: CPT | Mod: S$GLB,,, | Performed by: PHYSICIAN ASSISTANT

## 2019-11-19 PROCEDURE — 99999 PR PBB SHADOW E&M-EST. PATIENT-LVL IV: ICD-10-PCS | Mod: PBBFAC,,, | Performed by: PHYSICIAN ASSISTANT

## 2019-11-19 PROCEDURE — 72050 X-RAY EXAM NECK SPINE 4/5VWS: CPT | Mod: TC

## 2019-11-19 PROCEDURE — 72050 X-RAY EXAM NECK SPINE 4/5VWS: CPT | Mod: 26,,, | Performed by: RADIOLOGY

## 2019-11-19 RX ORDER — METHOCARBAMOL 750 MG/1
750 TABLET, FILM COATED ORAL 3 TIMES DAILY
Qty: 60 TABLET | Refills: 0 | Status: SHIPPED | OUTPATIENT
Start: 2019-11-19 | End: 2019-12-09

## 2019-11-19 NOTE — LETTER
November 19, 2019      RIANA Tubbs MD  [address]  [address]  [facility] [address]           [facility]  [address]  [facility] [address]  Phone: [phone]  Fax: [phone]          Patient: Bulmaro Nascimento   MR Number: [id number]   YOB: 1955   Date of Visit: 11/19/2019       Dear Dr. RIANA Tubbs:    Thank you for referring Bulmaro Nascimento to me for evaluation. Attached you will find relevant portions of my assessment and plan of care.    If you have questions, please do not hesitate to call me. I look forward to following Bulmaro Nascimento along with you.    Sincerely,    RODRIGO Vazquez  CC:  No Recipients    If you would like to receive this communication electronically, please contact [email or web address] or [phone] to request more information on [employer] Link access.    For providers and/or their staff who would like to refer a patient to [facility], please contact us through our one-stop-shop provider referral line, [facility], at [phone].    If you feel you have received this communication in error or would no longer like to receive these types of communications, please e-mail [email or web address]

## 2019-11-19 NOTE — PROGRESS NOTES
DATE: 11/19/2019  PATIENT: Bulmaro Nascimento    Supervising Physician: Ebenezer Regalado M.D.    CHIEF COMPLAINT: neck pain    HISTORY:  Bulmaro Nascimento is a 64 y.o. male retired [employer] and [employer]  here for initial evaluation of neck pain (Neck - 5, Arm - 0). The pain has been present for several years but has progressively worsened over the last few months. The patient describes the pain as aching. The pain is worse in the morning and with activity and improved by nothing in particular. There is occasionally associated numbness and tingling with sitting at a desk. There is no subjective weakness. Prior treatments have included celebrex, a chiropractor and massage, but no PT, ESIs or surgery.     The patient denies myelopathic symptoms such as handwriting changes or difficulty with buttons/coins/keys. Denies perineal paresthesias, bowel/bladder dysfunction.    PAST MEDICAL/SURGICAL HISTORY:  Past Medical History:   Diagnosis Date    Allergy     Anxiety 7/8/2015    Benign non-nodular prostatic hyperplasia without lower urinary tract symptoms 2/11/2016    Bruxism     Chronic pain of right knee 3/21/2018    Elevated PSA measurement 02/06/2017    Followed by Dr. 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 Regan - repeat in 10 years    KNEE SURGERY Bilateral 1992    knee cap alignment with clean up    SHOULDER SURGERY Right     SINUS SURGERY         Medications:  Current Outpatient Medications on File Prior to Visit   Medication Sig Dispense Refill    celecoxib (CELEBREX) 200 MG capsule        EPINEPHrine (EPIPEN) 0.3 mg/0.3 mL AtIn Inject 0.3 mLs (0.3 mg total) into the muscle once as needed. 2 Device 3    esomeprazole (NEXIUM) 40 MG capsule Take 1 capsule (40 mg total) by mouth before breakfast. 30 capsule 1    fluticasone (FLONASE) 50 mcg/actuation nasal spray 2 sprays by Each Nare route 2 (two) times daily. 1 Bottle 11    LORazepam (ATIVAN) 1 MG tablet Take 1 tablet by mouth 1 hour prior to MRI. May repeat once if needed. 2 tablet 0    metaxalone (SKELAXIN) 800 MG tablet Take 1 tablet (800 mg total) by mouth 2 (two) times daily as needed (bruxism). (Patient not taking: Reported on 11/13/2019) 60 tablet 3    ranitidine (ZANTAC) 150 MG tablet Take 1 tablet (150 mg total) by mouth 2 (two) times daily. 180 tablet 3    tadalafil (CIALIS) 20 MG Tab Take 1 tablet (20 mg total) by mouth once daily. 10 tablet 11    tadalafil (CIALIS) 5 MG tablet Take 1 tablet (5 mg total) by mouth daily as needed for Erectile Dysfunction. 90 tablet 3    valACYclovir (VALTREX) 1000 MG tablet       venlafaxine (EFFEXOR-XR) 37.5 MG 24 hr capsule Take 1 tablet by mouth at daily for one week; then increase to 2 tablets by mouth daily. 60 capsule 3     Current Facility-Administered Medications on File Prior to Visit   Medication Dose Route Frequency Provider Last Rate Last Dose    Allergy Mix   Subcutaneous 1 time in Clinic/HOD Maria Guadalupe Kirkpatrick MD           Social History:   Social History     Socioeconomic History    Marital status:      Spouse name: Not on file    Number of children: Not on file    Years of education: Not on file    Highest education level: Not on file   Occupational History    Occupation:    Social Needs    Financial resource strain: Not hard at all    Food insecurity:     Worry: Never true     Inability: Never true    Transportation needs:     Medical: No     Non-medical: No   Tobacco Use    Smoking status: Never Smoker    Smokeless tobacco: Never Used   Substance and  "Sexual Activity    Alcohol use: Yes     Alcohol/week: 1.0 standard drinks     Types: 1 Cans of beer per week     Frequency: Monthly or less     Drinks per session: 1 or 2     Binge frequency: Never     Comment: once a month    Drug use: No    Sexual activity: Not Currently     Partners: Female   Lifestyle    Physical activity:     Days per week: 3 days     Minutes per session: 120 min    Stress: Only a little   Relationships    Social connections:     Talks on phone: More than three times a week     Gets together: Once a week     Attends Spiritism service: Not on file     Active member of club or organization: No     Attends meetings of clubs or organizations: Never     Relationship status:    Other Topics Concern    Not on file   Social History Narrative    Lives in [place] alone. 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       REVIEW OF SYSTEMS:  Constitution: Negative. Negative for chills, fever and night sweats.   Cardiovascular: Negative for chest pain and syncope.   Respiratory: Negative for cough and shortness of breath.   Gastrointestinal: See HPI. Negative for nausea/vomiting. Negative for abdominal pain.  Genitourinary: See HPI. Negative for discoloration or dysuria.  Skin: Negative for dry skin, itching and rash.   Hematologic/Lymphatic: Negative for bleeding problem. Does not bruise/bleed easily.   Musculoskeletal: Negative for falls and muscle weakness.   Neurological: See HPI. No seizures.   Endocrine: Negative for polydipsia, polyphagia and polyuria.   Allergic/Immunologic: Negative for hives and persistent infections.  Psychiatric/Behavioral: Negative for depression and insomnia.         EXAM:  Ht 5' 10" (1.778 m)   Wt 79.4 kg (175 lb)   BMI 25.11 kg/m²     General: The patient is a very pleasant 64 " y.o. male in no apparent distress, the patient is oriented to person, place and time.  Psych: Normal mood and affect  HEENT: Vision grossly intact, hearing intact to the spoken word.  Lungs: Respirations unlabored.  Gait: Normal station and gait, no difficulty with toe or heel walk.   Skin: Cervical skin negative for rashes, lesions, hairy patches and surgical scars.  Range of motion: Cervical range of motion is acceptable. There is mild tenderness to palpation.  Spinal Balance: Global saggital and coronal spinal balance acceptable, no significant for scoliosis and kyphosis.  Musculoskeletal: No pain with the range of motion of the bilateral shoulders and elbows. Normal bulk and contour of the bilateral hands.  Vascular: Bilateral hands warm and well perfused, radial pulses 2+ bilaterally.  Neurological: Normal strength and tone in all major motor groups in the bilateral upper and lower extremities. Normal sensation to light touch in the C5-T1 and L2-S1 dermatomes bilaterally.  Deep tendon reflexes symmetric 2+ in the bilateral upper and lower extremities.  Negative Inverted Radial Reflex and Phillips's bilaterally. Negative Babinski bilaterally.     IMAGING:   Today I personally reviewed AP, Lat and Flex/Ex  upright C-spine films that demonstrate C5/6/7 disc degeneration.     Body mass index is 25.11 kg/m².    No results found for: HGBA1C        ASSESSMENT/PLAN:    Bulmaro was seen today for neck pain.    Diagnoses and all orders for this visit:    DDD (degenerative disc disease), cervical  -     Ambulatory Referral to Physical/Occupational Therapy    Neck pain  -     Ambulatory Referral to Physical/Occupational Therapy    Other orders  -     methocarbamol (ROBAXIN) 750 MG Tab; Take 1 tablet (750 mg total) by mouth 3 (three) times daily. As needed for muscle spasms for 60 doses        The patient is having neck pain without radicular or myelopathic symptoms.    Today we discussed at length all of the different  treatment options including anti-inflammatories, acetaminophen, rest, ice, heat, physical therapy including strengthening and stretching exercises, home exercises, ROM, aerobic conditioning, aqua therapy, other modalities including ultrasound, massage, and dry needling, epidural steroid injections and finally surgical intervention.      The patient would like to try physical therapy before having an MRI.  Orders placed [facility] at [facility].  Follow up after therapy in about 6 weeks if symptoms persist.      Follow up if symptoms worsen or fail to improve.    This patient was referred by Dr. Tubbs for consult.  A copy of this report will be sent electronically.

## 2019-12-02 ENCOUNTER — TELEPHONE (OUTPATIENT)
Dept: ALLERGY | Facility: CLINIC | Age: 64
End: 2019-12-02

## 2019-12-02 NOTE — TELEPHONE ENCOUNTER
Called and left a message for the patient to give us a call back in regards to rescheduling his injection appointment of  12/04/19 as the injection room will be closed due to lack of staff.

## 2019-12-02 NOTE — TELEPHONE ENCOUNTER
----- Message from June Benjamin LPN sent at 12/2/2019 10:47 AM CST -----  Contact: pt       ----- Message -----  From: Mayra Qiu  Sent: 12/2/2019  10:16 AM CST  To: Casimiro NGUYEN Staff    Pt is returning the nurses phone call pt received a phone call about his allergy injection for Wed 12/4/2019 about them being closed this day.. Can you please call pt at [phone]    PHYLLIS

## 2019-12-09 ENCOUNTER — CLINICAL SUPPORT (OUTPATIENT)
Dept: INTERNAL MEDICINE | Facility: CLINIC | Age: 64
End: 2019-12-09
Payer: COMMERCIAL

## 2019-12-09 DIAGNOSIS — J30.9 CHRONIC ALLERGIC RHINITIS: ICD-10-CM

## 2019-12-09 PROCEDURE — 95117 PR IMMU2THERAPY, 2+ INJECTIONS: ICD-10-PCS | Mod: S$GLB,,, | Performed by: FAMILY MEDICINE

## 2019-12-09 PROCEDURE — 95117 IMMUNOTHERAPY INJECTIONS: CPT | Mod: S$GLB,,, | Performed by: FAMILY MEDICINE

## 2019-12-09 PROCEDURE — 99499 UNLISTED E&M SERVICE: CPT | Mod: S$GLB,,, | Performed by: ALLERGY & IMMUNOLOGY

## 2019-12-09 PROCEDURE — 99499 NO LOS: ICD-10-PCS | Mod: S$GLB,,, | Performed by: ALLERGY & IMMUNOLOGY

## 2019-12-12 ENCOUNTER — OFFICE VISIT (OUTPATIENT)
Dept: FAMILY MEDICINE | Facility: CLINIC | Age: 64
End: 2019-12-12
Payer: COMMERCIAL

## 2019-12-12 VITALS
HEART RATE: 58 BPM | TEMPERATURE: 98 F | BODY MASS INDEX: 26.35 KG/M2 | OXYGEN SATURATION: 99 % | SYSTOLIC BLOOD PRESSURE: 120 MMHG | DIASTOLIC BLOOD PRESSURE: 74 MMHG | HEIGHT: 70 IN | WEIGHT: 184.06 LBS | RESPIRATION RATE: 18 BRPM

## 2019-12-12 DIAGNOSIS — Z13.29 THYROID DISORDER SCREEN: ICD-10-CM

## 2019-12-12 DIAGNOSIS — R51.9 CHRONIC NONINTRACTABLE HEADACHE, UNSPECIFIED HEADACHE TYPE: ICD-10-CM

## 2019-12-12 DIAGNOSIS — N40.0 BENIGN PROSTATIC HYPERPLASIA WITHOUT LOWER URINARY TRACT SYMPTOMS: ICD-10-CM

## 2019-12-12 DIAGNOSIS — F45.8 BRUXISM: ICD-10-CM

## 2019-12-12 DIAGNOSIS — Z13.0 SCREENING FOR DEFICIENCY ANEMIA: ICD-10-CM

## 2019-12-12 DIAGNOSIS — G47.00 INSOMNIA, UNSPECIFIED TYPE: ICD-10-CM

## 2019-12-12 DIAGNOSIS — M50.30 DDD (DEGENERATIVE DISC DISEASE), CERVICAL: ICD-10-CM

## 2019-12-12 DIAGNOSIS — G89.29 CHRONIC NONINTRACTABLE HEADACHE, UNSPECIFIED HEADACHE TYPE: ICD-10-CM

## 2019-12-12 DIAGNOSIS — Z13.220 SCREENING CHOLESTEROL LEVEL: ICD-10-CM

## 2019-12-12 DIAGNOSIS — Z13.1 DIABETES MELLITUS SCREENING: ICD-10-CM

## 2019-12-12 DIAGNOSIS — M17.11 PRIMARY OSTEOARTHRITIS OF RIGHT KNEE: ICD-10-CM

## 2019-12-12 DIAGNOSIS — K21.9 CHRONIC GERD: Primary | ICD-10-CM

## 2019-12-12 PROCEDURE — 99999 PR PBB SHADOW E&M-EST. PATIENT-LVL V: CPT | Mod: PBBFAC,,, | Performed by: NURSE PRACTITIONER

## 2019-12-12 PROCEDURE — 99214 PR OFFICE/OUTPT VISIT, EST, LEVL IV, 30-39 MIN: ICD-10-PCS | Mod: S$GLB,,, | Performed by: NURSE PRACTITIONER

## 2019-12-12 PROCEDURE — 99999 PR PBB SHADOW E&M-EST. PATIENT-LVL V: ICD-10-PCS | Mod: PBBFAC,,, | Performed by: NURSE PRACTITIONER

## 2019-12-12 PROCEDURE — 3008F PR BODY MASS INDEX (BMI) DOCUMENTED: ICD-10-PCS | Mod: CPTII,S$GLB,, | Performed by: NURSE PRACTITIONER

## 2019-12-12 PROCEDURE — 3008F BODY MASS INDEX DOCD: CPT | Mod: CPTII,S$GLB,, | Performed by: NURSE PRACTITIONER

## 2019-12-12 PROCEDURE — 99214 OFFICE O/P EST MOD 30 MIN: CPT | Mod: S$GLB,,, | Performed by: NURSE PRACTITIONER

## 2019-12-12 RX ORDER — METHOCARBAMOL 750 MG/1
500 TABLET, FILM COATED ORAL 4 TIMES DAILY
COMMUNITY
End: 2020-08-04

## 2019-12-12 RX ORDER — ESZOPICLONE 3 MG/1
TABLET, FILM COATED ORAL
COMMUNITY
Start: 2019-11-19 | End: 2020-04-13 | Stop reason: SDUPTHER

## 2019-12-12 RX ORDER — CIMETIDINE 400 MG/1
400 TABLET, FILM COATED ORAL 2 TIMES DAILY
Qty: 60 TABLET | Refills: 2 | Status: SHIPPED | OUTPATIENT
Start: 2019-12-12 | End: 2020-08-04

## 2019-12-12 NOTE — PROGRESS NOTES
Subjective:       Patient ID: Bulmaro Nascimento is a 64 y.o. male.    Chief Complaint: Follow-up and Medication Problem (discuss changing Ranitidine)    Patient is a 64-year-old white male with BPH and a history of elevated PSA that is followed by urology, chronic ALLERGIES that is followed by ALLERGY specialist, chronic right knee pain that is followed by orthopedic specialist, recurrent cold sores, chronic GERD, bruxism with chronic headaches followed by Neurology, cervical disc disease followed by [facility], insomnia followed by [facility] and an abnormal TSH level in June 2019 that is here today for 6 month follow up with thyroid lab results.     Patient has bruxism and insomnia that was controlled on Ativan 1 mg at bedtime in the past. Patient was concerned with the risk of future possible Alzheimer's issues associated with the use of this drug so we tried Mirtazapine at night. Patient did not tolerate the Mirtazapine at night so I changed patient to Robaxin muscle relaxer at night for the Bruxism that was no longer effective.  Patient also has chronic headaches that he associated with Bruxism.  He reported seeing a dentist and trying the Botox injections to treat but reports the headaches were still present and the Botox did not help. He had tried Lunesta with some success in the past so I started patient back on Lunesta in June 2019 and reports now sleeping 6 to 7 hours per night. Patient was referred to Sleep Medicine MD for further assessment and management.  He reports that Dr. Proctor advised to continue the Lunesta at present dose and plans to do a sleep study in April 2020 at age 65.     Patient also has chronic headaches that he associated with Bruxism.  He reported seeing a dentist and trying the Botox injections to treat but reports the headaches were still present and the Botox did not help so he seen a neurologist Dr. Gordon.  He reports that Dr. Gordon ordered an MRI for  further evaluation BUT he did not have done because he reports that the headaches have resolved once he started treating chronic neck pain with physical therapy.    [facility] and diagnosed with Cervical DDD -sent for PT and the neck pain and headaches have improved - they prescribe Robaxin prn.     Patient has Primary OA of right knee followed by Orthopedic MD and prescribed Celebrex prn.     Patient has chronic GERD.  Patient reports he did have an EGD done in 2012 with Dr. Regan - will request records.  He was advised that long-term PPI use (Nexium) is discouraged as increases risk of osteoporosis and bone fractures so patient  Was on Zantac for chronic GERD for daily control and using OTC NEXIUM ONLY as needed for breakthrough symptoms. Patient now requesting change from Zantac due to recent recall - he also reports his chronic GERD symptoms are not really controlled on H2 Blocker alone - will change from Zantac to Tagamet and refer to GI MD for further evaluation and management.     Patient has BPH with history of elevated PSA that is followed by [facility].     Patient has chronic Allergies that is managed by [facility] and reports he has an Allergy injections monthly.      Patient has cold sores that takes Valtrex prn.    Patient had TSH that was mildly low at 0.226 but free T4 was normal = Subclinical Hyperthroidism and asymptomatic in June 2019 during wellness exam - REPEAT THYROID PANEL IS NORMAL.  See results below.           Component      Latest Ref Rng & Units 10/16/2019 3/15/2019 3/1/2016   TSH      0.400 - 4.000 uIU/mL 0.541 0.226 (L) 0.887   Free T4      0.71 - 1.51 ng/dL 1.15 1.02    T4 Total      4.5 - 11.5 ug/dL 9.5     T3, Total      60 - 180 ng/dL 117     T3, Free      2.3 - 4.2 pg/mL 3.3     Thyroperoxidase Antibodies      <6.0 IU/mL <6.0     Thyrotropin Receptor Ab      0.00 - 1.75 IU/L <1.00       Current Outpatient Medications   Medication  Sig Dispense Refill    celecoxib (CELEBREX) 200 MG capsule       eszopiclone (LUNESTA) 3 mg Tab       fluticasone (FLONASE) 50 mcg/actuation nasal spray 2 sprays by Each Nare route 2 (two) times daily. 1 Bottle 11    methocarbamol (ROBAXIN) 750 MG Tab Take 500 mg by mouth 4 (four) times daily.      tadalafil (CIALIS) 20 MG Tab Take 1 tablet (20 mg total) by mouth once daily. 10 tablet 11    tadalafil (CIALIS) 5 MG tablet Take 1 tablet (5 mg total) by mouth daily as needed for Erectile Dysfunction. 90 tablet 3    valACYclovir (VALTREX) 1000 MG tablet       EPINEPHrine (EPIPEN) 0.3 mg/0.3 mL AtIn Inject 0.3 mLs (0.3 mg total) into the muscle once as needed. 2 Device 3    metaxalone (SKELAXIN) 800 MG tablet Take 1 tablet (800 mg total) by mouth 2 (two) times daily as needed (bruxism). (Patient not taking: Reported on 11/13/2019) 60 tablet 3    ranitidine (ZANTAC) 150 MG tablet Take 1 tablet (150 mg total) by mouth 2 (two) times daily. (Patient not taking: Reported on 12/12/2019) 180 tablet 3     Current Facility-Administered Medications   Medication Dose Route Frequency Provider Last Rate Last Dose    Allergy Mix   Subcutaneous 1 time in Clinic/HOD Maria Guadalupe Kirkpatrick MD           Past Medical History:   Diagnosis Date    Allergy     Anxiety 7/8/2015    Benign non-nodular prostatic hyperplasia without lower urinary tract symptoms 2/11/2016    Bruxism     Chronic pain of right knee 3/21/2018    Elevated PSA measurement 02/06/2017    Followed by Dr. Lara    Elevated PSA measurement     Family history of prostate cancer in father 3/14/2018    Gastroesophageal reflux disease without esophagitis 6/8/2015    Insomnia 6/8/2015    Recurrent cold sores 6/14/2018    Seasonal allergic rhinitis due to pollen 12/17/2016    SI (sacroiliac) pain 7/30/2018    Subclinical hyperthyroidism 4/5/2019       Past Surgical History:   Procedure Laterality Date    COLONOSCOPY  07/11/2012    normal - Dr. Regan -  repeat in 10 years    KNEE SURGERY Bilateral 1992    knee cap alignment with clean up    SHOULDER SURGERY Right     SINUS SURGERY         Family History   Problem Relation Age of Onset    Thyroid disease Mother     Diabetes Father     Cancer Father 75        Prostate     Thyroid disease Sister     Cancer Brother 50        bladder cancer    No Known Problems Sister     No Known Problems Brother     No Known Problems Brother     Heart disease Neg Hx     Prostate cancer Neg Hx     Kidney disease Neg Hx        Social History     Socioeconomic History    Marital status:      Spouse name: Not on file    Number of children: Not on file    Years of education: Not on file    Highest education level: Not on file   Occupational History    Occupation:    Social Needs    Financial resource strain: Not hard at all    Food insecurity:     Worry: Never true     Inability: Never true    Transportation needs:     Medical: No     Non-medical: No   Tobacco Use    Smoking status: Never Smoker    Smokeless tobacco: Never Used   Substance and Sexual Activity    Alcohol use: Yes     Alcohol/week: 1.0 standard drinks     Types: 1 Cans of beer per week     Frequency: Monthly or less     Drinks per session: 1 or 2     Binge frequency: Never     Comment: once a month    Drug use: No    Sexual activity: Not Currently     Partners: Female   Lifestyle    Physical activity:     Days per week: 3 days     Minutes per session: 120 min    Stress: Only a little   Relationships    Social connections:     Talks on phone: More than three times a week     Gets together: Once a week     Attends Buddhist service: Not on file     Active member of club or organization: No     Attends meetings of clubs or organizations: Never     Relationship status:    Other Topics Concern    Not on file   Social History Narrative    Lives in [place] alone. 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 He was an  in the [employer]. He swam for minutes with flippers about 3 days or 4 days a weeks until he was advised to stop in 2018 due to knee problem.       Review of Systems   Constitutional: Negative for activity change and unexpected weight change.   HENT: Negative for hearing loss, rhinorrhea and trouble swallowing.    Eyes: Negative for discharge and visual disturbance.   Respiratory: Negative for chest tightness and wheezing.    Cardiovascular: Negative for chest pain and palpitations.   Gastrointestinal: Negative for blood in stool, constipation, diarrhea and vomiting.   Endocrine: Negative for polydipsia and polyuria.   Genitourinary: Negative for difficulty urinating, hematuria and urgency.   Musculoskeletal: Positive for arthralgias, joint swelling and neck pain.   Neurological: Positive for headaches. Negative for weakness.   Psychiatric/Behavioral: Negative for confusion and dysphoric mood.         Objective:     Vitals:    12/12/19 1351   BP: 120/74   BP Location: Left arm   Patient Position: Sitting   BP Method: Large (Manual)   Pulse: (!) 58   Resp: 18   Temp: 97.9 °F (36.6 °C)   TempSrc: Oral   SpO2: 99%   Weight: 83.5 kg (184 lb 1.4 oz)   Height: 5' 10" (1.778 m)          Physical Exam   Constitutional: He is oriented to person, place, and time. He appears well-developed and well-nourished. He is cooperative. No distress.   HENT:   Head: Normocephalic and atraumatic.   Right Ear: Hearing, tympanic membrane, external ear and ear canal normal.   Left Ear: Hearing, external ear and ear canal normal.   Nose: Nose normal.   Mouth/Throat: Oropharynx is clear and moist. No oropharyngeal exudate.   Eyes: Pupils are equal, round, and reactive to light. Conjunctivae and EOM are normal. Right eye exhibits no discharge. Left eye exhibits no discharge. No scleral icterus.   Neck: Normal range of motion. Neck supple. No tracheal deviation present. No thyromegaly present.   Cardiovascular: Normal " rate, regular rhythm, normal heart sounds and intact distal pulses.   No murmur heard.  Pulmonary/Chest: Effort normal and breath sounds normal. No respiratory distress.   Abdominal: Soft. He exhibits no distension.   Musculoskeletal: Normal range of motion. He exhibits no edema or tenderness.   Lymphadenopathy:     He has no cervical adenopathy.   Neurological: He is alert and oriented to person, place, and time. He has normal strength. Coordination and gait normal.   Skin: Skin is warm, dry and intact. No rash noted. No cyanosis. Nails show no clubbing.   Psychiatric: He has a normal mood and affect. His speech is normal and behavior is normal. Judgment and thought content normal. Cognition and memory are normal.   Vitals reviewed.        Assessment:         ICD-10-CM ICD-9-CM   1. Chronic GERD K21.9 530.81   2. Benign prostatic hyperplasia without lower urinary tract symptoms N40.0 600.00   3. Insomnia, unspecified type G47.00 780.52   4. Bruxism F45.8 306.8   5. Chronic nonintractable headache, unspecified headache type R51 784.0   6. Primary osteoarthritis of right knee M17.11 715.16   7. DDD (degenerative disc disease), cervical M50.30 722.4   8. Diabetes mellitus screening Z13.1 V77.1   9. Screening cholesterol level Z13.220 V77.91   10. Thyroid disorder screen Z13.29 V77.0   11. Screening for deficiency anemia Z13.0 V78.1       Plan:       Chronic GERD  -  stop the Zantac due to recent recall and changed to Tagamet 400 mg twice daily.  Will refer to [facility] for further evaluation to to chronic GERD not controlled on medication.  -     cimetidine (TAGAMET) 400 MG tablet; Take 1 tablet (400 mg total) by mouth 2 (two) times daily.  Dispense: 60 tablet; Refill: 2  -     Ambulatory Referral to Gastroenterology    Benign prostatic hyperplasia without lower urinary tract symptoms  -  followed by [facility]  -     PSA, total and free; Future; Expected date: 12/12/2019    Insomnia, unspecified type  -   controlled on Lunesta and followed by [facility].  Plans for a sleep study around April 2020    Bruxism  -  sleeps with     Chronic nonintractable headache, unspecified headache type  -  followed by [facility] and reports headaches have improved with treatment of cervical disc disease.    Primary osteoarthritis of right knee  -  followed [facility]    DDD (degenerative disc disease), cervical  -  followed by [facility] and going to physical therapy    Diabetes mellitus screening  -     Comprehensive metabolic panel; Future; Expected date: 12/12/2019    Screening cholesterol level  -     Lipid panel; Future; Expected date: 12/12/2019    Thyroid disorder screen  -     TSH; Future; Expected date: 12/12/2019    Screening for deficiency anemia  -     CBC auto differential; Future; Expected date: 12/12/2019      Follow up in about 6 months (around 6/12/2020) for fasting labs and INITIAL MEDICARE WELLNESS.     Patient's Medications   New Prescriptions    CIMETIDINE (TAGAMET) 400 MG TABLET    Take 1 tablet (400 mg total) by mouth 2 (two) times daily.   Previous Medications    CELECOXIB (CELEBREX) 200 MG CAPSULE        EPINEPHRINE (EPIPEN) 0.3 MG/0.3 ML ATIN    Inject 0.3 mLs (0.3 mg total) into the muscle once as needed.    ESZOPICLONE (LUNESTA) 3 MG TAB        FLUTICASONE (FLONASE) 50 MCG/ACTUATION NASAL SPRAY    2 sprays by Each Nare route 2 (two) times daily.    METAXALONE (SKELAXIN) 800 MG TABLET    Take 1 tablet (800 mg total) by mouth 2 (two) times daily as needed (bruxism).    METHOCARBAMOL (ROBAXIN) 750 MG TAB    Take 500 mg by mouth 4 (four) times daily.    TADALAFIL (CIALIS) 20 MG TAB    Take 1 tablet (20 mg total) by mouth once daily.    TADALAFIL (CIALIS) 5 MG TABLET    Take 1 tablet (5 mg total) by mouth daily as needed for Erectile Dysfunction.    VALACYCLOVIR (VALTREX) 1000 MG TABLET       Modified Medications    No medications on file   Discontinued  Medications    ESOMEPRAZOLE (NEXIUM) 40 MG CAPSULE    Take 1 capsule (40 mg total) by mouth before breakfast.    LORAZEPAM (ATIVAN) 1 MG TABLET    Take 1 tablet by mouth 1 hour prior to MRI. May repeat once if needed.    RANITIDINE (ZANTAC) 150 MG TABLET    Take 1 tablet (150 mg total) by mouth 2 (two) times daily.    VENLAFAXINE (EFFEXOR-XR) 37.5 MG 24 HR CAPSULE    Take 1 tablet by mouth at daily for one week; then increase to 2 tablets by mouth daily.

## 2019-12-18 ENCOUNTER — PATIENT MESSAGE (OUTPATIENT)
Dept: ALLERGY | Facility: CLINIC | Age: 64
End: 2019-12-18

## 2019-12-26 PROBLEM — R53.1 WEAKNESS: Status: ACTIVE | Noted: 2019-12-26

## 2019-12-26 PROBLEM — R51.9 GENERALIZED HEADACHES: Status: ACTIVE | Noted: 2019-12-26

## 2020-01-03 ENCOUNTER — TELEPHONE (OUTPATIENT)
Dept: ALLERGY | Facility: CLINIC | Age: 65
End: 2020-01-03

## 2020-01-03 ENCOUNTER — OFFICE VISIT (OUTPATIENT)
Dept: ALLERGY | Facility: CLINIC | Age: 65
End: 2020-01-03
Payer: COMMERCIAL

## 2020-01-03 VITALS — BODY MASS INDEX: 25.88 KG/M2 | HEIGHT: 70 IN | OXYGEN SATURATION: 99 % | HEART RATE: 74 BPM | WEIGHT: 180.75 LBS

## 2020-01-03 DIAGNOSIS — H10.13 ALLERGIC CONJUNCTIVITIS, BILATERAL: ICD-10-CM

## 2020-01-03 DIAGNOSIS — J30.9 CHRONIC ALLERGIC RHINITIS: Primary | ICD-10-CM

## 2020-01-03 DIAGNOSIS — Z51.6 ALLERGY DESENSITIZATION THERAPY: ICD-10-CM

## 2020-01-03 PROCEDURE — 99999 PR PBB SHADOW E&M-EST. PATIENT-LVL III: ICD-10-PCS | Mod: PBBFAC,,, | Performed by: ALLERGY & IMMUNOLOGY

## 2020-01-03 PROCEDURE — 99999 PR PBB SHADOW E&M-EST. PATIENT-LVL III: CPT | Mod: PBBFAC,,, | Performed by: ALLERGY & IMMUNOLOGY

## 2020-01-03 PROCEDURE — 99214 PR OFFICE/OUTPT VISIT, EST, LEVL IV, 30-39 MIN: ICD-10-PCS | Mod: S$GLB,,, | Performed by: ALLERGY & IMMUNOLOGY

## 2020-01-03 PROCEDURE — 99214 OFFICE O/P EST MOD 30 MIN: CPT | Mod: S$GLB,,, | Performed by: ALLERGY & IMMUNOLOGY

## 2020-01-03 NOTE — PROGRESS NOTES
Chief Complaint: No chief complaint on file.      Subjective:         HPI    Bulmaro Nascimento is a 64 y.o. male here for continued evaluation of chronic allergic rhinitis to pets, mold, trees, weeds, grass on IT maintenance vial 1:1 0.5cc monthly. He was last seen 2/12/19. He gets 3 shots C/TR in upper left, M in lower left and GR/W in right. Last injection 12/19/19. He has had no reactions to shots, no itch, no hives, no swelling, no SOB, no wheeze, no dizziness. He definitely feels they have helped. Been on for about 10 years. Prior would have 3-5 sinus infections per year not has less then one. He does not take any daily allergy meds. He uses Flonase prn if more congested or heavier drip. He has daily PND in AM for couple hours then clears. He has had no bad flares and no infections in years.     Review of Systems  Constitutional: Negative for changes in appetite, unintentional weight loss, fever, chills and fatigue.   HENT: Negative for facial pain, nose bleeds, , postnasal drip, throat clearing, and voice change. Negative for ear discharge, ear pain, facial swelling, sore throat and trouble swallowing. positive for nasal congestion, sinus pressure and ears popping; positive for scratchy throat   Eyes: Negative for occular discharge, redness, itching and visual disturbance.  Respiratory: Negative for chest tightness, shortness of breath, wheezing, dyspnea on exertion, sputum production and cough.   Cardiovascular: Negative for chest pain, palpitations and leg swelling.  Gastrointestinal: Negative for abdominal distension, abdominal pain, constipation, diarrhea, nausea,and vomiting.   Genitourinary: Negative for difficulty urinating.   Musculoskeletal: Negative for arthralgias, gait problems, joint swelling, myalgias and back pain.   Neurological: Negative for dizziness, syncope, weakness, light-headedness.  positive for headache   Hematological: Negative for adenopathy, does not bruise or bleed  easily.  Psychiatric/Behavioral: Negative for agitation, anxiety, behavioral problems, confusion, and insomnia.  Skin: Negative for rash.     PMH:  Reviewed with the patient and current for this visit    Family History:  Reviewed with the patient and current for this visit    Social History:  Reviewed with the patient and current for this visit     Environmental History:  Reviewed with the patient and current for this visit          Objective:      Skin Test results: patient had positive prick skin test to cat, tree and grass pollens, and cat allergen.      Immunotherapy: patient has been on allergen specific immunotherapy since 12/2005; his vaccines were reformulated to stronger allergen concentrations of August 2009 because of frequent symptoms.  He is on a maintenance dose of vial #1 red top 1:1 of 3 vaccines at 0.5 cc at a monthly interval.  .      Physical Exam  General:patient is well developed and well nourished, in no acute distress.  Mental Status:  Alert, oriented and cooperative  Head and Face: normocephalic   Allergic shiners: No  Eyes:   Pupils: ERRLA: Scleral conjunctiva: clear; Cornea: clear; Palprebal conjunctiva: normal: Eyelid Skin: normal  Ears:Tympanic membrane Left:intact and normal light reflex; Right:intact and normal light reflex; External canals normal bilaterally.  Nose:  Nares: patent; Mucosa : Boggy and congested; Nasal septum: midline;Turbinates are enlarged but do not occlude the nasal airway.  Mouth/Pharynx: tonsils present; posterior pharyngeal wall normal; tongue normal; teeth normal; voice quality normal.  Neck:  Cervical lymph nodes: small, non-tender, freely moveable both anterior and posterior cervical chain; Trachea: midline; Masses: none  Lungs: Air movement is good; respiratory effort is good; no respiratory distress; breath sounds are vesicular in all lung fields; no wheezing; normal expiratory time; no cough.  Heart: regular rate and rhythm with mild respiratory variation;  A1 and P2 are normal; no murmurs or gallops.  Abdomen:exam not done  Extremities: no cyanosis, clubbing or edema  Skin:no rashes or lesions present; skin hydrated and supple.            Assessment:       1. Chronic allergic rhinitis     2. Allergic conjunctivitis, bilateral     3. Allergy desensitization therapy             Plan:       1. Ok to stop IT  2. fluticasone 2 SEN daily as needed  3. RTC as needed            Maria Guadalupe Kirkpatrick MD

## 2020-01-13 ENCOUNTER — PATIENT MESSAGE (OUTPATIENT)
Dept: UROLOGY | Facility: CLINIC | Age: 65
End: 2020-01-13

## 2020-01-13 RX ORDER — TADALAFIL 5 MG/1
5 TABLET ORAL DAILY PRN
Qty: 90 TABLET | Refills: 3 | Status: SHIPPED | OUTPATIENT
Start: 2020-01-13 | End: 2021-02-01 | Stop reason: SDUPTHER

## 2020-01-14 ENCOUNTER — OFFICE VISIT (OUTPATIENT)
Dept: GASTROENTEROLOGY | Facility: CLINIC | Age: 65
End: 2020-01-14
Payer: COMMERCIAL

## 2020-01-14 VITALS
SYSTOLIC BLOOD PRESSURE: 120 MMHG | HEART RATE: 61 BPM | DIASTOLIC BLOOD PRESSURE: 82 MMHG | BODY MASS INDEX: 25.91 KG/M2 | WEIGHT: 180.63 LBS

## 2020-01-14 DIAGNOSIS — K21.9 CHRONIC GERD: Primary | ICD-10-CM

## 2020-01-14 PROCEDURE — 3008F PR BODY MASS INDEX (BMI) DOCUMENTED: ICD-10-PCS | Mod: CPTII,S$GLB,, | Performed by: INTERNAL MEDICINE

## 2020-01-14 PROCEDURE — 99203 OFFICE O/P NEW LOW 30 MIN: CPT | Mod: S$GLB,,, | Performed by: INTERNAL MEDICINE

## 2020-01-14 PROCEDURE — 3008F BODY MASS INDEX DOCD: CPT | Mod: CPTII,S$GLB,, | Performed by: INTERNAL MEDICINE

## 2020-01-14 PROCEDURE — 99203 PR OFFICE/OUTPT VISIT, NEW, LEVL III, 30-44 MIN: ICD-10-PCS | Mod: S$GLB,,, | Performed by: INTERNAL MEDICINE

## 2020-01-14 PROCEDURE — 99999 PR PBB SHADOW E&M-EST. PATIENT-LVL III: ICD-10-PCS | Mod: PBBFAC,,, | Performed by: INTERNAL MEDICINE

## 2020-01-14 PROCEDURE — 99999 PR PBB SHADOW E&M-EST. PATIENT-LVL III: CPT | Mod: PBBFAC,,, | Performed by: INTERNAL MEDICINE

## 2020-01-14 RX ORDER — FAMOTIDINE 20 MG/1
20 TABLET, FILM COATED ORAL 2 TIMES DAILY
COMMUNITY
End: 2020-08-04

## 2020-01-14 NOTE — LETTER
January 14, 2020      [place]  [address]  [address]  [facility] [address]           [facility]  [address], [address]   [facility] [address]  Phone: [phone]  Fax: [phone]          Patient: Bulmaro Nascimento   MR Number: [id number]   YOB: 1955   Date of Visit: 1/14/2020       Dear Samira Pimentel:    Thank you for referring Bulmaro Nascimento to me for evaluation. Attached you will find relevant portions of my assessment and plan of care.    If you have questions, please do not hesitate to call me. I look forward to following Bulmaro Nascimento along with you.    Sincerely,    Latasha Valero MD    Enclosure  CC:  No Recipients    If you would like to receive this communication electronically, please contact [email or web address] or [phone] to request more information on [employer] Link access.    For providers and/or their staff who would like to refer a patient to [facility], please contact us through our one-stop-shop provider referral line, [facility], at [phone].    If you feel you have received this communication in error or would no longer like to receive these types of communications, please e-mail [email or web address]

## 2020-01-23 ENCOUNTER — OFFICE VISIT (OUTPATIENT)
Dept: SPORTS MEDICINE | Facility: CLINIC | Age: 65
End: 2020-01-23
Payer: COMMERCIAL

## 2020-01-23 VITALS
SYSTOLIC BLOOD PRESSURE: 105 MMHG | BODY MASS INDEX: 25.77 KG/M2 | HEIGHT: 70 IN | WEIGHT: 180 LBS | DIASTOLIC BLOOD PRESSURE: 61 MMHG | HEART RATE: 64 BPM

## 2020-01-23 DIAGNOSIS — M17.12 PRIMARY OSTEOARTHRITIS OF LEFT KNEE: ICD-10-CM

## 2020-01-23 DIAGNOSIS — M17.11 PRIMARY OSTEOARTHRITIS OF RIGHT KNEE: Primary | ICD-10-CM

## 2020-01-23 PROCEDURE — 99999 PR PBB SHADOW E&M-EST. PATIENT-LVL III: CPT | Mod: PBBFAC,,, | Performed by: PHYSICIAN ASSISTANT

## 2020-01-23 PROCEDURE — 20610 DRAIN/INJ JOINT/BURSA W/O US: CPT | Mod: 50,S$GLB,, | Performed by: PHYSICIAN ASSISTANT

## 2020-01-23 PROCEDURE — 20610 PR DRAIN/INJECT LARGE JOINT/BURSA: ICD-10-PCS | Mod: 50,S$GLB,, | Performed by: PHYSICIAN ASSISTANT

## 2020-01-23 PROCEDURE — 99499 NO LOS: ICD-10-PCS | Mod: S$GLB,,, | Performed by: PHYSICIAN ASSISTANT

## 2020-01-23 PROCEDURE — 99499 UNLISTED E&M SERVICE: CPT | Mod: S$GLB,,, | Performed by: PHYSICIAN ASSISTANT

## 2020-01-23 PROCEDURE — 99999 PR PBB SHADOW E&M-EST. PATIENT-LVL III: ICD-10-PCS | Mod: PBBFAC,,, | Performed by: PHYSICIAN ASSISTANT

## 2020-01-23 NOTE — PROGRESS NOTES
Patient is here for follow up of knee arthritis. Pt is requesting bilateral Euflexxa #1.  [facility] Has failed other conservative modalities including NSAIDS, activity modification, weight loss.    The prior shot was tolerated well.    PHYSICAL EXAMINATION:     General: The patient is alert and oriented x 3. Mood is pleasant.   Observation of ears, eyes and nose reveals no gross abnormalities. No   labored breathing observed.     No signs of infection or adverse reaction to knee.    PROCEDURE NOTE:  Injection Procedure bilateral knee  A time out was performed, including verification of patient ID, procedure, site and side, availability of information and equipment, review of safety issues, and agreement with consent, the procedure site was marked.    After time out was performed, the patient was prepped aseptically with povidone-iodine swabsticks. A diagnostic and therapeutic injection of 2cc Euflexxa was given under sterile technique using a 22g x 1.5 needle from the Superolateral  aspect of the bilateral Knee Joint in the supine position.      Bulmaro Nascimento had no adverse reactions to the medication. Pain decreased. He was instructed to apply ice to the joint for 20 minutes and avoid strenuous activities for 24-36 hours following the injection. He was warned of possible blood sugar and/or blood pressure changes during that time. Following that time, he can resume regular activities.    He was reminded to call the clinic immediately for any adverse side effects as explained in clinic today.    RTC 1 week for 2nd injection.   All questions were answered, pt will contact us for questions or concerns in the interim.

## 2020-01-30 ENCOUNTER — OFFICE VISIT (OUTPATIENT)
Dept: SPORTS MEDICINE | Facility: CLINIC | Age: 65
End: 2020-01-30
Payer: COMMERCIAL

## 2020-01-30 VITALS
DIASTOLIC BLOOD PRESSURE: 62 MMHG | HEART RATE: 65 BPM | SYSTOLIC BLOOD PRESSURE: 106 MMHG | HEIGHT: 70 IN | BODY MASS INDEX: 25.77 KG/M2 | WEIGHT: 180 LBS

## 2020-01-30 DIAGNOSIS — M17.12 PRIMARY OSTEOARTHRITIS OF LEFT KNEE: ICD-10-CM

## 2020-01-30 DIAGNOSIS — M79.671 RIGHT FOOT PAIN: ICD-10-CM

## 2020-01-30 DIAGNOSIS — M17.11 PRIMARY OSTEOARTHRITIS OF RIGHT KNEE: Primary | ICD-10-CM

## 2020-01-30 PROCEDURE — 20610 PR DRAIN/INJECT LARGE JOINT/BURSA: ICD-10-PCS | Mod: 50,S$GLB,, | Performed by: PHYSICIAN ASSISTANT

## 2020-01-30 PROCEDURE — 99999 PR PBB SHADOW E&M-EST. PATIENT-LVL III: ICD-10-PCS | Mod: PBBFAC,,, | Performed by: PHYSICIAN ASSISTANT

## 2020-01-30 PROCEDURE — 99499 UNLISTED E&M SERVICE: CPT | Mod: S$GLB,,, | Performed by: PHYSICIAN ASSISTANT

## 2020-01-30 PROCEDURE — 99999 PR PBB SHADOW E&M-EST. PATIENT-LVL III: CPT | Mod: PBBFAC,,, | Performed by: PHYSICIAN ASSISTANT

## 2020-01-30 PROCEDURE — 20610 DRAIN/INJ JOINT/BURSA W/O US: CPT | Mod: 50,S$GLB,, | Performed by: PHYSICIAN ASSISTANT

## 2020-01-30 PROCEDURE — 99499 NO LOS: ICD-10-PCS | Mod: S$GLB,,, | Performed by: PHYSICIAN ASSISTANT

## 2020-01-30 NOTE — PROGRESS NOTES
Patient is here for follow up of knee arthritis. Pt is requesting bilateral Euflexxa #2.  [facility] Has failed other conservative modalities including NSAIDS, activity modification, weight loss.    The prior shot was tolerated well.    PHYSICAL EXAMINATION:     General: The patient is alert and oriented x 3. Mood is pleasant.   Observation of ears, eyes and nose reveals no gross abnormalities. No   labored breathing observed.     No signs of infection or adverse reaction to knee.    PROCEDURE NOTE:  Injection Procedure bilateral knee  A time out was performed, including verification of patient ID, procedure, site and side, availability of information and equipment, review of safety issues, and agreement with consent, the procedure site was marked.    After time out was performed, the patient was prepped aseptically with povidone-iodine swabsticks. A diagnostic and therapeutic injection of 2cc Euflexxa was given under sterile technique using a 22g x 1.5 needle from the Superolateral  aspect of the bilateral Knee Joint in the supine position.      Bulmaro Nascimento had no adverse reactions to the medication. Pain decreased. He was instructed to apply ice to the joint for 20 minutes and avoid strenuous activities for 24-36 hours following the injection. He was warned of possible blood sugar and/or blood pressure changes during that time. Following that time, he can resume regular activities.    He was reminded to call the clinic immediately for any adverse side effects as explained in clinic today.    RTC 1 week for 3rd  injection.   All questions were answered, pt will contact us for questions or concerns in the interim.    Referral to Dr. Clark for right foot pain.

## 2020-02-04 ENCOUNTER — HOSPITAL ENCOUNTER (OUTPATIENT)
Dept: RADIOLOGY | Facility: HOSPITAL | Age: 65
Discharge: HOME | End: 2020-02-04
Attending: STUDENT IN AN ORGANIZED HEALTH CARE EDUCATION/TRAINING PROGRAM
Payer: COMMERCIAL

## 2020-02-04 ENCOUNTER — OFFICE VISIT (OUTPATIENT)
Dept: ORTHOPEDICS | Facility: CLINIC | Age: 65
End: 2020-02-04
Payer: COMMERCIAL

## 2020-02-04 VITALS — HEIGHT: 70 IN | BODY MASS INDEX: 26.1 KG/M2 | WEIGHT: 182.31 LBS

## 2020-02-04 DIAGNOSIS — M19.079 ARTHRITIS OF METATARSOPHALANGEAL (MTP) JOINT OF GREAT TOE: ICD-10-CM

## 2020-02-04 DIAGNOSIS — M19.079 1ST MTP ARTHRITIS: ICD-10-CM

## 2020-02-04 DIAGNOSIS — M79.671 RIGHT FOOT PAIN: Primary | ICD-10-CM

## 2020-02-04 DIAGNOSIS — M79.671 RIGHT FOOT PAIN: ICD-10-CM

## 2020-02-04 PROCEDURE — 73630 X-RAY EXAM OF FOOT: CPT | Mod: TC,RT

## 2020-02-04 PROCEDURE — 99999 PR PBB SHADOW E&M-EST. PATIENT-LVL III: CPT | Mod: PBBFAC,,, | Performed by: ORTHOPAEDIC SURGERY

## 2020-02-04 PROCEDURE — 3008F BODY MASS INDEX DOCD: CPT | Mod: CPTII,S$GLB,, | Performed by: ORTHOPAEDIC SURGERY

## 2020-02-04 PROCEDURE — 3008F PR BODY MASS INDEX (BMI) DOCUMENTED: ICD-10-PCS | Mod: CPTII,S$GLB,, | Performed by: ORTHOPAEDIC SURGERY

## 2020-02-04 PROCEDURE — 73630 XR FOOT COMPLETE 3 VIEW RIGHT: ICD-10-PCS | Mod: 26,RT,, | Performed by: RADIOLOGY

## 2020-02-04 PROCEDURE — 73630 X-RAY EXAM OF FOOT: CPT | Mod: 26,RT,, | Performed by: RADIOLOGY

## 2020-02-04 PROCEDURE — 99999 PR PBB SHADOW E&M-EST. PATIENT-LVL III: ICD-10-PCS | Mod: PBBFAC,,, | Performed by: ORTHOPAEDIC SURGERY

## 2020-02-04 PROCEDURE — 99203 PR OFFICE/OUTPT VISIT, NEW, LEVL III, 30-44 MIN: ICD-10-PCS | Mod: S$GLB,,, | Performed by: ORTHOPAEDIC SURGERY

## 2020-02-04 PROCEDURE — 99203 OFFICE O/P NEW LOW 30 MIN: CPT | Mod: S$GLB,,, | Performed by: ORTHOPAEDIC SURGERY

## 2020-02-04 NOTE — PROGRESS NOTES
CHIEF COMPLAINT: Right 1st MTP pain                                                        HISTORY OF PRESENT ILLNESS:  The patient is a 64 y.o. male  who presents for evaluation of his right great toe MTP pain which has been going on for a few years, but worsened over the weekend. The patient states he walked 3 miles this weekend and since that time the joint became red, swollen and painful. The pain is about a 4/10 with walking and 6/10 with extremes of motion. The patient states he has had prior similar episodes in the past, usually related to swimming, walking or running. He states he is very active and uses the elliptical regularly, but is being limited somewhat by pain. Patient has tried plantar fasciitis inserts, which do not help. Denies N/T     History of Trauma: None  Pain Duration: 4 days  Pain Quality: achy  Pain Context:worsening  Pain Timing: persistent  Pain Location:superior 1st MTP  Pain Severity: moderate  Modifying Factors: Worst with activity, minimal at rest  Previous Treatments:rest, nsaids  Associated Signs and Symptoms:none        PAST MEDICAL HISTORY:   Past Medical History:   Diagnosis Date    Allergy     Anxiety 7/8/2015    Benign non-nodular prostatic hyperplasia without lower urinary tract symptoms 2/11/2016    Bruxism     Chronic pain of right knee 3/21/2018    DDD (degenerative disc disease), cervical 12/12/2019    Elevated PSA measurement 02/06/2017    Followed by Dr. 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 Regan - repeat in 10 years     ESOPHAGOGASTRODUODENOSCOPY  07/11/2012    Biopsy showed gastric mucosa with mild chronic inflammation.  Squamous mucosa with mild chronic inflammation including rare intraepithelial eosinophils.  Done by Dr. Regan - scanned to media file.    KNEE SURGERY Bilateral 1992    knee cap alignment with clean up    SHOULDER SURGERY Right     SINUS SURGERY       FAMILY HISTORY:   Family History   Problem Relation Age of Onset    Thyroid disease Mother     Diabetes Father     Cancer Father 75        Prostate     Thyroid disease Sister     Cancer Brother 50        bladder cancer    No Known Problems Sister     No Known Problems Brother     No Known Problems Brother     Heart disease Neg Hx     Prostate cancer Neg Hx     Kidney disease Neg Hx      SOCIAL HISTORY:   Social History     Socioeconomic History    Marital status:      Spouse name: Not on file    Number of children: Not on file    Years of education: Not on file    Highest education level: Not on file   Occupational History    Occupation:    Social Needs    Financial resource strain: Not hard at all    Food insecurity:     Worry: Never true     Inability: Never true    Transportation needs:     Medical: No     Non-medical: No   Tobacco Use    Smoking status: Never Smoker    Smokeless tobacco: Never Used   Substance and Sexual Activity    Alcohol use: Yes     Alcohol/week: 1.0 standard drinks     Types: 1 Cans of beer per week     Frequency: Monthly or less     Drinks per session: 1 or 2     Binge frequency: Never     Comment: once a month    Drug use: No    Sexual activity: Not Currently     Partners: Female   Lifestyle    Physical activity:     Days per week: 3 days     Minutes per session: 120 min    Stress: Only a little   Relationships    Social connections:     Talks on phone: More than three times a week     Gets together: Once a week     Attends Quaker service: Not on file     Active member of club or  "organization: No     Attends meetings of clubs or organizations: Never     Relationship status:    Other Topics Concern    Not on file   Social History Narrative    Lives in [place] alone. 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       MEDICATIONS:   Current Outpatient Medications:     celecoxib (CELEBREX) 200 MG capsule, , Disp: , Rfl:     cimetidine (TAGAMET) 400 MG tablet, Take 1 tablet (400 mg total) by mouth 2 (two) times daily., Disp: 60 tablet, Rfl: 2    eszopiclone (LUNESTA) 3 mg Tab, , Disp: , Rfl:     famotidine (PEPCID) 20 MG tablet, Take 20 mg by mouth 2 (two) times daily., Disp: , Rfl:     fluticasone (FLONASE) 50 mcg/actuation nasal spray, 2 sprays by Each Nare route 2 (two) times daily., Disp: 1 Bottle, Rfl: 11    metaxalone (SKELAXIN) 800 MG tablet, Take 1 tablet (800 mg total) by mouth 2 (two) times daily as needed (bruxism)., Disp: 60 tablet, Rfl: 3    methocarbamol (ROBAXIN) 750 MG Tab, Take 500 mg by mouth 4 (four) times daily., Disp: , Rfl:     tadalafil (CIALIS) 20 MG Tab, Take 1 tablet (20 mg total) by mouth once daily., Disp: 10 tablet, Rfl: 11    tadalafil (CIALIS) 5 MG tablet, Take 1 tablet (5 mg total) by mouth daily as needed for Erectile Dysfunction., Disp: 90 tablet, Rfl: 3    valACYclovir (VALTREX) 1000 MG tablet, , Disp: , Rfl:     EPINEPHrine (EPIPEN) 0.3 mg/0.3 mL AtIn, Inject 0.3 mLs (0.3 mg total) into the muscle once as needed., Disp: 2 Device, Rfl: 3    Current Facility-Administered Medications:     Allergy Mix, , Subcutaneous, 1 time in Clinic/HOD, Maria Guadalupe Kirkpatrick MD  ALLERGIES: Review of patient's allergies indicates:  No Known Allergies    VITAL SIGNS: Ht 5' 10" (1.778 m)   Wt 82.7 kg (182 lb 5.1 oz)   BMI 26.16 kg/m²      Review of Systems   Constitution: Negative " for chills, fever, weakness and weight loss.   HENT: Negative for congestion.   Cardiovascular: Negative for chest pain and dyspnea on exertion.   Respiratory: Negative for cough and shortness of breath.   Hematologic/Lymphatic: Does not bruise/bleed easily.   Skin: Negative for rash and suspicious lesions.   Musculoskeletal: see HPI  Gastrointestinal: Negative for bowel incontinence, constipation,diarrhea, vomiting.   Genitourinary: Negative for bladder incontinence.   Neurological: Negative for numbness, paresthesias and sensory change.           PHYSICAL EXAMINATION    General:  The patient is alert and oriented x 3.  Mood is pleasant.  Observation of ears, eyes and nose reveal no gross abnormalities.  No labored breathing observed.    Right Foot and Ankle Exam    INSPECTION:      ALIGNMENT:  Gait:    Normal    Hindfoot  Mild hindfoot valgus  Scars:   None    Midfoot: Pes planus  Swelling:  About R 1st MTP   Forefoot: Normal  Color:   Erythema about R 1st MTP     Atrophy:  None    Collective Ankle-Hindfoot Alignment    Heel / Toe Walking: No difficulty   Good -plantigrade (PG), well aligned           [Fair-PG, malaligned, asymptomatic]         [Poor-Non-PG,malaligned, has sxs]     TENDERNESS:  lATERAL:    anterior:  Sinus tarsi:  None  Anteromedial joint line:  none  Syndesmosis:  none  Anterolateral joint line:   none  ATFL:   none  Talonavicular:    none   CFL:   none  Anterior tibialis:   none  Anterolateral gutter: none  Extensor tendons:   none  Fibula:   none  Peroneal tendons: none  POSTERIOR:  Peroneal tubercle.  None  Medial/lateral achilles:   none       Medial/lateral achilles insertion: none  MEDIAL:      Deltoid:  none  CALCANEUS:  Malleolus:  none  Retrocalcaneal:   none  PTT:   Mild TTP  Medial achilles:   none  Navicular:  none  Lateral achilles:   none       Calcaneal tuberosity:   none  FOOT:    Calcaneal cuboid  none MT / MT heads:  none   Navicular   none  Medial cord origin  PF:  none  Cuneiforms:   none  Web space:   none  Lisfranc    none  Tarsal tunnel:   none  Base of the fifth metatarsal  none Tinels sign   neg        RANGE OF MOTION:  RIGHT/ LEFT   STRENGTH: (affected)  Ankle DF/PF:  15/45  15/45    Anterior tibialis: 5/5     Eversion/Inversion: 15/25 15/25  Posterior tibialis: 5/5   Midfoot ABD/ADD: 10/10 10/10  Gastroc-soleus: 5/5   First MTP DF/PF: 50/25 70/25  Peroneals:  5/5         EHL:   5/5   (* = pain)     FHL:   5/5         (* = pain)        NEUROLOGIC TESTING:  All dermatomes foot, ankle and leg have normal sensation light touch  Ankle Reflexes 2+, symmetric   Negative Babinski and No Clonus    VASCULAR:  2+ pulses PT/DT with brisk capillary refill toes.    Other Findings:  Pain with maximal dorsiflexion of R 1st MTP      XRAYS:  Right standing foot xrays were ordered and reviewed.   No evidence of any fracture or dislocation.  The osseous structures appear well mineralized and well aligned. No mortise displacement. Joint space narrowing and subchondral sclerosis of R first MTP joint.    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 We discussed both surgical and non-surgical options. The patient is interested in non-operative treatment at this time. Recommended spenco total contact max inserts. Recommend taking Celebrex prophylactically prior to excess exercise to prevent these sort of flairs. RTC PRN.    I have personally taken the history and examined this patient and agree with the residents note as stated above.

## 2020-02-04 NOTE — LETTER
February 4, 2020      Juani Ellington PA-C  [address]  [facility] [address]           [facility]  [address]  [facility] [address]  Phone: [phone]          Patient: Bulmaro Nascimento   MR Number: [id number]   YOB: 1955   Date of Visit: 2/4/2020       Dear Juani Ellington:    Thank you for referring Bulmaro Nascimento to me for evaluation. Attached you will find relevant portions of my assessment and plan of care.    If you have questions, please do not hesitate to call me. I look forward to following Bulmaro Nascimento along with you.    Sincerely,    Rafael Clark MD    Enclosure  CC:  No Recipients    If you would like to receive this communication electronically, please contact [email or web address] or [phone] to request more information on [employer] Link access.    For providers and/or their staff who would like to refer a patient to [facility], please contact us through our one-stop-shop provider referral line, [facility] , at [phone].    If you feel you have received this communication in error or would no longer like to receive these types of communications, please e-mail [email or web address]

## 2020-02-06 ENCOUNTER — OFFICE VISIT (OUTPATIENT)
Dept: SPORTS MEDICINE | Facility: CLINIC | Age: 65
End: 2020-02-06
Payer: COMMERCIAL

## 2020-02-06 VITALS
WEIGHT: 182 LBS | SYSTOLIC BLOOD PRESSURE: 103 MMHG | HEIGHT: 70 IN | DIASTOLIC BLOOD PRESSURE: 58 MMHG | BODY MASS INDEX: 26.05 KG/M2 | HEART RATE: 58 BPM

## 2020-02-06 DIAGNOSIS — M17.12 PRIMARY OSTEOARTHRITIS OF LEFT KNEE: ICD-10-CM

## 2020-02-06 DIAGNOSIS — M17.11 PRIMARY OSTEOARTHRITIS OF RIGHT KNEE: Primary | ICD-10-CM

## 2020-02-06 PROCEDURE — 20610 DRAIN/INJ JOINT/BURSA W/O US: CPT | Mod: 50,S$GLB,, | Performed by: PHYSICIAN ASSISTANT

## 2020-02-06 PROCEDURE — 99499 UNLISTED E&M SERVICE: CPT | Mod: S$GLB,,, | Performed by: PHYSICIAN ASSISTANT

## 2020-02-06 PROCEDURE — 99499 NO LOS: ICD-10-PCS | Mod: S$GLB,,, | Performed by: PHYSICIAN ASSISTANT

## 2020-02-06 PROCEDURE — 20610 PR DRAIN/INJECT LARGE JOINT/BURSA: ICD-10-PCS | Mod: 50,S$GLB,, | Performed by: PHYSICIAN ASSISTANT

## 2020-02-06 PROCEDURE — 99999 PR PBB SHADOW E&M-EST. PATIENT-LVL III: CPT | Mod: PBBFAC,,, | Performed by: PHYSICIAN ASSISTANT

## 2020-02-06 PROCEDURE — 99999 PR PBB SHADOW E&M-EST. PATIENT-LVL III: ICD-10-PCS | Mod: PBBFAC,,, | Performed by: PHYSICIAN ASSISTANT

## 2020-02-06 NOTE — PROGRESS NOTES
Patient is here for follow up of knee arthritis. Pt is requesting bilateral Euflexxa #3.  [facility] reviewed per encounter record. Has failed other conservative modalities including NSAIDS, activity modification, weight loss.    The prior shot was tolerated well.    PHYSICAL EXAMINATION:     General: The patient is alert and oriented x 3. Mood is pleasant.   Observation of ears, eyes and nose reveals no gross abnormalities. No   labored breathing observed.     No signs of infection or adverse reaction to knee.    PROCEDURE NOTE:  Injection Procedure bilateral knee  A time out was performed, including verification of patient ID, procedure, site and side, availability of information and equipment, review of safety issues, and agreement with consent, the procedure site was marked.    After time out was performed, the patient was prepped aseptically with povidone-iodine swabsticks. A diagnostic and therapeutic injection of 2cc Euflexxa was given under sterile technique using a 22g x 1.5 needle from the Superolateral  aspect of the bilateral Knee Joint in the supine position.      Bulmaro Nascimento had no adverse reactions to the medication. Pain decreased. He was instructed to apply ice to the joint for 20 minutes and avoid strenuous activities for 24-36 hours following the injection. He was warned of possible blood sugar and/or blood pressure changes during that time. Following that time, he can resume regular activities.    He was reminded to call the clinic immediately for any adverse side effects as explained in clinic today.    RTC prn for follow up   All questions were answered, pt will contact us for questions or concerns in the interim.

## 2020-03-08 ENCOUNTER — PATIENT MESSAGE (OUTPATIENT)
Dept: SLEEP MEDICINE | Facility: CLINIC | Age: 65
End: 2020-03-08

## 2020-03-09 ENCOUNTER — TELEPHONE (OUTPATIENT)
Dept: SLEEP MEDICINE | Facility: CLINIC | Age: 65
End: 2020-03-09

## 2020-03-09 DIAGNOSIS — G47.00 INSOMNIA, UNSPECIFIED TYPE: Primary | ICD-10-CM

## 2020-03-09 RX ORDER — ESZOPICLONE 3 MG/1
3 TABLET, FILM COATED ORAL NIGHTLY
Qty: 30 TABLET | Refills: 3 | Status: SHIPPED | OUTPATIENT
Start: 2020-03-09 | End: 2020-04-08

## 2020-03-09 NOTE — TELEPHONE ENCOUNTER
Will refill Lunesta        __________________      Can you issue a refill for my prescription for Lunesta 3 mg? I was unable to refill the last order on your prior prescription because it . Im pending an inpatient sleep study through [facility] and your office after my Medicare coverage begins 2020. I will be traveling in 2020 and will be unable to accommodate this study until I return -- May Probably. Can you refill this prescription to cover me through  and I will make an appointment with your office for April to discuss the sleep study?   Im dependent on the medication for sleeping at night this time and will not be able to function the following day without it. Im not currently using my CPAP machine because it simply doesnt help me sleep, especially with my nighttime jaw clenching (I've tried many times to uset it). And, I do not believe that my issue involves physical breathing blockages. The Lunesta works for six - seven hours of sleep. Please advise - thank you for your help.   Bulmaro Nascimento

## 2020-03-25 ENCOUNTER — PATIENT MESSAGE (OUTPATIENT)
Dept: ALLERGY | Facility: CLINIC | Age: 65
End: 2020-03-25

## 2020-03-25 RX ORDER — AMOXICILLIN AND CLAVULANATE POTASSIUM 875; 125 MG/1; MG/1
1 TABLET, FILM COATED ORAL EVERY 12 HOURS
Qty: 42 TABLET | Refills: 0 | Status: SHIPPED | OUTPATIENT
Start: 2020-03-25 | End: 2020-04-15

## 2020-04-11 ENCOUNTER — PATIENT MESSAGE (OUTPATIENT)
Dept: SLEEP MEDICINE | Facility: CLINIC | Age: 65
End: 2020-04-11

## 2020-04-13 ENCOUNTER — TELEPHONE (OUTPATIENT)
Dept: SLEEP MEDICINE | Facility: CLINIC | Age: 65
End: 2020-04-13

## 2020-04-13 DIAGNOSIS — G47.00 INSOMNIA, UNSPECIFIED TYPE: Primary | ICD-10-CM

## 2020-04-13 RX ORDER — ESZOPICLONE 3 MG/1
TABLET, FILM COATED ORAL
Qty: 90 TABLET | Refills: 2 | Status: SHIPPED | OUTPATIENT
Start: 2020-04-13 | End: 2020-10-06 | Stop reason: SDUPTHER

## 2020-04-13 NOTE — TELEPHONE ENCOUNTER
Prescription     Kike Salgado MA routed conversation to You 9 hours ago (7:06 AM)      Bulmaro Nascimento 2 days ago         Dr. Proctor:     I have three remaining refills on my Lunesta/Eszopicone prescription at my [facility] [address] in [place], [place]. Can your office modify that prescription to allow me to  these three refills (90 day supply) on my next pickup/refill date on around April 21, 2020? Im age 65 and Im attempting to limit all future outdoor trips during these times, that are not absolutely necessary. Thanks to your office for your help in this matter, if its possible/doable.     Bulmaro Nascimento          Responsible Party

## 2020-05-08 ENCOUNTER — PATIENT MESSAGE (OUTPATIENT)
Dept: FAMILY MEDICINE | Facility: CLINIC | Age: 65
End: 2020-05-08

## 2020-05-08 DIAGNOSIS — Z13.1 DIABETES MELLITUS SCREENING: ICD-10-CM

## 2020-05-08 DIAGNOSIS — R35.1 NOCTURIA: Primary | ICD-10-CM

## 2020-05-08 DIAGNOSIS — Z87.898 HISTORY OF ELEVATED PSA: ICD-10-CM

## 2020-05-08 DIAGNOSIS — Z13.220 SCREENING CHOLESTEROL LEVEL: ICD-10-CM

## 2020-05-08 DIAGNOSIS — Z13.6 ENCOUNTER FOR SCREENING FOR CARDIOVASCULAR DISORDERS: ICD-10-CM

## 2020-05-08 DIAGNOSIS — N40.0 BENIGN PROSTATIC HYPERPLASIA WITHOUT LOWER URINARY TRACT SYMPTOMS: ICD-10-CM

## 2020-05-12 NOTE — TELEPHONE ENCOUNTER
"Medicare does cover a version of an annual visit in the first year called the Welcome to Medicare visit.  I would recommend keeping the appt as scheduled and Samira can either work the visit as the Welcome to Medicare visit or a routine follow up of chronic conditions (both are covered by Medicare).  As for annual labs, he is correct in that Medicare does not cover "Annual labs" in the same way commercial insurance does.  However, his last labs showed a slightly elevated cholesterol panel with an ASCVD 10 yr risk of 8.2% and slightly elevated thyroid function.  Medicare would cover those labs (CMP, lipid, TSH, free T4) subject to copays or deductibles.  Medicare does cover screening PSA even in the first year.  Again, I'd recommend he keep the appt and discuss in person.  "

## 2020-05-13 NOTE — TELEPHONE ENCOUNTER
Called and spoke with pt in regards of your message. Pt states that he understands, but he is still unclear on the matter of the Medicare Wellness . Patient states that he has read his Medicare book several times, and it states that it does not cover Wellness for the first year.

## 2020-05-13 NOTE — TELEPHONE ENCOUNTER
Call patient  - tell him to keep appt for labs and MEDICARE WELLNESS exam as scheduled - labs are coded for medicare.  He needs to have PSA level due to BPH/follow up with brogle.  And medicare does cover CMP, lipid and CBC yearly according to Welcome to Medicare guidelines. Tell him see the message I sent over portal.

## 2020-05-13 NOTE — TELEPHONE ENCOUNTER
I called and spoke with patient.  Assured him that Medicare covers what they call a WELCOME to MEDICARE visit within the first 6 months of him being on Medicare - he can call insurance to confirm if he would like.  Patient states that he will keep appts as scheduled.

## 2020-05-28 ENCOUNTER — TELEPHONE (OUTPATIENT)
Dept: FAMILY MEDICINE | Facility: CLINIC | Age: 65
End: 2020-05-28

## 2020-05-28 DIAGNOSIS — N40.0 BENIGN PROSTATIC HYPERPLASIA WITHOUT LOWER URINARY TRACT SYMPTOMS: ICD-10-CM

## 2020-05-28 DIAGNOSIS — Z13.6 ENCOUNTER FOR SCREENING FOR CARDIOVASCULAR DISORDERS: ICD-10-CM

## 2020-05-28 DIAGNOSIS — Z13.1 DIABETES MELLITUS SCREENING: ICD-10-CM

## 2020-05-28 DIAGNOSIS — Z87.898 HISTORY OF ELEVATED PSA: Primary | ICD-10-CM

## 2020-05-28 NOTE — TELEPHONE ENCOUNTER
----- Message from Joo Dickson sent at 5/28/2020  7:37 AM CDT -----  Good Morning  ,  Please re-enter orders for this patients.  He came in today but did not have the labs drawn due to him wanting to call Medicare to confirm that these labs would be covered.  Once we cancelled the check in his orders were unlinked.  He stated that he will be back by next week to have the labs done.        Thanks

## 2020-06-16 ENCOUNTER — TELEPHONE (OUTPATIENT)
Dept: SPORTS MEDICINE | Facility: CLINIC | Age: 65
End: 2020-06-16

## 2020-06-16 ENCOUNTER — PATIENT OUTREACH (OUTPATIENT)
Dept: ADMINISTRATIVE | Facility: OTHER | Age: 65
End: 2020-06-16

## 2020-06-16 NOTE — TELEPHONE ENCOUNTER
----- Message from Shanice Rojo MA sent at 6/16/2020  3:54 PM CDT -----  Contact: self [phone]   Returning a call to set up an appt for an injection for fluid on the knees [phone]

## 2020-06-17 ENCOUNTER — OFFICE VISIT (OUTPATIENT)
Dept: SLEEP MEDICINE | Facility: CLINIC | Age: 65
End: 2020-06-17
Payer: MEDICARE

## 2020-06-17 DIAGNOSIS — M17.11 PRIMARY OSTEOARTHRITIS OF RIGHT KNEE: Primary | ICD-10-CM

## 2020-06-17 DIAGNOSIS — M17.12 PRIMARY OSTEOARTHRITIS OF LEFT KNEE: ICD-10-CM

## 2020-06-17 DIAGNOSIS — F45.8 BRUXISM: ICD-10-CM

## 2020-06-17 DIAGNOSIS — G47.33 OSA (OBSTRUCTIVE SLEEP APNEA): ICD-10-CM

## 2020-06-17 DIAGNOSIS — G47.00 INSOMNIA, UNSPECIFIED TYPE: Primary | ICD-10-CM

## 2020-06-17 PROCEDURE — 99214 PR OFFICE/OUTPT VISIT, EST, LEVL IV, 30-39 MIN: ICD-10-PCS | Mod: 95,,, | Performed by: PSYCHIATRY & NEUROLOGY

## 2020-06-17 PROCEDURE — 99214 OFFICE O/P EST MOD 30 MIN: CPT | Mod: 95,,, | Performed by: PSYCHIATRY & NEUROLOGY

## 2020-06-17 RX ORDER — SUVOREXANT 20 MG/1
TABLET, FILM COATED ORAL
Qty: 30 TABLET | Refills: 5 | Status: SHIPPED | OUTPATIENT
Start: 2020-06-17 | End: 2020-08-04

## 2020-06-17 NOTE — PROGRESS NOTES
The patient location is: home  The chief complaint leading to consultation is: follow uop  Visit type: audiovisual  Total time spent with patient: 30 min  Each patient to whom he or she provides medical services by telemedicine is:  (1) informed of the relationship between the physician and patient and the respective role of any other health care provider with respect to management of the patient; and (2) notified that he or she may decline to receive medical services by telemedicine and may withdraw from such care at any time.      INTERVAL HISTORY:    06/17/2020:  The patient has not presented any new complaints since the previous visit. Still taking Lunesta 3 mg - gets 6 hrs of sleep and less aware of tooth grinding at night.   Still reports bruxism/TMJ. He stopped got off Lorazepam.  He wears an OA for bruxism. He stopped wearing CPAP as he did not feel any additional benefit on his main problem (TMJ/bruxism), and it also aggravated his insomnia.  He is no longer using Lorazepam and Skelaxin. ESS 3/24. Denied excessive daytime sleepiness.  He still practices law during COVID lock down. He has just switched to Medicare. He is interested in switching to Belsomra, as it was specifically recommended for his age group based on safety profile.       EPWORTH SLEEPINESS SCALE TOTAL SCORE  6/15/2020 9/3/2019 6/17/2019   Total score 8 12 3           PREVIOUS HISTORY  06/19/2019 INITIAL HISTORY OF PRESENT ILLNESS:  Bulmaro Nascimento is a 65 y.o. male is here to be evaluated for a sleep disorder.       CHIEF COMPLAINT:      He presents with tooth grinding . 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     failed OA for bruxism; also failed Ativan (was addictive but helped); failed Lexapro, Mirtazapine,   Tizanidine. Failed Botox injections.     Skelaxine and Lunesta worked best.    Ambien - did not work.    The patient's complaints include excessive daytime sleepiness, fatigued  snoring and interrupted sleep  since  .    Bulmaro Nascimento denied  excessive daytime sleepiness, excessive daytime fatigue and interrupted sleep.  ESS 3/24, PHQ9 - 4, GAD7-5    SLEEP ROUTINE AND LIFESTYLE 06/17/2020 :  Sleep Clinic New Patient 6/17/2019   What time do you go to bed on a week day? (Give a range) 9-10 PM   What time do you go to bed on a day off? (Give a range) 9-10 PM   How long does it take you to fall asleep? (Give a range) 15 min   On average, how many times per night do you wake up? 1-2   How long does it take you to fall back into sleep? (Give a range) 10 Minutes   What time do you wake up to start your day on a week day? (Give a range) 4-7 AM   What time do you wake up to start your day on a day off? (Give a range) 4-7 AM   What time do you get out of bed? (Give a range) 4-7 AM   On average, how many hours do you sleep? 4-6 hours   On average, how many naps do you take per day? 1   Rate your sleep quality from 0 to 5 (0-poor, 5-great). 2   Have you experienced:  N/a   How much weight have you lost or gained (in lbs.) in the last year? 5   On average, how many times per night do you go to the bathroom?  1   Have you ever had a sleep study/CPAP machine/surgery for sleep apnea? Yes   Have you ever had a CPAP machine for sleep apnea? Yes   Have you ever had surgery for sleep apnea? No       Sleep Clinic ROS  6/17/2019   Difficulty breathing through the nose?  No   Sore throat or dry mouth in the morning? No   Irregular or very fast heart beat?  No   Shortness of breath?  No   Acid reflux? Yes   Body aches and pains?  Yes   Morning headaches? Yes   Dizziness? No   Mood changes?  Sometimes   Do you exercise?  Yes   Do you feel like moving your legs a lot?  No          Denies symptoms concerning for parasomnia    09/04/2019:  The patient has not presented any new complaints since the previous visit.   Using 5 cm H2O (no APAP capability)  Used 4 hrs per night; tried it without the sleep aid  It did not help his bruxism  unfortunately.  AHI 3.3 (improved from 9/hr on his 2015 AHI on HST)  Likes his Dreamwear full mask    DME: [facility]    Reports difficulty exhaling - APR was off.    CBD has not helped him.    Tried genetic tests for anxiety and sleep    He states that without Lunesta he feels he stays in a very superficial sleep        Social History:  Occupation: law  Bed partner: girlfriend  Exercise routine: yes  Caffeine:       PREVIOUS SLEEP STUDIES:     HST  5/2015: Significant Obstructive sleep apnea (JAROD) with AHI (apnea hypopnea Index) of 9.1 (at some portions 36 - does not specify position or stage) and SaO2 of 89% (weight  175 lbs).      DME:       PAST MEDICAL HISTORY:    Active Ambulatory Problems     Diagnosis Date Noted    Chronic GERD 06/08/2015    Insomnia 06/08/2015    Benign non-nodular prostatic hyperplasia without lower urinary tract symptoms 02/11/2016    Seasonal allergic rhinitis due to pollen 12/17/2016    Bruxism 01/27/2017    Nocturia 03/15/2017    Urinary frequency 03/15/2017    Slow urinary stream 03/14/2018    Family history of prostate cancer in father 03/14/2018    Chronic pain of right knee 03/21/2018    Recurrent cold sores 06/14/2018    Subclinical hyperthyroidism 04/05/2019    DDD (degenerative disc disease), cervical 12/12/2019    Primary osteoarthritis of right knee 12/12/2019    Chronic nonintractable headache 12/12/2019    Generalized headaches 12/26/2019    Weakness 12/26/2019    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     Resolved Ambulatory Problems     Diagnosis Date Noted    Sleep apnea 06/08/2015    Benign localized hyperplasia of prostate with urinary obstruction and other lower urinary tract symptoms (LUTS)(600.21) 06/08/2015    Sinusitis, chronic 06/08/2015    Anxiety 07/08/2015    allergic rhinitis due to animal cat and dog dander 12/17/2016    Seasonal allergic rhinitis due to fungal spores 12/17/2016    Elevated PSA measurement 02/06/2017    SI (sacroiliac) pain  07/30/2018     Past Medical History:   Diagnosis Date    Allergy     Gastroesophageal reflux disease without esophagitis 6/8/2015                PAST SURGICAL HISTORY:    Past Surgical History:   Procedure Laterality Date    COLONOSCOPY  07/11/2012    normal - Dr. Regan - repeat in 10 years    ESOPHAGOGASTRODUODENOSCOPY  07/11/2012    Biopsy showed gastric mucosa with mild chronic inflammation.  Squamous mucosa with mild chronic inflammation including rare intraepithelial eosinophils.  Done by Dr. Regan - scanned to media file.    KNEE SURGERY Bilateral 1992    knee cap alignment with clean up    SHOULDER SURGERY Right     SINUS SURGERY           FAMILY HISTORY:                Family History   Problem Relation Age of Onset    Thyroid disease Mother     Diabetes Father     Cancer Father 75        Prostate     Thyroid disease Sister     Cancer Brother 50        bladder cancer    No Known Problems Sister     No Known Problems Brother     No Known Problems Brother     Heart disease Neg Hx     Prostate cancer Neg Hx     Kidney disease Neg Hx        SOCIAL HISTORY:          Tobacco:   Social History     Tobacco Use   Smoking Status Never Smoker   Smokeless Tobacco Never Used       alcohol use:    Social History     Substance and Sexual Activity   Alcohol Use Yes    Alcohol/week: 1.0 standard drinks    Types: 1 Cans of beer per week    Frequency: Monthly or less    Drinks per session: 1 or 2    Binge frequency: Never    Comment: once a month                   ALLERGIES:  Review of patient's allergies indicates:  No Known Allergies    CURRENT MEDICATIONS:    Current Outpatient Medications   Medication Sig Dispense Refill    celecoxib (CELEBREX) 200 MG capsule       cimetidine (TAGAMET) 400 MG tablet Take 1 tablet (400 mg total) by mouth 2 (two) times daily. 60 tablet 2    EPINEPHrine (EPIPEN) 0.3 mg/0.3 mL AtIn Inject 0.3 mLs (0.3 mg total) into the muscle once as needed. 2 Device 3     eszopiclone (LUNESTA) 3 mg Tab 1 pill PO at bedtime as needed for insomnia. Please allow 3 months supply. 90 tablet 2    famotidine (PEPCID) 20 MG tablet Take 20 mg by mouth 2 (two) times daily.      fluticasone (FLONASE) 50 mcg/actuation nasal spray 2 sprays by Each Nare route 2 (two) times daily. 1 Bottle 11    metaxalone (SKELAXIN) 800 MG tablet Take 1 tablet (800 mg total) by mouth 2 (two) times daily as needed (bruxism). 60 tablet 3    methocarbamol (ROBAXIN) 750 MG Tab Take 500 mg by mouth 4 (four) times daily.      tadalafil (CIALIS) 20 MG Tab Take 1 tablet (20 mg total) by mouth once daily. 10 tablet 11    tadalafil (CIALIS) 5 MG tablet Take 1 tablet (5 mg total) by mouth daily as needed for Erectile Dysfunction. 90 tablet 3    valACYclovir (VALTREX) 1000 MG tablet        Current Facility-Administered Medications   Medication Dose Route Frequency Provider Last Rate Last Dose    Allergy Mix   Subcutaneous 1 time in Clinic/HOD Maria Guadalupe Kirkpatrick MD                        H/o sinus surgery and allergy shots once a year    PHYSICAL EXAM:  There were no vitals taken for this visit.  GENERAL: Overweight body habitus, well groomed.  HEENT:   HEENT:  Conjunctivae are non-erythematous; Pupils equal, round, and reactive to light; Nose is symmetrical; Nasal mucosa is pink and moist; Septum is midline; Inferior turbinates are hypertrophied; Nasal airflow is normal; Posterior pharynx is pink; Modified Mallampati:II; Posterior palate is normal; Tonsils not visualized; Uvula is normal and pink;Tongue is enlarged; Dentition is fair; No TMJ tenderness; Jaw opening and protrusion without click and without discomfort.  NECK: Supple. Neck circumference is 16 inches. No thyromegaly. No palpable nodes.     SKIN: On face and neck: No abrasions, no rashes, no lesions.  No subcutaneous nodules are palpable.  RESPIRATORY: Chest is clear to auscultation.  Normal chest expansion and non-labored breathing at  "rest.  CARDIOVASCULAR: Normal S1, S2.  No murmurs, gallops or rubs. No carotid bruits bilaterally.  No edema. No clubbing. No cyanosis.    NEURO: Oriented to time, place and person. Normal attention span and concentration. Gait normal.    PSYCH: Affect is full. Mood is normal  MUSCULOSKELETAL: Moves 4 extremities. Gait normal.         Using [facility]: yes      ASSESSMENT:    1. JAROD - mild on HST. The patient symptomatically has  excessive daytime sleepiness, excessive daytime fatigue and interrupted sleep  with exam findings of "a crowded oral airway and elevated body mass index. The patient has medical co-morbidities of Bruxism,  which can be worsened by JAROD. Did not get improvement on CPAP 5, but only used for 4 hrs at a time - hard to exhale; no improvement in sleep continuity or TMJ. He is interested in switching to Belsomra, as it was specifically recommended for his age group based on safety profile. I told him that Medicare coverage is limited for Belsomra, but he wished to use  for coverage.     2. Bruxism with TMJ (which was his main problem that made his seek  sleep disordered breathing evaluation) - currently mild improvement  on Lunesta 3 mg which makes him less aware of bruxism-related discomfort. No longer taking muscle relaxant or benzodiazepines.       PLAN:    Continue  Lunesta 3 mg    Belsomra - I will provide prescription.  Dental referral for OA for JAROD was given tot he patient.     May consider in lab PSG to evaluate for Bruxism and JAROD in lab in future    More than 15 minutes of this 30 minutes visit was spent in counseling: during our discussion today, we talked about the etiology of  JAROD as well as the potential ramifications of untreated sleep apnea, which could include daytime sleepiness, hypertension, heart disease and/or stroke.  We discussed potential treatment options, which could include weight loss, body positioning, continuous positive airway pressure (CPAP), or referral for " surgical consideration. Meanwhile, he  is urged to avoid supine sleep, weight gain and alcoholic beverages since all of these can worsen JAROD.     Precautions: The patient was advised to abstain from driving should he feel sleepy or drowsy.    Follow up: 6 months    Thank you for allowing me the opportunity to participate in the care of your patient.    This visit summary will be sent to referring provider via inbasket

## 2020-06-17 NOTE — PATIENT INSTRUCTIONS
For OA (oral appliance) for Obstructive sleep apnea (JAROD) please call   Please bring your sleep study report to the dentist:      Dr. Nidia HOLLY  [facility] - [phone]   Dr. Gume Polk [phone] -[place]     Or   Dr. Mook Persaud, DDS [phone] -[place]    Or Dr. Thanh Montano [phone] - [facility]

## 2020-06-23 ENCOUNTER — TELEPHONE (OUTPATIENT)
Dept: SLEEP MEDICINE | Facility: CLINIC | Age: 65
End: 2020-06-23

## 2020-06-24 NOTE — TELEPHONE ENCOUNTER
Dr. Proctor:  My pharmacy did not fill the Belsomra  prescription because it is not covered by my prescription medicine  provider,  and its cash cost is over $500. Instead, could you please continue me on my current prescription for Lunesta with a 90 day refill ( allows this 90 day prescription) so that I can minimize my trips to the grocery store/pharmacy. Thank you for your help with this.   Bulmaro Nascimento

## 2020-06-29 ENCOUNTER — PATIENT OUTREACH (OUTPATIENT)
Dept: ADMINISTRATIVE | Facility: OTHER | Age: 65
End: 2020-06-29

## 2020-06-29 NOTE — PROGRESS NOTES
Requested updates within Care Everywhere.  Patient's chart was reviewed for overdue TATI topics.  Immunizations reconciled.

## 2020-06-30 ENCOUNTER — OFFICE VISIT (OUTPATIENT)
Dept: PAIN MEDICINE | Facility: CLINIC | Age: 65
End: 2020-06-30
Payer: MEDICARE

## 2020-06-30 VITALS
SYSTOLIC BLOOD PRESSURE: 134 MMHG | OXYGEN SATURATION: 98 % | RESPIRATION RATE: 16 BRPM | TEMPERATURE: 99 F | HEART RATE: 64 BPM | HEIGHT: 70 IN | DIASTOLIC BLOOD PRESSURE: 70 MMHG | WEIGHT: 183.06 LBS | BODY MASS INDEX: 26.21 KG/M2

## 2020-06-30 DIAGNOSIS — M79.18 MYOFASCIAL PAIN: ICD-10-CM

## 2020-06-30 DIAGNOSIS — M47.812 CERVICAL SPONDYLOSIS: Primary | ICD-10-CM

## 2020-06-30 DIAGNOSIS — M50.30 DDD (DEGENERATIVE DISC DISEASE), CERVICAL: ICD-10-CM

## 2020-06-30 DIAGNOSIS — M54.2 CERVICALGIA: ICD-10-CM

## 2020-06-30 PROCEDURE — 99999 PR PBB SHADOW E&M-EST. PATIENT-LVL IV: ICD-10-PCS | Mod: PBBFAC,,, | Performed by: ANESTHESIOLOGY

## 2020-06-30 PROCEDURE — 99999 PR PBB SHADOW E&M-EST. PATIENT-LVL IV: CPT | Mod: PBBFAC,,, | Performed by: ANESTHESIOLOGY

## 2020-06-30 PROCEDURE — 99214 OFFICE O/P EST MOD 30 MIN: CPT | Mod: PBBFAC,PN | Performed by: ANESTHESIOLOGY

## 2020-06-30 PROCEDURE — 20553 NJX 1/MLT TRIGGER POINTS 3/>: CPT | Mod: PBBFAC,PN | Performed by: ANESTHESIOLOGY

## 2020-06-30 RX ORDER — DICLOFENAC SODIUM 75 MG/1
75 TABLET, DELAYED RELEASE ORAL 2 TIMES DAILY PRN
Qty: 60 TABLET | Refills: 2 | Status: SHIPPED | OUTPATIENT
Start: 2020-06-30 | End: 2020-07-02 | Stop reason: SINTOL

## 2020-06-30 RX ORDER — LIDOCAINE HYDROCHLORIDE 10 MG/ML
7 INJECTION INFILTRATION; PERINEURAL
Status: COMPLETED | OUTPATIENT
Start: 2020-06-30 | End: 2020-06-30

## 2020-06-30 RX ORDER — METAXALONE 800 MG/1
800 TABLET ORAL 3 TIMES DAILY PRN
Qty: 40 TABLET | Refills: 1 | Status: SHIPPED | OUTPATIENT
Start: 2020-06-30 | End: 2021-08-09

## 2020-06-30 RX ORDER — TRIAMCINOLONE ACETONIDE 40 MG/ML
40 INJECTION, SUSPENSION INTRA-ARTICULAR; INTRAMUSCULAR
Status: COMPLETED | OUTPATIENT
Start: 2020-06-30 | End: 2020-06-30

## 2020-06-30 RX ADMIN — TRIAMCINOLONE ACETONIDE 40 MG: 40 INJECTION, SUSPENSION INTRA-ARTICULAR; INTRAMUSCULAR at 04:06

## 2020-06-30 RX ADMIN — LIDOCAINE HYDROCHLORIDE 7 ML: 10 INJECTION, SOLUTION INFILTRATION; PERINEURAL at 04:06

## 2020-06-30 NOTE — PROGRESS NOTES
Chronic Pain - New Consult    Referring Physician: Self, Aaareferral      Chief Complaint   Patient presents with    Neck Pain     stiffness. gives consent headaches        SUBJECTIVE:    Bulmaro Nascimento presents to the clinic for the evaluation of neck pain. The pain started years ago and symptoms have been persistent. No recent trauma or inciting event. The pain is located in the bilateral cervicothoracic and upper trapezius area. There is no radiation down the arms. There are associated headaches located in the temporal region. The neck pain is described as aching and tight band and is rated as 7/10. There is associated stiffness. Symptoms interfere with daily activity and sleeping. The pain is constant and exacerbated by nothing in particular.  The pain is mitigated by chiropractic care, muscle relaxer and NSAIDS.     Patient denies urinary incontinence, bowel incontinence, significant weight loss, significant motor weakness and loss of sensations.    Physical Therapy/Home Exercise: yes, mild improvement      Pain Disability Index Review:  Last 3 PDI Scores 6/30/2020   Pain Disability Index (PDI) 24       Pain Medications:    - Robaxin as needed     report: Reviewed    Pain Procedures: none    Imaging:     XR CERVICAL SPINE AP LAT WITH FLEX EXTENSION (11/19/2019):     TECHNIQUE:  Three views of the cervical spine plus flexion and extension views were performed.     FINDINGS:  No acute fractures.  Straightening of normal cervical lordosis.  Flexion and extension views documented no instability.  Unremarkable predental space and paraspinal soft tissues.  Disc narrowing of a mild-to-moderate degree C5-C6 and of a moderate to severe degree C6-C7.  Associated endplate osteophytes and uncovertebral spurring at these narrowed disc spaces.  Some endplate osteophytes and uncovertebral spurring about preserved C3-C4 and C4-C5 levels.    Past Medical History:   Diagnosis Date    Allergy     Anxiety 7/8/2015     Benign non-nodular prostatic hyperplasia without lower urinary tract symptoms 2/11/2016    Bruxism     Chronic pain of right knee 3/21/2018    DDD (degenerative disc disease), cervical 12/12/2019    Elevated PSA measurement 02/06/2017    Followed by Dr. 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 Regan - repeat in 10 years    ESOPHAGOGASTRODUODENOSCOPY  07/11/2012    Biopsy showed gastric mucosa with mild chronic inflammation.  Squamous mucosa with mild chronic inflammation including rare intraepithelial eosinophils.  Done by Dr. Regan - scanned to media file.    KNEE SURGERY Bilateral 1992    knee cap alignment with clean up    SHOULDER SURGERY Right     SINUS SURGERY       Social History     Socioeconomic History    Marital status:      Spouse name: Not on file    Number of children: Not on file    Years of education: Not on file    Highest education level: Not on file   Occupational History    Occupation:    Social Needs    Financial resource strain: Not hard at all    Food insecurity     Worry: Never true     Inability: Never true    Transportation needs     Medical: No     Non-medical: No   Tobacco Use    Smoking status: Never Smoker    Smokeless tobacco: Never Used   Substance and Sexual Activity    Alcohol use: Yes     Alcohol/week: 1.0 standard drinks     Types: 1 Cans of beer per week     Frequency: Monthly or less     Drinks per session: 1 or 2     Binge frequency: Never     Comment: once a month    Drug use: No    Sexual activity: Not Currently     Partners: Female    Lifestyle    Physical activity     Days per week: 3 days     Minutes per session: 120 min    Stress: Only a little   Relationships    Social connections     Talks on phone: More than three times a week     Gets together: Once a week     Attends Holiness service: Not on file     Active member of club or organization: No     Attends meetings of clubs or organizations: Never     Relationship status:    Other Topics Concern    Not on file   Social History Narrative    Lives in [place] alone. 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     Family History   Problem Relation Age of Onset    Thyroid disease Mother     Diabetes Father     Cancer Father 75        Prostate     Thyroid disease Sister     Cancer Brother 50        bladder cancer    No Known Problems Sister     No Known Problems Brother     No Known Problems Brother     Heart disease Neg Hx     Prostate cancer Neg Hx     Kidney disease Neg Hx        Review of patient's allergies indicates:  No Known Allergies    Current Outpatient Medications   Medication Sig    eszopiclone (LUNESTA) 3 mg Tab 1 pill PO at bedtime as needed for insomnia. Please allow 3 months supply.    fluticasone (FLONASE) 50 mcg/actuation nasal spray 2 sprays by Each Nare route 2 (two) times daily.    suvorexant (BELSOMRA) 20 mg Tab 1 pill PO at bedtime PRN for Insomia    tadalafil (CIALIS) 5 MG tablet Take 1 tablet (5 mg total) by mouth daily as needed for Erectile Dysfunction.    cimetidine (TAGAMET) 400 MG tablet Take 1 tablet (400 mg total) by mouth 2 (two) times daily.    diclofenac (VOLTAREN) 75 MG EC tablet Take 1 tablet (75 mg total) by mouth 2 (two) times daily as needed.    EPINEPHrine (EPIPEN) 0.3 mg/0.3 mL AtIn Inject 0.3 mLs (0.3 mg total) into the muscle once as  "needed.    famotidine (PEPCID) 20 MG tablet Take 20 mg by mouth 2 (two) times daily.    metaxalone (SKELAXIN) 800 MG tablet Take 1 tablet (800 mg total) by mouth 2 (two) times daily as needed (bruxism).    metaxalone (SKELAXIN) 800 MG tablet Take 1 tablet (800 mg total) by mouth 3 (three) times daily as needed for Pain.    methocarbamol (ROBAXIN) 750 MG Tab Take 500 mg by mouth 4 (four) times daily.    tadalafil (CIALIS) 20 MG Tab Take 1 tablet (20 mg total) by mouth once daily. (Patient not taking: Reported on 6/30/2020)    valACYclovir (VALTREX) 1000 MG tablet      Current Facility-Administered Medications   Medication    Allergy Mix    sodium hyaluronate (EUFLEXXA) 10 mg/mL(mw 2.4 -3.6 million) injection 20 mg    sodium hyaluronate (EUFLEXXA) 10 mg/mL(mw 2.4 -3.6 million) injection 20 mg       REVIEW OF SYSTEMS:  GENERAL: No weight loss, malaise or fevers.  HEENT: +  headaches  RESPIRATORY: Negative for wheezing or shortness of breath.  CARDIOVASCULAR: Negative for chest pain or palpitations.  GI: No blood in stools or black stools or change in bowel habits.  : Negative for kidney stones, urinary tract infections, or incontinence.  MUSCULOSKELETAL: See HPI  SKIN: Negative for rash or itching.  PSYCH: Negative for mood disorder and recent psychosocial stressors.    HEMATOLOGY/LYMPHOLOGY Negative for prolonged bleeding, bruising easily or swollen nodes.  NEURO:  No history of seizures or tremors.    OBJECTIVE:    /70 (BP Location: Right arm, Patient Position: Sitting, BP Method: Large (Automatic))   Pulse 64   Temp 98.6 °F (37 °C) (Oral)   Resp 16   Ht 5' 10" (1.778 m)   Wt 83 kg (183 lb 1.5 oz)   SpO2 98%   BMI 26.27 kg/m²     PHYSICAL EXAMINATION:  GENERAL: Well appearing, in no acute distress.  PSYCH:  Mood and affect is appropriate.  Awake, alert, and oriented x 3.  SKIN: Skin color, texture, turgor normal, no rashes or lesions  HEENT: Normocephalic, atraumatic.  EOM intact.  CV: Radial " pulses are 2+.  RESP:  Respirations are unlabored.  GI: Abdomen soft and non-tender.  MSK:  No atrophy or tone abnormalities are noted.      Neck: Mild tenderness to palpation over the cervical paraspinous and upper trapezius muscles, bilaterally. Spurling negative. No pain with neck flexion, extension, or lateral rotation.  No obvious deformity or signs of trauma.  Normal cervical spine range of motion.    Back:  No pain to palpation over the lumbar spine and paraspinous muscles.  Negative for pain with facet loading and back extension/rotation. Normal range of motion without pain reproduction.    Buttocks:  No pain to palpation over the PSIS.     Extremities:  Peripheral joint ROM is full and pain free without obvious instability or laxity in all four extremities. No edema or skin discolorations noted.     Gait:  Gait is normal    NEUR:  No loss of sensation is noted.       Strength   Deltoids Triceps Biceps Wrist Extension Wrist Flexion Hand    Upper: R 5/5 5/5 5/5 5/5 5/5 5/5     L 5/5 5/5 5/5 5/5 5/5 5/5         ASSESSMENT: 65 y.o. male with chronic neck pain, consistent with facet arthropathy with significant overlying myofascial component.     1. Cervical spondylosis    2. DDD (degenerative disc disease), cervical    3. Cervicalgia    4. Myofascial pain          PLAN:     - I have stressed the importance of physical activity and a home exercise plan to help with pain and improve health.  - Start Voltaren 75 mg twice a day to help with pain.  - Rx Skelaxin 800 mg as needed for spasm.  - Cervical TPI in clinic today.  - Consider trial of PT with dry needling if no improvement with above.  - I discussed with the patient potential secondary side effects of medication prescribed and urged the patient to read all FDA approved materials that the pharmacy provides with the prescription.     The above plan and management options were discussed at length with patient. Patient is in agreement with the above and  verbalized understanding. It will be communicated with the referring physician via electronic record, fax, or mail.    [facility] EMMA Ball III  06/30/2020    INFORMED CONSENT: The procedure, risks, benefits and options were discussed with patient. There are no contraindications to the procedure. The patient expressed understanding and agreed to proceed. The personnel performing the procedure was discussed. I verify that I personally obtained consent prior to the start of the procedure and the signed consent can be found on the patient's chart.      PROCEDURE: BILATERAL CERVICAL PARASPINAL AND UPPER TRAPEZIUS TRIGGER POINT INJECTION  The patient was placed in a seated position. The site of pain and procedure were confirmed with the patient prior to starting the procedure. The patient's trigger points were identified and marked. The skin was prepped with chloroprep three times.  A 25-gauge 1.5 inch  needle was advanced through the skin and subcutaneous tissues.  Aspiration for blood, air and CSF was negative.  A total of 7 ml of Lidocaine 1% and 40 mg Kenalog was injected throughout all trigger points.  No complications were evident. No specimens collected.    [facility] Lizzy III  06/30/2020

## 2020-07-02 DIAGNOSIS — M47.812 CERVICAL SPONDYLOSIS: Primary | ICD-10-CM

## 2020-07-02 RX ORDER — CELECOXIB 200 MG/1
200 CAPSULE ORAL 2 TIMES DAILY PRN
Qty: 60 CAPSULE | Refills: 2 | Status: SHIPPED | OUTPATIENT
Start: 2020-07-02 | End: 2022-08-23 | Stop reason: SDUPTHER

## 2020-08-04 ENCOUNTER — OFFICE VISIT (OUTPATIENT)
Dept: FAMILY MEDICINE | Facility: CLINIC | Age: 65
End: 2020-08-04
Payer: MEDICARE

## 2020-08-04 VITALS
BODY MASS INDEX: 26.1 KG/M2 | TEMPERATURE: 98 F | RESPIRATION RATE: 16 BRPM | DIASTOLIC BLOOD PRESSURE: 70 MMHG | HEIGHT: 70 IN | WEIGHT: 182.31 LBS | OXYGEN SATURATION: 98 % | SYSTOLIC BLOOD PRESSURE: 126 MMHG | HEART RATE: 58 BPM

## 2020-08-04 DIAGNOSIS — B00.1 RECURRENT COLD SORES: ICD-10-CM

## 2020-08-04 DIAGNOSIS — G89.29 CHRONIC NONINTRACTABLE HEADACHE, UNSPECIFIED HEADACHE TYPE: ICD-10-CM

## 2020-08-04 DIAGNOSIS — Z87.898 HISTORY OF ELEVATED PSA: ICD-10-CM

## 2020-08-04 DIAGNOSIS — M50.30 DDD (DEGENERATIVE DISC DISEASE), CERVICAL: ICD-10-CM

## 2020-08-04 DIAGNOSIS — G47.00 INSOMNIA, UNSPECIFIED TYPE: ICD-10-CM

## 2020-08-04 DIAGNOSIS — M17.11 PRIMARY OSTEOARTHRITIS OF RIGHT KNEE: ICD-10-CM

## 2020-08-04 DIAGNOSIS — F45.8 BRUXISM: ICD-10-CM

## 2020-08-04 DIAGNOSIS — J30.9 CHRONIC ALLERGIC RHINITIS: ICD-10-CM

## 2020-08-04 DIAGNOSIS — N40.0 BENIGN PROSTATIC HYPERPLASIA WITHOUT LOWER URINARY TRACT SYMPTOMS: ICD-10-CM

## 2020-08-04 DIAGNOSIS — Z00.00 MEDICARE ANNUAL WELLNESS VISIT, INITIAL: Primary | ICD-10-CM

## 2020-08-04 DIAGNOSIS — Z13.1 DIABETES MELLITUS SCREENING: ICD-10-CM

## 2020-08-04 DIAGNOSIS — Z13.6 ENCOUNTER FOR SCREENING FOR CARDIOVASCULAR DISORDERS: ICD-10-CM

## 2020-08-04 DIAGNOSIS — R51.9 CHRONIC NONINTRACTABLE HEADACHE, UNSPECIFIED HEADACHE TYPE: ICD-10-CM

## 2020-08-04 DIAGNOSIS — H61.22 IMPACTED CERUMEN OF LEFT EAR: ICD-10-CM

## 2020-08-04 DIAGNOSIS — K21.9 CHRONIC GERD: ICD-10-CM

## 2020-08-04 DIAGNOSIS — Z23 NEED FOR STREPTOCOCCUS PNEUMONIAE VACCINATION: ICD-10-CM

## 2020-08-04 PROCEDURE — 69209 PR REMOVAL IMPACTED CERUMEN USING IRRIGATION/LAVAGE, UNILATERAL: ICD-10-PCS | Mod: LT,,, | Performed by: NURSE PRACTITIONER

## 2020-08-04 PROCEDURE — 99214 OFFICE O/P EST MOD 30 MIN: CPT | Mod: PBBFAC,PN | Performed by: NURSE PRACTITIONER

## 2020-08-04 PROCEDURE — 99999 PR PBB SHADOW E&M-EST. PATIENT-LVL IV: ICD-10-PCS | Mod: PBBFAC,,, | Performed by: NURSE PRACTITIONER

## 2020-08-04 PROCEDURE — G0009 ADMIN PNEUMOCOCCAL VACCINE: HCPCS | Mod: PBBFAC,PN

## 2020-08-04 PROCEDURE — 69209 REMOVE IMPACTED EAR WAX UNI: CPT | Mod: LT,,, | Performed by: NURSE PRACTITIONER

## 2020-08-04 PROCEDURE — 99999 PR PBB SHADOW E&M-EST. PATIENT-LVL IV: CPT | Mod: PBBFAC,,, | Performed by: NURSE PRACTITIONER

## 2020-08-04 PROCEDURE — 99214 OFFICE O/P EST MOD 30 MIN: CPT | Mod: 25,,, | Performed by: NURSE PRACTITIONER

## 2020-08-04 PROCEDURE — 99214 PR OFFICE/OUTPT VISIT, EST, LEVL IV, 30-39 MIN: ICD-10-PCS | Mod: 25,,, | Performed by: NURSE PRACTITIONER

## 2020-08-04 RX ORDER — HYDROGEN PEROXIDE 3 %
20 SOLUTION, NON-ORAL MISCELLANEOUS
COMMUNITY
End: 2021-03-16

## 2020-08-04 NOTE — PROGRESS NOTES
Subjective:       Patient ID: Bulmaro Nascimento is a 65 y.o. male.    Chief Complaint: Medicare AWV      THIS IS A MEDICARE ANNUAL PHYSICAL EXAM SO HEALTH RISK ASSESSMENT, DEPRESSION SCREENING, AND ADL/FUNCTIONAL ABILITY CHECKLIST REVIEWED - SEE FLOW SHEETS.  ALL PAST MEDICAL, SURGICAL AND FAMILY HISTORY REVIEWED - SEE BELOW.  ALL CHRONIC PROBLEMS EVALUATED.       Patient is a 65-year-old white male with BPH and a history of elevated PSA that is followed by urology, chronic ALLERGIES that is followed by ALLERGY specialist, chronic right knee pain that is followed by orthopedic specialist, recurrent cold sores, chronic GERD, bruxism with chronic headaches followed by Neurology, cervical disc disease followed by [facility], insomnia followed by [facility] and an abnormal TSH level in June 2019 that is here today for MEDICARE WELCOME TO MEDICARE PHYSICAL.     Patient has BPH with history of elevated PSA that is followed by [facility] Dr. Lara.     Patient has chronic Allergies that is managed by [facility] and reports he had Allergy injections monthly for 9 years and stopped these injections in December 2019 and now followed prn.    Patient has insomnia that is followed by [facility] Dr. Proctor.    Patient has chronic headaches that he associated with Bruxism as well as chronic neck pain.  He had tried Botox injections for headache without relief.  He seen [facility]  Dr. Gordon who ordered an MRI for further evaluation BUT he did not have done due to claustrophobia and because he reports that the headaches have improved once he started treating chronic neck pain with physical therapy and following with [facility].  [facility] and diagnosed with Cervical DDD -sent for PT and the neck pain and headaches have improved - they prescribe Robaxin prn.      Patient has Primary OA of right knee followed by  Orthopedic MD and prescribed Celebrex prn. He reports he gets knee injections every 6 months.     Patient has chronic GERD.  Patient seen [facility] DR. Dalmau in January 2020 that advised he can take Nexium 20 mg daily long-term and told him to take OTC medication.       Patient has cold sores that takes Valtrex prn.     In June 2019 - Patient had TSH that was mildly low at 0.226 but free T4 was normal = Subclinical Hyperthroidism and asymptomatic in June 2019 during wellness exam - REPEAT THYROID PANEL NORMAL ub December 2019.    Wellness labs:  -  CBC shows no anemia or infection present.  -  CMP shows normal blood sugar, kidney and liver function  -  Cholesterol screening is normal.  -  PSA level followed by Dr. Lara.    Health Maintenance:  -  Due for Pneumonia vaccine.      Component      Latest Ref Rng & Units 7/30/2020 9/16/2019 3/15/2019 7/3/2018   WBC      3.90 - 12.70 K/uL 5.53  5.36    RBC      4.60 - 6.20 M/uL 4.74  4.84    Hemoglobin      14.0 - 18.0 g/dL 14.9  15.2    Hematocrit      40.0 - 54.0 % 44.8  44.4    MCV      82 - 98 fL 95  92    MCH      27.0 - 31.0 pg 31.4 (H)  31.4 (H)    MCHC      32.0 - 36.0 g/dL 33.3  34.2    RDW      11.5 - 14.5 % 13.2  13.4    Platelets      150 - 350 K/uL 209  244    MPV      9.2 - 12.9 fL 11.1  11.4    Immature Granulocytes      0.0 - 0.5 % 0.2      Gran # (ANC)      1.8 - 7.7 K/uL 2.9  3.0    Immature Grans (Abs)      0.00 - 0.04 K/uL 0.01      Lymph #      1.0 - 4.8 K/uL 2.0  1.7    Mono #      0.3 - 1.0 K/uL 0.5  0.5    Eos #      0.0 - 0.5 K/uL 0.2  0.2    Baso #      0.00 - 0.20 K/uL 0.04  0.03    nRBC      0 /100 WBC 0      Gran%      38.0 - 73.0 % 51.5  55.3    Lymph%      18.0 - 48.0 % 36.0  31.2    Mono%      4.0 - 15.0 % 8.5  10.1    Eosinophil%      0.0 - 8.0 % 3.1  2.8    Basophil%      0.0 - 1.9 % 0.7  0.6    Differential Method       Automated  Automated    Sodium      136 - 145 mmol/L 143 140 143    Potassium      3.5 - 5.1 mmol/L 4.4 4.5 4.4     Chloride      95 - 110 mmol/L 110 106 106    CO2      23 - 29 mmol/L 26 28 29    Glucose      70 - 110 mg/dL 96 99 91    BUN, Bld      2 - 20 mg/dL 13 13 19    Creatinine      0.50 - 1.40 mg/dL 0.88 1.0 0.94    Calcium      8.7 - 10.5 mg/dL 9.5 9.7 9.8    PROTEIN TOTAL      6.0 - 8.4 g/dL 6.8 6.9 6.9    Albumin      3.5 - 5.2 g/dL 4.2 4.1 4.1    BILIRUBIN TOTAL      0.1 - 1.0 mg/dL 0.8 0.3 0.9    Alkaline Phosphatase      38 - 126 U/L 56 72 64    AST      15 - 46 U/L 37 24 33    ALT      10 - 44 U/L 27 18 21    Anion Gap      8 - 16 mmol/L 7 (L) 6 (L) 8    eGFR if African American      >60 mL/min/1.73 m:2 >60.0 >60 >60.0    eGFR if non African American      >60 mL/min/1.73 m:2 >60.0 >60 >60.0    SEGS      49 - 77 %       Cholesterol      120 - 199 mg/dL 182  175    Triglycerides      30 - 150 mg/dL 92  91    HDL      40 - 75 mg/dL 54  49    LDL Cholesterol External      63.0 - 159.0 mg/dL 109.6  107.8    Hdl/Cholesterol Ratio      20.0 - 50.0 % 29.7  28.0    Total Cholesterol/HDL Ratio      2.0 - 5.0 3.4  3.6    Non-HDL Cholesterol      mg/dL 128  126    PSA Total      0.00 - 4.00 ng/mL 2.9  2.7 2.4   PSA, Free      0.01 - 1.50 ng/mL 0.86  0.78 0.74   PSA, Free Pct      Not established % 29.66  28.89 30.83     Current Outpatient Medications   Medication Sig Dispense Refill    celecoxib (CELEBREX) 200 MG capsule Take 1 capsule (200 mg total) by mouth 2 (two) times daily as needed for Pain. 60 capsule 2    eszopiclone (LUNESTA) 3 mg Tab 1 pill PO at bedtime as needed for insomnia. Please allow 3 months supply. 90 tablet 2    fluticasone (FLONASE) 50 mcg/actuation nasal spray 2 sprays by Each Nare route 2 (two) times daily. 1 Bottle 11    metaxalone (SKELAXIN) 800 MG tablet Take 1 tablet (800 mg total) by mouth 3 (three) times daily as needed for Pain. 40 tablet 1    tadalafil (CIALIS) 5 MG tablet Take 1 tablet (5 mg total) by mouth daily as needed for Erectile Dysfunction. 90 tablet 3    metaxalone (SKELAXIN)  800 MG tablet Take 1 tablet (800 mg total) by mouth 2 (two) times daily as needed (bruxism). 60 tablet 3     Current Facility-Administered Medications   Medication Dose Route Frequency Provider Last Rate Last Dose    Allergy Mix   Subcutaneous 1 time in Clinic/HOD Maria Guadalupe Kirkpatrick MD        sodium hyaluronate (EUFLEXXA) 10 mg/mL(mw 2.4 -3.6 million) injection 20 mg  20 mg Intra-articular 1 time in Clinic/HOD Juani Ellington PA-C        sodium hyaluronate (EUFLEXXA) 10 mg/mL(mw 2.4 -3.6 million) injection 20 mg  20 mg Intra-articular 1 time in Clinic/HOD Juani Ellington PA-C           Past Medical History:   Diagnosis Date    Allergy     Anxiety 7/8/2015    Benign non-nodular prostatic hyperplasia without lower urinary tract symptoms 2/11/2016    Bruxism     Chronic pain of right knee 3/21/2018    DDD (degenerative disc disease), cervical 12/12/2019    Elevated PSA measurement 02/06/2017    Followed by Dr. Lara    Elevated PSA measurement     Family history of prostate cancer in father 3/14/2018    Gastroesophageal reflux disease without esophagitis 6/8/2015    Insomnia 6/8/2015    Recurrent cold sores 6/14/2018    Seasonal allergic rhinitis due to pollen 12/17/2016    SI (sacroiliac) pain 7/30/2018    Subclinical hyperthyroidism 4/5/2019       Past Surgical History:   Procedure Laterality Date    COLONOSCOPY  07/11/2012    normal - Dr. Regan - repeat in 10 years    ESOPHAGOGASTRODUODENOSCOPY  07/11/2012    Biopsy showed gastric mucosa with mild chronic inflammation.  Squamous mucosa with mild chronic inflammation including rare intraepithelial eosinophils.  Done by Dr. Regan - scanned to media file.    KNEE SURGERY Bilateral 1992    knee cap alignment with clean up    SHOULDER SURGERY Right     SINUS SURGERY         Family History   Problem Relation Age of Onset    Thyroid disease Mother     Diabetes Father     Cancer Father 75        Prostate     Thyroid disease Sister      Cancer Brother 50        bladder cancer    No Known Problems Sister     No Known Problems Brother     No Known Problems Brother     Heart disease Neg Hx     Prostate cancer Neg Hx     Kidney disease Neg Hx        Social History     Socioeconomic History    Marital status:      Spouse name: Not on file    Number of children: Not on file    Years of education: Not on file    Highest education level: Not on file   Occupational History    Occupation:    Social Needs    Financial resource strain: Not hard at all    Food insecurity     Worry: Never true     Inability: Never true    Transportation needs     Medical: No     Non-medical: No   Tobacco Use    Smoking status: Never Smoker    Smokeless tobacco: Never Used   Substance and Sexual Activity    Alcohol use: Yes     Alcohol/week: 1.0 standard drinks     Types: 1 Cans of beer per week     Frequency: Never     Drinks per session: 1 or 2     Binge frequency: Never     Comment: once a month    Drug use: No    Sexual activity: Not Currently     Partners: Female   Lifestyle    Physical activity     Days per week: 3 days     Minutes per session: 150+ min    Stress: To some extent   Relationships    Social connections     Talks on phone: More than three times a week     Gets together: Once a week     Attends Restorationist service: Not on file     Active member of club or organization: No     Attends meetings of clubs or organizations: Never     Relationship status:    Other Topics Concern    Not on file   Social History Narrative    Lives in [facility] [place]. 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       Review of Systems   Constitutional: Negative for activity change and unexpected weight change.   HENT: Negative for hearing loss, rhinorrhea and  "trouble swallowing.    Eyes: Negative for discharge and visual disturbance.   Respiratory: Negative for chest tightness and wheezing.    Cardiovascular: Negative for chest pain and palpitations.   Gastrointestinal: Negative for blood in stool, constipation, diarrhea and vomiting.   Endocrine: Negative for polydipsia and polyuria.   Genitourinary: Negative for difficulty urinating, hematuria and urgency.   Musculoskeletal: Positive for arthralgias. Negative for joint swelling and neck pain.   Neurological: Positive for headaches. Negative for weakness.   Psychiatric/Behavioral: Negative for confusion and dysphoric mood.         Objective:     Vitals:    08/04/20 1018   BP: 126/70   BP Location: Right arm   Patient Position: Sitting   BP Method: Large (Manual)   Pulse: (!) 58   Resp: 16   Temp: 97.7 °F (36.5 °C)   TempSrc: Oral   SpO2: 98%   Weight: 82.7 kg (182 lb 5.1 oz)   Height: 5' 10" (1.778 m)          Physical Exam  Vitals signs reviewed.   Constitutional:       General: He is not in acute distress.     Appearance: Normal appearance. He is well-developed. He is not ill-appearing, toxic-appearing or diaphoretic.      Comments: Body mass index is 26.16 kg/m².   HENT:      Head: Normocephalic and atraumatic.      Right Ear: Hearing, tympanic membrane, ear canal and external ear normal.      Left Ear: Hearing normal. There is impacted cerumen.      Ears:      Comments: Right TM WNL.  Left EAC with wax impaction.  Ear irrigated in office and all wax removed.  TM WNL     Nose: Nose normal.   Eyes:      General: No scleral icterus.        Right eye: No discharge.         Left eye: No discharge.      Extraocular Movements: Extraocular movements intact.      Conjunctiva/sclera: Conjunctivae normal.      Pupils: Pupils are equal, round, and reactive to light.   Neck:      Musculoskeletal: Normal range of motion and neck supple.      Thyroid: No thyromegaly.      Trachea: No tracheal deviation.   Cardiovascular:      Rate " and Rhythm: Normal rate and regular rhythm.      Heart sounds: Normal heart sounds. No murmur.   Pulmonary:      Effort: Pulmonary effort is normal. No respiratory distress.      Breath sounds: Normal breath sounds. No stridor. No wheezing, rhonchi or rales.   Abdominal:      General: There is no distension.      Palpations: Abdomen is soft. There is no mass.      Tenderness: There is no abdominal tenderness. There is no guarding or rebound.      Hernia: No hernia is present.   Genitourinary:     Comments: Deferred to Urologist  Musculoskeletal: Normal range of motion.         General: No swelling or tenderness.      Right lower leg: No edema.      Left lower leg: No edema.   Lymphadenopathy:      Cervical: No cervical adenopathy.   Skin:     General: Skin is warm and dry.      Findings: No rash.      Nails: There is no clubbing.     Neurological:      Mental Status: He is alert and oriented to person, place, and time.      Coordination: Coordination normal.      Gait: Gait normal.   Psychiatric:         Mood and Affect: Mood normal.         Speech: Speech normal.         Behavior: Behavior normal. Behavior is cooperative.         Thought Content: Thought content normal.         Judgment: Judgment normal.           Assessment:         ICD-10-CM ICD-9-CM   1. Medicare annual wellness visit, initial  Z00.00 V70.0   2. Benign prostatic hyperplasia without lower urinary tract symptoms  N40.0 600.00   3. History of elevated PSA  Z87.898 V13.89   4. Body mass index (BMI) of 26.0 to 26.9 in adult  Z68.26 V85.22   5. Chronic GERD  K21.9 530.81   6. Insomnia, unspecified type  G47.00 780.52   7. Bruxism  F45.8 306.8   8. Chronic nonintractable headache, unspecified headache type  R51 784.0   9. DDD (degenerative disc disease), cervical  M50.30 722.4   10. Primary osteoarthritis of right knee  M17.11 715.16   11. Recurrent cold sores  B00.1 054.9   12. Chronic allergic rhinitis  J30.9 477.9   13. Impacted cerumen of left ear   H61.22 380.4   14. Need for Streptococcus pneumoniae vaccination  Z23 V03.82   15. Diabetes mellitus screening  Z13.1 V77.1   16. Encounter for screening for cardiovascular disorders   Z13.6 V81.2       Plan:       Medicare annual wellness visit, initial  -  Pneumonia vaccine today  Health Maintenance Summary    Pneumococcal Vaccine (65+ Low/Medium Risk) Overdue 4/8/2020    Influenza Vaccine Next Due 9/1/2020     Done 8/28/2019 Imm Admin: Influenza    Done 8/28/2019 Imm Admin: Influenza - Quadrivalent    Done 10/4/2018 Imm Admin: Influenza    Done 10/4/2018 Imm Admin: Influenza - Quadrivalent    Done 11/22/2017 Imm Admin: Influenza - Quadrivalent - MDCK - PF    Patient has more history with this topic...   PROSTATE-SPECIFIC ANTIGEN Next Due 7/30/2021     Done 7/30/2020 PSA, TOTAL AND FREE    Done 3/15/2019 PSA, TOTAL AND FREE    Done 7/3/2018 PSA, TOTAL AND FREE    Done 3/31/2017 PSA, TOTAL AND FREE    Done 2/6/2017 PSA, SCREENING     Patient has more history with this topic...   Colorectal Cancer Screening Next Due 7/11/2022    Lipid Panel Next Due 7/30/2025     Done 7/30/2020 LIPID PANEL    Done 3/15/2019 LIPID PANEL    Done 3/27/2018 LIPID PANEL    Done 2/6/2017 LIPID PANEL    Done 3/2/2016 LIPID PANEL    Patient has more history with this topic...   TETANUS VACCINE Next Due 11/7/2026     Done 11/7/2016 Imm Admin: Tdap    Done 4/17/2004 Imm Admin: Td (ADULT)   Hepatitis C Screening This plan is no longer active.     Done 10/2/2018 HEPATITIS PANEL, ACUTE    Done 4/27/2015    HIV Screening This plan is no longer active.     Done 10/2/2018 HIV 1 / 2 ANTIBODY   Shingles Vaccine This plan is no longer active.     Done 6/27/2019 Imm Admin: Zoster Recombinant    Done 2/9/2019 Imm Admin: Zoster Recombinant         Benign prostatic hyperplasia without lower urinary tract symptoms  -  Followed by [facility] Dr. Lara.      History of elevated PSA  -  Followed by [facility] Dr. Lara.      Body mass index (BMI)  of 26.0 to 26.9 in adult  -     CBC auto differential; Future; Expected date: 08/04/2020    Chronic GERD  -  followed by [facility] and told to take Nexium 20 mg daily by specialist.      Insomnia, unspecified type  -  followed by [facility] and prescribe Lunesta    Bruxism    Chronic nonintractable headache, unspecified headache type  -  followed by [facility] in past    DDD (degenerative disc disease), cervical  -  [facility]    Primary osteoarthritis of right knee  -  followed by [facility]    Recurrent cold sores    Chronic allergic rhinitis  -  followed by [facility]    Impacted cerumen of left ear  -     Ear wax removal    Need for Streptococcus pneumoniae vaccination  -     (In Office Administered) Pneumococcal Conjugate Vaccine (13 Valent) (IM)    Diabetes mellitus screening  -     Comprehensive metabolic panel; Future; Expected date: 08/04/2020    Encounter for screening for cardiovascular disorders   -     Lipid Panel; Future; Expected date: 08/04/2020      Follow up in about 1 year (around 8/4/2021) for MEDICARE ANNUAL PHYSICAL EXAM.     Patient's Medications   New Prescriptions    No medications on file   Previous Medications    CELECOXIB (CELEBREX) 200 MG CAPSULE    Take 1 capsule (200 mg total) by mouth 2 (two) times daily as needed for Pain.    ESOMEPRAZOLE (NEXIUM) 20 MG CAPSULE    Take 20 mg by mouth before breakfast.    ESZOPICLONE (LUNESTA) 3 MG TAB    1 pill PO at bedtime as needed for insomnia. Please allow 3 months supply.    FLUTICASONE (FLONASE) 50 MCG/ACTUATION NASAL SPRAY    2 sprays by Each Nare route 2 (two) times daily.    METAXALONE (SKELAXIN) 800 MG TABLET    Take 1 tablet (800 mg total) by mouth 3 (three) times daily as needed for Pain.    TADALAFIL (CIALIS) 5 MG TABLET    Take 1 tablet (5 mg total) by mouth daily as needed for Erectile Dysfunction.    Modified Medications    No medications on file   Discontinued Medications    CIMETIDINE (TAGAMET) 400 MG TABLET    Take 1 tablet (400 mg total) by mouth 2 (two) times daily.    EPINEPHRINE (EPIPEN) 0.3 MG/0.3 ML ATIN    Inject 0.3 mLs (0.3 mg total) into the muscle once as needed.    FAMOTIDINE (PEPCID) 20 MG TABLET    Take 20 mg by mouth 2 (two) times daily.    METAXALONE (SKELAXIN) 800 MG TABLET    Take 1 tablet (800 mg total) by mouth 2 (two) times daily as needed (bruxism).    METHOCARBAMOL (ROBAXIN) 750 MG TAB    Take 500 mg by mouth 4 (four) times daily.    SUVOREXANT (BELSOMRA) 20 MG TAB    1 pill PO at bedtime PRN for Insomia    VALACYCLOVIR (VALTREX) 1000 MG TABLET

## 2020-08-07 ENCOUNTER — OFFICE VISIT (OUTPATIENT)
Dept: SPORTS MEDICINE | Facility: CLINIC | Age: 65
End: 2020-08-07
Payer: MEDICARE

## 2020-08-07 VITALS
BODY MASS INDEX: 26.2 KG/M2 | DIASTOLIC BLOOD PRESSURE: 65 MMHG | SYSTOLIC BLOOD PRESSURE: 128 MMHG | HEART RATE: 58 BPM | HEIGHT: 70 IN | WEIGHT: 183 LBS

## 2020-08-07 DIAGNOSIS — M17.12 PRIMARY OSTEOARTHRITIS OF LEFT KNEE: ICD-10-CM

## 2020-08-07 DIAGNOSIS — M17.11 PRIMARY OSTEOARTHRITIS OF RIGHT KNEE: ICD-10-CM

## 2020-08-07 PROCEDURE — 99999 PR PBB SHADOW E&M-EST. PATIENT-LVL III: ICD-10-PCS | Mod: PBBFAC,,, | Performed by: PHYSICIAN ASSISTANT

## 2020-08-07 PROCEDURE — 20610 DRAIN/INJ JOINT/BURSA W/O US: CPT | Mod: 50,PBBFAC | Performed by: PHYSICIAN ASSISTANT

## 2020-08-07 PROCEDURE — 20610 PR DRAIN/INJECT LARGE JOINT/BURSA: ICD-10-PCS | Mod: 50,S$PBB,, | Performed by: PHYSICIAN ASSISTANT

## 2020-08-07 PROCEDURE — 99999 PR PBB SHADOW E&M-EST. PATIENT-LVL III: CPT | Mod: PBBFAC,,, | Performed by: PHYSICIAN ASSISTANT

## 2020-08-07 PROCEDURE — 99213 OFFICE O/P EST LOW 20 MIN: CPT | Mod: PBBFAC | Performed by: PHYSICIAN ASSISTANT

## 2020-08-07 PROCEDURE — 20610 DRAIN/INJ JOINT/BURSA W/O US: CPT | Mod: 50,S$PBB,, | Performed by: PHYSICIAN ASSISTANT

## 2020-08-07 PROCEDURE — 99499 UNLISTED E&M SERVICE: CPT | Mod: S$PBB,,, | Performed by: PHYSICIAN ASSISTANT

## 2020-08-07 PROCEDURE — 99499 NO LOS: ICD-10-PCS | Mod: S$PBB,,, | Performed by: PHYSICIAN ASSISTANT

## 2020-08-07 RX ADMIN — Medication 20 MG: at 10:08

## 2020-08-07 NOTE — PROGRESS NOTES
Patient is here for follow up of knee arthritis. Pt is requesting bilateral Euflexxa #1.  [facility] Has failed other conservative modalities including NSAIDS, activity modification, weight loss.    The prior shot was tolerated well.    PHYSICAL EXAMINATION:     General: The patient is alert and oriented x 3. Mood is pleasant.   Observation of ears, eyes and nose reveals no gross abnormalities. No   labored breathing observed.     No signs of infection or adverse reaction to knee.    PROCEDURE NOTE:  Injection Procedure bilateral knee  A time out was performed, including verification of patient ID, procedure, site and side, availability of information and equipment, review of safety issues, and agreement with consent, the procedure site was marked.    After time out was performed, the patient was prepped aseptically with povidone-iodine swabsticks. A diagnostic and therapeutic injection of 2cc Euflexxa was given under sterile technique using a 22g x 1.5 needle from the Superolateral  aspect of the bilateral Knee Joint in the supine position.      Bulmaro Nascimento had no adverse reactions to the medication. Pain decreased. He was instructed to apply ice to the joint for 20 minutes and avoid strenuous activities for 24-36 hours following the injection. He was warned of possible blood sugar and/or blood pressure changes during that time. Following that time, he can resume regular activities.    He was reminded to call the clinic immediately for any adverse side effects as explained in clinic today.    RTC 1 week for 2nd injection.   All questions were answered, pt will contact us for questions or concerns in the interim.

## 2020-08-14 ENCOUNTER — OFFICE VISIT (OUTPATIENT)
Dept: SPORTS MEDICINE | Facility: CLINIC | Age: 65
End: 2020-08-14
Payer: MEDICARE

## 2020-08-14 VITALS
HEART RATE: 53 BPM | WEIGHT: 183 LBS | BODY MASS INDEX: 26.2 KG/M2 | DIASTOLIC BLOOD PRESSURE: 73 MMHG | HEIGHT: 70 IN | SYSTOLIC BLOOD PRESSURE: 150 MMHG

## 2020-08-14 DIAGNOSIS — M17.11 PRIMARY OSTEOARTHRITIS OF RIGHT KNEE: ICD-10-CM

## 2020-08-14 DIAGNOSIS — M17.12 PRIMARY OSTEOARTHRITIS OF LEFT KNEE: Primary | ICD-10-CM

## 2020-08-14 PROCEDURE — 20610 DRAIN/INJ JOINT/BURSA W/O US: CPT | Mod: 50,PBBFAC | Performed by: PHYSICIAN ASSISTANT

## 2020-08-14 PROCEDURE — 20610 PR DRAIN/INJECT LARGE JOINT/BURSA: ICD-10-PCS | Mod: 50,S$PBB,, | Performed by: PHYSICIAN ASSISTANT

## 2020-08-14 PROCEDURE — 99499 UNLISTED E&M SERVICE: CPT | Mod: S$PBB,,, | Performed by: PHYSICIAN ASSISTANT

## 2020-08-14 PROCEDURE — 99999 PR PBB SHADOW E&M-EST. PATIENT-LVL III: CPT | Mod: PBBFAC,,, | Performed by: PHYSICIAN ASSISTANT

## 2020-08-14 PROCEDURE — 99499 NO LOS: ICD-10-PCS | Mod: S$PBB,,, | Performed by: PHYSICIAN ASSISTANT

## 2020-08-14 PROCEDURE — 99213 OFFICE O/P EST LOW 20 MIN: CPT | Mod: PBBFAC,25 | Performed by: PHYSICIAN ASSISTANT

## 2020-08-14 PROCEDURE — 20610 DRAIN/INJ JOINT/BURSA W/O US: CPT | Mod: 50,S$PBB,, | Performed by: PHYSICIAN ASSISTANT

## 2020-08-14 PROCEDURE — 99999 PR PBB SHADOW E&M-EST. PATIENT-LVL III: ICD-10-PCS | Mod: PBBFAC,,, | Performed by: PHYSICIAN ASSISTANT

## 2020-08-14 RX ADMIN — Medication 20 MG: at 10:08

## 2020-08-14 NOTE — PROGRESS NOTES
Patient is here for follow up of knee arthritis. Pt is requesting bilateral Euflexxa #2.  [facility] reviewed per encounter record. Has failed other conservative modalities including NSAIDS, activity modification, weight loss.    The prior shot was tolerated well.    PHYSICAL EXAMINATION:     General: The patient is alert and oriented x 3. Mood is pleasant.   Observation of ears, eyes and nose reveals no gross abnormalities. No   labored breathing observed.     No signs of infection or adverse reaction to knee.    PROCEDURE NOTE:  Injection Procedure bilateral knee  A time out was performed, including verification of patient ID, procedure, site and side, availability of information and equipment, review of safety issues, and agreement with consent, the procedure site was marked.    After time out was performed, the patient was prepped aseptically with povidone-iodine swabsticks. A diagnostic and therapeutic injection of 2cc Euflexxa was given under sterile technique using a 22g x 1.5 needle from the Superolateral  aspect of the bilateral Knee Joint in the supine position.      Bulmaro Nascimento had no adverse reactions to the medication. Pain decreased. He was instructed to apply ice to the joint for 20 minutes and avoid strenuous activities for 24-36 hours following the injection. He was warned of possible blood sugar and/or blood pressure changes during that time. Following that time, he can resume regular activities.    He was reminded to call the clinic immediately for any adverse side effects as explained in clinic today.    RTC 1 week for 3rd injection.   All questions were answered, pt will contact us for questions or concerns in the interim.

## 2020-08-21 ENCOUNTER — OFFICE VISIT (OUTPATIENT)
Dept: SPORTS MEDICINE | Facility: CLINIC | Age: 65
End: 2020-08-21
Payer: MEDICARE

## 2020-08-21 VITALS
HEART RATE: 61 BPM | SYSTOLIC BLOOD PRESSURE: 152 MMHG | WEIGHT: 180 LBS | HEIGHT: 70 IN | DIASTOLIC BLOOD PRESSURE: 71 MMHG | BODY MASS INDEX: 25.77 KG/M2

## 2020-08-21 DIAGNOSIS — M17.11 PRIMARY OSTEOARTHRITIS OF RIGHT KNEE: Primary | ICD-10-CM

## 2020-08-21 DIAGNOSIS — M17.12 PRIMARY OSTEOARTHRITIS OF LEFT KNEE: ICD-10-CM

## 2020-08-21 PROCEDURE — 20610 DRAIN/INJ JOINT/BURSA W/O US: CPT | Mod: 50,PBBFAC | Performed by: PHYSICIAN ASSISTANT

## 2020-08-21 PROCEDURE — 20610 DRAIN/INJ JOINT/BURSA W/O US: CPT | Mod: 50,S$PBB,, | Performed by: PHYSICIAN ASSISTANT

## 2020-08-21 PROCEDURE — 99213 OFFICE O/P EST LOW 20 MIN: CPT | Mod: PBBFAC,25 | Performed by: PHYSICIAN ASSISTANT

## 2020-08-21 PROCEDURE — 99999 PR PBB SHADOW E&M-EST. PATIENT-LVL III: ICD-10-PCS | Mod: PBBFAC,,, | Performed by: PHYSICIAN ASSISTANT

## 2020-08-21 PROCEDURE — 99499 NO LOS: ICD-10-PCS | Mod: S$PBB,,, | Performed by: PHYSICIAN ASSISTANT

## 2020-08-21 PROCEDURE — 20610 PR DRAIN/INJECT LARGE JOINT/BURSA: ICD-10-PCS | Mod: 50,S$PBB,, | Performed by: PHYSICIAN ASSISTANT

## 2020-08-21 PROCEDURE — 99999 PR PBB SHADOW E&M-EST. PATIENT-LVL III: CPT | Mod: PBBFAC,,, | Performed by: PHYSICIAN ASSISTANT

## 2020-08-21 PROCEDURE — 99499 UNLISTED E&M SERVICE: CPT | Mod: S$PBB,,, | Performed by: PHYSICIAN ASSISTANT

## 2020-08-21 RX ADMIN — Medication 20 MG: at 10:08

## 2020-08-21 NOTE — PROGRESS NOTES
Patient is here for follow up of bilateral knee arthritis. Pt is requesting bilateral Euflexxa #3.  PMFH reviewed per encounter record. Has failed other conservative modalities including NSAIDS, activity modification, weight loss.    The prior shot was tolerated well.    PHYSICAL EXAMINATION:     General: The patient is alert and oriented x 3. Mood is pleasant.   Observation of ears, eyes and nose reveals no gross abnormalities. No   labored breathing observed.     No signs of infection or adverse reaction to knee.    PROCEDURE NOTE:  Injection Procedure bilateral knee  A time out was performed, including verification of patient ID, procedure, site and side, availability of information and equipment, review of safety issues, and agreement with consent, the procedure site was marked.    After time out was performed, the patient was prepped aseptically with povidone-iodine swabsticks. A diagnostic and therapeutic injection of 2cc Euflexxa was given under sterile technique using a 22g x 1.5 needle from the Superolateral  aspect of the bilateral Knee Joint in the supine position.      Bulmaro Nascimento had no adverse reactions to the medication. Pain decreased. He was instructed to apply ice to the joint for 20 minutes and avoid strenuous activities for 24-36 hours following the injection. He was warned of possible blood sugar and/or blood pressure changes during that time. Following that time, he can resume regular activities.    He was reminded to call the clinic immediately for any adverse side effects as explained in clinic today.    RTC in 6 months for repeat visco injection  All questions were answered, pt will contact us for questions or concerns in the interim.

## 2020-09-03 ENCOUNTER — PATIENT MESSAGE (OUTPATIENT)
Dept: GASTROENTEROLOGY | Facility: CLINIC | Age: 65
End: 2020-09-03

## 2020-09-03 DIAGNOSIS — K21.9 CHRONIC GERD: Primary | ICD-10-CM

## 2020-09-03 RX ORDER — OMEPRAZOLE 40 MG/1
40 CAPSULE, DELAYED RELEASE ORAL DAILY
Qty: 90 CAPSULE | Refills: 3 | Status: SHIPPED | OUTPATIENT
Start: 2020-09-03 | End: 2021-03-16

## 2020-09-03 NOTE — TELEPHONE ENCOUNTER
Patient would like a prescription for nexium sent to [facility] in [place]. I don't know if you want to send the 20mg or 40mg. Patient has also scheduled an appointment on 9/9/20. Does he need to keep that appointment?

## 2020-09-14 ENCOUNTER — PATIENT OUTREACH (OUTPATIENT)
Dept: ADMINISTRATIVE | Facility: OTHER | Age: 65
End: 2020-09-14

## 2020-09-21 ENCOUNTER — PATIENT MESSAGE (OUTPATIENT)
Dept: UROLOGY | Facility: CLINIC | Age: 65
End: 2020-09-21

## 2020-09-22 NOTE — TELEPHONE ENCOUNTER
lmovm regarding the 20mg tildalifil is not listed in his current medication list and it can not be refilled at this time.

## 2020-10-14 ENCOUNTER — PATIENT MESSAGE (OUTPATIENT)
Dept: SLEEP MEDICINE | Facility: CLINIC | Age: 65
End: 2020-10-14

## 2020-10-14 ENCOUNTER — TELEPHONE (OUTPATIENT)
Dept: SLEEP MEDICINE | Facility: CLINIC | Age: 65
End: 2020-10-14

## 2020-11-09 ENCOUNTER — TELEPHONE (OUTPATIENT)
Dept: SLEEP MEDICINE | Facility: CLINIC | Age: 65
End: 2020-11-09

## 2020-11-25 ENCOUNTER — PATIENT MESSAGE (OUTPATIENT)
Dept: SPORTS MEDICINE | Facility: CLINIC | Age: 65
End: 2020-11-25

## 2020-11-25 ENCOUNTER — PATIENT MESSAGE (OUTPATIENT)
Dept: ALLERGY | Facility: CLINIC | Age: 65
End: 2020-11-25

## 2020-11-25 DIAGNOSIS — M17.12 PRIMARY OSTEOARTHRITIS OF LEFT KNEE: ICD-10-CM

## 2020-11-25 DIAGNOSIS — M17.11 PRIMARY OSTEOARTHRITIS OF RIGHT KNEE: Primary | ICD-10-CM

## 2020-12-02 ENCOUNTER — OFFICE VISIT (OUTPATIENT)
Dept: ALLERGY | Facility: CLINIC | Age: 65
End: 2020-12-02
Payer: MEDICARE

## 2020-12-02 DIAGNOSIS — J01.80 ACUTE NON-RECURRENT SINUSITIS OF OTHER SINUS: ICD-10-CM

## 2020-12-02 DIAGNOSIS — H10.13 ALLERGIC CONJUNCTIVITIS, BILATERAL: ICD-10-CM

## 2020-12-02 DIAGNOSIS — Z51.6 ALLERGY DESENSITIZATION THERAPY: ICD-10-CM

## 2020-12-02 DIAGNOSIS — J30.9 CHRONIC ALLERGIC RHINITIS: Primary | ICD-10-CM

## 2020-12-02 PROCEDURE — 99213 PR OFFICE/OUTPT VISIT, EST, LEVL III, 20-29 MIN: ICD-10-PCS | Mod: 95,,, | Performed by: ALLERGY & IMMUNOLOGY

## 2020-12-02 PROCEDURE — 99213 OFFICE O/P EST LOW 20 MIN: CPT | Mod: 95,,, | Performed by: ALLERGY & IMMUNOLOGY

## 2020-12-02 NOTE — PROGRESS NOTES
The patient location is: patient home in [place]  The chief complaint leading to consultation is: f/u allergic rhinitis    Visit type: audiovisual    Face to Face time with patient: 20 minutes  25 minutes of total time spent on the encounter, which includes face to face time and non-face to face time preparing to see the patient (eg, review of tests), Obtaining and/or reviewing separately obtained history, Documenting clinical information in the electronic or other health record, Independently interpreting results (not separately reported) and communicating results to the patient/family/caregiver, or Care coordination (not separately reported).         Each patient to whom he or she provides medical services by telemedicine is:  (1) informed of the relationship between the physician and patient and the respective role of any other health care provider with respect to management of the patient; and (2) notified that he or she may decline to receive medical services by telemedicine and may withdraw from such care at any time.    Notes:     Chief Complaint: Follow-up      Subjective:         HPI    Bulmaro Nascimento is a 65 y.o. male here for continued evaluation of chronic allergic rhinitis to pets, mold, trees, weeds, grass. He was last seen 1/3/2020. He had done very well on IT, He was getting  maintenance vial 1:1 0.5cc monthly for about 10 years.. He was getting 3 shots C/TR in upper left, M in lower left and GR/W in right. Last injection 12/19/19. He had no reactions to shots, no itch, no hives, no swelling, no SOB, no wheeze, no dizziness. He definitely felt they helped. As he had been on for about 10 years shots were stopped in January.   In past, prior to shots would have 3-5 sinus infections per year, after shots had less then one per year.  He was sick this past week and ended up in  on antibiotics. He has 10 days of augmenting, half way through and feeling better. H e has congestion, sinus pressure and  drainage. He has this also end of January so 2 this year. No other changes in medical history. Take Flonase prn       Review of Systems  Constitutional: Negative for changes in appetite, unintentional weight loss, fever, chills and fatigue.   HENT: Negative for facial pain, nose bleeds, , postnasal drip, throat clearing, and voice change. Negative for ear discharge, ear pain, facial swelling, sore throat and trouble swallowing. positive for nasal congestion, sinus pressure and ears popping; positive for scratchy throat   Eyes: Negative for occular discharge, redness, itching and visual disturbance.  Respiratory: Negative for chest tightness, shortness of breath, wheezing, dyspnea on exertion, sputum production and cough.   Cardiovascular: Negative for chest pain, palpitations and leg swelling.  Gastrointestinal: Negative for abdominal distension, abdominal pain, constipation, diarrhea, nausea,and vomiting.   Genitourinary: Negative for difficulty urinating.   Musculoskeletal: Negative for arthralgias, gait problems, joint swelling, myalgias and back pain.   Neurological: Negative for dizziness, syncope, weakness, light-headedness.  positive for headache   Hematological: Negative for adenopathy, does not bruise or bleed easily.  Psychiatric/Behavioral: Negative for agitation, anxiety, behavioral problems, confusion, and insomnia.  Skin: Negative for rash.     PMH:  Reviewed with the patient and current for this visit    Family History:  Reviewed with the patient and current for this visit    Social History:  Reviewed with the patient and current for this visit     Environmental History:  Reviewed with the patient and current for this visit          Objective:      Skin Test results: patient had positive prick skin test to cat, tree and grass pollens, and cat allergen.      Immunotherapy: patient had been on allergen specific immunotherapy since 12/2005; his vaccines were reformulated to stronger allergen  concentrations of August 2009 because of frequent symptoms. They were stopped 1/2020.  He was on a maintenance dose of vial #1 red top 1:1 of 3 vaccines at 0.5 cc at a monthly interval.  .      Physical Exam  General:patient is well developed and well nourished, in no acute distress.  Mental Status:  Alert, oriented and cooperative  Head and Face: normocephalic   Allergic shiners: No  Eyes:    Scleral conjunctiva: clear;  Palprebal conjunctiva: normal: Eyelid Skin: normal  Skin:no rashes or lesions present; skin hydrated and supple.            Assessment:       1. Chronic allergic rhinitis     2. Allergic conjunctivitis, bilateral     3. Allergy desensitization therapy     4. Acute non-recurrent sinusitis of other sinus             Plan:       1. fluticasone 2 SEN daily   2. Immunocaps to assess allergies and determine if need to cosnider IT again  3. Phone review            Maria Guadalupe Kirkpatrick MD

## 2020-12-15 ENCOUNTER — PATIENT MESSAGE (OUTPATIENT)
Dept: ALLERGY | Facility: CLINIC | Age: 65
End: 2020-12-15

## 2021-01-14 ENCOUNTER — TELEPHONE (OUTPATIENT)
Dept: SLEEP MEDICINE | Facility: CLINIC | Age: 66
End: 2021-01-14

## 2021-02-01 RX ORDER — TADALAFIL 5 MG/1
5 TABLET ORAL DAILY PRN
Qty: 90 TABLET | Refills: 3 | Status: SHIPPED | OUTPATIENT
Start: 2021-02-01 | End: 2021-02-05 | Stop reason: SDUPTHER

## 2021-02-05 ENCOUNTER — PATIENT MESSAGE (OUTPATIENT)
Dept: UROLOGY | Facility: CLINIC | Age: 66
End: 2021-02-05

## 2021-02-05 RX ORDER — TADALAFIL 5 MG/1
5 TABLET ORAL DAILY PRN
Qty: 90 TABLET | Refills: 3 | Status: SHIPPED | OUTPATIENT
Start: 2021-02-05 | End: 2022-03-24 | Stop reason: SDUPTHER

## 2021-02-21 ENCOUNTER — PATIENT MESSAGE (OUTPATIENT)
Dept: SPORTS MEDICINE | Facility: CLINIC | Age: 66
End: 2021-02-21

## 2021-03-01 ENCOUNTER — OFFICE VISIT (OUTPATIENT)
Dept: SPORTS MEDICINE | Facility: CLINIC | Age: 66
End: 2021-03-01
Payer: MEDICARE

## 2021-03-01 VITALS
WEIGHT: 180.81 LBS | HEIGHT: 70 IN | SYSTOLIC BLOOD PRESSURE: 113 MMHG | BODY MASS INDEX: 25.88 KG/M2 | HEART RATE: 69 BPM | DIASTOLIC BLOOD PRESSURE: 68 MMHG

## 2021-03-01 DIAGNOSIS — M17.11 PRIMARY OSTEOARTHRITIS OF RIGHT KNEE: Primary | ICD-10-CM

## 2021-03-01 DIAGNOSIS — M17.12 PRIMARY OSTEOARTHRITIS OF LEFT KNEE: ICD-10-CM

## 2021-03-01 PROCEDURE — 99999 PR PBB SHADOW E&M-EST. PATIENT-LVL III: CPT | Mod: PBBFAC,,, | Performed by: PHYSICIAN ASSISTANT

## 2021-03-01 PROCEDURE — 20610 PR DRAIN/INJECT LARGE JOINT/BURSA: ICD-10-PCS | Mod: 50,S$PBB,, | Performed by: PHYSICIAN ASSISTANT

## 2021-03-01 PROCEDURE — 99499 NO LOS: ICD-10-PCS | Mod: S$PBB,,, | Performed by: PHYSICIAN ASSISTANT

## 2021-03-01 PROCEDURE — 20610 DRAIN/INJ JOINT/BURSA W/O US: CPT | Mod: 50,S$PBB,, | Performed by: PHYSICIAN ASSISTANT

## 2021-03-01 PROCEDURE — 99499 UNLISTED E&M SERVICE: CPT | Mod: S$PBB,,, | Performed by: PHYSICIAN ASSISTANT

## 2021-03-01 PROCEDURE — 99999 PR PBB SHADOW E&M-EST. PATIENT-LVL III: ICD-10-PCS | Mod: PBBFAC,,, | Performed by: PHYSICIAN ASSISTANT

## 2021-03-01 PROCEDURE — 20610 DRAIN/INJ JOINT/BURSA W/O US: CPT | Mod: 50,PBBFAC | Performed by: PHYSICIAN ASSISTANT

## 2021-03-01 PROCEDURE — 99213 OFFICE O/P EST LOW 20 MIN: CPT | Mod: PBBFAC | Performed by: PHYSICIAN ASSISTANT

## 2021-03-01 RX ADMIN — Medication 20 MG: at 02:03

## 2021-03-08 ENCOUNTER — OFFICE VISIT (OUTPATIENT)
Dept: SPORTS MEDICINE | Facility: CLINIC | Age: 66
End: 2021-03-08
Payer: MEDICARE

## 2021-03-08 VITALS
DIASTOLIC BLOOD PRESSURE: 67 MMHG | HEART RATE: 59 BPM | SYSTOLIC BLOOD PRESSURE: 136 MMHG | WEIGHT: 180 LBS | HEIGHT: 70 IN | BODY MASS INDEX: 25.77 KG/M2 | RESPIRATION RATE: 18 BRPM

## 2021-03-08 DIAGNOSIS — M17.11 PRIMARY OSTEOARTHRITIS OF RIGHT KNEE: Primary | ICD-10-CM

## 2021-03-08 DIAGNOSIS — M17.12 PRIMARY OSTEOARTHRITIS OF LEFT KNEE: ICD-10-CM

## 2021-03-08 PROCEDURE — 20610 DRAIN/INJ JOINT/BURSA W/O US: CPT | Mod: 50,PBBFAC | Performed by: PHYSICIAN ASSISTANT

## 2021-03-08 PROCEDURE — 99499 UNLISTED E&M SERVICE: CPT | Mod: S$PBB,,, | Performed by: PHYSICIAN ASSISTANT

## 2021-03-08 PROCEDURE — 99999 PR PBB SHADOW E&M-EST. PATIENT-LVL III: CPT | Mod: PBBFAC,,, | Performed by: PHYSICIAN ASSISTANT

## 2021-03-08 PROCEDURE — 99499 NO LOS: ICD-10-PCS | Mod: S$PBB,,, | Performed by: PHYSICIAN ASSISTANT

## 2021-03-08 PROCEDURE — 99213 OFFICE O/P EST LOW 20 MIN: CPT | Mod: PBBFAC,25 | Performed by: PHYSICIAN ASSISTANT

## 2021-03-08 PROCEDURE — 20610 PR DRAIN/INJECT LARGE JOINT/BURSA: ICD-10-PCS | Mod: 50,S$PBB,, | Performed by: PHYSICIAN ASSISTANT

## 2021-03-08 PROCEDURE — 20610 DRAIN/INJ JOINT/BURSA W/O US: CPT | Mod: 50,S$PBB,, | Performed by: PHYSICIAN ASSISTANT

## 2021-03-08 PROCEDURE — 99999 PR PBB SHADOW E&M-EST. PATIENT-LVL III: ICD-10-PCS | Mod: PBBFAC,,, | Performed by: PHYSICIAN ASSISTANT

## 2021-03-08 RX ADMIN — Medication 20 MG: at 10:03

## 2021-03-15 ENCOUNTER — OFFICE VISIT (OUTPATIENT)
Dept: SPORTS MEDICINE | Facility: CLINIC | Age: 66
End: 2021-03-15
Payer: MEDICARE

## 2021-03-15 VITALS
HEART RATE: 61 BPM | DIASTOLIC BLOOD PRESSURE: 66 MMHG | BODY MASS INDEX: 25.77 KG/M2 | WEIGHT: 180 LBS | SYSTOLIC BLOOD PRESSURE: 121 MMHG | HEIGHT: 70 IN

## 2021-03-15 DIAGNOSIS — M17.11 PRIMARY OSTEOARTHRITIS OF RIGHT KNEE: Primary | ICD-10-CM

## 2021-03-15 DIAGNOSIS — M17.12 PRIMARY OSTEOARTHRITIS OF LEFT KNEE: ICD-10-CM

## 2021-03-15 PROCEDURE — 99999 PR PBB SHADOW E&M-EST. PATIENT-LVL III: ICD-10-PCS | Mod: PBBFAC,,, | Performed by: PHYSICIAN ASSISTANT

## 2021-03-15 PROCEDURE — 20610 PR DRAIN/INJECT LARGE JOINT/BURSA: ICD-10-PCS | Mod: 50,S$PBB,, | Performed by: PHYSICIAN ASSISTANT

## 2021-03-15 PROCEDURE — 99213 OFFICE O/P EST LOW 20 MIN: CPT | Mod: PBBFAC | Performed by: PHYSICIAN ASSISTANT

## 2021-03-15 PROCEDURE — 99999 PR PBB SHADOW E&M-EST. PATIENT-LVL III: CPT | Mod: PBBFAC,,, | Performed by: PHYSICIAN ASSISTANT

## 2021-03-15 PROCEDURE — 20610 DRAIN/INJ JOINT/BURSA W/O US: CPT | Mod: 50,PBBFAC | Performed by: PHYSICIAN ASSISTANT

## 2021-03-15 PROCEDURE — 20610 DRAIN/INJ JOINT/BURSA W/O US: CPT | Mod: 50,S$PBB,, | Performed by: PHYSICIAN ASSISTANT

## 2021-03-15 PROCEDURE — 99499 UNLISTED E&M SERVICE: CPT | Mod: S$PBB,,, | Performed by: PHYSICIAN ASSISTANT

## 2021-03-15 PROCEDURE — 99499 NO LOS: ICD-10-PCS | Mod: S$PBB,,, | Performed by: PHYSICIAN ASSISTANT

## 2021-03-15 RX ADMIN — Medication 20 MG: at 09:03

## 2021-03-16 ENCOUNTER — OFFICE VISIT (OUTPATIENT)
Dept: ALLERGY | Facility: CLINIC | Age: 66
End: 2021-03-16
Payer: MEDICARE

## 2021-03-16 VITALS — WEIGHT: 182.13 LBS | BODY MASS INDEX: 26.07 KG/M2 | HEIGHT: 70 IN

## 2021-03-16 DIAGNOSIS — J32.1 CHRONIC FRONTAL SINUSITIS: ICD-10-CM

## 2021-03-16 DIAGNOSIS — H10.13 ALLERGIC CONJUNCTIVITIS, BILATERAL: ICD-10-CM

## 2021-03-16 DIAGNOSIS — J30.9 CHRONIC ALLERGIC RHINITIS: Primary | ICD-10-CM

## 2021-03-16 PROCEDURE — 99214 OFFICE O/P EST MOD 30 MIN: CPT | Mod: S$PBB,,, | Performed by: ALLERGY & IMMUNOLOGY

## 2021-03-16 PROCEDURE — 99213 OFFICE O/P EST LOW 20 MIN: CPT | Mod: PBBFAC,PO | Performed by: ALLERGY & IMMUNOLOGY

## 2021-03-16 PROCEDURE — 99999 PR PBB SHADOW E&M-EST. PATIENT-LVL III: CPT | Mod: PBBFAC,,, | Performed by: ALLERGY & IMMUNOLOGY

## 2021-03-16 PROCEDURE — 99214 PR OFFICE/OUTPT VISIT, EST, LEVL IV, 30-39 MIN: ICD-10-PCS | Mod: S$PBB,,, | Performed by: ALLERGY & IMMUNOLOGY

## 2021-03-16 PROCEDURE — 99999 PR PBB SHADOW E&M-EST. PATIENT-LVL III: ICD-10-PCS | Mod: PBBFAC,,, | Performed by: ALLERGY & IMMUNOLOGY

## 2021-03-16 RX ORDER — PREDNISONE 20 MG/1
20 TABLET ORAL DAILY
Qty: 5 TABLET | Refills: 0 | Status: SHIPPED | OUTPATIENT
Start: 2021-03-16 | End: 2021-03-21

## 2021-03-16 RX ORDER — AMOXICILLIN AND CLAVULANATE POTASSIUM 875; 125 MG/1; MG/1
1 TABLET, FILM COATED ORAL 2 TIMES DAILY
Qty: 42 TABLET | Refills: 0 | Status: SHIPPED | OUTPATIENT
Start: 2021-03-16 | End: 2021-04-06

## 2021-03-16 RX ORDER — AMOXICILLIN AND CLAVULANATE POTASSIUM 875; 125 MG/1; MG/1
1 TABLET, FILM COATED ORAL 2 TIMES DAILY
COMMUNITY
End: 2021-05-25

## 2021-03-16 RX ORDER — OMEPRAZOLE 20 MG/1
TABLET, ORALLY DISINTEGRATING, DELAYED RELEASE ORAL
COMMUNITY
Start: 2020-09-01 | End: 2022-09-21

## 2021-04-21 ENCOUNTER — PATIENT MESSAGE (OUTPATIENT)
Dept: ALLERGY | Facility: CLINIC | Age: 66
End: 2021-04-21

## 2021-04-28 ENCOUNTER — OFFICE VISIT (OUTPATIENT)
Dept: OTOLARYNGOLOGY | Facility: CLINIC | Age: 66
End: 2021-04-28
Payer: MEDICARE

## 2021-04-28 DIAGNOSIS — J30.9 CHRONIC ALLERGIC RHINITIS: ICD-10-CM

## 2021-04-28 DIAGNOSIS — J32.9 CHRONIC SINUSITIS, UNSPECIFIED LOCATION: Primary | ICD-10-CM

## 2021-04-28 PROCEDURE — 99203 OFFICE O/P NEW LOW 30 MIN: CPT | Mod: 95,,, | Performed by: OTOLARYNGOLOGY

## 2021-04-28 PROCEDURE — 99203 PR OFFICE/OUTPT VISIT, NEW, LEVL III, 30-44 MIN: ICD-10-PCS | Mod: 95,,, | Performed by: OTOLARYNGOLOGY

## 2021-05-05 ENCOUNTER — OFFICE VISIT (OUTPATIENT)
Dept: OTOLARYNGOLOGY | Facility: CLINIC | Age: 66
End: 2021-05-05
Payer: MEDICARE

## 2021-05-05 DIAGNOSIS — Z98.890 HISTORY OF ENDOSCOPIC SINUS SURGERY: ICD-10-CM

## 2021-05-05 DIAGNOSIS — J32.9 CHRONIC SINUSITIS, UNSPECIFIED LOCATION: Primary | ICD-10-CM

## 2021-05-05 DIAGNOSIS — J30.9 CHRONIC ALLERGIC RHINITIS: ICD-10-CM

## 2021-05-05 PROCEDURE — 99212 OFFICE O/P EST SF 10 MIN: CPT | Mod: PBBFAC,PN,25 | Performed by: OTOLARYNGOLOGY

## 2021-05-05 PROCEDURE — 31231 NASAL ENDOSCOPY DX: CPT | Mod: PBBFAC,PN | Performed by: OTOLARYNGOLOGY

## 2021-05-05 PROCEDURE — 99999 PR PBB SHADOW E&M-EST. PATIENT-LVL II: CPT | Mod: PBBFAC,,, | Performed by: OTOLARYNGOLOGY

## 2021-05-05 PROCEDURE — 31231 NASAL ENDOSCOPY DX: CPT | Mod: S$PBB,,, | Performed by: OTOLARYNGOLOGY

## 2021-05-05 PROCEDURE — 99214 OFFICE O/P EST MOD 30 MIN: CPT | Mod: 25,S$PBB,, | Performed by: OTOLARYNGOLOGY

## 2021-05-05 PROCEDURE — 99999 PR PBB SHADOW E&M-EST. PATIENT-LVL II: ICD-10-PCS | Mod: PBBFAC,,, | Performed by: OTOLARYNGOLOGY

## 2021-05-05 PROCEDURE — 31231 PR NASAL ENDOSCOPY, DX: ICD-10-PCS | Mod: S$PBB,,, | Performed by: OTOLARYNGOLOGY

## 2021-05-05 PROCEDURE — 99214 PR OFFICE/OUTPT VISIT, EST, LEVL IV, 30-39 MIN: ICD-10-PCS | Mod: 25,S$PBB,, | Performed by: OTOLARYNGOLOGY

## 2021-05-05 RX ORDER — FLUTICASONE PROPIONATE 50 MCG
1 SPRAY, SUSPENSION (ML) NASAL 2 TIMES DAILY
Qty: 16 G | Refills: 11 | Status: SHIPPED | OUTPATIENT
Start: 2021-05-05

## 2021-05-12 ENCOUNTER — HOSPITAL ENCOUNTER (OUTPATIENT)
Dept: RADIOLOGY | Facility: HOSPITAL | Age: 66
Discharge: HOME | End: 2021-05-12
Attending: OTOLARYNGOLOGY
Payer: MEDICARE

## 2021-05-12 DIAGNOSIS — Z98.890 HISTORY OF ENDOSCOPIC SINUS SURGERY: ICD-10-CM

## 2021-05-12 DIAGNOSIS — J32.9 CHRONIC SINUSITIS, UNSPECIFIED LOCATION: ICD-10-CM

## 2021-05-12 PROCEDURE — 70486 CT MEDTRONIC SINUSES WITHOUT: ICD-10-PCS | Mod: 26,,, | Performed by: RADIOLOGY

## 2021-05-12 PROCEDURE — 70486 CT MAXILLOFACIAL W/O DYE: CPT | Mod: TC

## 2021-05-12 PROCEDURE — 70486 CT MAXILLOFACIAL W/O DYE: CPT | Mod: 26,,, | Performed by: RADIOLOGY

## 2021-05-13 ENCOUNTER — PATIENT MESSAGE (OUTPATIENT)
Dept: OTOLARYNGOLOGY | Facility: CLINIC | Age: 66
End: 2021-05-13

## 2021-05-24 ENCOUNTER — TELEPHONE (OUTPATIENT)
Dept: ORTHOPEDICS | Facility: CLINIC | Age: 66
End: 2021-05-24

## 2021-05-24 DIAGNOSIS — M50.30 DDD (DEGENERATIVE DISC DISEASE), CERVICAL: Primary | ICD-10-CM

## 2021-05-25 ENCOUNTER — OFFICE VISIT (OUTPATIENT)
Dept: ORTHOPEDICS | Facility: CLINIC | Age: 66
End: 2021-05-25
Payer: MEDICARE

## 2021-05-25 ENCOUNTER — HOSPITAL ENCOUNTER (OUTPATIENT)
Dept: RADIOLOGY | Facility: HOSPITAL | Age: 66
Discharge: HOME | End: 2021-05-25
Attending: PHYSICIAN ASSISTANT
Payer: MEDICARE

## 2021-05-25 VITALS — WEIGHT: 176.38 LBS | BODY MASS INDEX: 25.25 KG/M2 | HEIGHT: 70 IN

## 2021-05-25 DIAGNOSIS — M54.2 NECK PAIN: ICD-10-CM

## 2021-05-25 DIAGNOSIS — M50.30 DDD (DEGENERATIVE DISC DISEASE), CERVICAL: ICD-10-CM

## 2021-05-25 DIAGNOSIS — M50.30 DDD (DEGENERATIVE DISC DISEASE), CERVICAL: Primary | ICD-10-CM

## 2021-05-25 PROCEDURE — 99214 OFFICE O/P EST MOD 30 MIN: CPT | Mod: S$PBB,,, | Performed by: PHYSICIAN ASSISTANT

## 2021-05-25 PROCEDURE — 72050 X-RAY EXAM NECK SPINE 4/5VWS: CPT | Mod: TC

## 2021-05-25 PROCEDURE — 99999 PR PBB SHADOW E&M-EST. PATIENT-LVL III: CPT | Mod: PBBFAC,,, | Performed by: PHYSICIAN ASSISTANT

## 2021-05-25 PROCEDURE — 99214 PR OFFICE/OUTPT VISIT, EST, LEVL IV, 30-39 MIN: ICD-10-PCS | Mod: S$PBB,,, | Performed by: PHYSICIAN ASSISTANT

## 2021-05-25 PROCEDURE — 72050 X-RAY EXAM NECK SPINE 4/5VWS: CPT | Mod: 26,,, | Performed by: RADIOLOGY

## 2021-05-25 PROCEDURE — 99999 PR PBB SHADOW E&M-EST. PATIENT-LVL III: ICD-10-PCS | Mod: PBBFAC,,, | Performed by: PHYSICIAN ASSISTANT

## 2021-05-25 PROCEDURE — 99213 OFFICE O/P EST LOW 20 MIN: CPT | Mod: PBBFAC,25 | Performed by: PHYSICIAN ASSISTANT

## 2021-05-25 PROCEDURE — 72050 XR CERVICAL SPINE AP LAT WITH FLEX EXTEN: ICD-10-PCS | Mod: 26,,, | Performed by: RADIOLOGY

## 2021-05-25 RX ORDER — METHOCARBAMOL 750 MG/1
750 TABLET, FILM COATED ORAL 3 TIMES DAILY
Qty: 60 TABLET | Refills: 0 | Status: SHIPPED | OUTPATIENT
Start: 2021-05-25 | End: 2021-06-14

## 2021-05-25 RX ORDER — METHOCARBAMOL 750 MG/1
750 TABLET, FILM COATED ORAL 3 TIMES DAILY
COMMUNITY
End: 2021-05-25

## 2021-07-12 ENCOUNTER — PATIENT MESSAGE (OUTPATIENT)
Dept: SLEEP MEDICINE | Facility: CLINIC | Age: 66
End: 2021-07-12

## 2021-07-12 DIAGNOSIS — G47.00 INSOMNIA, UNSPECIFIED TYPE: ICD-10-CM

## 2021-07-12 RX ORDER — ESZOPICLONE 3 MG/1
TABLET, FILM COATED ORAL
Qty: 90 TABLET | Refills: 2 | OUTPATIENT
Start: 2021-07-12

## 2021-07-16 ENCOUNTER — OFFICE VISIT (OUTPATIENT)
Dept: URGENT CARE | Facility: CLINIC | Age: 66
End: 2021-07-16
Payer: MEDICARE

## 2021-07-16 VITALS
OXYGEN SATURATION: 98 % | BODY MASS INDEX: 25.77 KG/M2 | WEIGHT: 180 LBS | DIASTOLIC BLOOD PRESSURE: 77 MMHG | HEIGHT: 70 IN | TEMPERATURE: 98 F | RESPIRATION RATE: 18 BRPM | HEART RATE: 58 BPM | SYSTOLIC BLOOD PRESSURE: 126 MMHG

## 2021-07-16 DIAGNOSIS — J02.9 SORE THROAT: Primary | ICD-10-CM

## 2021-07-16 DIAGNOSIS — R09.81 SINUS CONGESTION: ICD-10-CM

## 2021-07-16 LAB
CTP QC/QA: YES
CTP QC/QA: YES
MOLECULAR STREP A: NEGATIVE
SARS-COV-2 RDRP RESP QL NAA+PROBE: NEGATIVE

## 2021-07-16 PROCEDURE — U0002 COVID-19 LAB TEST NON-CDC: HCPCS | Mod: QW,CR,ICN,CMP | Performed by: PHYSICIAN ASSISTANT

## 2021-07-16 PROCEDURE — 87651 STREP A DNA AMP PROBE: CPT | Mod: QW,S$GLB,, | Performed by: PHYSICIAN ASSISTANT

## 2021-07-16 PROCEDURE — 99213 OFFICE O/P EST LOW 20 MIN: CPT | Mod: ICN,CMP,S$GLB,CS | Performed by: PHYSICIAN ASSISTANT

## 2021-07-16 PROCEDURE — U0002: ICD-10-PCS | Mod: QW,CR,ICN,CMP | Performed by: PHYSICIAN ASSISTANT

## 2021-07-16 PROCEDURE — 99213 PR OFFICE/OUTPT VISIT, EST, LEVL III, 20-29 MIN: ICD-10-PCS | Mod: ICN,CMP,S$GLB,CS | Performed by: PHYSICIAN ASSISTANT

## 2021-07-16 PROCEDURE — 87651 POCT STREP A MOLECULAR: ICD-10-PCS | Mod: QW,S$GLB,, | Performed by: PHYSICIAN ASSISTANT

## 2021-07-19 ENCOUNTER — PATIENT MESSAGE (OUTPATIENT)
Dept: SLEEP MEDICINE | Facility: CLINIC | Age: 66
End: 2021-07-19

## 2021-07-19 DIAGNOSIS — G47.00 INSOMNIA, UNSPECIFIED TYPE: ICD-10-CM

## 2021-07-19 RX ORDER — ESZOPICLONE 3 MG/1
TABLET, FILM COATED ORAL
Qty: 90 TABLET | Refills: 2 | Status: CANCELLED | OUTPATIENT
Start: 2021-07-19

## 2021-07-21 DIAGNOSIS — G47.00 INSOMNIA, UNSPECIFIED TYPE: ICD-10-CM

## 2021-07-21 RX ORDER — ESZOPICLONE 3 MG/1
TABLET, FILM COATED ORAL
Qty: 90 TABLET | Refills: 2 | Status: SHIPPED | OUTPATIENT
Start: 2021-07-21 | End: 2021-10-20 | Stop reason: ALTCHOICE

## 2021-07-22 ENCOUNTER — PATIENT MESSAGE (OUTPATIENT)
Dept: ALLERGY | Facility: CLINIC | Age: 66
End: 2021-07-22

## 2021-07-22 ENCOUNTER — TELEPHONE (OUTPATIENT)
Dept: ALLERGY | Facility: CLINIC | Age: 66
End: 2021-07-22

## 2021-07-22 DIAGNOSIS — G47.00 INSOMNIA, UNSPECIFIED TYPE: Primary | ICD-10-CM

## 2021-07-22 DIAGNOSIS — J32.9 RECURRENT SINUS INFECTIONS: Primary | ICD-10-CM

## 2021-07-22 RX ORDER — ESZOPICLONE 3 MG/1
3 TABLET, FILM COATED ORAL NIGHTLY
Qty: 30 TABLET | Refills: 0 | Status: SHIPPED | OUTPATIENT
Start: 2021-07-22 | End: 2021-08-09 | Stop reason: SDUPTHER

## 2021-07-23 ENCOUNTER — PATIENT MESSAGE (OUTPATIENT)
Dept: SPORTS MEDICINE | Facility: CLINIC | Age: 66
End: 2021-07-23

## 2021-07-27 ENCOUNTER — TELEPHONE (OUTPATIENT)
Dept: ALLERGY | Facility: CLINIC | Age: 66
End: 2021-07-27

## 2021-08-06 ENCOUNTER — PATIENT OUTREACH (OUTPATIENT)
Dept: ADMINISTRATIVE | Facility: OTHER | Age: 66
End: 2021-08-06

## 2021-08-09 ENCOUNTER — OFFICE VISIT (OUTPATIENT)
Dept: SLEEP MEDICINE | Facility: CLINIC | Age: 66
End: 2021-08-09
Payer: MEDICARE

## 2021-08-09 ENCOUNTER — OFFICE VISIT (OUTPATIENT)
Dept: FAMILY MEDICINE | Facility: CLINIC | Age: 66
End: 2021-08-09
Payer: MEDICARE

## 2021-08-09 VITALS
WEIGHT: 177.56 LBS | SYSTOLIC BLOOD PRESSURE: 136 MMHG | OXYGEN SATURATION: 97 % | TEMPERATURE: 97 F | HEART RATE: 67 BPM | HEIGHT: 70 IN | DIASTOLIC BLOOD PRESSURE: 86 MMHG | BODY MASS INDEX: 25.42 KG/M2 | RESPIRATION RATE: 16 BRPM

## 2021-08-09 VITALS
HEART RATE: 56 BPM | WEIGHT: 178.13 LBS | SYSTOLIC BLOOD PRESSURE: 119 MMHG | BODY MASS INDEX: 25.5 KG/M2 | DIASTOLIC BLOOD PRESSURE: 70 MMHG | HEIGHT: 70 IN

## 2021-08-09 DIAGNOSIS — B36.9 FUNGAL DERMATITIS: Primary | ICD-10-CM

## 2021-08-09 DIAGNOSIS — N40.0 BENIGN PROSTATIC HYPERPLASIA WITHOUT LOWER URINARY TRACT SYMPTOMS: ICD-10-CM

## 2021-08-09 DIAGNOSIS — Z12.5 SCREENING PSA (PROSTATE SPECIFIC ANTIGEN): ICD-10-CM

## 2021-08-09 DIAGNOSIS — G47.33 OSA (OBSTRUCTIVE SLEEP APNEA): ICD-10-CM

## 2021-08-09 DIAGNOSIS — G47.00 INSOMNIA, UNSPECIFIED TYPE: Primary | ICD-10-CM

## 2021-08-09 PROCEDURE — 99999 PR PBB SHADOW E&M-EST. PATIENT-LVL IV: ICD-10-PCS | Mod: PBBFAC,,, | Performed by: NURSE PRACTITIONER

## 2021-08-09 PROCEDURE — 99213 OFFICE O/P EST LOW 20 MIN: CPT | Mod: S$PBB,,, | Performed by: NURSE PRACTITIONER

## 2021-08-09 PROCEDURE — 99213 OFFICE O/P EST LOW 20 MIN: CPT | Mod: PBBFAC,27,PO | Performed by: PSYCHIATRY & NEUROLOGY

## 2021-08-09 PROCEDURE — 99213 PR OFFICE/OUTPT VISIT, EST, LEVL III, 20-29 MIN: ICD-10-PCS | Mod: S$PBB,,, | Performed by: NURSE PRACTITIONER

## 2021-08-09 PROCEDURE — 99214 OFFICE O/P EST MOD 30 MIN: CPT | Mod: S$PBB,,, | Performed by: PSYCHIATRY & NEUROLOGY

## 2021-08-09 PROCEDURE — 99214 OFFICE O/P EST MOD 30 MIN: CPT | Mod: PBBFAC,PN | Performed by: NURSE PRACTITIONER

## 2021-08-09 PROCEDURE — 99999 PR PBB SHADOW E&M-EST. PATIENT-LVL IV: CPT | Mod: PBBFAC,,, | Performed by: NURSE PRACTITIONER

## 2021-08-09 PROCEDURE — 99214 PR OFFICE/OUTPT VISIT, EST, LEVL IV, 30-39 MIN: ICD-10-PCS | Mod: S$PBB,,, | Performed by: PSYCHIATRY & NEUROLOGY

## 2021-08-09 PROCEDURE — 99999 PR PBB SHADOW E&M-EST. PATIENT-LVL III: ICD-10-PCS | Mod: PBBFAC,,, | Performed by: PSYCHIATRY & NEUROLOGY

## 2021-08-09 PROCEDURE — 99999 PR PBB SHADOW E&M-EST. PATIENT-LVL III: CPT | Mod: PBBFAC,,, | Performed by: PSYCHIATRY & NEUROLOGY

## 2021-08-09 RX ORDER — KETOCONAZOLE 20 MG/G
CREAM TOPICAL 2 TIMES DAILY
Qty: 60 G | Refills: 0 | Status: SHIPPED | OUTPATIENT
Start: 2021-08-09 | End: 2021-10-05 | Stop reason: ALTCHOICE

## 2021-08-09 RX ORDER — METHOCARBAMOL 750 MG/1
TABLET, FILM COATED ORAL
COMMUNITY
Start: 2021-05-25 | End: 2022-08-23 | Stop reason: ALTCHOICE

## 2021-08-11 ENCOUNTER — TELEPHONE (OUTPATIENT)
Dept: UROLOGY | Facility: CLINIC | Age: 66
End: 2021-08-11

## 2021-08-12 ENCOUNTER — TELEPHONE (OUTPATIENT)
Dept: UROLOGY | Facility: CLINIC | Age: 66
End: 2021-08-12

## 2021-08-12 ENCOUNTER — TELEPHONE (OUTPATIENT)
Dept: FAMILY MEDICINE | Facility: CLINIC | Age: 66
End: 2021-08-12

## 2021-08-23 ENCOUNTER — OFFICE VISIT (OUTPATIENT)
Dept: FAMILY MEDICINE | Facility: CLINIC | Age: 66
End: 2021-08-23
Payer: MEDICARE

## 2021-08-23 VITALS
SYSTOLIC BLOOD PRESSURE: 114 MMHG | HEIGHT: 71 IN | OXYGEN SATURATION: 97 % | RESPIRATION RATE: 16 BRPM | HEART RATE: 55 BPM | BODY MASS INDEX: 24.86 KG/M2 | DIASTOLIC BLOOD PRESSURE: 68 MMHG | WEIGHT: 177.56 LBS | TEMPERATURE: 98 F

## 2021-08-23 DIAGNOSIS — Z87.898 HISTORY OF ELEVATED PSA: ICD-10-CM

## 2021-08-23 DIAGNOSIS — L25.5 CONTACT DERMATITIS DUE TO PLANTS, EXCEPT FOOD, UNSPECIFIED CONTACT DERMATITIS TYPE: ICD-10-CM

## 2021-08-23 DIAGNOSIS — J30.9 CHRONIC ALLERGIC RHINITIS: ICD-10-CM

## 2021-08-23 DIAGNOSIS — Z00.00 MEDICARE ANNUAL WELLNESS VISIT, SUBSEQUENT: Primary | ICD-10-CM

## 2021-08-23 DIAGNOSIS — K21.9 CHRONIC GERD: ICD-10-CM

## 2021-08-23 DIAGNOSIS — G47.00 INSOMNIA, UNSPECIFIED TYPE: ICD-10-CM

## 2021-08-23 DIAGNOSIS — G89.29 CHRONIC NONINTRACTABLE HEADACHE, UNSPECIFIED HEADACHE TYPE: ICD-10-CM

## 2021-08-23 DIAGNOSIS — R51.9 CHRONIC NONINTRACTABLE HEADACHE, UNSPECIFIED HEADACHE TYPE: ICD-10-CM

## 2021-08-23 DIAGNOSIS — F45.8 BRUXISM: ICD-10-CM

## 2021-08-23 DIAGNOSIS — N40.0 BENIGN PROSTATIC HYPERPLASIA WITHOUT LOWER URINARY TRACT SYMPTOMS: ICD-10-CM

## 2021-08-23 DIAGNOSIS — Z23 NEED FOR STREPTOCOCCUS PNEUMONIAE VACCINATION: ICD-10-CM

## 2021-08-23 DIAGNOSIS — M50.30 DDD (DEGENERATIVE DISC DISEASE), CERVICAL: ICD-10-CM

## 2021-08-23 PROCEDURE — 90732 PPSV23 VACC 2 YRS+ SUBQ/IM: CPT | Mod: PBBFAC,PN

## 2021-08-23 PROCEDURE — G0009 ADMIN PNEUMOCOCCAL VACCINE: HCPCS | Mod: PBBFAC,PN

## 2021-08-23 PROCEDURE — G0439 PR MEDICARE ANNUAL WELLNESS SUBSEQUENT VISIT: ICD-10-PCS | Mod: ,,, | Performed by: NURSE PRACTITIONER

## 2021-08-23 PROCEDURE — 99999 PR PBB SHADOW E&M-EST. PATIENT-LVL IV: CPT | Mod: PBBFAC,,, | Performed by: NURSE PRACTITIONER

## 2021-08-23 PROCEDURE — 99999 PR PBB SHADOW E&M-EST. PATIENT-LVL IV: ICD-10-PCS | Mod: PBBFAC,,, | Performed by: NURSE PRACTITIONER

## 2021-08-23 PROCEDURE — 99214 OFFICE O/P EST MOD 30 MIN: CPT | Mod: PBBFAC,PN | Performed by: NURSE PRACTITIONER

## 2021-08-23 PROCEDURE — G0439 PPPS, SUBSEQ VISIT: HCPCS | Mod: ,,, | Performed by: NURSE PRACTITIONER

## 2021-08-23 RX ORDER — TRIAMCINOLONE ACETONIDE 1 MG/G
CREAM TOPICAL 2 TIMES DAILY
Qty: 80 G | Refills: 0 | Status: SHIPPED | OUTPATIENT
Start: 2021-08-23 | End: 2021-10-05 | Stop reason: ALTCHOICE

## 2021-08-26 ENCOUNTER — TELEPHONE (OUTPATIENT)
Dept: ALLERGY | Facility: CLINIC | Age: 66
End: 2021-08-26

## 2021-08-27 ENCOUNTER — PATIENT MESSAGE (OUTPATIENT)
Dept: ALLERGY | Facility: CLINIC | Age: 66
End: 2021-08-27

## 2021-09-16 ENCOUNTER — PATIENT MESSAGE (OUTPATIENT)
Dept: ALLERGY | Facility: CLINIC | Age: 66
End: 2021-09-16

## 2021-10-05 ENCOUNTER — OFFICE VISIT (OUTPATIENT)
Dept: FAMILY MEDICINE | Facility: CLINIC | Age: 66
End: 2021-10-05
Payer: MEDICARE

## 2021-10-05 VITALS
WEIGHT: 178.88 LBS | HEIGHT: 71 IN | OXYGEN SATURATION: 98 % | DIASTOLIC BLOOD PRESSURE: 66 MMHG | SYSTOLIC BLOOD PRESSURE: 134 MMHG | RESPIRATION RATE: 16 BRPM | TEMPERATURE: 98 F | BODY MASS INDEX: 25.04 KG/M2 | HEART RATE: 58 BPM

## 2021-10-05 DIAGNOSIS — N34.3 URETHRITIS, NOT SEXUALLY TRANSMITTED: Primary | ICD-10-CM

## 2021-10-05 DIAGNOSIS — N34.1 URETHRITIS, NOT SEXUALLY TRANSMITTED: Primary | ICD-10-CM

## 2021-10-05 PROCEDURE — 99214 OFFICE O/P EST MOD 30 MIN: CPT | Mod: S$PBB,,, | Performed by: NURSE PRACTITIONER

## 2021-10-05 PROCEDURE — 99999 PR PBB SHADOW E&M-EST. PATIENT-LVL IV: CPT | Mod: PBBFAC,,, | Performed by: NURSE PRACTITIONER

## 2021-10-05 PROCEDURE — 99214 PR OFFICE/OUTPT VISIT, EST, LEVL IV, 30-39 MIN: ICD-10-PCS | Mod: S$PBB,,, | Performed by: NURSE PRACTITIONER

## 2021-10-05 PROCEDURE — 99999 PR PBB SHADOW E&M-EST. PATIENT-LVL IV: ICD-10-PCS | Mod: PBBFAC,,, | Performed by: NURSE PRACTITIONER

## 2021-10-05 PROCEDURE — 99214 OFFICE O/P EST MOD 30 MIN: CPT | Mod: PBBFAC,PN | Performed by: NURSE PRACTITIONER

## 2021-10-05 RX ORDER — DOXYCYCLINE 100 MG/1
100 CAPSULE ORAL EVERY 12 HOURS
Qty: 14 CAPSULE | Refills: 0 | Status: SHIPPED | OUTPATIENT
Start: 2021-10-05 | End: 2021-10-12

## 2021-10-12 ENCOUNTER — OFFICE VISIT (OUTPATIENT)
Dept: ALLERGY | Facility: CLINIC | Age: 66
End: 2021-10-12
Payer: MEDICARE

## 2021-10-12 VITALS — BODY MASS INDEX: 25 KG/M2 | HEIGHT: 71 IN | WEIGHT: 178.56 LBS

## 2021-10-12 DIAGNOSIS — J30.9 CHRONIC ALLERGIC RHINITIS: Primary | ICD-10-CM

## 2021-10-12 DIAGNOSIS — H10.13 ALLERGIC CONJUNCTIVITIS, BILATERAL: ICD-10-CM

## 2021-10-12 DIAGNOSIS — J32.8 OTHER CHRONIC SINUSITIS: ICD-10-CM

## 2021-10-12 PROCEDURE — 99999 PR PBB SHADOW E&M-EST. PATIENT-LVL III: ICD-10-PCS | Mod: PBBFAC,,, | Performed by: ALLERGY & IMMUNOLOGY

## 2021-10-12 PROCEDURE — 99999 PR PBB SHADOW E&M-EST. PATIENT-LVL III: CPT | Mod: PBBFAC,,, | Performed by: ALLERGY & IMMUNOLOGY

## 2021-10-12 PROCEDURE — 99213 OFFICE O/P EST LOW 20 MIN: CPT | Mod: 25,PBBFAC,PO | Performed by: ALLERGY & IMMUNOLOGY

## 2021-10-12 PROCEDURE — 95004 PERQ TESTS W/ALRGNC XTRCS: CPT | Mod: S$PBB,,, | Performed by: ALLERGY & IMMUNOLOGY

## 2021-10-12 PROCEDURE — 95004 PERQ TESTS W/ALRGNC XTRCS: CPT | Mod: PBBFAC,PO | Performed by: ALLERGY & IMMUNOLOGY

## 2021-10-12 PROCEDURE — 95004 PR ALLERGY SKIN TESTS,ALLERGENS: ICD-10-PCS | Mod: S$PBB,,, | Performed by: ALLERGY & IMMUNOLOGY

## 2021-10-12 PROCEDURE — 99214 OFFICE O/P EST MOD 30 MIN: CPT | Mod: S$PBB,25,, | Performed by: ALLERGY & IMMUNOLOGY

## 2021-10-12 PROCEDURE — 99214 PR OFFICE/OUTPT VISIT, EST, LEVL IV, 30-39 MIN: ICD-10-PCS | Mod: S$PBB,25,, | Performed by: ALLERGY & IMMUNOLOGY

## 2021-10-13 DIAGNOSIS — M17.11 PRIMARY OSTEOARTHRITIS OF RIGHT KNEE: Primary | ICD-10-CM

## 2021-10-13 DIAGNOSIS — M17.12 PRIMARY OSTEOARTHRITIS OF LEFT KNEE: ICD-10-CM

## 2021-10-15 ENCOUNTER — OFFICE VISIT (OUTPATIENT)
Dept: SPORTS MEDICINE | Facility: CLINIC | Age: 66
End: 2021-10-15
Payer: MEDICARE

## 2021-10-15 VITALS
TEMPERATURE: 98 F | HEIGHT: 70 IN | DIASTOLIC BLOOD PRESSURE: 72 MMHG | SYSTOLIC BLOOD PRESSURE: 138 MMHG | WEIGHT: 180 LBS | HEART RATE: 52 BPM | BODY MASS INDEX: 25.77 KG/M2

## 2021-10-15 DIAGNOSIS — M17.12 PRIMARY OSTEOARTHRITIS OF LEFT KNEE: ICD-10-CM

## 2021-10-15 DIAGNOSIS — M17.11 PRIMARY OSTEOARTHRITIS OF RIGHT KNEE: Primary | ICD-10-CM

## 2021-10-15 PROCEDURE — 99213 OFFICE O/P EST LOW 20 MIN: CPT | Mod: PBBFAC,25 | Performed by: PHYSICIAN ASSISTANT

## 2021-10-15 PROCEDURE — 20610 DRAIN/INJ JOINT/BURSA W/O US: CPT | Mod: 50,PBBFAC | Performed by: PHYSICIAN ASSISTANT

## 2021-10-15 PROCEDURE — 20610 PR DRAIN/INJECT LARGE JOINT/BURSA: ICD-10-PCS | Mod: 50,S$PBB,, | Performed by: PHYSICIAN ASSISTANT

## 2021-10-15 PROCEDURE — 99499 UNLISTED E&M SERVICE: CPT | Mod: S$PBB,,, | Performed by: PHYSICIAN ASSISTANT

## 2021-10-15 PROCEDURE — 99999 PR PBB SHADOW E&M-EST. PATIENT-LVL III: CPT | Mod: PBBFAC,,, | Performed by: PHYSICIAN ASSISTANT

## 2021-10-15 PROCEDURE — 20610 DRAIN/INJ JOINT/BURSA W/O US: CPT | Mod: 50,S$PBB,, | Performed by: PHYSICIAN ASSISTANT

## 2021-10-15 PROCEDURE — 99499 NO LOS: ICD-10-PCS | Mod: S$PBB,,, | Performed by: PHYSICIAN ASSISTANT

## 2021-10-15 PROCEDURE — 99999 PR PBB SHADOW E&M-EST. PATIENT-LVL III: ICD-10-PCS | Mod: PBBFAC,,, | Performed by: PHYSICIAN ASSISTANT

## 2021-10-15 RX ADMIN — Medication 20 MG: at 12:10

## 2021-10-20 ENCOUNTER — PATIENT MESSAGE (OUTPATIENT)
Dept: SLEEP MEDICINE | Facility: CLINIC | Age: 66
End: 2021-10-20
Payer: MEDICARE

## 2021-10-20 DIAGNOSIS — G47.00 INSOMNIA, UNSPECIFIED TYPE: Primary | ICD-10-CM

## 2021-10-20 RX ORDER — ESZOPICLONE 1 MG/1
TABLET, FILM COATED ORAL
Qty: 30 TABLET | Refills: 2 | Status: SHIPPED | OUTPATIENT
Start: 2021-10-20 | End: 2022-03-03 | Stop reason: ALTCHOICE

## 2021-10-20 RX ORDER — ESZOPICLONE 2 MG/1
TABLET, FILM COATED ORAL
Qty: 30 TABLET | Refills: 2 | Status: SHIPPED | OUTPATIENT
Start: 2021-10-20 | End: 2022-03-03 | Stop reason: ALTCHOICE

## 2021-10-20 RX ORDER — HYDROXYZINE HYDROCHLORIDE 50 MG/1
TABLET, FILM COATED ORAL
Qty: 30 TABLET | Refills: 5 | Status: SHIPPED | OUTPATIENT
Start: 2021-10-20 | End: 2022-03-24

## 2021-10-21 ENCOUNTER — OFFICE VISIT (OUTPATIENT)
Dept: UROLOGY | Facility: CLINIC | Age: 66
End: 2021-10-21
Payer: MEDICARE

## 2021-10-21 VITALS
OXYGEN SATURATION: 98 % | HEART RATE: 60 BPM | WEIGHT: 180.19 LBS | HEIGHT: 70 IN | BODY MASS INDEX: 25.8 KG/M2 | SYSTOLIC BLOOD PRESSURE: 114 MMHG | DIASTOLIC BLOOD PRESSURE: 89 MMHG

## 2021-10-21 DIAGNOSIS — R35.1 NOCTURIA: ICD-10-CM

## 2021-10-21 DIAGNOSIS — R39.89 URETHRAL PAIN: Primary | ICD-10-CM

## 2021-10-21 DIAGNOSIS — N40.0 BENIGN NON-NODULAR PROSTATIC HYPERPLASIA WITHOUT LOWER URINARY TRACT SYMPTOMS: ICD-10-CM

## 2021-10-21 PROCEDURE — 99999 PR PBB SHADOW E&M-EST. PATIENT-LVL IV: CPT | Mod: PBBFAC,,, | Performed by: NURSE PRACTITIONER

## 2021-10-21 PROCEDURE — 99214 PR OFFICE/OUTPT VISIT, EST, LEVL IV, 30-39 MIN: ICD-10-PCS | Mod: S$PBB,,, | Performed by: NURSE PRACTITIONER

## 2021-10-21 PROCEDURE — 81003 URINALYSIS AUTO W/O SCOPE: CPT | Performed by: NURSE PRACTITIONER

## 2021-10-21 PROCEDURE — 99999 PR PBB SHADOW E&M-EST. PATIENT-LVL IV: ICD-10-PCS | Mod: PBBFAC,,, | Performed by: NURSE PRACTITIONER

## 2021-10-21 PROCEDURE — 99214 OFFICE O/P EST MOD 30 MIN: CPT | Mod: PBBFAC,PO | Performed by: NURSE PRACTITIONER

## 2021-10-21 PROCEDURE — 87086 URINE CULTURE/COLONY COUNT: CPT | Performed by: NURSE PRACTITIONER

## 2021-10-21 PROCEDURE — 99214 OFFICE O/P EST MOD 30 MIN: CPT | Mod: S$PBB,,, | Performed by: NURSE PRACTITIONER

## 2021-10-21 RX ORDER — DOXYCYCLINE 100 MG/1
CAPSULE ORAL
COMMUNITY
Start: 2021-10-05 | End: 2022-03-24 | Stop reason: ALTCHOICE

## 2021-10-22 ENCOUNTER — OFFICE VISIT (OUTPATIENT)
Dept: SPORTS MEDICINE | Facility: CLINIC | Age: 66
End: 2021-10-22
Payer: MEDICARE

## 2021-10-22 VITALS
HEIGHT: 70 IN | SYSTOLIC BLOOD PRESSURE: 125 MMHG | BODY MASS INDEX: 25.48 KG/M2 | DIASTOLIC BLOOD PRESSURE: 67 MMHG | HEART RATE: 55 BPM | WEIGHT: 178 LBS | TEMPERATURE: 98 F

## 2021-10-22 DIAGNOSIS — M17.12 PRIMARY OSTEOARTHRITIS OF LEFT KNEE: ICD-10-CM

## 2021-10-22 DIAGNOSIS — M17.11 PRIMARY OSTEOARTHRITIS OF RIGHT KNEE: Primary | ICD-10-CM

## 2021-10-22 LAB
BILIRUB UR QL STRIP: NEGATIVE
CLARITY UR REFRACT.AUTO: ABNORMAL
COLOR UR AUTO: YELLOW
GLUCOSE UR QL STRIP: NEGATIVE
HGB UR QL STRIP: NEGATIVE
KETONES UR QL STRIP: NEGATIVE
LEUKOCYTE ESTERASE UR QL STRIP: NEGATIVE
NITRITE UR QL STRIP: NEGATIVE
PH UR STRIP: 5 [PH] (ref 5–8)
PROT UR QL STRIP: NEGATIVE
SP GR UR STRIP: 1.01 (ref 1–1.03)
URN SPEC COLLECT METH UR: ABNORMAL

## 2021-10-22 PROCEDURE — 99499 UNLISTED E&M SERVICE: CPT | Mod: S$PBB,,, | Performed by: PHYSICIAN ASSISTANT

## 2021-10-22 PROCEDURE — 99499 NO LOS: ICD-10-PCS | Mod: S$PBB,,, | Performed by: PHYSICIAN ASSISTANT

## 2021-10-22 PROCEDURE — 20610 DRAIN/INJ JOINT/BURSA W/O US: CPT | Mod: 50,PBBFAC | Performed by: PHYSICIAN ASSISTANT

## 2021-10-22 PROCEDURE — 99213 OFFICE O/P EST LOW 20 MIN: CPT | Mod: PBBFAC,25 | Performed by: PHYSICIAN ASSISTANT

## 2021-10-22 PROCEDURE — 20610 DRAIN/INJ JOINT/BURSA W/O US: CPT | Mod: 50,S$PBB,, | Performed by: PHYSICIAN ASSISTANT

## 2021-10-22 PROCEDURE — 20610 PR DRAIN/INJECT LARGE JOINT/BURSA: ICD-10-PCS | Mod: 50,S$PBB,, | Performed by: PHYSICIAN ASSISTANT

## 2021-10-22 PROCEDURE — 99999 PR PBB SHADOW E&M-EST. PATIENT-LVL III: CPT | Mod: PBBFAC,,, | Performed by: PHYSICIAN ASSISTANT

## 2021-10-22 PROCEDURE — 99999 PR PBB SHADOW E&M-EST. PATIENT-LVL III: ICD-10-PCS | Mod: PBBFAC,,, | Performed by: PHYSICIAN ASSISTANT

## 2021-10-22 RX ADMIN — Medication 20 MG: at 03:10

## 2021-10-23 LAB — BACTERIA UR CULT: NO GROWTH

## 2021-10-24 ENCOUNTER — PATIENT MESSAGE (OUTPATIENT)
Dept: SLEEP MEDICINE | Facility: CLINIC | Age: 66
End: 2021-10-24
Payer: MEDICARE

## 2021-10-27 ENCOUNTER — PATIENT MESSAGE (OUTPATIENT)
Dept: UROLOGY | Facility: CLINIC | Age: 66
End: 2021-10-27
Payer: MEDICARE

## 2021-10-29 ENCOUNTER — OFFICE VISIT (OUTPATIENT)
Dept: SPORTS MEDICINE | Facility: CLINIC | Age: 66
End: 2021-10-29
Payer: MEDICARE

## 2021-10-29 VITALS
SYSTOLIC BLOOD PRESSURE: 127 MMHG | HEART RATE: 53 BPM | HEIGHT: 70 IN | BODY MASS INDEX: 25.48 KG/M2 | WEIGHT: 178 LBS | DIASTOLIC BLOOD PRESSURE: 71 MMHG

## 2021-10-29 DIAGNOSIS — M17.12 PRIMARY OSTEOARTHRITIS OF LEFT KNEE: ICD-10-CM

## 2021-10-29 DIAGNOSIS — M17.11 PRIMARY OSTEOARTHRITIS OF RIGHT KNEE: Primary | ICD-10-CM

## 2021-10-29 PROCEDURE — 99499 UNLISTED E&M SERVICE: CPT | Mod: S$PBB,,, | Performed by: PHYSICIAN ASSISTANT

## 2021-10-29 PROCEDURE — 20610 DRAIN/INJ JOINT/BURSA W/O US: CPT | Mod: 50,PBBFAC | Performed by: PHYSICIAN ASSISTANT

## 2021-10-29 PROCEDURE — 20610 PR DRAIN/INJECT LARGE JOINT/BURSA: ICD-10-PCS | Mod: 50,S$PBB,, | Performed by: PHYSICIAN ASSISTANT

## 2021-10-29 PROCEDURE — 99999 PR PBB SHADOW E&M-EST. PATIENT-LVL III: CPT | Mod: PBBFAC,,, | Performed by: PHYSICIAN ASSISTANT

## 2021-10-29 PROCEDURE — 99213 OFFICE O/P EST LOW 20 MIN: CPT | Mod: PBBFAC,25 | Performed by: PHYSICIAN ASSISTANT

## 2021-10-29 PROCEDURE — 99499 NO LOS: ICD-10-PCS | Mod: S$PBB,,, | Performed by: PHYSICIAN ASSISTANT

## 2021-10-29 PROCEDURE — 99999 PR PBB SHADOW E&M-EST. PATIENT-LVL III: ICD-10-PCS | Mod: PBBFAC,,, | Performed by: PHYSICIAN ASSISTANT

## 2021-10-29 PROCEDURE — 20610 DRAIN/INJ JOINT/BURSA W/O US: CPT | Mod: 50,S$PBB,, | Performed by: PHYSICIAN ASSISTANT

## 2021-10-29 RX ADMIN — Medication 20 MG: at 12:10

## 2021-11-12 ENCOUNTER — TELEPHONE (OUTPATIENT)
Dept: ADMINISTRATIVE | Facility: HOSPITAL | Age: 66
End: 2021-11-12
Payer: MEDICARE

## 2021-12-03 ENCOUNTER — TELEPHONE (OUTPATIENT)
Dept: ADMINISTRATIVE | Facility: HOSPITAL | Age: 66
End: 2021-12-03
Payer: MEDICARE

## 2022-02-11 DIAGNOSIS — M17.11 PRIMARY OSTEOARTHRITIS OF RIGHT KNEE: Primary | ICD-10-CM

## 2022-02-11 DIAGNOSIS — M17.12 PRIMARY OSTEOARTHRITIS OF LEFT KNEE: ICD-10-CM

## 2022-02-11 RX ORDER — CELECOXIB 200 MG/1
200 CAPSULE ORAL 2 TIMES DAILY
Qty: 60 CAPSULE | Refills: 2 | Status: SHIPPED | OUTPATIENT
Start: 2022-02-11

## 2022-02-11 NOTE — PROGRESS NOTES
Authorization placed for bilateral Euflexxa injections.  Not eligible until 04/29/2022.    Refilled Celebrex 200 mg b.i.d. p.r.n. pain

## 2022-03-02 ENCOUNTER — PATIENT MESSAGE (OUTPATIENT)
Dept: SLEEP MEDICINE | Facility: CLINIC | Age: 67
End: 2022-03-02
Payer: MEDICARE

## 2022-03-03 DIAGNOSIS — G47.00 INSOMNIA, UNSPECIFIED TYPE: Primary | ICD-10-CM

## 2022-03-03 DIAGNOSIS — F41.9 ANXIETY: ICD-10-CM

## 2022-03-03 RX ORDER — LORAZEPAM 1 MG/1
TABLET ORAL
Qty: 30 TABLET | Refills: 0 | Status: SHIPPED | OUTPATIENT
Start: 2022-03-03 | End: 2022-05-02 | Stop reason: SDUPTHER

## 2022-03-03 NOTE — PROGRESS NOTES
Will order short  Lorazepam course to help with BDZR withdrawal up until his appointment with me in April.

## 2022-03-24 ENCOUNTER — PATIENT MESSAGE (OUTPATIENT)
Dept: FAMILY MEDICINE | Facility: CLINIC | Age: 67
End: 2022-03-24
Payer: MEDICARE

## 2022-03-24 RX ORDER — TADALAFIL 5 MG/1
5 TABLET ORAL DAILY PRN
Qty: 90 TABLET | Refills: 3 | Status: SHIPPED | OUTPATIENT
Start: 2022-03-24 | End: 2023-04-12 | Stop reason: SDUPTHER

## 2022-04-03 ENCOUNTER — PATIENT MESSAGE (OUTPATIENT)
Dept: SPORTS MEDICINE | Facility: CLINIC | Age: 67
End: 2022-04-03
Payer: MEDICARE

## 2022-05-01 ENCOUNTER — PATIENT MESSAGE (OUTPATIENT)
Dept: SLEEP MEDICINE | Facility: CLINIC | Age: 67
End: 2022-05-01
Payer: MEDICARE

## 2022-05-02 DIAGNOSIS — G47.00 INSOMNIA, UNSPECIFIED TYPE: ICD-10-CM

## 2022-05-02 DIAGNOSIS — F41.9 ANXIETY: ICD-10-CM

## 2022-05-02 RX ORDER — LORAZEPAM 1 MG/1
TABLET ORAL
Qty: 30 TABLET | Refills: 2 | Status: SHIPPED | OUTPATIENT
Start: 2022-05-02 | End: 2022-11-28

## 2022-05-03 ENCOUNTER — OFFICE VISIT (OUTPATIENT)
Dept: SPORTS MEDICINE | Facility: CLINIC | Age: 67
End: 2022-05-03
Payer: MEDICARE

## 2022-05-03 VITALS
WEIGHT: 169.56 LBS | DIASTOLIC BLOOD PRESSURE: 67 MMHG | SYSTOLIC BLOOD PRESSURE: 139 MMHG | HEART RATE: 62 BPM | HEIGHT: 70 IN | BODY MASS INDEX: 24.27 KG/M2

## 2022-05-03 DIAGNOSIS — M89.8X6 TIBIAL PAIN: Primary | ICD-10-CM

## 2022-05-03 DIAGNOSIS — S80.11XA CONTUSION OF RIGHT LOWER EXTREMITY, INITIAL ENCOUNTER: Primary | ICD-10-CM

## 2022-05-03 PROCEDURE — 99213 PR OFFICE/OUTPT VISIT, EST, LEVL III, 20-29 MIN: ICD-10-PCS | Mod: S$PBB,,, | Performed by: ORTHOPAEDIC SURGERY

## 2022-05-03 PROCEDURE — 99999 PR PBB SHADOW E&M-EST. PATIENT-LVL III: CPT | Mod: PBBFAC,,, | Performed by: ORTHOPAEDIC SURGERY

## 2022-05-03 PROCEDURE — 99213 OFFICE O/P EST LOW 20 MIN: CPT | Mod: PBBFAC,PN | Performed by: ORTHOPAEDIC SURGERY

## 2022-05-03 PROCEDURE — 99999 PR PBB SHADOW E&M-EST. PATIENT-LVL III: ICD-10-PCS | Mod: PBBFAC,,, | Performed by: ORTHOPAEDIC SURGERY

## 2022-05-03 PROCEDURE — 99213 OFFICE O/P EST LOW 20 MIN: CPT | Mod: S$PBB,,, | Performed by: ORTHOPAEDIC SURGERY

## 2022-05-03 NOTE — PROGRESS NOTES
Subjective:          Chief Complaint: Bulmaro Nascimento is a 67 y.o. male who had concerns including Pain of the Right Lower Leg.    HPI     Patient who is retired [employer] previously seeing Juani Ellington PA-C for primary osteoarthritis of the right knee presents to clinic with right lower leg pain x 2 weeks following a fall. Patient states the pain began when he was stepping on a chair and slipped and had the metal back of the chair hit his shin.  This was followed by severe pain, swelling, and bruising.  The pain and swelling has reduced significantly since the initial injury.  Pain is only present when he presses on the area which causes 3/10 pain. He recently went on a hiking trip with his grandchildren with very few issues He has attempted multiple conservative measures that include activity modification, ice & elevation, and oral medications (heat compression), which has helped slightly. Denies numbness, tingling, radiation, and inability to bear weight. He is here today to ensure everything is healing properly and he did not cause a fracture.    No previous surgeries or trauma on right leg        Review of Systems   Constitutional: Negative.   HENT: Negative.    Eyes: Negative.    Cardiovascular: Negative.    Respiratory: Negative.    Endocrine: Negative.    Hematologic/Lymphatic: Negative.    Skin: Negative.    Musculoskeletal: Positive for arthritis, falls and joint pain. Negative for muscle weakness and stiffness.   Neurological: Negative.    Psychiatric/Behavioral: Negative.    Allergic/Immunologic: Negative.                    Objective:        General: Bulmaro is well-developed, well-nourished, appears stated age, in no acute distress, alert and oriented to time, place and person.     General    Constitutional: He is oriented to person, place, and time. He appears well-developed and well-nourished. No distress.   Cardiovascular: Normal rate and regular rhythm.    Neurological: He is alert and  oriented to person, place, and time.   Psychiatric: He has a normal mood and affect. His behavior is normal. Thought content normal.           Right Knee Exam     Range of Motion   Extension:  0 normal   Flexion:  140 normal     Tests   Ligament Examination Lachman: normal (-1 to 2mm) PCL-Posterior Drawer: normal (0 to 2mm)     MCL - Valgus: normal (0 to 2mm)  LCL - Varus: normal    Other   Sensation: normal    Comments:  Localized ecchymosis and swelling approximately mid tibia on along the anterior medial border of the right lower leg.  Multiple scabs also noted.  This area is tender to palpation.     Muscle Strength   Right Lower Extremity   Quadriceps:  5/5   Hamstrin/5     Vascular Exam     Right Pulses  Dorsalis Pedis:      2+  Posterior Tibial:      2+          Radiographs tibia fibula    My interpretation:    Integrity of bone is well maintained without any signs of fracture or bony contusion anywhere along the tibia or fibula, including the cortices          Assessment:       Encounter Diagnosis   Name Primary?    Contusion of right lower extremity, initial encounter Yes          Plan:       1. I made the decision to order new imaging of the extremity or extremities evaluated. I independently reviewed and interpreted the radiographs and/or MRIs today. These images were shown to the patient where I then discussed my findings in detail.    2. We discussed at length different treatment options.  Because the swelling and symptoms have decreased since the initial injury, I recommended he continue with conservative management to include ice compression as needed to reduce swelling.  He can also take anti-inflammatories to include ibuprofen 600 mg with meals.  I explained that continuing to use the right leg could slightly be slowing down the healing process and if he is able to rest, I recommended doing so.    3. RTC to see Rico Quintanilla PA-C in p.r.n. patient will continue conservative management and  contact our clinic in 4 weeks should he feel his symptoms not continue to improve.      All of the patient's questions were answered. Patient was advised to call the clinic or contact me through the patient portal for any questions or concerns.                       Patient questionnaires may have been collected.

## 2022-05-05 ENCOUNTER — PATIENT OUTREACH (OUTPATIENT)
Dept: ADMINISTRATIVE | Facility: OTHER | Age: 67
End: 2022-05-05
Payer: MEDICARE

## 2022-05-06 ENCOUNTER — HOSPITAL ENCOUNTER (OUTPATIENT)
Dept: RADIOLOGY | Facility: HOSPITAL | Age: 67
Discharge: HOME | End: 2022-05-06
Attending: PHYSICIAN ASSISTANT
Payer: MEDICARE

## 2022-05-06 ENCOUNTER — OFFICE VISIT (OUTPATIENT)
Dept: SPORTS MEDICINE | Facility: CLINIC | Age: 67
End: 2022-05-06
Payer: MEDICARE

## 2022-05-06 VITALS
HEART RATE: 59 BPM | SYSTOLIC BLOOD PRESSURE: 117 MMHG | BODY MASS INDEX: 24.34 KG/M2 | HEIGHT: 70 IN | DIASTOLIC BLOOD PRESSURE: 68 MMHG | WEIGHT: 170 LBS

## 2022-05-06 DIAGNOSIS — M17.12 PRIMARY OSTEOARTHRITIS OF LEFT KNEE: ICD-10-CM

## 2022-05-06 DIAGNOSIS — M17.11 PRIMARY OSTEOARTHRITIS OF RIGHT KNEE: Primary | ICD-10-CM

## 2022-05-06 DIAGNOSIS — M17.11 PRIMARY OSTEOARTHRITIS OF RIGHT KNEE: ICD-10-CM

## 2022-05-06 PROCEDURE — 20610 DRAIN/INJ JOINT/BURSA W/O US: CPT | Mod: 50,PBBFAC | Performed by: PHYSICIAN ASSISTANT

## 2022-05-06 PROCEDURE — 99213 OFFICE O/P EST LOW 20 MIN: CPT | Mod: PBBFAC | Performed by: PHYSICIAN ASSISTANT

## 2022-05-06 PROCEDURE — 73564 X-RAY EXAM KNEE 4 OR MORE: CPT | Mod: 26,50,, | Performed by: RADIOLOGY

## 2022-05-06 PROCEDURE — 73564 XR KNEE ORTHO BILAT WITH FLEXION: ICD-10-PCS | Mod: 26,50,, | Performed by: RADIOLOGY

## 2022-05-06 PROCEDURE — 73564 X-RAY EXAM KNEE 4 OR MORE: CPT | Mod: TC,50

## 2022-05-06 PROCEDURE — 99999 PR PBB SHADOW E&M-EST. PATIENT-LVL III: CPT | Mod: PBBFAC,,, | Performed by: PHYSICIAN ASSISTANT

## 2022-05-06 PROCEDURE — 99499 NO LOS: ICD-10-PCS | Mod: S$PBB,,, | Performed by: PHYSICIAN ASSISTANT

## 2022-05-06 PROCEDURE — 20610 DRAIN/INJ JOINT/BURSA W/O US: CPT | Mod: 50,S$PBB,, | Performed by: PHYSICIAN ASSISTANT

## 2022-05-06 PROCEDURE — 99999 PR PBB SHADOW E&M-EST. PATIENT-LVL III: ICD-10-PCS | Mod: PBBFAC,,, | Performed by: PHYSICIAN ASSISTANT

## 2022-05-06 PROCEDURE — 20610 PR DRAIN/INJECT LARGE JOINT/BURSA: ICD-10-PCS | Mod: 50,S$PBB,, | Performed by: PHYSICIAN ASSISTANT

## 2022-05-06 PROCEDURE — 99499 UNLISTED E&M SERVICE: CPT | Mod: S$PBB,,, | Performed by: PHYSICIAN ASSISTANT

## 2022-05-06 RX ADMIN — Medication 20 MG: at 04:05

## 2022-05-06 NOTE — PROGRESS NOTES
CC: Bilateral knee pain and visco supplementation    67 y.o. Male who is retired [employer]  returns today for follow up bilateral knees. He is here today for bilateral knee Euflexxa injections #1. He has been receiving Euflexxa series every 6 months in bilateral knees since 2018 with a decrease in pain and improvement of function. He reports effusions in his knees that increase with activity and are resolved by ice and NSAIDS. He has had CSI in his right knee in the past with significant relief of pain.    History of BL Knee arthroscopic debridements - years ago.   Negative for smoking.   Negative for diabetes.     REVIEW OF SYSTEMS:   Constitution: Negative. Negative for chills, fever and night sweats.    Hematologic/Lymphatic: Negative for bleeding problem. Does not bruise/bleed easily.   Skin: Negative for dry skin, itching and rash.   Musculoskeletal: Negative for falls. Positive for right knee.    PAST MEDICAL HISTORY:   Past Medical History:   Diagnosis Date    Allergy     Anxiety 7/8/2015    Benign non-nodular prostatic hyperplasia without lower urinary tract symptoms 2/11/2016    Bruxism     Chronic pain of right knee 3/21/2018    DDD (degenerative disc disease), cervical 12/12/2019    Elevated PSA measurement 02/06/2017    Followed by Dr. 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 Regan - repeat in 10 years    ESOPHAGOGASTRODUODENOSCOPY  07/11/2012    Biopsy showed gastric mucosa with mild chronic inflammation.  Squamous mucosa with mild chronic  inflammation including rare intraepithelial eosinophils.  Done by Dr. Regan - scanned to media file.    KNEE SURGERY Bilateral 1992    knee cap alignment with clean up    SHOULDER SURGERY Right     SINUS SURGERY         FAMILY HISTORY:   Family History   Problem Relation Age of Onset    Thyroid disease Mother     Diabetes Father     Cancer Father 75        Prostate     Thyroid disease Sister     Cancer Brother 50        bladder cancer    No Known Problems Sister     No Known Problems Brother     No Known Problems Brother     Heart disease Neg Hx     Prostate cancer Neg Hx     Kidney disease Neg Hx        SOCIAL HISTORY:   Social History     Socioeconomic History    Marital status:    Occupational History    Occupation:    Tobacco Use    Smoking status: Never Smoker    Smokeless tobacco: Never Used   Substance and Sexual Activity    Alcohol use: Yes     Alcohol/week: 1.0 standard drink     Types: 1 Glasses of wine per week     Comment: once a month    Drug use: No    Sexual activity: Not Currently     Partners: Female   Social History Narrative    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     Social Determinants of Health     Financial Resource Strain: Low Risk     Difficulty of Paying Living Expenses: Not hard at all   Food Insecurity: No Food Insecurity    Worried About Running Out of Food in the Last Year: Never true    Ran Out of Food in the Last Year: Never true   Transportation Needs: No Transportation Needs    Lack of Transportation (Medical): No    Lack of Transportation (Non-Medical): No   Physical Activity: Sufficiently Active    Days of Exercise per Week: 3 days    Minutes of Exercise per Session: 120 min   Stress: Stress Concern Present    Feeling of Stress : To some extent   Social Connections: Unknown  "   Frequency of Communication with Friends and Family: Once a week    Frequency of Social Gatherings with Friends and Family: Once a week    Active Member of Clubs or Organizations: No    Attends Club or Organization Meetings: Never    Marital Status:    Housing Stability: Low Risk     Unable to Pay for Housing in the Last Year: No    Number of Places Lived in the Last Year: 1    Unstable Housing in the Last Year: No       MEDICATIONS:     Current Outpatient Medications:     celecoxib (CELEBREX) 200 MG capsule, Take 1 capsule (200 mg total) by mouth 2 (two) times daily as needed for Pain., Disp: 60 capsule, Rfl: 2    celecoxib (CELEBREX) 200 MG capsule, Take 1 capsule (200 mg total) by mouth 2 (two) times daily., Disp: 60 capsule, Rfl: 2    fluticasone propionate (FLONASE) 50 mcg/actuation nasal spray, 1 spray (50 mcg total) by Each Nostril route 2 (two) times daily., Disp: 16 g, Rfl: 11    LORazepam (ATIVAN) 1 MG tablet, 1 pill PO at bedtime as needed for insomnia, Disp: 30 tablet, Rfl: 2    methocarbamoL (ROBAXIN) 750 MG Tab, , Disp: , Rfl:     omeprazole 20 mg TbLD, , Disp: , Rfl:     tadalafiL (CIALIS) 5 MG tablet, Take 1 tablet (5 mg total) by mouth daily as needed for Erectile Dysfunction., Disp: 90 tablet, Rfl: 3    Current Facility-Administered Medications:     Allergy Mix, , Subcutaneous, 1 time in Clinic/HOD, Maria Guadalupe Kirkpatrick MD    ALLERGIES:   Review of patient's allergies indicates:  No Known Allergies     PHYSICAL EXAMINATION:  /68   Pulse (!) 59   Ht 5' 10" (1.778 m)   Wt 77.1 kg (170 lb)   BMI 24.39 kg/m²   General: Well-developed well-nourished 67 y.o. malein no acute distress   Cardiovascular: Regular rhythm by palpation of distal pulse, normal color and temperature, no concerning varicosities on symptomatic side   Lungs: No labored breathing or wheezing appreciated   Neuro: Alert and oriented ×3   Psychiatric: well oriented to person, place and time, " demonstrates normal mood and affect   Skin: No rashes, lesions or ulcers, normal temperature, turgor, and texture on involved extremity    General    Vitals reviewed.  Constitutional: He is oriented to person, place, and time. He appears well-developed and well-nourished. No distress.   Cardiovascular: Normal rate and regular rhythm.    Pulmonary/Chest: Effort normal. No respiratory distress.   Neurological: He is alert and oriented to person, place, and time.   Psychiatric: He has a normal mood and affect. His behavior is normal. Thought content normal.              Repeat examination of the bilateral knee shows no effusion.  No joint line tenderness. -  Slava's testing. Good quadriceps bulk and strength.  Central patellofemoral tracking. + patellar grind. + patellofemoral crepitus.  Neutral standing alignment.  Active range of motion is full from 0-135°. No pain with forced extension. Ligamentously stable.      IMAGING:  Bilateral knees: 5/6/22  FINDINGS:  Four views bilateral knees.     Right: There is DJD and a mild varus deformity.  There is mild chondrocalcinosis.     Left: There is DJD and a mild varus deformity.    MRI Right Knee: 5/22/19   1. Medial meniscus tear.   2. Full-thickness patellofemoral cartilage loss.   3. Small interstitial tear of PCL.   4. Small joint effusion with small partially ruptured Baker's cyst    ASSESSMENT:      ICD-10-CM ICD-9-CM   1. Primary osteoarthritis of right knee  M17.11 715.16   2. Primary osteoarthritis of left knee  M17.12 715.16       PLAN:     We have discussed a variety of treatment options including medications, injections, physical therapy and other alternative treatments. I also explained the indications, risks and benefits of surgery.  Patient's pain is refractory HEP, conservative management, and NSAIDs. Pt would like to proceed with visco-supplementation today.    I made the decision to obtain old records of the patient including previous notes and imaging.  New imaging was ordered today of the extremity or extremities evaluated. I independently reviewed and interpreted the radiographs and/or MRIs today as well as prior imaging. Reviewed with patient in detail.    1. Injection Procedure bilateral knees  A time out was performed, including verification of patient ID, procedure, site and side, availability of information and equipment, review of safety issues, and agreement with consent, the procedure site was marked.    After time out was performed, the patient was prepped aseptically with chloraprep swabsticks. A diagnostic and therapeutic injection of 2 cc Euflexxa was given under sterile technique using a 22g x 1.5 needle from the Superolateral  aspect of the bilateral Knee Joint in the supine position.      Bulmaro Nascimento had no adverse reactions to the medication. Pain decreased. He was instructed to apply ice to the joint for 20 minutes and avoid strenuous activities for 24-36 hours following the injection. He was warned of possible blood sugar and/or blood pressure changes during that time. Following that time, he can resume regular activities.    He was reminded to call the clinic immediately for any adverse side effects as explained in clinic today.    2.  Ice compress to the affected area 2-3x a day for 15-20 minutes as needed for pain management.  3. NSAIDS prn pain  4. Continue patellofemoral and quad HEP  5. RTC in 1 week with Juani Ellington PA-C for bilateral knee Euflexxa #2.    All of the patient's questions were answered and the patient will contact us if they have any questions or concerns in the interim.

## 2022-05-13 ENCOUNTER — OFFICE VISIT (OUTPATIENT)
Dept: SPORTS MEDICINE | Facility: CLINIC | Age: 67
End: 2022-05-13
Payer: MEDICARE

## 2022-05-13 VITALS
DIASTOLIC BLOOD PRESSURE: 71 MMHG | HEIGHT: 70 IN | HEART RATE: 58 BPM | BODY MASS INDEX: 24.48 KG/M2 | WEIGHT: 171 LBS | SYSTOLIC BLOOD PRESSURE: 121 MMHG

## 2022-05-13 DIAGNOSIS — M17.12 PRIMARY OSTEOARTHRITIS OF LEFT KNEE: Primary | ICD-10-CM

## 2022-05-13 DIAGNOSIS — M17.11 PRIMARY OSTEOARTHRITIS OF RIGHT KNEE: ICD-10-CM

## 2022-05-13 PROCEDURE — 20610 PR DRAIN/INJECT LARGE JOINT/BURSA: ICD-10-PCS | Mod: 50,S$PBB,, | Performed by: PHYSICIAN ASSISTANT

## 2022-05-13 PROCEDURE — 99999 PR PBB SHADOW E&M-EST. PATIENT-LVL III: ICD-10-PCS | Mod: PBBFAC,,, | Performed by: PHYSICIAN ASSISTANT

## 2022-05-13 PROCEDURE — 99999 PR PBB SHADOW E&M-EST. PATIENT-LVL III: CPT | Mod: PBBFAC,,, | Performed by: PHYSICIAN ASSISTANT

## 2022-05-13 PROCEDURE — 99213 OFFICE O/P EST LOW 20 MIN: CPT | Mod: PBBFAC | Performed by: PHYSICIAN ASSISTANT

## 2022-05-13 PROCEDURE — 99499 NO LOS: ICD-10-PCS | Mod: S$PBB,,, | Performed by: PHYSICIAN ASSISTANT

## 2022-05-13 PROCEDURE — 99499 UNLISTED E&M SERVICE: CPT | Mod: S$PBB,,, | Performed by: PHYSICIAN ASSISTANT

## 2022-05-13 PROCEDURE — 20610 DRAIN/INJ JOINT/BURSA W/O US: CPT | Mod: 50,PBBFAC | Performed by: PHYSICIAN ASSISTANT

## 2022-05-13 PROCEDURE — 20610 DRAIN/INJ JOINT/BURSA W/O US: CPT | Mod: 50,S$PBB,, | Performed by: PHYSICIAN ASSISTANT

## 2022-05-13 RX ADMIN — Medication 20 MG: at 10:05

## 2022-05-13 NOTE — PROGRESS NOTES
Patient is here for follow up of bilateral knee arthritis. Pt is requesting bilateral  Euflexxa injection #2.  [facility]. Has failed other conservative modalities including NSAIDS, activity modification, weight loss.    The prior shot was tolerated well.    PHYSICAL EXAMINATION:     General: The patient is alert and oriented x 3. Mood is pleasant.   Observation of ears, eyes and nose reveals no gross abnormalities. No   labored breathing observed.     No signs of infection or adverse reaction to knee.    PROCEDURE NOTE:  Injection Procedure bilateral knees #2  A time out was performed, including verification of patient ID, procedure, site and side, availability of information and equipment, review of safety issues, and agreement with consent, the procedure site was marked.    After time out was performed, the patient was prepped aseptically with povidone-iodine swabsticks. A diagnostic and therapeutic injection of 2cc Euflexxa was given under sterile technique using a 22g x 1.5 needle from the Superolateral  aspect of the bilateral Knee Joint in the supine position.      Bulmaro Nascimento had no adverse reactions to the medication. Pain decreased. He was instructed to apply ice to the joint for 20 minutes and avoid strenuous activities for 24-36 hours following the injection. He was warned of possible blood sugar and/or blood pressure changes during that time. Following that time, he can resume regular activities.    He was reminded to call the clinic immediately for any adverse side effects as explained in clinic today.    RTC 1 week for 3rd injection.  All questions were answered, pt will contact us for questions or concerns in the interim.

## 2022-05-20 ENCOUNTER — OFFICE VISIT (OUTPATIENT)
Dept: SPORTS MEDICINE | Facility: CLINIC | Age: 67
End: 2022-05-20
Payer: MEDICARE

## 2022-05-20 VITALS
HEART RATE: 59 BPM | BODY MASS INDEX: 26.13 KG/M2 | HEIGHT: 70 IN | RESPIRATION RATE: 14 BRPM | SYSTOLIC BLOOD PRESSURE: 124 MMHG | DIASTOLIC BLOOD PRESSURE: 61 MMHG | WEIGHT: 182.5 LBS

## 2022-05-20 DIAGNOSIS — M17.12 PRIMARY OSTEOARTHRITIS OF LEFT KNEE: Primary | ICD-10-CM

## 2022-05-20 DIAGNOSIS — M17.11 PRIMARY OSTEOARTHRITIS OF RIGHT KNEE: ICD-10-CM

## 2022-05-20 PROCEDURE — 99999 PR PBB SHADOW E&M-EST. PATIENT-LVL III: CPT | Mod: PBBFAC,,, | Performed by: PHYSICIAN ASSISTANT

## 2022-05-20 PROCEDURE — 20610 PR DRAIN/INJECT LARGE JOINT/BURSA: ICD-10-PCS | Mod: 50,S$PBB,, | Performed by: PHYSICIAN ASSISTANT

## 2022-05-20 PROCEDURE — 99499 NO LOS: ICD-10-PCS | Mod: S$PBB,,, | Performed by: PHYSICIAN ASSISTANT

## 2022-05-20 PROCEDURE — 99499 UNLISTED E&M SERVICE: CPT | Mod: S$PBB,,, | Performed by: PHYSICIAN ASSISTANT

## 2022-05-20 PROCEDURE — 20610 DRAIN/INJ JOINT/BURSA W/O US: CPT | Mod: 50,PBBFAC | Performed by: PHYSICIAN ASSISTANT

## 2022-05-20 PROCEDURE — 20610 DRAIN/INJ JOINT/BURSA W/O US: CPT | Mod: 50,S$PBB,, | Performed by: PHYSICIAN ASSISTANT

## 2022-05-20 PROCEDURE — 99999 PR PBB SHADOW E&M-EST. PATIENT-LVL III: ICD-10-PCS | Mod: PBBFAC,,, | Performed by: PHYSICIAN ASSISTANT

## 2022-05-20 PROCEDURE — 99213 OFFICE O/P EST LOW 20 MIN: CPT | Mod: PBBFAC,25 | Performed by: PHYSICIAN ASSISTANT

## 2022-05-20 RX ADMIN — Medication 20 MG: at 10:05

## 2022-05-20 NOTE — PROGRESS NOTES
Patient is here for follow up of bilateral knee arthritis. Pt is requesting bilateral  Euflexxa injection #3.  [facility] Has failed other conservative modalities including NSAIDS, activity modification, weight loss.    The prior shot was tolerated well.    PHYSICAL EXAMINATION:     General: The patient is alert and oriented x 3. Mood is pleasant.   Observation of ears, eyes and nose reveals no gross abnormalities. No   labored breathing observed.     No signs of infection or adverse reaction to knee.    PROCEDURE NOTE:  Injection Procedure bilateral knees #3  A time out was performed, including verification of patient ID, procedure, site and side, availability of information and equipment, review of safety issues, and agreement with consent, the procedure site was marked.    After time out was performed, the patient was prepped aseptically with povidone-iodine swabsticks. A diagnostic and therapeutic injection of 2cc Euflexxa was given under sterile technique using a 22g x 1.5 needle from the Superolateral  aspect of the bilateral Knee Joint in the supine position.      Bulmaro Nascimento had no adverse reactions to the medication. Pain decreased. He was instructed to apply ice to the joint for 20 minutes and avoid strenuous activities for 24-36 hours following the injection. He was warned of possible blood sugar and/or blood pressure changes during that time. Following that time, he can resume regular activities.    He was reminded to call the clinic immediately for any adverse side effects as explained in clinic today.    RTC in 6 months with Juani Ellington PA-C for follow up and repeat visco supplementation.  All questions were answered, pt will contact us for questions or concerns in the interim.

## 2022-06-15 NOTE — PROGRESS NOTES
History  Chief Complaint   Patient presents with    Chest Pain     Patient reports chest pain that woke her from sleeping and subsided  Pain re occurred approximately 30 PTA and patient states it is an 8 and increased upon palpation  47 YO female presents with chest pain  Pt states pain has been constant for the last 30 minutes, central chest, sharp, non-radiating, no known exacerbating or alleviating factors  Pt has not taken anything for discomfort  She has had some congestion and runny nose, mildly shortness of breath  States she did have similar pain that woke her from sleep this morning but subsided  She denies similar pain in the past  Pt has no known cardiac issues, states her father may have had an MI but this could have been due to drug abuse  Pt denies F/C/N/V/D/C, no dysuria, burning on urination or blood in urine  History provided by:  Patient   used: No    Chest Pain  Pain location:  Substernal area  Pain quality: sharp    Pain radiates to:  Does not radiate  Pain severity:  Moderate  Onset quality:  Sudden  Duration:  30 minutes  Timing:  Constant  Progression:  Unchanged  Chronicity:  New  Relieved by:  Nothing  Worsened by:  Nothing tried  Ineffective treatments:  None tried  Associated symptoms: shortness of breath    Associated symptoms: no abdominal pain, no dizziness, no fatigue, no fever, not vomiting and no weakness        Prior to Admission Medications   Prescriptions Last Dose Informant Patient Reported? Taking? albuterol (PROVENTIL HFA,VENTOLIN HFA) 90 mcg/act inhaler   Yes No   Sig: Inhale 2 puffs every 6 (six) hours as needed for wheezing      Facility-Administered Medications: None       Past Medical History:   Diagnosis Date    Anemia     Asthma        History reviewed  No pertinent surgical history  History reviewed  No pertinent family history  I have reviewed and agree with the history as documented      E-Cigarette/Vaping    E-Cigarette Use Patient here for Allergy injections.  No new meds, no problems after last injection.    0.5mL of 1:1 Red, 1 of 3 given SQ in left upper arm  Tolerated well  0.5mL of 1:1 Red, 2 of 3 given SQ in right arm.  Tolerated well.  0.5mL of 1:1 Red, 3 of 3 given SQ in mid left arm. Tolerated well.    After 30 minutes of observation, Sites 1/3, 2+ induration, Site 2/3 no reaction,  Site 3/3, 0 reaction.    No complaints voiced.     Never User      E-Cigarette/Vaping Substances    Nicotine No     THC No     CBD No     Flavoring No     Other No     Unknown No      Social History     Tobacco Use    Smoking status: Never Smoker    Smokeless tobacco: Never Used   Vaping Use    Vaping Use: Never used   Substance Use Topics    Alcohol use: Never    Drug use: Yes     Types: Marijuana       Review of Systems   Constitutional: Negative for chills, fatigue and fever  HENT: Negative for dental problem  Eyes: Negative for visual disturbance  Respiratory: Positive for shortness of breath  Cardiovascular: Positive for chest pain  Gastrointestinal: Negative for abdominal pain, diarrhea and vomiting  Genitourinary: Negative for dysuria and frequency  Musculoskeletal: Negative for arthralgias  Skin: Negative for rash  Neurological: Negative for dizziness, weakness and light-headedness  Psychiatric/Behavioral: Negative for agitation, behavioral problems and confusion  All other systems reviewed and are negative  Physical Exam  Physical Exam  Vitals and nursing note reviewed  Constitutional:       Appearance: Normal appearance  HENT:      Head: Normocephalic and atraumatic  Eyes:      Extraocular Movements: Extraocular movements intact  Conjunctiva/sclera: Conjunctivae normal    Cardiovascular:      Rate and Rhythm: Normal rate  Pulmonary:      Effort: Pulmonary effort is normal       Breath sounds: Normal breath sounds  Comments: Palpation over the anterior chest reproduces discomfort  Abdominal:      General: There is no distension  Musculoskeletal:         General: Normal range of motion  Cervical back: Normal range of motion  Skin:     Findings: No rash  Neurological:      General: No focal deficit present  Mental Status: She is alert  Cranial Nerves: No cranial nerve deficit     Psychiatric:         Mood and Affect: Mood normal          Vital Signs  ED Triage Vitals   Temperature Pulse Respirations Blood Pressure SpO2   06/15/22 1000 06/15/22 1003 06/15/22 1003 06/15/22 1003 06/15/22 1004   97 6 °F (36 4 °C) 57 20 116/71 100 %      Temp Source Heart Rate Source Patient Position - Orthostatic VS BP Location FiO2 (%)   06/15/22 1000 06/15/22 1003 06/15/22 1003 06/15/22 1003 --   Oral Monitor Lying Left arm       Pain Score       06/15/22 1000       8           Vitals:    06/15/22 1003 06/15/22 1222 06/15/22 1324 06/15/22 1415   BP: 116/71 117/67 123/87 128/58   Pulse: 57 72 (!) 51 60   Patient Position - Orthostatic VS: Lying Lying Lying Lying         Visual Acuity      ED Medications  Medications   sodium chloride 0 9 % bolus 1,000 mL (1,000 mL Intravenous Not Given 6/15/22 1312)   aspirin tablet 325 mg (325 mg Oral Given 6/15/22 1058)   albuterol (PROVENTIL HFA,VENTOLIN HFA) inhaler 2 puff (2 puffs Inhalation Given 6/15/22 1059)   naproxen (NAPROSYN) tablet 500 mg (500 mg Oral Given 6/15/22 1316)       Diagnostic Studies  Results Reviewed     Procedure Component Value Units Date/Time    HS Troponin I 2hr [[id number]]  (Normal) Collected: 06/15/22 1319    Lab Status: Final result Specimen: Blood from Hand, Right Updated: 06/15/22 1347     hs TnI 2hr 4 ng/L      Delta 2hr hsTnI -1 ng/L     HS Troponin I 4hr [[id number]]     Lab Status: No result Specimen: Blood     HS Troponin 0hr (reflex protocol) [[id number]]  (Normal) Collected: 06/15/22 1113    Lab Status: Final result Specimen: Blood from Hand, Right Updated: 06/15/22 1146     hs TnI 0hr 5 ng/L     Hepatic function panel [[id number]]  (Normal) Collected: 06/15/22 1113    Lab Status: Final result Specimen: Blood from Hand, Right Updated: 06/15/22 1144     Total Bilirubin 0 33 mg/dL      Bilirubin, Direct 0 09 mg/dL      Alkaline Phosphatase 73 U/L      AST 16 U/L      ALT 26 U/L      Total Protein 7 5 g/dL      Albumin 3 9 g/dL     Basic metabolic panel [[id number]] Collected: 06/15/22 1113    Lab Status: Final result Specimen: Blood from Hand, Right Updated: 06/15/22 1144     Sodium 139 mmol/L      Potassium 4 0 mmol/L      Chloride 103 mmol/L      CO2 27 mmol/L      ANION GAP 9 mmol/L      BUN 17 mg/dL      Creatinine 0 85 mg/dL      Glucose 98 mg/dL      Calcium 9 7 mg/dL      eGFR 80 ml/min/1 73sq m     Narrative:      [place] guidelines for Chronic Kidney Disease (CKD):     Stage 1 with normal or high GFR (GFR > 90 mL/min/1 73 square meters)    Stage 2 Mild CKD (GFR = 60-89 mL/min/1 73 square meters)    Stage 3A Moderate CKD (GFR = 45-59 mL/min/1 73 square meters)    Stage 3B Moderate CKD (GFR = 30-44 mL/min/1 73 square meters)    Stage 4 Severe CKD (GFR = 15-29 mL/min/1 73 square meters)    Stage 5 End Stage CKD (GFR <15 mL/min/1 73 square meters)  Note: GFR calculation is accurate only with a steady state creatinine    CBC and differential [[id number]]  (Abnormal) Collected: 06/15/22 1113    Lab Status: Final result Specimen: Blood from Hand, Right Updated: 06/15/22 1119     WBC 6 95 Thousand/uL      RBC 4 21 Million/uL      Hemoglobin 14 7 g/dL      Hematocrit 44 1 %       fL      MCH 34 9 pg      MCHC 33 3 g/dL      RDW 13 4 %      MPV 9 4 fL      Platelets 318 Thousands/uL      nRBC 0 /100 WBCs      Neutrophils Relative 71 %      Immat GRANS % 0 %      Lymphocytes Relative 21 %      Monocytes Relative 5 %      Eosinophils Relative 3 %      Basophils Relative 0 %      Neutrophils Absolute 4 87 Thousands/µL      Immature Grans Absolute 0 02 Thousand/uL      Lymphocytes Absolute 1 49 Thousands/µL      Monocytes Absolute 0 35 Thousand/µL      Eosinophils Absolute 0 20 Thousand/µL      Basophils Absolute 0 02 Thousands/µL                  XR chest 2 views   Final Result by Isabela Sanchez MD (06/15 1242)      No acute cardiopulmonary disease        Findings are stable            Workstation performed: [id number]                    Procedures  ECG 12 Lead Documentation Only    Date/Time: 6/15/2022 10:12 AM  Performed by: Dedrick Fisher MD  Authorized by: Dedrick Fisher MD     ECG reviewed by me, the ED Provider: yes    Patient location:  ED  Previous ECG:     Previous ECG:  Compared to current    Comparison ECG info:  2/1/2022    Similarity:  No change  Interpretation:     Interpretation: normal    Rate:     ECG rate:  53    ECG rate assessment: bradycardic    Rhythm:     Rhythm: sinus rhythm and sinus bradycardia    QRS:     QRS axis:  Normal    QRS intervals:  Normal  Conduction:     Conduction: normal    ST segments:     ST segments:  Normal  T waves:     T waves: normal    Comments:      Sinus arrhythmia    ECG 12 Lead Documentation Only    Date/Time: 6/15/2022 2:24 PM  Performed by: Dedrick Fisher MD  Authorized by: Dedrick Fisher MD     ECG reviewed by me, the ED Provider: yes    Patient location:  ED  Previous ECG:     Previous ECG:  Compared to current    Comparison ECG info:  6/15/2022 1006    Similarity:  No change  Interpretation:     Interpretation: normal    Rate:     ECG rate:  57    ECG rate assessment: normal    Rhythm:     Rhythm: sinus rhythm and sinus bradycardia    QRS:     QRS axis:  Normal    QRS intervals:  Normal  Conduction:     Conduction: normal    ST segments:     ST segments:  Normal  T waves:     T waves: normal               ED Course             HEART Risk Score    Flowsheet Row Most Recent Value   Heart Score Risk Calculator    History 0 Filed at: 06/15/2022 1402   ECG 0 Filed at: 06/15/2022 1402   Age 1 Filed at: 06/15/2022 1402   Risk Factors 1 Filed at: 06/15/2022 1402   Troponin 0 Filed at: 06/15/2022 1402   HEART Score 2 Filed at: 06/15/2022 1402                                      MDM  Number of Diagnoses or Management Options  Chest pain with low risk of acute coronary syndrome: new and requires workup  Diagnosis management comments: 1  Chest pain - Pt with no known cardiac issues   Will check ECG and troponin, CBC for anemia, metabolic panel for electrolyte abnormalities and dehydration, TSH, CXR  Amount and/or Complexity of Data Reviewed  Clinical lab tests: ordered and reviewed  Tests in the radiology section of CPT®: ordered and reviewed  Review and summarize past medical records: yes  Independent visualization of images, tracings, or specimens: yes    Patient Progress  Patient progress: stable      Disposition  Final diagnoses:   Chest pain with low risk of acute coronary syndrome     Time reflects when diagnosis was documented in both MDM as applicable and the Disposition within this note     Time User Action Codes Description Comment    6/15/2022  2:03 PM Conrado Ybarra [R07 [id number]] Chest pain with low risk of acute coronary syndrome       ED Disposition     ED Disposition   Discharge    Condition   Stable    Date/Time   Wed Ashish 15, 2022  2:02 PM    [address] discharge to home/self care                 Follow-up Information    None         Patient's Medications   Discharge Prescriptions    NAPROXEN (NAPROSYN) 500 MG TABLET    Take 1 tablet (500 mg total) by mouth 2 (two) times a day with meals for 10 days       Start Date: 6/15/2022 End Date: 6/25/2022       Order Dose: 500 mg       Quantity: 20 tablet    Refills: 0           PDMP Review     None          ED Provider  Electronically Signed by           Edilia Jansen MD  06/15/22 5068

## 2022-06-24 ENCOUNTER — OFFICE VISIT (OUTPATIENT)
Dept: ALLERGY | Facility: CLINIC | Age: 67
End: 2022-06-24
Payer: MEDICARE

## 2022-06-24 VITALS — HEIGHT: 70 IN | WEIGHT: 181.88 LBS | BODY MASS INDEX: 26.04 KG/M2

## 2022-06-24 DIAGNOSIS — J30.9 CHRONIC ALLERGIC RHINITIS: Primary | ICD-10-CM

## 2022-06-24 DIAGNOSIS — J32.8 OTHER CHRONIC SINUSITIS: ICD-10-CM

## 2022-06-24 DIAGNOSIS — H10.13 ALLERGIC CONJUNCTIVITIS, BILATERAL: ICD-10-CM

## 2022-06-24 PROCEDURE — 99999 PR PBB SHADOW E&M-EST. PATIENT-LVL III: CPT | Mod: PBBFAC,,, | Performed by: ALLERGY & IMMUNOLOGY

## 2022-06-24 PROCEDURE — 99213 OFFICE O/P EST LOW 20 MIN: CPT | Mod: PBBFAC,PO | Performed by: ALLERGY & IMMUNOLOGY

## 2022-06-24 PROCEDURE — 99214 OFFICE O/P EST MOD 30 MIN: CPT | Mod: S$PBB,,, | Performed by: ALLERGY & IMMUNOLOGY

## 2022-06-24 PROCEDURE — 99999 PR PBB SHADOW E&M-EST. PATIENT-LVL III: ICD-10-PCS | Mod: PBBFAC,,, | Performed by: ALLERGY & IMMUNOLOGY

## 2022-06-24 PROCEDURE — 99214 PR OFFICE/OUTPT VISIT, EST, LEVL IV, 30-39 MIN: ICD-10-PCS | Mod: S$PBB,,, | Performed by: ALLERGY & IMMUNOLOGY

## 2022-06-24 RX ORDER — AMOXICILLIN AND CLAVULANATE POTASSIUM 875; 125 MG/1; MG/1
1 TABLET, FILM COATED ORAL 2 TIMES DAILY
Qty: 28 TABLET | Refills: 0 | Status: SHIPPED | OUTPATIENT
Start: 2022-06-24 | End: 2022-07-08

## 2022-06-24 NOTE — PROGRESS NOTES
Chief Complaint: Sinus Problem      Subjective:           Bulmaro Nascimento is a 67 y.o. male here for continued evaluation of chronic allergic rhinitis to pets, mold, trees, weeds, grass. He was last seen 10/12/2021. He was on IT, got 3 shots C/TR in upper left, M in lower left and GR/W in right. He was on for over 10 years so stopped in Jan 2020. He was fine until December 2020. He had sinus infection 12/2020 treated with Augmentin for 14 day and resolved. He was worried infections would return so was seen and had immunocaps with only some mold allergy. since then had sinus infection in feb 2021 and March 2021. He gets pressure around eyes and temples, some drainage. no nasal congestion. He was given Augmentin for 14 days in Feb then for 28 days in March. This did. He has not had infection since. Was doing well. Then 2 weeks ago started with sinus pressure,. Headaches, PND, no sore throat, no runny nose. Feels like an infection. Is using fluticasone 2 SEN daily for past 2 weeks and no help. He had recurrent sinus infections prior to shot and resolved on shots.    He does not take any daily allergy meds. He uses Flonase prn if more congested or heavier drip. He has daily PND in AM for couple hours then clears.    Review of Systems  Constitutional: Negative for changes in appetite, unintentional weight loss, fever, chills and fatigue.   HENT: Negative for facial pain, nose bleeds, , postnasal drip, throat clearing, and voice change. Negative for ear discharge, ear pain, facial swelling, sore throat and trouble swallowing. positive for nasal congestion, sinus pressure and ears popping; positive for scratchy throat   Eyes: Negative for occular discharge, redness, itching and visual disturbance.  Respiratory: Negative for chest tightness, shortness of breath, wheezing, dyspnea on exertion, sputum production and cough.   Cardiovascular: Negative for chest pain, palpitations and leg swelling.  Gastrointestinal:  Negative for abdominal distension, abdominal pain, constipation, diarrhea, nausea,and vomiting.   Genitourinary: Negative for difficulty urinating.   Musculoskeletal: Negative for arthralgias, gait problems, joint swelling, myalgias and back pain.   Neurological: Negative for dizziness, syncope, weakness, light-headedness.  positive for headache   Hematological: Negative for adenopathy, does not bruise or bleed easily.  Psychiatric/Behavioral: Negative for agitation, anxiety, behavioral problems, confusion, and insomnia.  Skin: Negative for rash.     PMH:  Reviewed with the patient and current for this visit    Family History:  Reviewed with the patient and current for this visit    Social History:  Reviewed with the patient and current for this visit     Environmental History:  Reviewed with the patient and current for this visit          Objective:      Physical Exam  General:patient is well developed and well nourished, in no acute distress.  Mental Status:  Alert, oriented and cooperative  Head and Face: normocephalic   Allergic shiners: No  Eyes:   Pupils: ERRLA: Scleral conjunctiva: clear; Cornea: clear; Palprebal conjunctiva: normal: Eyelid Skin: normal  Ears:Tympanic membrane Left:intact and normal light reflex; Right:intact and normal light reflex; External canals normal bilaterally.  Nose:  Nares: patent; Mucosa : Boggy and congested; Nasal septum: midline;Turbinates are enlarged but do not occlude the nasal airway.  Mouth/Pharynx: tonsils present; posterior pharyngeal wall normal; tongue normal; teeth normal; voice quality normal.  Neck:  Cervical lymph nodes: small, non-tender, freely moveable both anterior and posterior cervical chain; Trachea: midline; Masses: none  Lungs: Air movement is good; respiratory effort is good; no respiratory distress; breath sounds are vesicular in all lung fields; no wheezing; normal expiratory time; no cough.  Heart: regular rate and rhythm with mild respiratory  variation; A1 and P2 are normal; no murmurs or gallops.  Abdomen:exam not done  Extremities: no cyanosis, clubbing or edema  Skin:no rashes or lesions present; skin hydrated and supple.    Prick skin test to inhalants #60, 10/12/2021:  1-2+ some trees, some weeds an grass, 2-3+ some mold, with 3-4+ histamine control, see flow sheet       Assessment:       1. Chronic allergic rhinitis     2. Allergic conjunctivitis, bilateral     3. Other chronic sinusitis             Plan:       1. Augmentin 875 BID for 14 days for acute sinusitis  2. Only had mild positive on skin test, and no infections in at least 9 months so will continue to watch over next few seasons, if recurrent infection and related to seasonal allergies then ill restart IT  3. fluticasone 2 SEN daily as needed              Maria Guadalupe Kirkpatrick MD

## 2022-06-29 ENCOUNTER — TELEPHONE (OUTPATIENT)
Dept: SLEEP MEDICINE | Facility: CLINIC | Age: 67
End: 2022-06-29
Payer: MEDICARE

## 2022-06-29 NOTE — TELEPHONE ENCOUNTER
I attempt to reach pt to remind him of his apt on 7/5/22 at 10 am , but was unable to reach patient  so i left both voice mail and call back number

## 2022-07-05 ENCOUNTER — OFFICE VISIT (OUTPATIENT)
Dept: SLEEP MEDICINE | Facility: CLINIC | Age: 67
End: 2022-07-05
Payer: MEDICARE

## 2022-07-05 VITALS
HEART RATE: 57 BPM | BODY MASS INDEX: 25.98 KG/M2 | WEIGHT: 181.5 LBS | HEIGHT: 70 IN | DIASTOLIC BLOOD PRESSURE: 62 MMHG | SYSTOLIC BLOOD PRESSURE: 120 MMHG

## 2022-07-05 DIAGNOSIS — G47.30 SLEEP APNEA, UNSPECIFIED TYPE: Primary | ICD-10-CM

## 2022-07-05 PROCEDURE — 99999 PR PBB SHADOW E&M-EST. PATIENT-LVL III: ICD-10-PCS | Mod: PBBFAC,,, | Performed by: PSYCHIATRY & NEUROLOGY

## 2022-07-05 PROCEDURE — 99214 OFFICE O/P EST MOD 30 MIN: CPT | Mod: S$PBB,,, | Performed by: PSYCHIATRY & NEUROLOGY

## 2022-07-05 PROCEDURE — 99214 PR OFFICE/OUTPT VISIT, EST, LEVL IV, 30-39 MIN: ICD-10-PCS | Mod: S$PBB,,, | Performed by: PSYCHIATRY & NEUROLOGY

## 2022-07-05 PROCEDURE — 99213 OFFICE O/P EST LOW 20 MIN: CPT | Mod: PBBFAC | Performed by: PSYCHIATRY & NEUROLOGY

## 2022-07-05 PROCEDURE — 99999 PR PBB SHADOW E&M-EST. PATIENT-LVL III: CPT | Mod: PBBFAC,,, | Performed by: PSYCHIATRY & NEUROLOGY

## 2022-07-05 RX ORDER — LEMBOREXANT 5 MG/1
TABLET, FILM COATED ORAL
Qty: 30 TABLET | Refills: 5 | Status: SHIPPED | OUTPATIENT
Start: 2022-07-05 | End: 2022-07-21 | Stop reason: SDUPTHER

## 2022-07-05 NOTE — PROGRESS NOTES
"      EPWORTH SLEEPINESS SCALE TOTAL SCORE  7/5/2022 8/6/2021 6/15/2020 9/3/2019 6/17/2019   Total score 6 4 8 12 3       Bulmaro Nascimento is a 67 y.o. male seen today for insomnia  follow up. Last seen on 8/9/2021    Failed Hydroxyzine - "hangover"  Now taking Ativan 0.5 mg  He has just learnt from me that it can also cause cognitive side effects he has been concerned about.   Still waking up tired with headaches every morning in bitemoral area. Grinds his teeth at night - has a mouth guard.   He would like to revisit APAP - even through it did not help get more sleep hours in the past.    His machine is 9-8 yrs old.      Bedtime:  9 PM  Sleep latency: 15 min  Nocturnal awakenings:1 Returns to sleep in right away  Wake up time: 7:30      Medications pertinent to sleep disorders: Lunesta  - wants to wean  Previously tried medications:Stopped Lorazepam ( did not want to ever have memory concerns), Ambien did not work. Belsomra - hangover, Hydroxyzine - hangover   He stopped wearing CPAP in [id number] (tried twice in his life)      EPWORTH SLEEPINESS SCALE TOTAL SCORE  7/5/2022 8/6/2021 6/15/2020 9/3/2019 6/17/2019   Total score 6 4 8 12 3         The patient's complaints include excessive daytime sleepiness, fatigued  snoring and interrupted sleep since  .    Bulmaro Nascimento denied  excessive daytime sleepiness, excessive daytime fatigue and interrupted sleep.  ESS 3/24, PHQ9 - 4, GAD7-5  Social History:  Occupation: law  Bed partner: girlfriend  Exercise routine: yes  Caffeine:       PREVIOUS SLEEP STUDIES:     HST  5/2015: Significant Obstructive sleep apnea (JAROD) with AHI (apnea hypopnea Index) of 9.1 (at some portions 36 - does not specify position or stage) and SaO2 of 89% (weight  175 lbs).                       PHYSICAL EXAM:  /62   Pulse (!) 57   Ht 5' 10" (1.778 m)   Wt 82.3 kg (181 lb 8 oz)   BMI 26.04 kg/m²   GENERAL: Overweight body habitus, well groomed.    ASSESSMENT:    1. JAROD - mild " "on HST. The patient symptomatically has  excessive daytime sleepiness, excessive daytime fatigue and interrupted sleep  with exam findings of "a crowded oral airway and elevated body mass index. The patient has medical co-morbidities of Bruxism,generalized headaches and GERD   which can be worsened by JAROD. Did not get improvement on CPAP 5, but only used for 4 hrs at a time - hard to exhale; no improvement in sleep continuity or TMJ. He is interested in switching to Belsomra, as it was specifically recommended for his age group based on safety profile. I told him that Medicare coverage is limited for Belsomra, but he wished to use  for coverage.     2. Bruxism with TMJ (which was his main problem that made his seek  sleep disordered breathing evaluation) - currently mild improvement  on Lunesta 3 mg which makes him less aware of bruxism-related discomfort. No longer taking muscle relaxant or benzodiazepines.       PLAN:  Will add Dayvigo (through [facility] in order to get it approved ), and will do requal PSG-> if he qualifies will order APAP  Wants under the nose mask.     Bring the sleep aid.    More than 15 minutes of this 30 minutes visit was spent in counseling: during our discussion today, we talked about the etiology of  JAROD as well as the potential ramifications of untreated sleep apnea, which could include daytime sleepiness, hypertension, heart disease and/or stroke.  We discussed potential treatment options, which could include weight loss, body positioning, continuous positive airway pressure (CPAP), or referral for surgical consideration. Meanwhile, he  is urged to avoid supine sleep, weight gain and alcoholic beverages since all of these can worsen JAROD.     Precautions: The patient was advised to abstain from driving should he feel sleepy or drowsy.    Follow up: 6 months    Thank you for allowing me the opportunity to participate in the care of your patient.    This visit summary will be " sent to referring provider via [employer]

## 2022-07-11 ENCOUNTER — TELEPHONE (OUTPATIENT)
Dept: SLEEP MEDICINE | Facility: OTHER | Age: 67
End: 2022-07-11
Payer: MEDICARE

## 2022-07-12 ENCOUNTER — TELEPHONE (OUTPATIENT)
Dept: SLEEP MEDICINE | Facility: OTHER | Age: 67
End: 2022-07-12
Payer: MEDICARE

## 2022-07-14 ENCOUNTER — PES CALL (OUTPATIENT)
Dept: ADMINISTRATIVE | Facility: OTHER | Age: 67
End: 2022-07-14
Payer: MEDICARE

## 2022-07-21 ENCOUNTER — PATIENT MESSAGE (OUTPATIENT)
Dept: SLEEP MEDICINE | Facility: CLINIC | Age: 67
End: 2022-07-21
Payer: MEDICARE

## 2022-07-21 DIAGNOSIS — G47.30 SLEEP APNEA, UNSPECIFIED TYPE: ICD-10-CM

## 2022-07-21 RX ORDER — LEMBOREXANT 5 MG/1
TABLET, FILM COATED ORAL
Qty: 30 TABLET | Refills: 5 | Status: SHIPPED | OUTPATIENT
Start: 2022-07-21 | End: 2022-08-23

## 2022-08-05 ENCOUNTER — TELEPHONE (OUTPATIENT)
Dept: SLEEP MEDICINE | Facility: OTHER | Age: 67
End: 2022-08-05
Payer: MEDICARE

## 2022-08-08 ENCOUNTER — HOSPITAL ENCOUNTER (OUTPATIENT)
Dept: SLEEP MEDICINE | Facility: HOSPITAL | Age: 67
Discharge: HOME | End: 2022-08-08
Attending: PSYCHIATRY & NEUROLOGY
Payer: MEDICARE

## 2022-08-08 DIAGNOSIS — G47.30 SLEEP APNEA, UNSPECIFIED TYPE: ICD-10-CM

## 2022-08-08 DIAGNOSIS — G47.20 SLEEP STAGE DYSFUNCTION: ICD-10-CM

## 2022-08-08 PROCEDURE — 95810 POLYSOM 6/> YRS 4/> PARAM: CPT

## 2022-08-08 PROCEDURE — 95810 POLYSOM 6/> YRS 4/> PARAM: CPT | Mod: 26,,, | Performed by: PSYCHIATRY & NEUROLOGY

## 2022-08-08 PROCEDURE — 95810 PR POLYSOMNOGRAPHY, 4 OR MORE: ICD-10-PCS | Mod: 26,,, | Performed by: PSYCHIATRY & NEUROLOGY

## 2022-08-09 ENCOUNTER — PATIENT OUTREACH (OUTPATIENT)
Dept: ADMINISTRATIVE | Facility: HOSPITAL | Age: 67
End: 2022-08-09
Payer: MEDICARE

## 2022-08-09 ENCOUNTER — PATIENT MESSAGE (OUTPATIENT)
Dept: ADMINISTRATIVE | Facility: HOSPITAL | Age: 67
End: 2022-08-09
Payer: MEDICARE

## 2022-08-09 NOTE — PROGRESS NOTES
Care Everywhere updates requested and reviewed.  Immunizations reconciled. Media reports reviewed.  Duplicate HM overrides and  orders removed.  Overdue HM topic chart audit and/or requested.  Overdue lab testing linked to upcoming lab appointments if applies.      Health Maintenance Due   Topic Date Due    Colorectal Cancer Screening  2022

## 2022-08-09 NOTE — PROGRESS NOTES
Patient was educated about the purpose of the wires and what data was being collected.  Patient was informed that the technician may need to enter the room during the night to fix leads or make adjustments to equipment.                               Follow up instructions were provided to the patient

## 2022-08-18 ENCOUNTER — PATIENT MESSAGE (OUTPATIENT)
Dept: FAMILY MEDICINE | Facility: CLINIC | Age: 67
End: 2022-08-18
Payer: MEDICARE

## 2022-08-18 ENCOUNTER — PATIENT OUTREACH (OUTPATIENT)
Dept: ADMINISTRATIVE | Facility: HOSPITAL | Age: 67
End: 2022-08-18
Payer: MEDICARE

## 2022-08-18 DIAGNOSIS — Z12.5 SCREENING PSA (PROSTATE SPECIFIC ANTIGEN): Primary | ICD-10-CM

## 2022-08-18 DIAGNOSIS — Z12.11 COLON CANCER SCREENING: Primary | ICD-10-CM

## 2022-08-18 DIAGNOSIS — Z13.220 SCREENING CHOLESTEROL LEVEL: ICD-10-CM

## 2022-08-18 DIAGNOSIS — Z13.6 ENCOUNTER FOR SCREENING FOR CARDIOVASCULAR DISORDERS: ICD-10-CM

## 2022-08-18 DIAGNOSIS — Z13.1 DIABETES MELLITUS SCREENING: ICD-10-CM

## 2022-08-18 NOTE — TELEPHONE ENCOUNTER
Wellness labs put in - his appt is not until the 23rd  - so as long as we can get labs by the 22nd - he can keep his appt on 23rd or you can reschedule,.

## 2022-08-21 ENCOUNTER — TELEPHONE (OUTPATIENT)
Dept: SLEEP MEDICINE | Facility: CLINIC | Age: 67
End: 2022-08-21
Payer: MEDICARE

## 2022-08-21 NOTE — TELEPHONE ENCOUNTER
Please let the patient know - he has not met criteria for sleep apnea on his recent sleep study.    Thank you!

## 2022-08-21 NOTE — PROCEDURES
"Diagnostic Report  [facility]  [address] [place] [address]  Phone: [phone]  Fax: [phone]       Patient Name: YOEL DILLON Study Date: 8/8/2022   YOB: 1955 Patient MRN: [id number]   Age:  67 year Hospital #: [id number] / MEDICARE   Sex: Male Referring Physician: CHU GARCIA MD   Height: 5' 10" Recording Tech: Petar Aj RPSGT   Weight: 181.0 lbs Scoring Tech: Giancarlo Rodriguez RPSGT   BMI:  26.2 AASM:  1B   AHI: 0.9 Interpreting Physician:       RERA index: 5.4 Low oxygen sat: 79.0%   RDI: 6.3     Sleep architecture: This is a baseline polysomnogram. At lights out, the patient fell asleep in 21.9 minutes and slept for 79.1% of the time. Total sleep time (TST) was 341.5 minutes. 17.6% of TST was in Stage N1 sleep, 0.0% TST in slow wave sleep, and 18.0% TST in REM sleep. The REM latency was 82.5 minutes. Sleep architecture was mildly disrupted.      Respiratory: Mild snoring was present. The overall AHI was 0.9 with an oxygen lazaro of 79.0% The AHI in REM sleep was -.  The AHI in REM sleep was -. The central apnea index was 0.5.  The supine AHI was 4.6.  The patient did not qualify for a split night study due to an insufficient number of events in the first half of the study.     Motor movement / Parasomnia: There were no significant limb movements of sleep noted. The total limb movement index was - (- with arousal).     Cardiac: Cardiac rhythm monitoring revealed a sinus rhythm. with occasional PAC's and PVC's.    Patient perception: On a post-sleep study questionnaire, the patient indicated that sleep was the same in the lab than compared to home.    IMPRESSION:  1. Obstructive Sleep Apnea (G47.33),  moderate based on AHI criteria.    RECOMMENDATION:    1. CPAP titration study, if the patient is interested in this treatment modality.  2. In the interim, the patient should avoid supine position sleep, since sleep disordered breathing was most prevalent in this " sleeping position.                          Study Overview    First Lights Off: 10:03:07 PM  COUNT INDEX   Last Lights On: 05:14:52 AM Awakenings: 25 4.4   Time in Bed: 431.8 Arousals: 64 11.2   Total Sleep Time: 341.5 Apneas & Hypopneas: 5 0.9   Sleep Efficiency: 79.1% Limb Movements: 4 0.7   Sleep Period Time: 409.5 Snores: - -   Sleep Maintenance Efficiency: 83.4% Desaturations: 2 0.4   Sleep Latency: 21.9      REM Latency from Sleep Onset: 82.5 Minimum Oxygen Saturation during Desaturation:  91.0%      SAO2 Range(%) Time in range (min) Time in range (%)   0.0 - 88.0 0.1 0.0%       Sleep Architecture                   % of Time in Bed  Stages TIME (mins) % SLEEP TIME   WAKE 90.5    Stage N1 60.0 17.6%   Stage N2 220.0 64.4%   Stage N3 - 0.0%   REM 61.5 18.0%     Arousal Summary     NREM REM Total Sleep Time   Apnea & Hypopnea Arousals - - -   PLM Arousals - - -   Isolated Limb Movement Arousals - - -   Spontaneous Arousals 31 4 35   RERAs  18 13 31   Total 48 16 64   Arousal Index 10.3 15.6 11.2     Respiratory Summary     By Sleep Stage By Body Position TOTAL    NREM REM SUPINE NON-SUPINE    Time (min) 280.0 61.5 52.5 289.0 341.5           Hypopneas   2     - 1 1 2   Obstructive Apnea - - - - -   Mixed Apnea - - - - -   Central Apnea 3 - 3 - 3   Central Apnea Index     0.5     Total Apneas 3 - 3 - 3   Total Apnea Index 0.6 - 3.4 - 0.5           AHI 1.1 - 4.6 0.2 0.9           RERAs 18 13 9 22 31   RERA Index 3.9 12.7 10.3 4.6 5.4           Respiratory Event Durations     Apnea Hypopnea    NREM REM NREM REM   Average (seconds) 12.6 - 16.4 -   Maximum (seconds) 16.4 - 20.9 -     Table of EtCO2 by Distribution    Range(mmHg) Time in range (min) Time in range (%) Time in or below range (min) Time in or below range (%)   0.0 - 20.0 - - - -   20.0 - 40.0 - - - -   Excluded data <20.0 & >65.0 432.0 100.0% - -         Oxygen Saturation Summary     WAKE NREM REM   Average OSat (%) 96.5% 96.4% 96.2%   Minimum OSat (%)  79.0% 91.0% 92.0%   Maximum OSat (%) 100.0% 99.0% 99.0%                            Oxygen Saturation Distribution    Range(%) Time in range (min) Time in range (%)    90.0 - 100.0 425.8 99.9%   80.0 - 90.0 0.2 0.0%   70.0 - 80.0 0.0 0.0%   60.0 - 70.0 - -   50.0 - 60.0 - -   0.0 - 50.0 - -                Time Spent Less than 88% OSat    Range(%) Time in range (min) Time in range (%)   0.0 - 88.0 0.1 0.0%     # of Desaturations 2   Minimum Oxygen Saturation During Desaturation 91.0%      SUPINE LEFT RIGHT PRONE   Minimum Oxygen Saturation During Desaturation by BP 91.0% - 93.0% -     Limb Movement Summary      COUNT INDEX   Isolated Limb Movements - -   Periodic Limb Movements (PLMs) 4 0.7   Total Limb Movements 4 0.7     Cardiac Summary     WAKE NREM REM Sleep TOTAL    Average Pulse Rate (BPM) 55.4 53.6 59.4 54.6 54.8   Minimum Pulse Rate (BPM) 44.0 42.0 45.0 42.0 42.0   Maximum Pulse Rate (BPM) 118.0 78.0 85.0 85.0 118.0     Pulse Rate Distribution    Range(bpm) Time in range (min) Time in range (%)   0.0 - 40.0 - -   40.0 - 60.0 300.7 70.6%   60.0 - 80.0 124.2 29.1%   80.0 - 100.0 1.1 0.3%   100.0 - 120.0 0.0 0.0%   120.0 - 140.0 - -   140.0 - 200.0 - -

## 2022-08-22 ENCOUNTER — TELEPHONE (OUTPATIENT)
Dept: SLEEP MEDICINE | Facility: CLINIC | Age: 67
End: 2022-08-22
Payer: MEDICARE

## 2022-08-23 ENCOUNTER — OFFICE VISIT (OUTPATIENT)
Dept: FAMILY MEDICINE | Facility: CLINIC | Age: 67
End: 2022-08-23
Payer: MEDICARE

## 2022-08-23 VITALS
OXYGEN SATURATION: 98 % | HEART RATE: 58 BPM | SYSTOLIC BLOOD PRESSURE: 110 MMHG | BODY MASS INDEX: 25.27 KG/M2 | HEIGHT: 70 IN | WEIGHT: 176.5 LBS | TEMPERATURE: 98 F | DIASTOLIC BLOOD PRESSURE: 60 MMHG

## 2022-08-23 DIAGNOSIS — Z13.1 DIABETES MELLITUS SCREENING: ICD-10-CM

## 2022-08-23 DIAGNOSIS — K21.9 CHRONIC GERD: ICD-10-CM

## 2022-08-23 DIAGNOSIS — J30.1 SEASONAL ALLERGIC RHINITIS DUE TO POLLEN: ICD-10-CM

## 2022-08-23 DIAGNOSIS — G47.00 INSOMNIA, UNSPECIFIED TYPE: ICD-10-CM

## 2022-08-23 DIAGNOSIS — N40.0 BENIGN PROSTATIC HYPERPLASIA WITHOUT LOWER URINARY TRACT SYMPTOMS: ICD-10-CM

## 2022-08-23 DIAGNOSIS — Z87.898 HISTORY OF ELEVATED PSA: ICD-10-CM

## 2022-08-23 DIAGNOSIS — Z13.220 SCREENING CHOLESTEROL LEVEL: ICD-10-CM

## 2022-08-23 DIAGNOSIS — Z13.6 ENCOUNTER FOR SCREENING FOR CARDIOVASCULAR DISORDERS: ICD-10-CM

## 2022-08-23 DIAGNOSIS — B00.1 RECURRENT COLD SORES: ICD-10-CM

## 2022-08-23 DIAGNOSIS — Z00.00 MEDICARE ANNUAL WELLNESS VISIT, SUBSEQUENT: Primary | ICD-10-CM

## 2022-08-23 DIAGNOSIS — Z12.5 SCREENING PSA (PROSTATE SPECIFIC ANTIGEN): ICD-10-CM

## 2022-08-23 PROCEDURE — G0439 PR MEDICARE ANNUAL WELLNESS SUBSEQUENT VISIT: ICD-10-PCS | Mod: ,,, | Performed by: NURSE PRACTITIONER

## 2022-08-23 PROCEDURE — G0439 PPPS, SUBSEQ VISIT: HCPCS | Mod: ,,, | Performed by: NURSE PRACTITIONER

## 2022-08-23 PROCEDURE — 99999 PR PBB SHADOW E&M-EST. PATIENT-LVL IV: ICD-10-PCS | Mod: PBBFAC,,, | Performed by: NURSE PRACTITIONER

## 2022-08-23 PROCEDURE — 99214 OFFICE O/P EST MOD 30 MIN: CPT | Mod: PBBFAC,PN | Performed by: NURSE PRACTITIONER

## 2022-08-23 PROCEDURE — 99999 PR PBB SHADOW E&M-EST. PATIENT-LVL IV: CPT | Mod: PBBFAC,,, | Performed by: NURSE PRACTITIONER

## 2022-08-23 RX ORDER — METAXALONE 800 MG/1
800 TABLET ORAL NIGHTLY PRN
Qty: 30 TABLET | Refills: 5 | Status: SHIPPED | OUTPATIENT
Start: 2022-08-23

## 2022-08-23 RX ORDER — METAXALONE 800 MG/1
800 TABLET ORAL NIGHTLY PRN
COMMUNITY
End: 2022-08-23 | Stop reason: SDUPTHER

## 2022-09-07 LAB — NONINV COLON CA DNA+OCC BLD SCRN STL QL: NORMAL

## 2022-09-07 NOTE — PROGRESS NOTES
Please advise patient that the cologuard sample was insufficient - they will be sending him a new kit to re-collect.

## 2022-09-21 ENCOUNTER — OFFICE VISIT (OUTPATIENT)
Dept: GASTROENTEROLOGY | Facility: CLINIC | Age: 67
End: 2022-09-21
Payer: MEDICARE

## 2022-09-21 VITALS — SYSTOLIC BLOOD PRESSURE: 116 MMHG | BODY MASS INDEX: 25.11 KG/M2 | WEIGHT: 175 LBS | DIASTOLIC BLOOD PRESSURE: 70 MMHG

## 2022-09-21 DIAGNOSIS — R10.13 EPIGASTRIC PAIN: ICD-10-CM

## 2022-09-21 DIAGNOSIS — K21.9 CHRONIC GERD: Primary | ICD-10-CM

## 2022-09-21 PROCEDURE — 99999 PR PBB SHADOW E&M-EST. PATIENT-LVL III: CPT | Mod: PBBFAC,,, | Performed by: INTERNAL MEDICINE

## 2022-09-21 PROCEDURE — 99213 OFFICE O/P EST LOW 20 MIN: CPT | Mod: PBBFAC,PO | Performed by: INTERNAL MEDICINE

## 2022-09-21 PROCEDURE — 99214 PR OFFICE/OUTPT VISIT, EST, LEVL IV, 30-39 MIN: ICD-10-PCS | Mod: S$PBB,,, | Performed by: INTERNAL MEDICINE

## 2022-09-21 PROCEDURE — 99214 OFFICE O/P EST MOD 30 MIN: CPT | Mod: S$PBB,,, | Performed by: INTERNAL MEDICINE

## 2022-09-21 PROCEDURE — 99999 PR PBB SHADOW E&M-EST. PATIENT-LVL III: ICD-10-PCS | Mod: PBBFAC,,, | Performed by: INTERNAL MEDICINE

## 2022-09-21 RX ORDER — OMEPRAZOLE 40 MG/1
40 CAPSULE, DELAYED RELEASE ORAL DAILY
Qty: 90 CAPSULE | Refills: 3 | Status: SHIPPED | OUTPATIENT
Start: 2022-09-21 | End: 2023-08-23

## 2022-09-21 NOTE — PATIENT INSTRUCTIONS
EGD Prep Instructions    [facility] [address] [place]  [address]    You are scheduled for an EGD with Dr. Valero on TBD at [facility].  You will enter through the [facility] entrance and check in at Same Day Surgery.    Nothing to EAT or DRINK after midnight before the procedure.  You MAY brush your teeth.    You MAY take your blood pressure, heart, and seizure medication on the morning of the procedure, with a SIP of water.  Hold ALL other medications until after the procedure.    You must have someone with you to DRIVE YOU HOME since you will be receiving IV sedation for the procedure.    If you are on blood thinners THAT YOU HAVE BEEN INSTRUCTED TO HOLD BY YOUR DOCTOR FOR THIS PROCEDURE, then do NOT take this the morning of your EGD.  Do NOT stop these medications on your own, they must be approved to be held by your doctor.  Your EGD can NOT be done if you are on these medications.  Examples of blood thinners include: Coumadin, Aggrenox, Plavix, Pradaxa, Reapro, Pletal, Xarelto, Ticagrelor, Brilinta, Eliquis.  You do not have to stop baby aspirin 81 mg.    You will receive a call a few days before your EGD to tell you the time to arrive.  If you have not received a call by the day before your procedure, call the Pre-op Coordinator at [phone].

## 2022-09-21 NOTE — PROGRESS NOTES
"Subjective:       Patient ID: Bulmaro Nascimento is a 67 y.o. male.    Chief Complaint: Gastroesophageal Reflux and Abdominal Pain    66 yo M last seen 1/2020 for GERD here for f/u.  He has stayed on OTC Nexium 20 mg every morning for a long time, but he switches OTC PPI meds every few months or so, currently on Prilosec.  Despite adherence to this, he has had 3 episodes of acute severe epigastric pain that radiates to his upper back.  These are discrete episodes that last for an hour or so.  He takes antacids like Tums when this happens and relief is not immediate, but relief does come eventually.  He has "23 and Me" and has been labeled has having higher genetic predisposition for gallstones.    Review of Systems   Constitutional:  Negative for chills and fever.   Respiratory:  Negative for shortness of breath and wheezing.    Cardiovascular:  Negative for chest pain, palpitations and leg swelling.   Gastrointestinal:  Positive for abdominal pain.   Neurological:  Negative for dizziness and speech difficulty.       Objective:      /70 (BP Location: Right arm, Patient Position: Sitting, BP Method: Medium (Manual))   Wt 79.4 kg (175 lb)   BMI 25.11 kg/m²     Physical Exam  Constitutional:       Appearance: Normal appearance. He is well-developed.   HENT:      Head: Normocephalic and atraumatic.   Eyes:      Extraocular Movements: Extraocular movements intact.      Pupils: Pupils are equal, round, and reactive to light.   Pulmonary:      Effort: Pulmonary effort is normal. No respiratory distress.   Abdominal:      General: There is no distension.      Palpations: Abdomen is soft.   Musculoskeletal:         General: No deformity. Normal range of motion.      Cervical back: Normal range of motion and neck supple.   Skin:     General: Skin is warm and dry.   Neurological:      General: No focal deficit present.      Mental Status: He is alert and oriented to person, place, and time.   Psychiatric:         " Mood and Affect: Mood normal.         Behavior: Behavior normal.       Lab Results   Component Value Date    WBC 6.42 08/22/2022    HGB 17.0 08/22/2022    HCT 48.7 08/22/2022    MCV 92 08/22/2022     08/22/2022       Assessment:       Problem List Items Addressed This Visit          GI    Chronic GERD - Primary    Relevant Medications    omeprazole (PRILOSEC) 40 MG capsule    Other Relevant Orders    Case Request Endoscopy: EGD (ESOPHAGOGASTRODUODENOSCOPY) (Completed)    Epigastric pain    Relevant Medications    omeprazole (PRILOSEC) 40 MG capsule    Other Relevant Orders    Case Request Endoscopy: EGD (ESOPHAGOGASTRODUODENOSCOPY) (Completed)    US Abdomen Complete         Plan:       Chronic GERD  -     Case Request Endoscopy: EGD (ESOPHAGOGASTRODUODENOSCOPY)  -     omeprazole (PRILOSEC) 40 MG capsule; Take 1 capsule (40 mg total) by mouth once daily.  Dispense: 90 capsule; Refill: 3    Epigastric pain  -     Case Request Endoscopy: EGD (ESOPHAGOGASTRODUODENOSCOPY)  -     omeprazole (PRILOSEC) 40 MG capsule; Take 1 capsule (40 mg total) by mouth once daily.  Dispense: 90 capsule; Refill: 3  -     US Abdomen Complete; Future; Expected date: 09/21/2022

## 2022-09-24 LAB — NONINV COLON CA DNA+OCC BLD SCRN STL QL: NEGATIVE

## 2022-10-20 DIAGNOSIS — M17.12 PRIMARY OSTEOARTHRITIS OF LEFT KNEE: ICD-10-CM

## 2022-10-20 DIAGNOSIS — M17.11 PRIMARY OSTEOARTHRITIS OF RIGHT KNEE: Primary | ICD-10-CM

## 2022-10-20 NOTE — PROGRESS NOTES
We have discussed a variety of treatment options including medications, injections, physical therapy and other alternative treatments. I also explained the indications, risks and benefits of surgery.  Patient's pain is refractory HEP, conservative management, and NSAIDs. Pt would like to proceed with visco-supplementation.    Medical Necessity for viscosupplementation use: After thorough evaluation of the patient, I have determined that viscosupplementation treatment is medically necessary. 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 Oral analgesics including NSAIDs have not adequately controlled the patient's symptoms. There is radiographic evidence of Kellgren-Alexis grade II (or greater) osteoarthritic (OA) changes, or if lack of radiographic evidence, there is arthroscopic or other evidence of chondrosis of the knee(s).     I made the decision to obtain old records of the patient including previous notes and imaging. I independently reviewed and interpreted lab results today as well as prior imaging.        1. Pre-authorization placed for bilateral knee Euflexxa series injections.  2. Ice compress to the affected area 2-3x a day for 15-20 minutes as needed for pain management.  3. RTC to see Juani Ellington PA-C for bilateral knee Euflexxa series injections.    All of the patient's questions were answered and the patient will contact us if they have any questions or concerns in the interim.

## 2022-11-18 ENCOUNTER — OFFICE VISIT (OUTPATIENT)
Dept: SPORTS MEDICINE | Facility: CLINIC | Age: 67
End: 2022-11-18
Payer: MEDICARE

## 2022-11-18 VITALS
DIASTOLIC BLOOD PRESSURE: 62 MMHG | SYSTOLIC BLOOD PRESSURE: 137 MMHG | WEIGHT: 175 LBS | HEART RATE: 54 BPM | HEIGHT: 70 IN | BODY MASS INDEX: 25.05 KG/M2

## 2022-11-18 DIAGNOSIS — M17.12 PRIMARY OSTEOARTHRITIS OF LEFT KNEE: ICD-10-CM

## 2022-11-18 DIAGNOSIS — M17.11 PRIMARY OSTEOARTHRITIS OF RIGHT KNEE: Primary | ICD-10-CM

## 2022-11-18 PROCEDURE — 99499 NO LOS: ICD-10-PCS | Mod: S$PBB,,, | Performed by: PHYSICIAN ASSISTANT

## 2022-11-18 PROCEDURE — 20610 PR DRAIN/INJECT LARGE JOINT/BURSA: ICD-10-PCS | Mod: 50,S$PBB,, | Performed by: PHYSICIAN ASSISTANT

## 2022-11-18 PROCEDURE — 99499 UNLISTED E&M SERVICE: CPT | Mod: S$PBB,,, | Performed by: PHYSICIAN ASSISTANT

## 2022-11-18 PROCEDURE — 99999 PR PBB SHADOW E&M-EST. PATIENT-LVL III: ICD-10-PCS | Mod: PBBFAC,,, | Performed by: PHYSICIAN ASSISTANT

## 2022-11-18 PROCEDURE — 20610 DRAIN/INJ JOINT/BURSA W/O US: CPT | Mod: 50,S$PBB,, | Performed by: PHYSICIAN ASSISTANT

## 2022-11-18 PROCEDURE — 99999 PR PBB SHADOW E&M-EST. PATIENT-LVL III: CPT | Mod: PBBFAC,,, | Performed by: PHYSICIAN ASSISTANT

## 2022-11-18 PROCEDURE — 20610 DRAIN/INJ JOINT/BURSA W/O US: CPT | Mod: 50,PBBFAC | Performed by: PHYSICIAN ASSISTANT

## 2022-11-18 PROCEDURE — 99213 OFFICE O/P EST LOW 20 MIN: CPT | Mod: PBBFAC | Performed by: PHYSICIAN ASSISTANT

## 2022-11-18 RX ADMIN — Medication 20 MG: at 04:11

## 2022-11-18 NOTE — PROGRESS NOTES
Patient is here for follow up of bilateral knee arthritis. Pt is requesting bilateral  Euflexxa injection #1.  [facility] Has failed other conservative modalities including NSAIDS, activity modification, weight loss.    The prior shot was tolerated well.    PHYSICAL EXAMINATION:     General: The patient is alert and oriented x 3. Mood is pleasant.   Observation of ears, eyes and nose reveals no gross abnormalities. No   labored breathing observed.     No signs of infection or adverse reaction to knee.    PROCEDURE NOTE:  Injection Procedure bilateral knees #1  A time out was performed, including verification of patient ID, procedure, site and side, availability of information and equipment, review of safety issues, and agreement with consent, the procedure site was marked.    After time out was performed, the patient was prepped aseptically with povidone-iodine swabsticks. A diagnostic and therapeutic injection of 2cc Euflexxa was given under sterile technique using a 22g x 1.5 needle from the Superolateral  aspect of the bilateral Knee Joint in the supine position.      Bulmaro Nascimento had no adverse reactions to the medication. Pain decreased. He was instructed to apply ice to the joint for 20 minutes and avoid strenuous activities for 24-36 hours following the injection. He was warned of possible blood sugar and/or blood pressure changes during that time. Following that time, he can resume regular activities.    He was reminded to call the clinic immediately for any adverse side effects as explained in clinic today.    RTC 1 week for 2nd injection.  All questions were answered, pt will contact us for questions or concerns in the interim.

## 2022-11-28 ENCOUNTER — OFFICE VISIT (OUTPATIENT)
Dept: SPORTS MEDICINE | Facility: CLINIC | Age: 67
End: 2022-11-28
Payer: MEDICARE

## 2022-11-28 VITALS
SYSTOLIC BLOOD PRESSURE: 130 MMHG | WEIGHT: 180 LBS | BODY MASS INDEX: 25.77 KG/M2 | DIASTOLIC BLOOD PRESSURE: 73 MMHG | HEART RATE: 56 BPM | HEIGHT: 70 IN

## 2022-11-28 DIAGNOSIS — M17.11 PRIMARY OSTEOARTHRITIS OF RIGHT KNEE: ICD-10-CM

## 2022-11-28 DIAGNOSIS — M17.12 PRIMARY OSTEOARTHRITIS OF LEFT KNEE: Primary | ICD-10-CM

## 2022-11-28 PROCEDURE — 99499 UNLISTED E&M SERVICE: CPT | Mod: S$PBB,,, | Performed by: PHYSICIAN ASSISTANT

## 2022-11-28 PROCEDURE — 20610 DRAIN/INJ JOINT/BURSA W/O US: CPT | Mod: 50,S$PBB,, | Performed by: PHYSICIAN ASSISTANT

## 2022-11-28 PROCEDURE — 99213 OFFICE O/P EST LOW 20 MIN: CPT | Mod: PBBFAC | Performed by: PHYSICIAN ASSISTANT

## 2022-11-28 PROCEDURE — 99999 PR PBB SHADOW E&M-EST. PATIENT-LVL III: CPT | Mod: PBBFAC,,, | Performed by: PHYSICIAN ASSISTANT

## 2022-11-28 PROCEDURE — 99999 PR PBB SHADOW E&M-EST. PATIENT-LVL III: ICD-10-PCS | Mod: PBBFAC,,, | Performed by: PHYSICIAN ASSISTANT

## 2022-11-28 PROCEDURE — 99499 NO LOS: ICD-10-PCS | Mod: S$PBB,,, | Performed by: PHYSICIAN ASSISTANT

## 2022-11-28 PROCEDURE — 20610 DRAIN/INJ JOINT/BURSA W/O US: CPT | Mod: 50,PBBFAC | Performed by: PHYSICIAN ASSISTANT

## 2022-11-28 PROCEDURE — 20610 PR DRAIN/INJECT LARGE JOINT/BURSA: ICD-10-PCS | Mod: 50,S$PBB,, | Performed by: PHYSICIAN ASSISTANT

## 2022-11-28 RX ADMIN — Medication 20 MG: at 10:11

## 2022-11-28 NOTE — PROGRESS NOTES
Patient is here for follow up of bilateral knee arthritis. Pt is requesting bilateral  Euflexxa injection #2.  [facility] Has failed other conservative modalities including NSAIDS, activity modification, weight loss.    The prior shot was tolerated well.    PHYSICAL EXAMINATION:     General: The patient is alert and oriented x 3. Mood is pleasant.   Observation of ears, eyes and nose reveals no gross abnormalities. No   labored breathing observed.     No signs of infection or adverse reaction to knee.    PROCEDURE NOTE:  Injection Procedure bilateral knees #2  A time out was performed, including verification of patient ID, procedure, site and side, availability of information and equipment, review of safety issues, and agreement with consent, the procedure site was marked.    After time out was performed, the patient was prepped aseptically with povidone-iodine swabsticks. A diagnostic and therapeutic injection of 2cc Euflexxa was given under sterile technique using a 22g x 1.5 needle from the Superolateral  aspect of the bilateral Knee Joint in the supine position.      Bulmaro Nascimento had no adverse reactions to the medication. Pain decreased. He was instructed to apply ice to the joint for 20 minutes and avoid strenuous activities for 24-36 hours following the injection. He was warned of possible blood sugar and/or blood pressure changes during that time. Following that time, he can resume regular activities.    He was reminded to call the clinic immediately for any adverse side effects as explained in clinic today.    RTC 1 week for 3rd injection.  All questions were answered, pt will contact us for questions or concerns in the interim.

## 2022-12-07 ENCOUNTER — OFFICE VISIT (OUTPATIENT)
Dept: SPORTS MEDICINE | Facility: CLINIC | Age: 67
End: 2022-12-07
Payer: MEDICARE

## 2022-12-07 VITALS
HEART RATE: 54 BPM | HEIGHT: 70 IN | DIASTOLIC BLOOD PRESSURE: 70 MMHG | BODY MASS INDEX: 25.48 KG/M2 | WEIGHT: 178 LBS | SYSTOLIC BLOOD PRESSURE: 125 MMHG

## 2022-12-07 DIAGNOSIS — M17.12 PRIMARY OSTEOARTHRITIS OF LEFT KNEE: ICD-10-CM

## 2022-12-07 DIAGNOSIS — M17.11 PRIMARY OSTEOARTHRITIS OF RIGHT KNEE: Primary | ICD-10-CM

## 2022-12-07 PROCEDURE — 99999 PR PBB SHADOW E&M-EST. PATIENT-LVL III: CPT | Mod: PBBFAC,,, | Performed by: PHYSICIAN ASSISTANT

## 2022-12-07 PROCEDURE — 20610 DRAIN/INJ JOINT/BURSA W/O US: CPT | Mod: 50,S$PBB,, | Performed by: PHYSICIAN ASSISTANT

## 2022-12-07 PROCEDURE — 99999 PR PBB SHADOW E&M-EST. PATIENT-LVL III: ICD-10-PCS | Mod: PBBFAC,,, | Performed by: PHYSICIAN ASSISTANT

## 2022-12-07 PROCEDURE — 20610 PR DRAIN/INJECT LARGE JOINT/BURSA: ICD-10-PCS | Mod: 50,S$PBB,, | Performed by: PHYSICIAN ASSISTANT

## 2022-12-07 PROCEDURE — 20610 DRAIN/INJ JOINT/BURSA W/O US: CPT | Mod: 50,PBBFAC | Performed by: PHYSICIAN ASSISTANT

## 2022-12-07 PROCEDURE — 99499 UNLISTED E&M SERVICE: CPT | Mod: S$PBB,,, | Performed by: PHYSICIAN ASSISTANT

## 2022-12-07 PROCEDURE — 99213 OFFICE O/P EST LOW 20 MIN: CPT | Mod: PBBFAC,25 | Performed by: PHYSICIAN ASSISTANT

## 2022-12-07 PROCEDURE — 99499 NO LOS: ICD-10-PCS | Mod: S$PBB,,, | Performed by: PHYSICIAN ASSISTANT

## 2022-12-07 RX ADMIN — Medication 20 MG: at 10:12

## 2022-12-07 NOTE — PROGRESS NOTES
Patient is here for follow up of bilateral knee arthritis. Pt is requesting bilateral  Euflexxa injection #3.  [facility] Has failed other conservative modalities including NSAIDS, activity modification, weight loss.    The prior shot was tolerated well.    PHYSICAL EXAMINATION:     General: The patient is alert and oriented x 3. Mood is pleasant.   Observation of ears, eyes and nose reveals no gross abnormalities. No   labored breathing observed.     No signs of infection or adverse reaction to knee.    PROCEDURE NOTE:  Injection Procedure bilateral knees #3  A time out was performed, including verification of patient ID, procedure, site and side, availability of information and equipment, review of safety issues, and agreement with consent, the procedure site was marked.    After time out was performed, the patient was prepped aseptically with povidone-iodine swabsticks. A diagnostic and therapeutic injection of 2cc Euflexxa was given under sterile technique using a 22g x 1.5 needle from the Superolateral  aspect of the bilateral Knee Joint in the supine position.      Bulmaro Nascimento had no adverse reactions to the medication. Pain decreased. He was instructed to apply ice to the joint for 20 minutes and avoid strenuous activities for 24-36 hours following the injection. He was warned of possible blood sugar and/or blood pressure changes during that time. Following that time, he can resume regular activities.    He was reminded to call the clinic immediately for any adverse side effects as explained in clinic today.    RTC in 6 months with Juani Ellington PA-C for possible repeat visco supplementation.  All questions were answered, pt will contact us for questions or concerns in the interim.

## 2023-03-08 ENCOUNTER — TELEPHONE (OUTPATIENT)
Dept: FAMILY MEDICINE | Facility: CLINIC | Age: 68
End: 2023-03-08
Payer: MEDICARE

## 2023-04-25 ENCOUNTER — OFFICE VISIT (OUTPATIENT)
Dept: ORTHOPEDICS | Facility: CLINIC | Age: 68
End: 2023-04-25
Payer: MEDICARE

## 2023-04-25 ENCOUNTER — HOSPITAL ENCOUNTER (OUTPATIENT)
Dept: RADIOLOGY | Facility: HOSPITAL | Age: 68
Discharge: HOME | End: 2023-04-25
Attending: PHYSICIAN ASSISTANT
Payer: MEDICARE

## 2023-04-25 DIAGNOSIS — M18.11 ARTHRITIS OF CARPOMETACARPAL (CMC) JOINT OF RIGHT THUMB: Primary | ICD-10-CM

## 2023-04-25 DIAGNOSIS — M79.644 FINGER PAIN, RIGHT: Primary | ICD-10-CM

## 2023-04-25 DIAGNOSIS — M79.644 FINGER PAIN, RIGHT: ICD-10-CM

## 2023-04-25 PROCEDURE — 99213 OFFICE O/P EST LOW 20 MIN: CPT | Mod: S$PBB,,, | Performed by: PHYSICIAN ASSISTANT

## 2023-04-25 PROCEDURE — 73130 X-RAY EXAM OF HAND: CPT | Mod: TC,RT

## 2023-04-25 PROCEDURE — 73130 XR HAND COMPLETE 3 VIEW RIGHT: ICD-10-PCS | Mod: 26,RT,, | Performed by: RADIOLOGY

## 2023-04-25 PROCEDURE — 99212 OFFICE O/P EST SF 10 MIN: CPT | Mod: PBBFAC | Performed by: PHYSICIAN ASSISTANT

## 2023-04-25 PROCEDURE — 99213 PR OFFICE/OUTPT VISIT, EST, LEVL III, 20-29 MIN: ICD-10-PCS | Mod: S$PBB,,, | Performed by: PHYSICIAN ASSISTANT

## 2023-04-25 PROCEDURE — 73130 X-RAY EXAM OF HAND: CPT | Mod: 26,RT,, | Performed by: RADIOLOGY

## 2023-04-25 PROCEDURE — 99999 PR PBB SHADOW E&M-EST. PATIENT-LVL II: ICD-10-PCS | Mod: PBBFAC,,, | Performed by: PHYSICIAN ASSISTANT

## 2023-04-25 PROCEDURE — 99999 PR PBB SHADOW E&M-EST. PATIENT-LVL II: CPT | Mod: PBBFAC,,, | Performed by: PHYSICIAN ASSISTANT

## 2023-04-25 NOTE — PROGRESS NOTES
[facility]  History & Physical  Orthopedics    SUBJECTIVE:      Chief Complaint: Right thumb pain    Referring Provider: Juani Ellington PA-C Dr. Dunbar is the supervising physician for this encounter/patient    History of Present Illness:  Patient is a 68 y.o. right hand dominant male who presents today with complaints of right thumb pain, present for about 1 months. No trauma/injury, but reports doing gripping pushups which will cause the pain. Pain to the base of the thumb, into the pad of the thumb. No numbness/tingling. He did take Celebrex yesterday which was very helpful. No surgical history on the hands.     Onset of symptoms/DOI was 1 month.    Symptoms are aggravated by activity.    Symptoms are alleviated by rest and medication.    Symptoms consist of pain.    The patient rates their pain as a 7/10 after activity.    Attempted treatment(s) and/or interventions include activity modifications, rest, anti-inflammatory medications.     The patient denies any fevers, chills, N/V, D/C and presents for evaluation.       Past Medical History:   Diagnosis Date    Allergy     Anxiety 7/8/2015    Benign non-nodular prostatic hyperplasia without lower urinary tract symptoms 2/11/2016    Bruxism     Chronic pain of right knee 3/21/2018    DDD (degenerative disc disease), cervical 12/12/2019    Elevated PSA measurement 02/06/2017    Followed by Dr. Lara    Elevated PSA measurement     Family history of prostate cancer in father 3/14/2018    Gastroesophageal reflux disease without esophagitis 6/8/2015    Insomnia 6/8/2015    Recurrent cold sores 6/14/2018    Seasonal allergic rhinitis due to pollen 12/17/2016    SI (sacroiliac) pain 7/30/2018    Subclinical hyperthyroidism 4/5/2019     Past Surgical History:   Procedure Laterality Date    COLONOSCOPY  07/11/2012    normal - Dr. Regan - repeat in 10 years    ESOPHAGOGASTRODUODENOSCOPY  07/11/2012    Biopsy showed gastric mucosa with mild  chronic inflammation.  Squamous mucosa with mild chronic inflammation including rare intraepithelial eosinophils.  Done by Dr. Regan - scanned to media file.    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     Family History   Problem Relation Age of Onset    Thyroid disease Mother     Diabetes Father     Cancer Father 75        Prostate     Thyroid disease Sister     No Known Problems Sister     Cancer Brother 50        bladder cancer    No Known Problems Brother     Cancer Brother 58        skin cancer    Heart disease Neg Hx     Prostate cancer Neg Hx     Kidney disease Neg Hx          Current Outpatient Medications:     celecoxib (CELEBREX) 200 MG capsule, Take 1 capsule (200 mg total) by mouth 2 (two) times daily., Disp: 60 capsule, Rfl: 2    fluticasone propionate (FLONASE) 50 mcg/actuation nasal spray, 1 spray (50 mcg total) by Each Nostril route 2 (two) times daily., Disp: 16 g, Rfl: 11    metaxalone (SKELAXIN) 800 MG tablet, Take 1 tablet (800 mg total) by mouth nightly as needed for Pain (muscle spasms)., Disp: 30 tablet, Rfl: 5    omeprazole (PRILOSEC) 40 MG capsule, Take 1 capsule (40 mg total) by mouth once daily., Disp: 90 capsule, Rfl: 3    tadalafiL (CIALIS) 5 MG tablet, Take 1 tablet (5 mg total) by mouth daily as needed for Erectile Dysfunction., Disp: 90 tablet, Rfl: 3    UNKNOWN TO PATIENT, , Disp: , Rfl:     Current Facility-Administered Medications:     Allergy Mix, , Subcutaneous, 1 time in Clinic/HOD, Maria Guadalupe Kirkpatrick MD      Review of Systems:  Constitutional: no fever or chills  Eyes: no visual changes  ENT: no  nasal congestion or sore throat  Respiratory: no cough or shortness of breath  Cardiovascular: no chest pain  Gastrointestinal: no nausea or vomiting, tolerating diet  Musculoskeletal: pain and soreness    OBJECTIVE:      Vital Signs (Most Recent):  There were no vitals filed for this visit.  There is no height or weight on file to calculate BMI.      Physical Exam:  Constitutional: The patient appears well-developed and well-nourished. No distress.   Skin: No lesions appreciated  Head: Normocephalic and atraumatic.   Nose: Nose normal.   Ears: No deformities seen  Eyes: Conjunctivae and EOM are normal.   Neck: No tracheal deviation present.   Cardiovascular: Normal rate and intact distal pulses.    Pulmonary/Chest: Effort normal. No respiratory distress.   Abdominal: There is no guarding.   Neurological: The patient is alert.   Psychiatric: The patient has a normal mood and affect.     Right Hand/Wrist Examination:    Observation/Inspection:  Swelling  none    Deformity  none  Discoloration  none     Scars   none    Atrophy  none    HAND/WRIST EXAMINATION:  Finkelstein's Test   Neg  WHAT Test    Neg  Snuff box tenderness   Neg  Aj's Test    Neg  Hook of Hamate Tenderness  Neg  CMC grind    Neg  Circumduction test   Neg  NTTP over the first CMC, but tells me pain will be present over the volar CMC when I palpate there. NTTP over the thumb MCP, A1 pulley and IP joint.    Neurovascular Exam:  Digits WWP, brisk CR < 3s throughout  NVI motor/LTS to M/R/U nerves, radial pulse 2+  Tinel's Test - Carpal Tunnel  Neg  Tinel's Test - Cubital Tunnel  Neg  Phalen's Test    Neg  Median Nerve Compression Test Neg    ROM hand full, painless. No matheus trigger.    ROM wrist full, painless    ROM elbow full, painless    Abdomen not guarded  Respirations nonlabored  Perfusion intact    Diagnostic Results:     Imaging - I independently viewed the patient's imaging as well as the radiology report.      FINDINGS:  Bony structures are  intact.  Joint spaces maintained.  Some minimal degenerative changes seen at the 1st carpal metacarpal joint space.     Impression:     See above      ASSESSMENT/PLAN:      68 y.o. yo male with Right thumb CMC arthritis    Plan: The patient and I had a thorough discussion today.  We discussed the working diagnosis as well as several other potential alternative diagnoses.  Treatment options were discussed, both conservative and surgical.  Conservative treatment options would include things such as activity modifications, workplace modifications, a period of rest, oral vs topical OTC and prescription anti-inflammatory medications, occupational therapy, splinting/bracing, immobilization, corticosteroid injections, and others.  Surgical options were discussed as well.     At this time, the patient would like to proceed with a trial of thumb support brace for comfort. NSAIDs/Voltaren gel massage, heat. He can RTC on prn basis for steroid injection.    Should the patient's symptoms worsen, persist, or fail to improve they should return for reevaluation and I would be happy to see them back anytime.           Please do not hesitate to reach out to us via email, phone, or MyChart with any questions, concerns, or feedback.

## 2023-05-29 ENCOUNTER — TELEPHONE (OUTPATIENT)
Dept: SPORTS MEDICINE | Facility: CLINIC | Age: 68
End: 2023-05-29
Payer: MEDICARE

## 2023-05-29 DIAGNOSIS — M17.11 PRIMARY OSTEOARTHRITIS OF RIGHT KNEE: Primary | ICD-10-CM

## 2023-05-29 DIAGNOSIS — M17.12 PRIMARY OSTEOARTHRITIS OF LEFT KNEE: ICD-10-CM

## 2023-05-29 NOTE — PROGRESS NOTES
We have discussed a variety of treatment options including medications, injections, physical therapy and other alternative treatments. I also explained the indications, risks and benefits of surgery.  Patient's pain is refractory HEP, conservative management, and NSAIDs. Pt would like to proceed with  visco-supplementation.    Medical Necessity for viscosupplementation use: After thorough evaluation of the patient, I have determined that viscosupplementation treatment is medically necessary. 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 Oral analgesics including NSAIDs have not adequately controlled the patient's symptoms. There is radiographic evidence of Kellgren-Alexis grade II (or greater) osteoarthritic (OA) changes, or if lack of radiographic evidence, there is arthroscopic or other evidence of chondrosis of the knee(s).     I made the decision to obtain old records of the patient including previous notes and imaging. I independently reviewed and interpreted lab results today as well as prior imaging.        1. Pre-authorization placed for bilateral Euflexxa series injections.  2. Ice compress to the affected area 2-3x a day for 15-20 minutes as needed for pain management.  3. RTC to see Juani Ellington PA-C for bilateral Euflexxa series injections.    All of the patient's questions were answered and the patient will contact us if they have any questions or concerns in the interim.

## 2023-05-29 NOTE — TELEPHONE ENCOUNTER
Called the patient in regards to scheduling Euflexxa injection of both knees. The patient can refer to portal for appointment dates and time. The patient's second injection is scheduled with Edgardo Villalobos PA-C due to Chandana being out of the office the week of the second injection.

## 2023-06-06 ENCOUNTER — PATIENT OUTREACH (OUTPATIENT)
Dept: ADMINISTRATIVE | Facility: HOSPITAL | Age: 68
End: 2023-06-06
Payer: MEDICARE

## 2023-06-08 ENCOUNTER — PATIENT MESSAGE (OUTPATIENT)
Dept: SPORTS MEDICINE | Facility: CLINIC | Age: 68
End: 2023-06-08
Payer: MEDICARE

## 2023-06-12 ENCOUNTER — OFFICE VISIT (OUTPATIENT)
Dept: SPORTS MEDICINE | Facility: CLINIC | Age: 68
End: 2023-06-12
Payer: MEDICARE

## 2023-06-12 VITALS
SYSTOLIC BLOOD PRESSURE: 117 MMHG | DIASTOLIC BLOOD PRESSURE: 71 MMHG | BODY MASS INDEX: 25.48 KG/M2 | WEIGHT: 178 LBS | HEART RATE: 53 BPM | HEIGHT: 70 IN

## 2023-06-12 DIAGNOSIS — M17.11 PRIMARY OSTEOARTHRITIS OF RIGHT KNEE: Primary | ICD-10-CM

## 2023-06-12 DIAGNOSIS — M17.12 PRIMARY OSTEOARTHRITIS OF LEFT KNEE: ICD-10-CM

## 2023-06-12 PROCEDURE — 20610 PR DRAIN/INJECT LARGE JOINT/BURSA: ICD-10-PCS | Mod: 50,S$PBB,, | Performed by: PHYSICIAN ASSISTANT

## 2023-06-12 PROCEDURE — 99999 PR PBB SHADOW E&M-EST. PATIENT-LVL III: CPT | Mod: PBBFAC,,, | Performed by: PHYSICIAN ASSISTANT

## 2023-06-12 PROCEDURE — 20610 DRAIN/INJ JOINT/BURSA W/O US: CPT | Mod: 50,PBBFAC | Performed by: PHYSICIAN ASSISTANT

## 2023-06-12 PROCEDURE — 20610 DRAIN/INJ JOINT/BURSA W/O US: CPT | Mod: 50,S$PBB,, | Performed by: PHYSICIAN ASSISTANT

## 2023-06-12 PROCEDURE — 99213 OFFICE O/P EST LOW 20 MIN: CPT | Mod: PBBFAC | Performed by: PHYSICIAN ASSISTANT

## 2023-06-12 PROCEDURE — 99999 PR PBB SHADOW E&M-EST. PATIENT-LVL III: ICD-10-PCS | Mod: PBBFAC,,, | Performed by: PHYSICIAN ASSISTANT

## 2023-06-12 PROCEDURE — 99499 NO LOS: ICD-10-PCS | Mod: S$PBB,,, | Performed by: PHYSICIAN ASSISTANT

## 2023-06-12 PROCEDURE — 99499 UNLISTED E&M SERVICE: CPT | Mod: S$PBB,,, | Performed by: PHYSICIAN ASSISTANT

## 2023-06-12 RX ADMIN — Medication 20 MG: at 09:06

## 2023-06-12 NOTE — PROGRESS NOTES
Patient is here for follow up of  bilateral knee arthritis. Pt is requesting bilateral Euflexxa injection #1.  [facility] Has failed other conservative modalities including NSAIDS, activity modification, weight loss.    The prior shot was tolerated well.    PHYSICAL EXAMINATION:     General: The patient is alert and oriented x 3. Mood is pleasant.   Observation of ears, eyes and nose reveals no gross abnormalities. No   labored breathing observed.     No signs of infection or adverse reaction to knee.    PROCEDURE NOTE:  Injection Procedure bilateral knees  A time out was performed, including verification of patient ID, procedure, site and side, availability of information and equipment, review of safety issues, and agreement with consent, the procedure site was marked.    After time out was performed, the patient was prepped aseptically with povidone-iodine swabsticks. A diagnostic and therapeutic injection of 2cc Euflexxa was given under sterile technique using a 22g x 1.5 needle from the Superolateral  aspect of the bilateral Knee Joint in the supine position.      Bulmaro Nascimento had no adverse reactions to the medication. Pain decreased. He was instructed to apply ice to the joint for 20 minutes and avoid strenuous activities for 24-36 hours following the injection. He was warned of possible blood sugar and/or blood pressure changes during that time. Following that time, he can resume regular activities.    He was reminded to call the clinic immediately for any adverse side effects as explained in clinic today.      RTC 1 week for 2nd injection.  All questions were answered, pt will contact us for questions or concerns in the interim.

## 2023-06-19 ENCOUNTER — OFFICE VISIT (OUTPATIENT)
Dept: SPORTS MEDICINE | Facility: CLINIC | Age: 68
End: 2023-06-19
Payer: MEDICARE

## 2023-06-19 VITALS
WEIGHT: 176 LBS | SYSTOLIC BLOOD PRESSURE: 116 MMHG | HEIGHT: 70 IN | DIASTOLIC BLOOD PRESSURE: 66 MMHG | HEART RATE: 55 BPM | BODY MASS INDEX: 25.2 KG/M2

## 2023-06-19 DIAGNOSIS — M17.0 PRIMARY OSTEOARTHRITIS OF BOTH KNEES: Primary | ICD-10-CM

## 2023-06-19 PROCEDURE — 99999 PR PBB SHADOW E&M-EST. PATIENT-LVL III: ICD-10-PCS | Mod: PBBFAC,,, | Performed by: PHYSICIAN ASSISTANT

## 2023-06-19 PROCEDURE — 99999 PR PBB SHADOW E&M-EST. PATIENT-LVL III: CPT | Mod: PBBFAC,,, | Performed by: PHYSICIAN ASSISTANT

## 2023-06-19 PROCEDURE — 99499 NO LOS: ICD-10-PCS | Mod: S$PBB,,, | Performed by: PHYSICIAN ASSISTANT

## 2023-06-19 PROCEDURE — 20610 DRAIN/INJ JOINT/BURSA W/O US: CPT | Mod: 50,S$PBB,, | Performed by: PHYSICIAN ASSISTANT

## 2023-06-19 PROCEDURE — 99213 OFFICE O/P EST LOW 20 MIN: CPT | Mod: PBBFAC,25 | Performed by: PHYSICIAN ASSISTANT

## 2023-06-19 PROCEDURE — 20610 DRAIN/INJ JOINT/BURSA W/O US: CPT | Mod: 50,PBBFAC | Performed by: PHYSICIAN ASSISTANT

## 2023-06-19 PROCEDURE — 20610 PR DRAIN/INJECT LARGE JOINT/BURSA: ICD-10-PCS | Mod: 50,S$PBB,, | Performed by: PHYSICIAN ASSISTANT

## 2023-06-19 PROCEDURE — 99499 UNLISTED E&M SERVICE: CPT | Mod: S$PBB,,, | Performed by: PHYSICIAN ASSISTANT

## 2023-06-19 RX ADMIN — Medication 20 MG: at 09:06

## 2023-06-19 NOTE — PROGRESS NOTES
Patient is here for follow up of  bilateral knee arthritis. Pt is requesting bilateral Euflexxa injection #2.  [facility] Has failed other conservative modalities including NSAIDS, activity modification, weight loss.    The prior shot was tolerated well.    PHYSICAL EXAMINATION:     General: The patient is alert and oriented x 3. Mood is pleasant.   Observation of ears, eyes and nose reveals no gross abnormalities. No   labored breathing observed.     No signs of infection or adverse reaction to knee.    PROCEDURE NOTE:  Injection Procedure bilateral knees  A time out was performed, including verification of patient ID, procedure, site and side, availability of information and equipment, review of safety issues, and agreement with consent, the procedure site was marked.    After time out was performed, the patient was prepped aseptically with povidone-iodine swabsticks. A diagnostic and therapeutic injection of 2cc Euflexxa was given under sterile technique using a 22g x 1.5 needle from the Superolateral  aspect of the bilateral Knee Joint in the supine position.      Bulmaro Nascimento had no adverse reactions to the medication. Pain decreased. He was instructed to apply ice to the joint for 20 minutes and avoid strenuous activities for 24-36 hours following the injection. He was warned of possible blood sugar and/or blood pressure changes during that time. Following that time, he can resume regular activities.    He was reminded to call the clinic immediately for any adverse side effects as explained in clinic today.      RTC 1 week for 3rd injection.  All questions were answered, pt will contact us for questions or concerns in the interim.

## 2023-06-28 ENCOUNTER — OFFICE VISIT (OUTPATIENT)
Dept: SPORTS MEDICINE | Facility: CLINIC | Age: 68
End: 2023-06-28
Payer: MEDICARE

## 2023-06-28 VITALS
BODY MASS INDEX: 25.34 KG/M2 | WEIGHT: 177 LBS | HEIGHT: 70 IN | HEART RATE: 55 BPM | SYSTOLIC BLOOD PRESSURE: 117 MMHG | DIASTOLIC BLOOD PRESSURE: 64 MMHG

## 2023-06-28 DIAGNOSIS — M17.12 PRIMARY OSTEOARTHRITIS OF LEFT KNEE: ICD-10-CM

## 2023-06-28 DIAGNOSIS — M17.11 PRIMARY OSTEOARTHRITIS OF RIGHT KNEE: Primary | ICD-10-CM

## 2023-06-28 PROCEDURE — 20610 DRAIN/INJ JOINT/BURSA W/O US: CPT | Mod: 50,S$PBB,, | Performed by: PHYSICIAN ASSISTANT

## 2023-06-28 PROCEDURE — 99499 NO LOS: ICD-10-PCS | Mod: S$PBB,,, | Performed by: PHYSICIAN ASSISTANT

## 2023-06-28 PROCEDURE — 20610 PR DRAIN/INJECT LARGE JOINT/BURSA: ICD-10-PCS | Mod: 50,S$PBB,, | Performed by: PHYSICIAN ASSISTANT

## 2023-06-28 PROCEDURE — 99999 PR PBB SHADOW E&M-EST. PATIENT-LVL III: CPT | Mod: PBBFAC,,, | Performed by: PHYSICIAN ASSISTANT

## 2023-06-28 PROCEDURE — 20610 DRAIN/INJ JOINT/BURSA W/O US: CPT | Mod: 50,PBBFAC | Performed by: PHYSICIAN ASSISTANT

## 2023-06-28 PROCEDURE — 99213 OFFICE O/P EST LOW 20 MIN: CPT | Mod: PBBFAC,25 | Performed by: PHYSICIAN ASSISTANT

## 2023-06-28 PROCEDURE — 99499 UNLISTED E&M SERVICE: CPT | Mod: S$PBB,,, | Performed by: PHYSICIAN ASSISTANT

## 2023-06-28 PROCEDURE — 99999 PR PBB SHADOW E&M-EST. PATIENT-LVL III: ICD-10-PCS | Mod: PBBFAC,,, | Performed by: PHYSICIAN ASSISTANT

## 2023-06-28 RX ADMIN — Medication 20 MG: at 10:06

## 2023-06-28 NOTE — PROGRESS NOTES
Patient is here for follow up of  bilateral knee arthritis. Pt is requesting bilateral Euflexxa injection #3.  [facility] reviewed per encounter record. Has failed other conservative modalities including NSAIDS, activity modification, weight loss.    The prior shot was tolerated well.    PHYSICAL EXAMINATION:     General: The patient is alert and oriented x 3. Mood is pleasant.   Observation of ears, eyes and nose reveals no gross abnormalities. No   labored breathing observed.     No signs of infection or adverse reaction to knee.    PROCEDURE NOTE:  Injection Procedure bilateral knees  A time out was performed, including verification of patient ID, procedure, site and side, availability of information and equipment, review of safety issues, and agreement with consent, the procedure site was marked.    After time out was performed, the patient was prepped aseptically with povidone-iodine swabsticks. A diagnostic and therapeutic injection of 2cc Euflexxa was given under sterile technique using a 22g x 1.5 needle from the Superolateral  aspect of the bilateral Knee Joint in the supine position.      Bulmaro Nascimento had no adverse reactions to the medication. Pain decreased. He was instructed to apply ice to the joint for 20 minutes and avoid strenuous activities for 24-36 hours following the injection. He was warned of possible blood sugar and/or blood pressure changes during that time. Following that time, he can resume regular activities.    He was reminded to call the clinic immediately for any adverse side effects as explained in clinic today.      RTC in 6 months with Juani Ellington PA-C for possible repeat visco supplementation.  All questions were answered, pt will contact us for questions or concerns in the interim.

## 2023-08-23 ENCOUNTER — OFFICE VISIT (OUTPATIENT)
Dept: FAMILY MEDICINE | Facility: CLINIC | Age: 68
End: 2023-08-23
Payer: MEDICARE

## 2023-08-23 VITALS
OXYGEN SATURATION: 98 % | HEART RATE: 57 BPM | BODY MASS INDEX: 25.56 KG/M2 | TEMPERATURE: 98 F | SYSTOLIC BLOOD PRESSURE: 114 MMHG | HEIGHT: 70 IN | DIASTOLIC BLOOD PRESSURE: 64 MMHG | WEIGHT: 178.56 LBS

## 2023-08-23 DIAGNOSIS — F45.8 BRUXISM: ICD-10-CM

## 2023-08-23 DIAGNOSIS — K76.0 HEPATIC STEATOSIS: ICD-10-CM

## 2023-08-23 DIAGNOSIS — Z13.220 SCREENING CHOLESTEROL LEVEL: ICD-10-CM

## 2023-08-23 DIAGNOSIS — K21.9 CHRONIC GERD: ICD-10-CM

## 2023-08-23 DIAGNOSIS — Z12.5 SCREENING PSA (PROSTATE SPECIFIC ANTIGEN): ICD-10-CM

## 2023-08-23 DIAGNOSIS — Z13.1 DIABETES MELLITUS SCREENING: ICD-10-CM

## 2023-08-23 DIAGNOSIS — B00.1 RECURRENT COLD SORES: ICD-10-CM

## 2023-08-23 DIAGNOSIS — J30.1 SEASONAL ALLERGIC RHINITIS DUE TO POLLEN: ICD-10-CM

## 2023-08-23 DIAGNOSIS — G47.00 INSOMNIA, UNSPECIFIED TYPE: ICD-10-CM

## 2023-08-23 DIAGNOSIS — N40.0 BENIGN PROSTATIC HYPERPLASIA WITHOUT LOWER URINARY TRACT SYMPTOMS: ICD-10-CM

## 2023-08-23 DIAGNOSIS — Z00.00 MEDICARE ANNUAL WELLNESS VISIT, SUBSEQUENT: Primary | ICD-10-CM

## 2023-08-23 DIAGNOSIS — Z87.898 HISTORY OF ELEVATED PSA: ICD-10-CM

## 2023-08-23 DIAGNOSIS — Z13.6 ENCOUNTER FOR SCREENING FOR CARDIOVASCULAR DISORDERS: ICD-10-CM

## 2023-08-23 PROCEDURE — 99999 PR PBB SHADOW E&M-EST. PATIENT-LVL IV: ICD-10-PCS | Mod: PBBFAC,,, | Performed by: NURSE PRACTITIONER

## 2023-08-23 PROCEDURE — 99999 PR PBB SHADOW E&M-EST. PATIENT-LVL IV: CPT | Mod: PBBFAC,,, | Performed by: NURSE PRACTITIONER

## 2023-08-23 PROCEDURE — 99214 OFFICE O/P EST MOD 30 MIN: CPT | Mod: PBBFAC,PN | Performed by: NURSE PRACTITIONER

## 2023-08-23 PROCEDURE — G0439 PR MEDICARE ANNUAL WELLNESS SUBSEQUENT VISIT: ICD-10-PCS | Mod: ,,, | Performed by: NURSE PRACTITIONER

## 2023-08-23 PROCEDURE — G0439 PPPS, SUBSEQ VISIT: HCPCS | Mod: ,,, | Performed by: NURSE PRACTITIONER

## 2023-08-23 RX ORDER — TIZANIDINE 4 MG/1
4 TABLET ORAL NIGHTLY
Qty: 30 TABLET | Refills: 5 | Status: SHIPPED | OUTPATIENT
Start: 2023-08-23

## 2023-08-23 NOTE — PROGRESS NOTES
Subjective:       Patient ID: Bulmaro Nascimento is a 68 y.o. male.    Chief Complaint: Medicare AWV    HPI    THIS IS A MEDICARE ANNUAL PHYSICAL EXAM SO HEALTH RISK ASSESSMENT, DEPRESSION SCREENING, AND ADL/FUNCTIONAL ABILITY CHECKLIST REVIEWED - SEE FLOW SHEETS.  ALL PAST MEDICAL, SURGICAL AND FAMILY HISTORY REVIEWED - SEE BELOW.  ALL CHRONIC PROBLEMS EVALUATED.       Medicare AWV:  1. HRA completed:  See Health Risk Assessment flowsheet  2. PMH and Family history reviewed and updated  3.  Updated list of current providers  Team Member Role and Specialty Contact Info Address Start End Comments   PCPs         Samira Pimentel NP General (Family Medicine) Phone: [phone] Fax: [phone]  [address] [facility] [address] [place] [address] 6/14/2018 - -   Additional Team Members         Erica Zarate MA Care Coordinator - - 5/30/2019 - -   Jadiel Lara MD Consulting Physician (Urology) Phone: [phone] Fax: [phone]  [address] [facility] [address] [place] [address] 8/23/2021 - -   Latasha Valero MD Consulting Physician (Gastroenterology) Phone: [phone] [address] [facility] [address]  [place] [address] 9/21/2022 - -   Maria Guadalupe Kirkpatrick MD Consulting Physician (Allergy) Phone: [phone] Fax: [phone]  [address] [place] [address] 8/23/2021 - -   Mindy Proctor MD Consulting Physician (Sleep Medicine) Phone: [phone] Fax: [phone]  [address] [place] [address] 8/23/2021 - -   Juani Ellington PA-C Physician Assistant (Sports Medicine) Phone: [phone] Fax: [phone]  [address] [place] [address] 8/23/2021 - -     4.  Updated measures/vital signs completed and stable  5. Cognitive function evaluated and intact.  6.  Health Maintenance schedule per USPSTF reviewed and updated - schedule reviewed with patient - seen under POC below.  7.  Risk factor review: no depression, no cognitive impairments  8.  All preventative counseling  reviewed and personalized health advice given.    9.  Advanced care planning discussed with handouts given for patient to review.  He will take forms home to complete.  10.  Patient is on NO opioids  11.  Patient has NO substance use.            Patient is a 68-year-old white male with BPH and a history of elevated PSA that is followed by urology, chronic ALLERGIES that is followed by ALLERGY specialist, chronic right knee pain that is followed by orthopedic specialist, recurrent cold sores, chronic GERD, Fatty liver seen on abdominal ultrasound, bruxism with chronic headaches followed by Neurology, cervical disc disease followed by [facility], insomnia followed by [facility] and an abnormal TSH level in June 2019 that is here today for MEDICARE ANNUAL WELLNESS VISIT with fasting lab results.     BPH with history of elevated PSA  followed by [facility] Dr. Lara.  Takes Cialis 5 mg daily   PSA 3.3  Component      Latest Ref Rng & Units 8/17/2023 8/22/2022   PSA, Screen      0.00 - 4.00 ng/mL 3.3 3.2     Chronic Allergies   managed by [facility] and reports he had Allergy injections monthly for 9 years and stopped these injections in December 2019 and now followed prn.     Insomnia   followed by [facility] Dr. Proctor.  No longer on any medications  Still has teeth clenching and problems sleeping at night - will trial tizanidine - if not effective - schedule back to see Dr. Proctor.     Chronic headaches   Patient associated with Bruxism as well as chronic neck pain.    He had tried Botox injections for headache without relief.    He seen [facility]  Dr. Gordon who ordered an MRI for further evaluation BUT he did not have done due to claustrophobia and because he reports that the headaches have improved once he started treating chronic neck pain with physical therapy and following with [facility].  Patient was [facility] and diagnosed with Cervical DDD -sent for PT and the neck pain and headaches have improved - they prescribe Skelaxin prn.     Primary OA of right knee   followed by Orthopedic MD and prescribed Celebrex prn. He reports he gets knee injections every 6 months.     Chronic GERD.   Patient seen [facility] DR. Dalmau in January 2020 that advised he can take Nexium 20 mg daily long-term and told him to take OTC medication.       Recurrent cold sores  takes Valtrex prn.    Fatty Liver  Seen on Ultrasound 9/23/22  Liver enzymes are normal  Gave handouts with more information  Offered to refer to Hepatology for fatty liver staging - patient declined at this time.    Wellness Labs:  CBC WNL  CMP WNL  Cholesterol levels within range  PSA WNL    Health Maintenance:  Up to date    Component      Latest Ref Rng 7/30/2020 8/6/2021   WBC      3.90 - 12.70 K/uL 5.53  6.40    WBC        6.39    RBC      4.60 - 6.20 M/uL 4.74  4.76    RBC        4.75    Hemoglobin      14.0 - 18.0 g/dL 14.9  15.1    Hemoglobin        15.1    Hematocrit      40.0 - 54.0 % 44.8  44.1    Hematocrit        43.9    MCV      82 - 98 fL 95  93    MCV        92    MCH      27.0 - 31.0 pg 31.4 (H)  31.7 (H)    MCH        31.8 (H)    MCHC      32.0 - 36.0 g/dL 33.3  34.2    MCHC        34.4    RDW      11.5 - 14.5 % 13.2  12.9    RDW        12.8    Platelets      150 - 450 K/uL 209  226    Platelets        225    MPV      9.2 - 12.9 fL 11.1  11.4    MPV        11.4    Gran # (ANC)      1.8 - 7.7 K/uL 2.9  3.1    Gran # (ANC)        3.1    Lymph #      1.0 - 4.8 K/uL 2.0  2.3    Lymph #        2.4    Mono #      0.3 - 1.0 K/uL 0.5  0.6    Mono #        0.6    Eos #      0.0 - 0.5 K/uL 0.2  0.3    Eos #        0.3    Baso #      0.00 - 0.20 K/uL 0.04  0.05    Baso #        0.05    Gran %      38.0 - 73.0 % 51.5  47.8    Gran %        48.1    Lymph %      18.0 - 48.0 % 36.0  36.3    Lymph %        37.1    Mono %      4.0 - 15.0 % 8.5  9.8    Mono %         "8.9    Eosinophil %      0.0 - 8.0 % 3.1  5.0    Eosinophil %        4.9    Basophil %      0.0 - 1.9 % 0.7  0.8    Basophil %        0.8    Differential Method Automated  Automated    Differential Method  Automated    Sodium      136 - 145 mmol/L 143  142    Potassium      3.5 - 5.1 mmol/L 4.4  4.1    Chloride      95 - 110 mmol/L 110  104    CO2      23 - 29 mmol/L 26  28    Glucose      70 - 110 mg/dL 96  98    BUN      2 - 20 mg/dL 13  16    Creatinine      0.50 - 1.40 mg/dL 0.88  0.98    Calcium      8.7 - 10.5 mg/dL 9.5  10.1    PROTEIN TOTAL      6.0 - 8.4 g/dL 6.8  6.9    Albumin      3.5 - 5.2 g/dL 4.2  4.4    BILIRUBIN TOTAL      0.1 - 1.0 mg/dL 0.8  0.7    Alkaline Phosphatase      38 - 126 U/L 56  57    AST      15 - 46 U/L 37  32    ALT      10 - 44 U/L 27  22    Anion Gap      8 - 16 mmol/L 7 (L)  10    Cholesterol      120 - 199 mg/dL 182  165    Triglycerides      30 - 150 mg/dL 92  88    HDL      40 - 75 mg/dL 54  43    LDL Cholesterol External      63.0 - 159.0 mg/dL 109.6  104.4    HDL/Cholesterol Ratio      20.0 - 50.0 % 29.7  26.1    Total Cholesterol/HDL Ratio      2.0 - 5.0  3.4  3.8    Non-HDL Cholesterol      mg/dL 128  122    Immature Granulocytes      0.0 - 0.5 % 0.2  0.3    Immature Granulocytes        0.2    Immature Grans (Abs)      0.00 - 0.04 K/uL 0.01  0.02    Immature Grans (Abs)        0.01    nRBC      0 /100 WBC 0  0    nRBC        0    eGFR      >60 mL/min/1.73 m^2     PSA, Screen      0.00 - 4.00 ng/mL        Review of Systems   HENT:          Bruxism   Psychiatric/Behavioral:  Positive for sleep disturbance. The patient is nervous/anxious.          Objective:     Vitals:    08/23/23 0957   BP: 114/64   BP Location: Right arm   Patient Position: Sitting   BP Method: Large (Manual)   Pulse: (!) 57   Temp: 98.1 °F (36.7 °C)   TempSrc: Temporal   SpO2: 98%   Weight: 81 kg (178 lb 9.2 oz)   Height: 5' 10" (1.778 m)          Physical Exam  Constitutional:       General: He is not in " acute distress.     Appearance: Normal appearance. He is well-developed and normal weight. He is not ill-appearing, toxic-appearing or diaphoretic.      Comments: Body mass index is 25.62 kg/m².     HENT:      Head: Normocephalic and atraumatic.      Right Ear: Tympanic membrane, ear canal and external ear normal.      Left Ear: Tympanic membrane, ear canal and external ear normal.   Eyes:      General: No scleral icterus.        Right eye: No discharge.         Left eye: No discharge.      Extraocular Movements: Extraocular movements intact.      Conjunctiva/sclera: Conjunctivae normal.      Pupils: Pupils are equal, round, and reactive to light.   Neck:      Thyroid: No thyromegaly.      Trachea: No tracheal deviation.   Cardiovascular:      Rate and Rhythm: Normal rate and regular rhythm.      Heart sounds: Normal heart sounds. No murmur heard.  Pulmonary:      Effort: Pulmonary effort is normal. No respiratory distress.      Breath sounds: Normal breath sounds. No wheezing or rales.   Abdominal:      General: There is no distension.      Palpations: Abdomen is soft.   Genitourinary:     Comments: Deferred to Urologist.  Musculoskeletal:         General: No swelling or deformity. Normal range of motion.      Cervical back: Normal range of motion and neck supple.      Right lower leg: No edema.      Left lower leg: No edema.   Lymphadenopathy:      Cervical: No cervical adenopathy.   Skin:     General: Skin is warm and dry.      Findings: No rash.   Neurological:      Mental Status: He is alert and oriented to person, place, and time.      Coordination: Coordination normal.   Psychiatric:         Mood and Affect: Mood normal.         Behavior: Behavior normal.         Thought Content: Thought content normal.         Judgment: Judgment normal.           Assessment:         ICD-10-CM ICD-9-CM   1. Medicare annual wellness visit, subsequent  Z00.00 V70.0   2. Hepatic steatosis  K76.0 571.8   3. Insomnia, unspecified  type  G47.00 780.52   4. Bruxism  F45.8 306.8   5. Benign prostatic hyperplasia without lower urinary tract symptoms  N40.0 600.00   6. History of elevated PSA  Z87.898 V13.89   7. Chronic GERD  K21.9 530.81   8. Seasonal allergic rhinitis due to pollen  J30.1 477.0   9. Recurrent cold sores  B00.1 054.9   10. BMI 25.0-25.9,adult  Z68.25 V85.21   11. Diabetes mellitus screening  Z13.1 V77.1   12. Screening PSA (prostate specific antigen)  Z12.5 V76.44   13. Screening cholesterol level  Z13.220 V77.91   14. Encounter for screening for cardiovascular disorders  Z13.6 V81.2       Plan:       Medicare annual wellness visit, subsequent  Health Maintenance Summary     Full History      Expand All  Collapse All    Postponed - Hemoglobin A1c (Diabetic Prevention Screening)  (Every 3 Years)  Postponed until 8/28/2023  No completion history exists for this topic.     Postponed - COVID-19 Vaccine  (5 - Pfizer series)  Postponed until 12/11/2023 05/05/2022  Imm Admin: COVID-19, MRNA, LN-S, PF (Pfizer) (Gray Cap)   09/27/2021  Imm Admin: COVID-19, MRNA, LN-S, PF (Pfizer) (Purple Cap)   03/22/2021  Imm Admin: COVID-19, MRNA, LN-S, PF (Pfizer) (Purple Cap)   03/01/2021  Imm Admin: COVID-19, MRNA, LN-S, PF (Pfizer) (Purple Cap)     Influenza Vaccine  (1)  Next due on 9/1/2023 09/23/2022  Imm Admin: Influenza   09/27/2021  Imm Admin: Influenza (FLUAD) - Quadrivalent - Adjuvanted - PF *Preferred* (65+)   08/09/2021  SmartData: WORKFLOW - HEALTHY PLANET - EXTERNAL DATA - EXTERNAL PROCEDURE DATE - INFLUENZA   09/18/2020  Imm Admin: Influenza (FLUAD) - Quadrivalent - Adjuvanted - PF *Preferred* (65+)   08/28/2019  Imm Admin: Influenza   View More History     Scheduled - PROSTATE-SPECIFIC ANTIGEN  (Yearly)  Scheduled for 8/20/2024 08/17/2023  PSA, Screening   08/22/2022  PSA, Screening   09/27/2021  PSA, Total and Free   07/30/2020  PSA, total and free   03/15/2019  PSA, total and free   View More History     Colorectal Cancer  Screening  (Cologuard - Every 3 Years)  Next due on 9/19/2025 09/19/2022  Cologuard Screening (Multitarget Stool DNA)   07/11/2012  Colonoscopy (Done - rpt in 10yrs)     TETANUS VACCINE  (Every 10 Years)  Next due on 11/7/2026 11/07/2016  Imm Admin: Tdap   04/17/2004  Imm Admin: Td (ADULT)     Scheduled - Lipid Panel  (Every 5 Years)  Scheduled for 8/20/2024 08/17/2023  Lipid Panel   08/22/2022  Lipid Panel   08/06/2021  Lipid Panel   07/30/2020  Lipid Panel   03/15/2019  Lipid panel   View More History     Hepatitis C Screening  Completed  10/02/2018  Hepatitis C Ab component of Hepatitis panel, acute   04/27/2015  Done     Shingles Vaccine  (Series Information)  Completed  06/27/2019  Imm Admin: Zoster Recombinant   02/09/2019  Imm Admin: Zoster Recombinant     Pneumococcal Vaccines (Age 65+)  (Series Information)  Completed  08/23/2021  Imm Admin: Pneumococcal Polysaccharide - 23 Valent   08/04/2020  Imm Admin: Pneumococcal Conjugate - 13 Valent         Hepatic steatosis  Work on lifestyle modifications  Gave multiple handouts.    Insomnia, unspecified type  Try zanaflex at night  If not effective - schedule back to see Dr. Proctor  -     tiZANidine (ZANAFLEX) 4 MG tablet; Take 1 tablet (4 mg total) by mouth every evening.  Dispense: 30 tablet; Refill: 5    Bruxism  Try zanaflex at night.  -     tiZANidine (ZANAFLEX) 4 MG tablet; Take 1 tablet (4 mg total) by mouth every evening.  Dispense: 30 tablet; Refill: 5    Benign prostatic hyperplasia without lower urinary tract symptoms  Continue cialist daily    History of elevated PSA  Stable.    Chronic GERD  OTC pepcid or nexium    Seasonal allergic rhinitis due to pollen    Recurrent cold sores  Valtrex prn    BMI 25.0-25.9,adult  -     CBC Auto Differential; Future; Expected date: 08/19/2024    Diabetes mellitus screening  -     Comprehensive Metabolic Panel; Future; Expected date: 08/19/2024    Screening PSA (prostate specific antigen)  -     PSA, Screening;  Future; Expected date: 08/19/2024    Screening cholesterol level  -     Lipid Panel; Future; Expected date: 08/19/2024    Encounter for screening for cardiovascular disorders  -     Lipid Panel; Future; Expected date: 08/19/2024      Follow up in about 1 year (around 8/23/2024) for fasting labs and MEDICARE WELLNESS.     Patient's Medications   New Prescriptions    TIZANIDINE (ZANAFLEX) 4 MG TABLET    Take 1 tablet (4 mg total) by mouth every evening.   Previous Medications    CELECOXIB (CELEBREX) 200 MG CAPSULE    Take 1 capsule (200 mg total) by mouth 2 (two) times daily.    FLUTICASONE PROPIONATE (FLONASE) 50 MCG/ACTUATION NASAL SPRAY    1 spray (50 mcg total) by Each Nostril route 2 (two) times daily.    METAXALONE (SKELAXIN) 800 MG TABLET    Take 1 tablet (800 mg total) by mouth nightly as needed for Pain (muscle spasms).    TADALAFIL (CIALIS) 5 MG TABLET    Take 1 tablet (5 mg total) by mouth daily as needed for Erectile Dysfunction.    UNKNOWN TO PATIENT       Modified Medications    No medications on file   Discontinued Medications    OMEPRAZOLE (PRILOSEC) 40 MG CAPSULE    Take 1 capsule (40 mg total) by mouth once daily.       Past Medical History:   Diagnosis Date    Allergy     Anxiety 7/8/2015    Benign non-nodular prostatic hyperplasia without lower urinary tract symptoms 2/11/2016    Bruxism     Chronic pain of right knee 3/21/2018    DDD (degenerative disc disease), cervical 12/12/2019    Elevated PSA measurement 02/06/2017    Followed by Dr. Lara    Elevated PSA measurement     Family history of prostate cancer in father 3/14/2018    Gastroesophageal reflux disease without esophagitis 6/8/2015    Insomnia 6/8/2015    Recurrent cold sores 6/14/2018    Seasonal allergic rhinitis due to pollen 12/17/2016    SI (sacroiliac) pain 7/30/2018    Subclinical hyperthyroidism 4/5/2019       Past Surgical History:   Procedure Laterality Date    COLONOSCOPY  07/11/2012    normal - Dr. Regan - repeat in 10  years    ESOPHAGOGASTRODUODENOSCOPY  07/11/2012    Biopsy showed gastric mucosa with mild chronic inflammation.  Squamous mucosa with mild chronic inflammation including rare intraepithelial eosinophils.  Done by Dr. Regan - scanned to media file.    KNEE SURGERY Bilateral 1992    knee cap alignment with clean up    SHOULDER SURGERY Right     SINUS SURGERY         Family History   Problem Relation Age of Onset    Thyroid disease Mother     Diabetes Father     Cancer Father 75        Prostate     Thyroid disease Sister     No Known Problems Sister     Cancer Brother 50        bladder cancer    No Known Problems Brother     Cancer Brother 58        skin cancer    Heart disease Neg Hx     Prostate cancer Neg Hx     Kidney disease Neg Hx        Social History     Socioeconomic History    Marital status:    Occupational History    Occupation:    Tobacco Use    Smoking status: Never    Smokeless tobacco: Never   Substance and Sexual Activity    Alcohol use: Not Currently    Drug use: No    Sexual activity: Not Currently     Partners: Female   Social History Narrative    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      Social Determinants of Health     Financial Resource Strain: Low Risk  (8/18/2023)    Overall Financial Resource Strain (CARDIA)     Difficulty of Paying Living Expenses: Not hard at all   Food Insecurity: No Food Insecurity (8/18/2023)    Hunger Vital Sign     Worried About Running Out of Food in the Last Year: Never true     Ran Out of Food in the Last Year: Never true   Transportation Needs: No Transportation Needs (8/18/2023)    PRAPARE - Transportation     Lack of Transportation (Medical): No     Lack of Transportation (Non-Medical): No   Physical Activity: Sufficiently Active (8/18/2023)    Exercise Vital Sign     Days of Exercise per Week: 3 days     Minutes of Exercise per Session: 120 min   Stress: Stress Concern Present (8/18/2023)    [place]  [place] [employer] - Occupational Stress Questionnaire     Feeling of Stress : To some extent   Social Connections: Unknown (8/18/2023)    Social Connection and Isolation Panel [NHANES]     Frequency of Communication with Friends and Family: Twice a week     Frequency of Social Gatherings with Friends and Family: Twice a week     Active Member of Clubs or Organizations: No     Attends Club or Organization Meetings: Never     Marital Status:    Housing Stability: Low Risk  (8/18/2023)    Housing Stability Vital Sign     Unable to Pay for Housing in the Last Year: No     Number of Places Lived in the Last Year: 1     Unstable Housing in the Last Year: No

## 2023-09-26 ENCOUNTER — TELEPHONE (OUTPATIENT)
Dept: GASTROENTEROLOGY | Facility: CLINIC | Age: 68
End: 2023-09-26
Payer: MEDICARE

## 2023-10-02 ENCOUNTER — TELEPHONE (OUTPATIENT)
Dept: GASTROENTEROLOGY | Facility: CLINIC | Age: 68
End: 2023-10-02
Payer: MEDICARE

## 2023-10-02 ENCOUNTER — PATIENT MESSAGE (OUTPATIENT)
Dept: SLEEP MEDICINE | Facility: CLINIC | Age: 68
End: 2023-10-02
Payer: MEDICARE

## 2023-10-02 DIAGNOSIS — G47.00 INSOMNIA, UNSPECIFIED TYPE: Primary | ICD-10-CM

## 2023-10-02 RX ORDER — ESZOPICLONE 3 MG/1
TABLET, FILM COATED ORAL
Qty: 30 TABLET | Refills: 1 | Status: SHIPPED | OUTPATIENT
Start: 2023-10-02

## 2023-10-02 NOTE — TELEPHONE ENCOUNTER
Called pt to reschedule his appt with Dr Valero on 10/31. Pt phone hung up. Pt portal message sent. Will try to call again later.

## 2023-11-14 ENCOUNTER — OFFICE VISIT (OUTPATIENT)
Dept: GASTROENTEROLOGY | Facility: CLINIC | Age: 68
End: 2023-11-14
Payer: MEDICARE

## 2023-11-14 VITALS — HEIGHT: 70 IN | BODY MASS INDEX: 25.54 KG/M2 | HEART RATE: 61 BPM | OXYGEN SATURATION: 99 % | WEIGHT: 178.38 LBS

## 2023-11-14 DIAGNOSIS — K21.9 CHRONIC GERD: Primary | ICD-10-CM

## 2023-11-14 PROCEDURE — 99999 PR PBB SHADOW E&M-EST. PATIENT-LVL IV: CPT | Mod: PBBFAC,,, | Performed by: INTERNAL MEDICINE

## 2023-11-14 PROCEDURE — 99214 OFFICE O/P EST MOD 30 MIN: CPT | Mod: S$PBB,,, | Performed by: INTERNAL MEDICINE

## 2023-11-14 PROCEDURE — 99214 PR OFFICE/OUTPT VISIT, EST, LEVL IV, 30-39 MIN: ICD-10-PCS | Mod: S$PBB,,, | Performed by: INTERNAL MEDICINE

## 2023-11-14 PROCEDURE — 99999 PR PBB SHADOW E&M-EST. PATIENT-LVL IV: ICD-10-PCS | Mod: PBBFAC,,, | Performed by: INTERNAL MEDICINE

## 2023-11-14 PROCEDURE — 99214 OFFICE O/P EST MOD 30 MIN: CPT | Mod: PBBFAC,PN | Performed by: INTERNAL MEDICINE

## 2023-11-14 NOTE — PATIENT INSTRUCTIONS
EGD Prep Instructions    [facility] [address] [place]  [address]    You are scheduled for an EGD with Dr. Valero on Thursday, January 25 at [facility].  You will enter through the [facility] entrance and check in at Same Day Surgery.    Nothing to EAT or DRINK after midnight before the procedure.  You MAY brush your teeth.    You MAY take your blood pressure, heart, and seizure medication on the morning of the procedure, with a SIP of water.  Hold ALL other medications until after the procedure.    You must have someone with you to DRIVE YOU HOME since you will be receiving IV sedation for the procedure.    If you are on blood thinners THAT YOU HAVE BEEN INSTRUCTED TO HOLD BY YOUR DOCTOR FOR THIS PROCEDURE, then do NOT take this the morning of your EGD.  Do NOT stop these medications on your own, they must be approved to be held by your doctor.  Your EGD can NOT be done if you are on these medications.  Examples of blood thinners include: Coumadin, Aggrenox, Plavix, Pradaxa, Reapro, Pletal, Xarelto, Ticagrelor, Brilinta, Eliquis.  You do not have to stop baby aspirin 81 mg.    You will receive a call a few days before your EGD to tell you the time to arrive.  If you have not received a call by the day before your procedure, call the Pre-op Coordinator at [phone].

## 2023-11-15 PROBLEM — R10.13 EPIGASTRIC PAIN: Status: RESOLVED | Noted: 2022-09-21 | Resolved: 2023-11-15

## 2023-11-15 NOTE — PROGRESS NOTES
"Subjective     Patient ID: Bulmaro Nascimento is a 68 y.o. male.    Chief Complaint: EGD    69 yo M here for f/u 9/2022 visit for GERD.  At that visit I gave Rx Protonix, but he states that he did not fill it.  He has stayed on OTC Nexium, when he takes PPI at all.  He took himself off the PPI, relying on diet alone to control symptoms.  He states that he does well if he is strict about his diet.  He did Cologuard instead of colonoscopy, but is ready to schedule EGD.    Review of Systems   Constitutional:  Negative for chills and fever.   Respiratory:  Negative for shortness of breath and wheezing.    Cardiovascular:  Negative for chest pain, palpitations and leg swelling.   Gastrointestinal:  Negative for abdominal pain.   Neurological:  Negative for dizziness and speech difficulty.      Objective   Pulse 61   Ht 5' 10" (1.778 m)   Wt 80.9 kg (178 lb 5.6 oz)   SpO2 99%   BMI 25.59 kg/m²     Physical Exam  Constitutional:       Appearance: Normal appearance. He is well-developed.   HENT:      Head: Normocephalic and atraumatic.   Eyes:      Extraocular Movements: Extraocular movements intact.      Pupils: Pupils are equal, round, and reactive to light.   Pulmonary:      Effort: Pulmonary effort is normal. No respiratory distress.   Abdominal:      General: There is no distension.      Palpations: Abdomen is soft.   Musculoskeletal:         General: No deformity. Normal range of motion.      Cervical back: Normal range of motion and neck supple.   Skin:     General: Skin is warm and dry.   Neurological:      General: No focal deficit present.      Mental Status: He is alert and oriented to person, place, and time.   Psychiatric:         Mood and Affect: Mood normal.         Behavior: Behavior normal.       Lab Results   Component Value Date    WBC 6.53 08/17/2023    HGB 16.1 08/17/2023    HCT 46.7 08/17/2023    MCV 91 08/17/2023     08/17/2023     U/s 9/2022 was independently visualized and reviewed by " me and showed fatty liver, o/w normal.       Assessment and Plan     1. Chronic GERD        -     EGD, use previous case request        -     He will need the [facility]

## 2023-12-06 ENCOUNTER — OFFICE VISIT (OUTPATIENT)
Dept: SLEEP MEDICINE | Facility: CLINIC | Age: 68
End: 2023-12-06
Payer: MEDICARE

## 2023-12-06 VITALS
HEIGHT: 70 IN | BODY MASS INDEX: 25.62 KG/M2 | WEIGHT: 179 LBS | SYSTOLIC BLOOD PRESSURE: 135 MMHG | HEART RATE: 58 BPM | DIASTOLIC BLOOD PRESSURE: 61 MMHG

## 2023-12-06 DIAGNOSIS — G47.00 INSOMNIA, UNSPECIFIED TYPE: Primary | ICD-10-CM

## 2023-12-06 PROCEDURE — 99213 OFFICE O/P EST LOW 20 MIN: CPT | Mod: PBBFAC | Performed by: PSYCHIATRY & NEUROLOGY

## 2023-12-06 PROCEDURE — 99214 PR OFFICE/OUTPT VISIT, EST, LEVL IV, 30-39 MIN: ICD-10-PCS | Mod: S$PBB,,, | Performed by: PSYCHIATRY & NEUROLOGY

## 2023-12-06 PROCEDURE — 99999 PR PBB SHADOW E&M-EST. PATIENT-LVL III: ICD-10-PCS | Mod: PBBFAC,,, | Performed by: PSYCHIATRY & NEUROLOGY

## 2023-12-06 PROCEDURE — 99214 OFFICE O/P EST MOD 30 MIN: CPT | Mod: S$PBB,,, | Performed by: PSYCHIATRY & NEUROLOGY

## 2023-12-06 PROCEDURE — 99999 PR PBB SHADOW E&M-EST. PATIENT-LVL III: CPT | Mod: PBBFAC,,, | Performed by: PSYCHIATRY & NEUROLOGY

## 2023-12-06 RX ORDER — LEMBOREXANT 5 MG/1
TABLET, FILM COATED ORAL
Qty: 30 TABLET | Refills: 3 | Status: SHIPPED | OUTPATIENT
Start: 2023-12-06 | End: 2024-01-09 | Stop reason: SDUPTHER

## 2023-12-06 NOTE — Clinical Note
Please consider Mr. Nascimento for CBTi candidacy - he has come off BDZ and BDZRA medications, did not qualify for Obstructive sleep apnea (JAROD) on most recent PSG.  Thank you,  Mindy

## 2023-12-06 NOTE — PROGRESS NOTES
"                                      12/4/2023     8:21 AM 7/5/2022     9:54 AM 8/6/2021     4:24 AM 6/15/2020     9:24 AM 9/3/2019     5:00 AM 6/17/2019     2:53 PM   EPWORTH SLEEPINESS SCALE TOTAL SCORE    Total score 9 6 4 8 12 3       Bulmaro Nascimento is a 68 y.o. male seen today for the follow up. Last seen on 7/5/2022.    Since his last visit the patient reports ongoing difficulty with falling and staying asleep; he has experienced cognitive side effects from the Belsomra - stopped using it after 2 days (was prescribed 20 mg)  Reports ongoing anxiety that seems to be aggravating his bruxism, states that the breathing relaxation techniques held, however he can not do those techniques for a prolonged time; not comfortable with a meditation.  Previously failed Botox for bruxism; currently taking a muscle relaxant with insufficient effect.   Previously failed CPAP on several trials.  He does not recall sleep apnea treatment to help him with tooth grinding.  Recent sleep study qualify him for upper airway resistance syndrome (UARS), but did not meet  Medicare criteria for obstructive sleep apnea.       ______________________________    Previously tried medications:     Failed Hydroxyzine - "hangover"  Stopped Ativan and Lunesta due to cognitive concerns (states "Ativan worked best for anxiety and tooth grinding), failed CBD; Belsomra 20 mg cold caused confusion after 2 days use.    His machine is 9-8 yrs old.      Bedtime:  9 PM  Sleep latency: 15 min  Nocturnal awakenings:1 Returns to sleep in right away  Wake up time: 7:30     He stopped wearing CPAP in [id number] (tried twice in his life)       PREVIOUS SLEEP STUDIES:     HST  5/2015: Significant Obstructive sleep apnea (JAROD) with AHI (apnea hypopnea Index) of 9.1 (at some portions 36 - does not specify position or stage) and SaO2 of 89% (weight  175 lbs).  PSG 2022: AHI 0.9, RDI 6.3.                     PHYSICAL EXAM:  /61 (BP Location: Right arm, " "Patient Position: Sitting)   Pulse (!) 58   Ht 5' 10" (1.778 m)   Wt 81.2 kg (179 lb)   BMI 25.68 kg/m²   GENERAL: Overweight body habitus, well groomed.    ASSESSMENT:    1. UARS -did not meet Medicare criteria on his PSG in lab in 2022. Tried and failed CPAP may times prior without symptomatic improvement int he quality and continuity of his sleep and no improvement in bruxism.   2 . Insomnia NEC. Multi-factorial -  excess time in bed, poor sleep hygiene, and likely paradoxical insomnia play a role. Stopped belsomra due to drowsiness.   3. Bruxism with TMJ (which was his main problem that made his seek  sleep disordered breathing evaluation) - currently mild improvement  Tizanidione. No longer taking muscle relaxant or benzodiazepines - failed Botox injections. Found Ativan to be most helpful but stopped due to cognitive side effect concerns.      PLAN:  Will add Dayvigo (through [facility] in order to get it approved ), and will do requal PSG-> if he qualifies will order APAP  Will refer to [facility]     More than 15 minutes of this 30 minutes visit was spent in counseling: during our discussion today, we talked about the etiology of  JAROD as well as the potential ramifications of untreated sleep apnea, which could include daytime sleepiness, hypertension, heart disease and/or stroke.  We discussed potential treatment options, which could include weight loss, body positioning, continuous positive airway pressure (CPAP), or referral for surgical consideration. Meanwhile, he  is urged to avoid supine sleep, weight gain and alcoholic beverages since all of these can worsen JAROD.     Precautions: The patient was advised to abstain from driving should he feel sleepy or drowsy.    Follow up: 6 months    Thank you for allowing me the opportunity to participate in the care of your patient.    This visit summary will be sent to referring provider via [facility]  "

## 2023-12-06 NOTE — PATIENT INSTRUCTIONS
Will send Dayvigo 5 mg to [facility] - will take for a month, may incrase the dose further - pharmacy will call you with an update  Will refer to [facility] - they will message you thorough My  Chart

## 2023-12-07 ENCOUNTER — PATIENT MESSAGE (OUTPATIENT)
Dept: PSYCHIATRY | Facility: CLINIC | Age: 68
End: 2023-12-07
Payer: MEDICARE

## 2024-01-09 ENCOUNTER — TELEPHONE (OUTPATIENT)
Dept: SPORTS MEDICINE | Facility: CLINIC | Age: 69
End: 2024-01-09
Payer: MEDICARE

## 2024-01-09 ENCOUNTER — PATIENT MESSAGE (OUTPATIENT)
Dept: SLEEP MEDICINE | Facility: CLINIC | Age: 69
End: 2024-01-09
Payer: MEDICARE

## 2024-01-09 DIAGNOSIS — G47.00 INSOMNIA, UNSPECIFIED TYPE: ICD-10-CM

## 2024-01-10 DIAGNOSIS — M17.11 PRIMARY OSTEOARTHRITIS OF RIGHT KNEE: Primary | ICD-10-CM

## 2024-01-10 DIAGNOSIS — M17.12 PRIMARY OSTEOARTHRITIS OF LEFT KNEE: ICD-10-CM

## 2024-01-10 RX ORDER — LEMBOREXANT 5 MG/1
TABLET, FILM COATED ORAL
Qty: 30 TABLET | Refills: 0 | Status: SHIPPED | OUTPATIENT
Start: 2024-01-10

## 2024-01-10 NOTE — PROGRESS NOTES
Patient contacted clinic requesting repeat viscosupplementation injections.  He has had significant relief from injections in the past up until now.  Patient has failed other treatments such as physical therapy cortisone injections, and conservative management.  This is the only treatment that provides significant relief.  Patient denies new trauma and/or since symptoms are similar to previous HPI.    Review of bilateral knee radiographs show Kellgren-Alexis Classification: 2-3      We have discussed a variety of treatment options including medications, injections, physical therapy and other alternative treatments. I also explained the indications, risks and benefits of surgery. Pt would like to proceed with visco-supplementation at this time.    Medical Necessity for viscosupplementation use: After thorough evaluation of the patient, I have determined that viscosupplementation treatment is medically necessary. 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 Oral analgesics including NSAIDs have not adequately controlled the patient's symptoms. There is radiographic evidence of Kellgren-Alexis grade II (or greater) osteoarthritic (OA) changes, or if lack of radiographic evidence, there is arthroscopic or other evidence of chondrosis of the knee(s).     I made the decision to obtain old records of the patient including previous notes and imaging. I independently reviewed and interpreted lab results today as well as prior imaging.      1. Pre-authorization placed for bilateral Euflexxa series injections.  2. RTC to see JADYN for bilateral Euflexxa series injections.    All of the patient's questions were answered and the patient will contact us if they have any questions or concerns in the interim.

## 2024-01-23 ENCOUNTER — TELEPHONE (OUTPATIENT)
Dept: GASTROENTEROLOGY | Facility: CLINIC | Age: 69
End: 2024-01-23
Payer: MEDICARE

## 2024-01-23 NOTE — TELEPHONE ENCOUNTER
LM to inform pt of arrival time of 0700 in [facility] Thursday. instructed pt nothing to eat or drink after midnight tomorrow. gave office number for pt to call back and confirm.

## 2024-01-23 NOTE — TELEPHONE ENCOUNTER
informed pt of arrival time of 0700 in \[facility]\"" Thursday. informed pt nothing to eat or drink after midnight tomorrow. pt voiced understanding.

## 2024-01-30 NOTE — PROGRESS NOTES
Subjective:       Patient ID: Bulmaro Nascimento is a 64 y.o. male.    Chief Complaint: Gastroesophageal Reflux    63 yo M complains of GERD.  He was on Nexium for almost 20 years and did very well.  He had an EGD with Dr. Regan 6 years ago and states that everything was fine.  He was taken off his PPI due to concerns over side effects and has taken Tagamet and Pepcid.  The Tagamet did not help at all, and the Pepcid has helped only marginally.  He denies vomiting or weight loss.    Review of Systems   Constitutional: Negative for appetite change and unexpected weight change.   Eyes: Negative for photophobia and visual disturbance.   Respiratory: Negative for chest tightness, shortness of breath and wheezing.    Cardiovascular: Negative for chest pain, palpitations and leg swelling.   Genitourinary: Negative for dysuria, flank pain and hematuria.   Musculoskeletal: Negative for joint swelling and myalgias.   Skin: Negative for color change and rash.   Neurological: Negative for dizziness and speech difficulty.   Psychiatric/Behavioral: Negative for confusion and hallucinations.       Objective:       /82 (BP Location: Left arm, Patient Position: Sitting, BP Method: Medium (Manual))   Pulse 61   Wt 81.9 kg (180 lb 9.6 oz)   BMI 25.91 kg/m²     Physical Exam   Constitutional: He is oriented to person, place, and time. He appears well-developed and well-nourished.   Eyes: Pupils are equal, round, and reactive to light. EOM are normal.   Cardiovascular: Normal rate, regular rhythm, normal heart sounds and intact distal pulses.   Pulmonary/Chest: Effort normal and breath sounds normal.   Abdominal: Soft. Bowel sounds are normal. He exhibits no distension. There is no tenderness.   Neurological: He is alert and oriented to person, place, and time.   Skin: Skin is warm and dry.   Psychiatric: He has a normal mood and affect. His behavior is normal.       Assessment:       1. Chronic GERD        Plan:        Chronic GERD        -     Resume Nexium 20 mg daily, and take it indefinitely        -     Proven risks of long term PPI use, including pneumonia, c.diff infection, and bone loss, as well as theoretical risks including dementia and CKD, were discussed with the patient at length and the patient understands risks and benefits of therapy.  Option of tapering/weaning PPI away was also discussed, including the need for possible long term therapy to treat symptoms if they recur after cessation of medication, as well as to mitigate the risk of developing complications of reflux such as Bradshaw's esophagus and/or esophageal cancer.  Patient understands the risks and benefits of treatment with drug versus cessation.  Daily supplementation with MVI with calcium and vitamin D were recommended as was follow up with PCP for bone scan when appropriate.  Labs including B12, folate, CMP, CBC, calcium, and magnesium should be checked at least annually.        -     Concerns over CKD and dementia have been disproved, and severe GERD is a quality of life issue        -     H2 blockers show tachyphylaxis with frequent use and thus are only helpful for patients who take them a few times a month only.  In addition, Tagamet is a potent p450 inhibitor and thus effects other medications.    Obtain records from Dr. Regan          No

## 2024-01-31 DIAGNOSIS — M17.11 PRIMARY OSTEOARTHRITIS OF RIGHT KNEE: Primary | ICD-10-CM

## 2024-01-31 DIAGNOSIS — M17.12 PRIMARY OSTEOARTHRITIS OF LEFT KNEE: ICD-10-CM

## 2024-02-02 ENCOUNTER — OFFICE VISIT (OUTPATIENT)
Dept: SPORTS MEDICINE | Facility: CLINIC | Age: 69
End: 2024-02-02
Payer: MEDICARE

## 2024-02-02 VITALS
BODY MASS INDEX: 25.57 KG/M2 | SYSTOLIC BLOOD PRESSURE: 110 MMHG | HEIGHT: 70 IN | HEART RATE: 61 BPM | WEIGHT: 178.63 LBS | DIASTOLIC BLOOD PRESSURE: 62 MMHG

## 2024-02-02 DIAGNOSIS — M17.12 PRIMARY OSTEOARTHRITIS OF LEFT KNEE: ICD-10-CM

## 2024-02-02 DIAGNOSIS — M17.11 PRIMARY OSTEOARTHRITIS OF RIGHT KNEE: Primary | ICD-10-CM

## 2024-02-02 PROCEDURE — 99999PBSHW PR PBB SHADOW TECHNICAL ONLY FILED TO HB: Mod: JZ,PBBFAC,,

## 2024-02-02 PROCEDURE — 99213 OFFICE O/P EST LOW 20 MIN: CPT | Mod: PBBFAC | Performed by: PHYSICIAN ASSISTANT

## 2024-02-02 PROCEDURE — 99999 PR PBB SHADOW E&M-EST. PATIENT-LVL III: CPT | Mod: PBBFAC,,, | Performed by: PHYSICIAN ASSISTANT

## 2024-02-02 PROCEDURE — 99499 UNLISTED E&M SERVICE: CPT | Mod: S$PBB,,, | Performed by: PHYSICIAN ASSISTANT

## 2024-02-02 PROCEDURE — 20610 DRAIN/INJ JOINT/BURSA W/O US: CPT | Mod: 50,PBBFAC | Performed by: PHYSICIAN ASSISTANT

## 2024-02-02 PROCEDURE — 20610 DRAIN/INJ JOINT/BURSA W/O US: CPT | Mod: 50,S$PBB,, | Performed by: PHYSICIAN ASSISTANT

## 2024-02-02 RX ORDER — CELECOXIB 200 MG/1
200 CAPSULE ORAL 2 TIMES DAILY
Qty: 60 CAPSULE | Refills: 5 | Status: SHIPPED | OUTPATIENT
Start: 2024-02-02

## 2024-02-02 RX ADMIN — Medication 16 MG: at 09:02

## 2024-02-02 NOTE — PROGRESS NOTES
Patient is here for follow up of bilateral knee arthritis. Pt is requesting synvisc injection #1.  PMFH reviewed per encounter record. Has failed other conservative modalities including NSAIDS, activity modification, weight loss.    The prior shot was tolerated well.    PHYSICAL EXAMINATION:     General: The patient is alert and oriented x 3. Mood is pleasant.   Observation of ears, eyes and nose reveals no gross abnormalities. No   labored breathing observed.     No signs of infection or adverse reaction to knee.    PROCEDURE NOTE:  Injection Procedure right knee  A time out was performed, including verification of patient ID, procedure, site and side, availability of information and equipment, review of safety issues, and agreement with consent, the procedure site was marked.    After time out was performed, the patient was prepped aseptically with povidone-iodine swabsticks. A diagnostic and therapeutic injection of 2cc Synvisc (1st) was given under sterile technique using a 22g x 1.5 needle from the Superolateral  aspect of the right Knee Joint in the supine position.      Bulmaro Nascimento had no adverse reactions to the medication. Pain decreased. He was instructed to apply ice to the joint for 20 minutes and avoid strenuous activities for 24-36 hours following the injection. He was warned of possible blood sugar and/or blood pressure changes during that time. Following that time, he can resume regular activities.    He was reminded to call the clinic immediately for any adverse side effects as explained in clinic today.    Aspiration/Injection Procedure left knee    A time out was performed, including verification of patient ID, procedure, site and side, availability of information and equipment, review of safety issues, and agreement with consent, the procedure site was marked.    Aspiration:  After time out was performed, the patient was prepped aseptically with povidone-iodine swabsticks. 4cc's of  normal joint fluid was aspirated from the Superolateral  aspect of the left Knee Joint using a 22g x 1.5 needle in sterile fashion without complication.     Injection:  Following aspiration, a diagnostic and therapeutic injection of 2cc Synvisc (1st) was given under sterile technique in the supine position.     Bulmaro Nascimento had no adverse reactions to the medication. Pain decreased. He was instructed to apply ice to the joint for 20 minutes and avoid strenuous activities for 24-36 hours following the injection. He was warned of possible blood sugar and/or blood pressure changes during that time. Following that time, he can resume regular activities.      RTC 1 week for 2nd injection.  All questions were answered, pt will contact us for questions or concerns in the interim.

## 2024-02-07 ENCOUNTER — OFFICE VISIT (OUTPATIENT)
Dept: SPORTS MEDICINE | Facility: CLINIC | Age: 69
End: 2024-02-07
Payer: MEDICARE

## 2024-02-07 VITALS
SYSTOLIC BLOOD PRESSURE: 128 MMHG | BODY MASS INDEX: 26.51 KG/M2 | HEART RATE: 61 BPM | DIASTOLIC BLOOD PRESSURE: 68 MMHG | HEIGHT: 70 IN | WEIGHT: 185.19 LBS

## 2024-02-07 DIAGNOSIS — M17.12 PRIMARY OSTEOARTHRITIS OF LEFT KNEE: ICD-10-CM

## 2024-02-07 DIAGNOSIS — M17.11 PRIMARY OSTEOARTHRITIS OF RIGHT KNEE: Primary | ICD-10-CM

## 2024-02-07 PROCEDURE — 99999PBSHW PR PBB SHADOW TECHNICAL ONLY FILED TO HB: Mod: JZ,PBBFAC,,

## 2024-02-07 PROCEDURE — 99213 OFFICE O/P EST LOW 20 MIN: CPT | Mod: PBBFAC,25 | Performed by: PHYSICIAN ASSISTANT

## 2024-02-07 PROCEDURE — 99499 UNLISTED E&M SERVICE: CPT | Mod: S$PBB,,, | Performed by: PHYSICIAN ASSISTANT

## 2024-02-07 PROCEDURE — 99999 PR PBB SHADOW E&M-EST. PATIENT-LVL III: CPT | Mod: PBBFAC,,, | Performed by: PHYSICIAN ASSISTANT

## 2024-02-07 PROCEDURE — 20610 DRAIN/INJ JOINT/BURSA W/O US: CPT | Mod: 50,S$PBB,, | Performed by: PHYSICIAN ASSISTANT

## 2024-02-07 PROCEDURE — 20610 DRAIN/INJ JOINT/BURSA W/O US: CPT | Mod: 50,PBBFAC | Performed by: PHYSICIAN ASSISTANT

## 2024-02-07 RX ADMIN — Medication 16 MG: at 10:02

## 2024-02-07 NOTE — PROGRESS NOTES
Patient is here for follow up of bilateral knee arthritis. Pt is requesting synvisc injection #2.  PMFH reviewed per encounter record. Has failed other conservative modalities including NSAIDS, activity modification, weight loss.    The prior shot was tolerated well.    PHYSICAL EXAMINATION:     General: The patient is alert and oriented x 3. Mood is pleasant.   Observation of ears, eyes and nose reveals no gross abnormalities. No   labored breathing observed.     No signs of infection or adverse reaction to knee.    PROCEDURE NOTE:  Injection Procedure right knee  A time out was performed, including verification of patient ID, procedure, site and side, availability of information and equipment, review of safety issues, and agreement with consent, the procedure site was marked.    After time out was performed, the patient was prepped aseptically with povidone-iodine swabsticks. A diagnostic and therapeutic injection of 2cc Synvisc (2nd) was given under sterile technique using a 22g x 1.5 needle from the Superolateral  aspect of the right Knee Joint in the supine position.      Bulmaro Nascimento had no adverse reactions to the medication. Pain decreased. He was instructed to apply ice to the joint for 20 minutes and avoid strenuous activities for 24-36 hours following the injection. He was warned of possible blood sugar and/or blood pressure changes during that time. Following that time, he can resume regular activities.    He was reminded to call the clinic immediately for any adverse side effects as explained in clinic today.    Injection Procedure left knee  A time out was performed, including verification of patient ID, procedure, site and side, availability of information and equipment, review of safety issues, and agreement with consent, the procedure site was marked.    After time out was performed, the patient was prepped aseptically with povidone-iodine swabsticks. A diagnostic and therapeutic injection  of 2cc Synvisc (2nd) was given under sterile technique using a 22g x 1.5 needle from the Superolateral  aspect of the left Knee Joint in the supine position.      Bulmaro Nascimento had no adverse reactions to the medication. Pain decreased. He was instructed to apply ice to the joint for 20 minutes and avoid strenuous activities for 24-36 hours following the injection. He was warned of possible blood sugar and/or blood pressure changes during that time. Following that time, he can resume regular activities.    He was reminded to call the clinic immediately for any adverse side effects as explained in clinic today.    RTC 1 week for 3rd injection.  All questions were answered, pt will contact us for questions or concerns in the interim.

## 2024-02-16 ENCOUNTER — OFFICE VISIT (OUTPATIENT)
Dept: SPORTS MEDICINE | Facility: CLINIC | Age: 69
End: 2024-02-16
Payer: MEDICARE

## 2024-02-16 VITALS
WEIGHT: 180.75 LBS | BODY MASS INDEX: 25.94 KG/M2 | HEART RATE: 62 BPM | SYSTOLIC BLOOD PRESSURE: 121 MMHG | DIASTOLIC BLOOD PRESSURE: 65 MMHG

## 2024-02-16 DIAGNOSIS — M17.11 PRIMARY OSTEOARTHRITIS OF RIGHT KNEE: Primary | ICD-10-CM

## 2024-02-16 DIAGNOSIS — M17.12 PRIMARY OSTEOARTHRITIS OF LEFT KNEE: ICD-10-CM

## 2024-02-16 PROCEDURE — 20610 DRAIN/INJ JOINT/BURSA W/O US: CPT | Mod: 50,S$PBB,, | Performed by: PHYSICIAN ASSISTANT

## 2024-02-16 PROCEDURE — 99999PBSHW PR PBB SHADOW TECHNICAL ONLY FILED TO HB: Mod: JZ,PBBFAC,,

## 2024-02-16 PROCEDURE — 99999 PR PBB SHADOW E&M-EST. PATIENT-LVL III: CPT | Mod: PBBFAC,,, | Performed by: PHYSICIAN ASSISTANT

## 2024-02-16 PROCEDURE — 99213 OFFICE O/P EST LOW 20 MIN: CPT | Mod: PBBFAC | Performed by: PHYSICIAN ASSISTANT

## 2024-02-16 PROCEDURE — 99499 UNLISTED E&M SERVICE: CPT | Mod: S$PBB,,, | Performed by: PHYSICIAN ASSISTANT

## 2024-02-16 PROCEDURE — 20610 DRAIN/INJ JOINT/BURSA W/O US: CPT | Mod: 50,PBBFAC | Performed by: PHYSICIAN ASSISTANT

## 2024-02-16 RX ADMIN — Medication 16 MG: at 09:02

## 2024-02-16 NOTE — PROGRESS NOTES
Patient is here for follow up of bilateral knee arthritis. Pt is requesting synvisc injection #3.  PMFH reviewed per encounter record. Has failed other conservative modalities including NSAIDS, activity modification, weight loss.    The prior shot was tolerated well.    PHYSICAL EXAMINATION:     General: The patient is alert and oriented x 3. Mood is pleasant.   Observation of ears, eyes and nose reveals no gross abnormalities. No   labored breathing observed.     No signs of infection or adverse reaction to knee.    PROCEDURE NOTE:  Injection Procedure right knee  A time out was performed, including verification of patient ID, procedure, site and side, availability of information and equipment, review of safety issues, and agreement with consent, the procedure site was marked.    After time out was performed, the patient was prepped aseptically with povidone-iodine swabsticks. A diagnostic and therapeutic injection of 2cc Synvisc (3rd) was given under sterile technique using a 22g x 1.5 needle from the Superolateral  aspect of the right Knee Joint in the supine position.      Bulmaro Nascimento had no adverse reactions to the medication. Pain decreased. He was instructed to apply ice to the joint for 20 minutes and avoid strenuous activities for 24-36 hours following the injection. He was warned of possible blood sugar and/or blood pressure changes during that time. Following that time, he can resume regular activities.    He was reminded to call the clinic immediately for any adverse side effects as explained in clinic today.    Injection Procedure left knee  A time out was performed, including verification of patient ID, procedure, site and side, availability of information and equipment, review of safety issues, and agreement with consent, the procedure site was marked.    After time out was performed, the patient was prepped aseptically with povidone-iodine swabsticks. A diagnostic and therapeutic injection  of 2cc Synvisc (3rd) was given under sterile technique using a 22g x 1.5 needle from the Superolateral  aspect of the left Knee Joint in the supine position.      Bulmaro Nascimento had no adverse reactions to the medication. Pain decreased. He was instructed to apply ice to the joint for 20 minutes and avoid strenuous activities for 24-36 hours following the injection. He was warned of possible blood sugar and/or blood pressure changes during that time. Following that time, he can resume regular activities.    He was reminded to call the clinic immediately for any adverse side effects as explained in clinic today.    RTC in 6 months with Juani Ellington PA-C for follow up and repeat visco supplementation.  All questions were answered, pt will contact us for questions or concerns in the interim.

## 2024-03-03 ENCOUNTER — OFFICE VISIT (OUTPATIENT)
Dept: URGENT CARE | Facility: CLINIC | Age: 69
End: 2024-03-03
Payer: MEDICARE

## 2024-03-03 VITALS
TEMPERATURE: 99 F | DIASTOLIC BLOOD PRESSURE: 74 MMHG | RESPIRATION RATE: 16 BRPM | OXYGEN SATURATION: 100 % | WEIGHT: 180 LBS | HEIGHT: 70 IN | SYSTOLIC BLOOD PRESSURE: 141 MMHG | BODY MASS INDEX: 25.77 KG/M2 | HEART RATE: 68 BPM

## 2024-03-03 DIAGNOSIS — J02.9 PHARYNGITIS, UNSPECIFIED ETIOLOGY: ICD-10-CM

## 2024-03-03 DIAGNOSIS — J32.9 BACTERIAL SINUSITIS: Primary | ICD-10-CM

## 2024-03-03 DIAGNOSIS — B96.89 BACTERIAL SINUSITIS: Primary | ICD-10-CM

## 2024-03-03 PROCEDURE — 99213 OFFICE O/P EST LOW 20 MIN: CPT | Mod: S$GLB,,, | Performed by: NURSE PRACTITIONER

## 2024-03-03 RX ORDER — AMOXICILLIN AND CLAVULANATE POTASSIUM 875; 125 MG/1; MG/1
1 TABLET, FILM COATED ORAL EVERY 12 HOURS
Qty: 20 TABLET | Refills: 0 | Status: SHIPPED | OUTPATIENT
Start: 2024-03-03 | End: 2024-03-13

## 2024-03-03 NOTE — PATIENT INSTRUCTIONS
Oral fluids-keep throat moist at all times   Rest  Soft foods for throat pain   Droplet and contact precautions (good handwashing, cover coughs and sneezes, no food/drink sharing, wipe down surfaces after use)   Warm salt water gargles   Ibuprofen and/or tylenol for pain relief    Hot showers; hot tea with honey

## 2024-03-03 NOTE — PROGRESS NOTES
"Subjective:      Patient ID: Bulmaro Nascimento is a 68 y.o. male.    Vitals:  height is 5' 10" (1.778 m) and weight is 81.6 kg (180 lb). His oral temperature is 98.6 °F (37 °C). His blood pressure is 141/74 (abnormal) and his pulse is 68. His respiration is 16 and oxygen saturation is 100%.     Chief Complaint: Sore Throat (Head Congestion (I Have Chronic Sinusitis); Very difficult to eat and sleep due to painful throat - Entered by patient)    67 y/o male presents to  today with complaint of sore throat and head congestion. Pt states he has h/o chronic sinusitis. Symptoms began four days ago, and he has been taking tylenol and motrin.     Sinus Problem  This is a new problem. The current episode started in the past 7 days. The problem is unchanged. There has been no fever. His pain is at a severity of 8/10. The pain is severe. Associated symptoms include congestion, headaches, sinus pressure and a sore throat. Pertinent negatives include no chills, coughing, diaphoresis, ear pain, hoarse voice, neck pain, shortness of breath, sneezing or swollen glands. Past treatments include acetaminophen. The treatment provided mild relief.       Constitution: Negative for chills, sweating and fever.   HENT:  Positive for congestion, postnasal drip, sinus pressure and sore throat. Negative for ear pain.    Neck: Negative for neck pain.   Respiratory:  Negative for cough, shortness of breath and wheezing.    Gastrointestinal:  Negative for vomiting and diarrhea.   Allergic/Immunologic: Negative for sneezing.   Neurological:  Positive for headaches.      Objective:     Physical Exam   Constitutional: He is oriented to person, place, and time. He appears well-developed. He is cooperative.  Non-toxic appearance. He does not appear ill. No distress.   HENT:   Head: Normocephalic.   Ears:   Right Ear: Hearing, tympanic membrane, external ear and ear canal normal.   Left Ear: Hearing, tympanic membrane, external ear and ear canal " normal.   Nose: Mucosal edema and congestion present. No rhinorrhea or nasal deformity. No epistaxis. Right sinus exhibits frontal sinus tenderness. Right sinus exhibits no maxillary sinus tenderness. Left sinus exhibits frontal sinus tenderness. Left sinus exhibits no maxillary sinus tenderness.   Mouth/Throat: Uvula is midline and mucous membranes are normal. Mucous membranes are moist. No trismus in the jaw. Normal dentition. No uvula swelling. Posterior oropharyngeal erythema present. No oropharyngeal exudate or posterior oropharyngeal edema. Oropharynx is clear.      Comments: Hypertrophic tonsils   Eyes: Conjunctivae and lids are normal. No scleral icterus.   Neck: Trachea normal and phonation normal. Neck supple. No edema present. No erythema present. No neck rigidity present.   Cardiovascular: Normal rate and regular rhythm.   Pulmonary/Chest: Effort normal and breath sounds normal. No respiratory distress. He has no decreased breath sounds. He has no wheezes. He has no rhonchi.   Abdominal: Normal appearance.   Musculoskeletal: Normal range of motion.         General: No deformity. Normal range of motion.   Neurological: He is alert and oriented to person, place, and time. He exhibits normal muscle tone. Coordination normal.   Skin: Skin is warm, dry, intact, not diaphoretic and not pale.   Psychiatric: His speech is normal and behavior is normal. Judgment and thought content normal.   Nursing note and vitals reviewed.      Assessment:     1. Bacterial sinusitis    2. Pharyngitis, unspecified etiology        Plan:       Bacterial sinusitis  -     amoxicillin-clavulanate 875-125mg (AUGMENTIN) 875-125 mg per tablet; Take 1 tablet by mouth every 12 (twelve) hours. for 10 days  Dispense: 20 tablet; Refill: 0    Pharyngitis, unspecified etiology      Patient Instructions   Oral fluids-keep throat moist at all times   Rest  Soft foods for throat pain   Droplet and contact precautions (good handwashing, cover  coughs and sneezes, no food/drink sharing, wipe down surfaces after use)   Warm salt water gargles   Ibuprofen and/or tylenol for pain relief    Hot showers; hot tea with honey

## 2024-03-11 DIAGNOSIS — G47.00 INSOMNIA, UNSPECIFIED TYPE: ICD-10-CM

## 2024-03-11 RX ORDER — LEMBOREXANT 5 MG/1
TABLET, FILM COATED ORAL
Qty: 30 TABLET | Refills: 0 | Status: SHIPPED | OUTPATIENT
Start: 2024-03-11

## 2024-03-27 RX ORDER — TADALAFIL 5 MG/1
5 TABLET ORAL DAILY PRN
Qty: 90 TABLET | Refills: 3 | Status: SHIPPED | OUTPATIENT
Start: 2024-03-27

## 2024-04-12 DIAGNOSIS — G47.00 INSOMNIA, UNSPECIFIED TYPE: ICD-10-CM

## 2024-04-12 DIAGNOSIS — F45.8 BRUXISM: ICD-10-CM

## 2024-04-15 ENCOUNTER — PATIENT MESSAGE (OUTPATIENT)
Dept: GASTROENTEROLOGY | Facility: CLINIC | Age: 69
End: 2024-04-15
Payer: MEDICARE

## 2024-04-17 RX ORDER — TIZANIDINE 4 MG/1
4 TABLET ORAL NIGHTLY
Qty: 30 TABLET | Refills: 5 | Status: SHIPPED | OUTPATIENT
Start: 2024-04-17

## 2024-04-23 DIAGNOSIS — G47.00 INSOMNIA, UNSPECIFIED TYPE: ICD-10-CM

## 2024-04-23 DIAGNOSIS — F45.8 BRUXISM: ICD-10-CM

## 2024-04-23 RX ORDER — TIZANIDINE 4 MG/1
4 TABLET ORAL NIGHTLY
Qty: 30 TABLET | Refills: 5 | Status: CANCELLED | OUTPATIENT
Start: 2024-04-23

## 2024-04-24 ENCOUNTER — TELEPHONE (OUTPATIENT)
Dept: FAMILY MEDICINE | Facility: CLINIC | Age: 69
End: 2024-04-24
Payer: MEDICARE

## 2024-04-24 NOTE — TELEPHONE ENCOUNTER
Called and spoke to brant at [facility] in [place]. Says they never received the RX for tizanidine, gave verbal as written.

## 2024-04-24 NOTE — TELEPHONE ENCOUNTER
----- Message from Anita Barger sent at 4/24/2024 11:33 AM CDT -----  Type:  Pharmacy Calling to Clarify an RX    Pharmacy Name:[facility]   Prescription Name:tiZANidine (ZANAFLEX) 4 MG tablet  What do they need to clarify?:  Best Call Back Number: [phone]  Additional Information: calling to get verbal .. Script was not received and pt is at the pharmacy with print out

## 2024-06-20 ENCOUNTER — PATIENT MESSAGE (OUTPATIENT)
Dept: PSYCHIATRY | Facility: CLINIC | Age: 69
End: 2024-06-20
Payer: MEDICARE

## 2024-08-16 ENCOUNTER — PATIENT MESSAGE (OUTPATIENT)
Dept: PSYCHIATRY | Facility: CLINIC | Age: 69
End: 2024-08-16
Payer: MEDICARE

## 2024-08-16 DIAGNOSIS — G47.00 INSOMNIA, UNSPECIFIED TYPE: ICD-10-CM

## 2024-08-16 RX ORDER — LEMBOREXANT 5 MG/1
TABLET, FILM COATED ORAL
Qty: 30 TABLET | Refills: 3 | Status: SHIPPED | OUTPATIENT
Start: 2024-08-16

## 2024-08-22 ENCOUNTER — TELEPHONE (OUTPATIENT)
Dept: SPORTS MEDICINE | Facility: CLINIC | Age: 69
End: 2024-08-22
Payer: MEDICARE

## 2024-08-22 ENCOUNTER — OFFICE VISIT (OUTPATIENT)
Dept: FAMILY MEDICINE | Facility: CLINIC | Age: 69
End: 2024-08-22
Payer: MEDICARE

## 2024-08-22 VITALS
OXYGEN SATURATION: 98 % | BODY MASS INDEX: 26.21 KG/M2 | DIASTOLIC BLOOD PRESSURE: 71 MMHG | TEMPERATURE: 98 F | HEART RATE: 58 BPM | SYSTOLIC BLOOD PRESSURE: 131 MMHG | HEIGHT: 70 IN | WEIGHT: 183.06 LBS

## 2024-08-22 DIAGNOSIS — K21.9 CHRONIC GERD: ICD-10-CM

## 2024-08-22 DIAGNOSIS — G47.00 INSOMNIA, UNSPECIFIED TYPE: ICD-10-CM

## 2024-08-22 DIAGNOSIS — Z00.00 MEDICARE ANNUAL WELLNESS VISIT, SUBSEQUENT: Primary | ICD-10-CM

## 2024-08-22 DIAGNOSIS — Z13.6 ENCOUNTER FOR SCREENING FOR CARDIOVASCULAR DISORDERS: ICD-10-CM

## 2024-08-22 DIAGNOSIS — Z13.220 SCREENING CHOLESTEROL LEVEL: ICD-10-CM

## 2024-08-22 DIAGNOSIS — Z13.1 DIABETES MELLITUS SCREENING: ICD-10-CM

## 2024-08-22 DIAGNOSIS — M17.11 PRIMARY OSTEOARTHRITIS OF RIGHT KNEE: Primary | ICD-10-CM

## 2024-08-22 DIAGNOSIS — N40.0 BENIGN PROSTATIC HYPERPLASIA WITHOUT LOWER URINARY TRACT SYMPTOMS: ICD-10-CM

## 2024-08-22 DIAGNOSIS — R51.9 GENERALIZED HEADACHES: ICD-10-CM

## 2024-08-22 DIAGNOSIS — J30.1 SEASONAL ALLERGIC RHINITIS DUE TO POLLEN: ICD-10-CM

## 2024-08-22 DIAGNOSIS — K76.0 FATTY LIVER: ICD-10-CM

## 2024-08-22 DIAGNOSIS — Z87.898 HISTORY OF ELEVATED PSA: ICD-10-CM

## 2024-08-22 DIAGNOSIS — B00.1 RECURRENT COLD SORES: ICD-10-CM

## 2024-08-22 DIAGNOSIS — F45.8 BRUXISM: ICD-10-CM

## 2024-08-22 DIAGNOSIS — M17.11 PRIMARY OSTEOARTHRITIS OF RIGHT KNEE: ICD-10-CM

## 2024-08-22 DIAGNOSIS — M17.12 PRIMARY OSTEOARTHRITIS OF LEFT KNEE: ICD-10-CM

## 2024-08-22 DIAGNOSIS — Z12.5 ENCOUNTER FOR SCREENING FOR MALIGNANT NEOPLASM OF PROSTATE: ICD-10-CM

## 2024-08-22 PROCEDURE — 99214 OFFICE O/P EST MOD 30 MIN: CPT | Mod: PBBFAC,PN | Performed by: NURSE PRACTITIONER

## 2024-08-22 PROCEDURE — 99999 PR PBB SHADOW E&M-EST. PATIENT-LVL IV: CPT | Mod: PBBFAC,,, | Performed by: NURSE PRACTITIONER

## 2024-08-22 RX ORDER — VALACYCLOVIR HYDROCHLORIDE 1 G/1
4000 TABLET, FILM COATED ORAL ONCE
COMMUNITY
Start: 2024-08-15

## 2024-08-22 NOTE — TELEPHONE ENCOUNTER
Patient returned my call, and he confirmed he wanted to do the Synvisc series for his gel injections. I told him we put in the order, and I got him scheduled for his 3 visits with Juani Ellington PA-C. I informed patient that at the first injection appointment we will get some updated xrays, and patient agreed and understood. Patient will call us if he needs to reschedule any appointments.

## 2024-08-22 NOTE — TELEPHONE ENCOUNTER
Attempted to contact patient who was requesting repeat gel injections. I had to leave a voicemail, and in the voicemail I was asking if he wanted to do the Synvisc series injections (like he had in February) or if he wanted to to the Euflexxa series injections (was originally scheduled for these but they were on back order). Told patient to give me a call back at [phone] to specify so we can put in the correct order.     Once patient specifies and order is put in, I will call him to schedule his 3 injection appointments. The first appointment will have to be scheduled as an established patient visit (30 mins) due to needing updated knee xrays ([id number]) as well as physical exam before injection. The next 2 appointments can be injection appointments (15 mins).     Waiting on patient callback.

## 2024-08-22 NOTE — PROGRESS NOTES
Subjective:       Patient ID: Bulmaro Nascimento is a 69 y.o. male.    Chief Complaint: Medicare AWV (Discuss vaccines (up to date?) as well as discuss labs )          HPI WITH ASSESSMENT AND PLAN OF CARE:      THIS IS A MEDICARE ANNUAL PHYSICAL EXAM SO HEALTH RISK ASSESSMENT, DEPRESSION SCREENING, AND ADL/FUNCTIONAL ABILITY CHECKLIST REVIEWED - SEE FLOW SHEETS.  ALL PAST MEDICAL, SURGICAL AND FAMILY HISTORY REVIEWED - SEE BELOW.  ALL CHRONIC PROBLEMS EVALUATED.       Medicare AWV:  1. HRA completed:  See Health Risk Assessment flowsheet  2. PMH and Family history reviewed and updated  3.  Updated list of current providers  Team Member Role and Specialty Contact Info Address Start End Comments   PCPs         Samira Pimentel NP General (Family Medicine) Phone: [phone]  Fax: [phone] [address]  [address]  [place] [address] 6/14/2018 - -   Additional Team Members         Erica Zarate MA Care Coordinator - - 5/30/2019 - -   Jadiel Lara MD Consulting Physician (Urology) Phone: [phone]  Fax: [phone] [address] [facility]  [address]  [place] [address] 8/23/2021 - -   Latasha Valero MD Consulting Physician (Gastroenterology) Phone: [phone] [address]  [address]  [facility] [address] 9/21/2022 - -   Maria Guadalupe Kirkpatrick MD Consulting Physician (Allergy) Phone: [phone]  Fax: [phone] [address] [place] [address] 8/23/2021 - -   Mindy Proctor MD Consulting Physician (Sleep Medicine) Phone: [phone]  Fax: [phone] [address] [facility]  [address]  [place] [address] 8/23/2021 - -   Russo-Digeorge, Jamie L., PA-C Physician Assistant (Orthopedic Surgery) Phone: [phone]  Fax: [phone] [address] [facility] [address] 4/25/2023 - -   Juani Ellington PA-C Physician Assistant (Sports Medicine) Phone: [phone]  Fax: [phone] [address] [address] 8/23/2021 - -     4.  Updated measures/vital signs completed  This is a chronic problem. He has been using valium 1 mg bid for years, ran out and was not taking it for a while. Advised him it is not indicated treatment for benign essential tremor. Diagnosed and started treatment with Valium about 6 years ago.   No known family members with tremor. Only in bilateral hands, no head tremor. Stress makes it worse.   Mild memory changes, sometimes searches for a word or name of people. No personality changes. He is able to do all executive functions like bills, letters, driving.     Will provide refill on valium decreasing to 1 mg tablet to be taken once a day if needed for anxiety or tremor and try primidone 50mg daily.    "and stable  /71 (BP Location: Left arm, Patient Position: Sitting, BP Method: Large (Manual))   Pulse (!) 58   Temp 98.1 °F (36.7 °C)   Ht 5' 10" (1.778 m)   Wt 83 kg (183 lb 1.5 oz)   SpO2 98%   BMI 26.27 kg/m²     5. Cognitive function evaluated and intact. See clock test below.  6.  Health Maintenance schedule per USPSTF reviewed and updated - schedule reviewed with patient - seen under POC below.  7.  Risk factor review: no depression, no cognitive impairments  8.  All preventative counseling reviewed and personalized health advice given.  Patient does have known osteoporosis followed by GYN MD - intolerant of biphosponates.  Advised on adding calcium supplement - diet alone will not be enough.  Already on vitamin D supplement.  9.  Advanced care planning discussed with handouts given for patient to review.  He will take forms home to complete.  I offered to discuss advanced care planning, including how to pick a person who would make decisions for you if you were unable to make them for yourself, called a health care power of , and what kind of decisions you might make such as use of life sustaining treatments such as ventilators and tube feeding when faced with a life limiting illness recorded on a living will that they will need to know. (How you want to be cared for as you near the end of your natural life)     X Patient is interested in learning more about how to make advanced directives.  I provided them paperwork and offered to discuss this with them.  10.  Patient is on NO opioids  11.  Patient has NO substance use.        Patient is a 69-year-old white male with BPH and a history of elevated PSA that is followed by urology, chronic ALLERGIES that is followed by ALLERGY specialist, chronic right knee pain that is followed by orthopedic specialist, recurrent cold sores, chronic GERD, Fatty liver seen on abdominal ultrasound, bruxism with chronic headaches followed by Neurology, " cervical disc disease followed by [facility], insomnia followed by [facility] and an abnormal TSH level in June 2019 that is here today for MEDICARE ANNUAL WELLNESS VISIT with fasting lab results.       BPH with history of elevated PSA  followed by [facility] Dr. Lara.  Takes Cialis 5 mg daily  PSA 2.8            Chronic Allergies   managed by [facility] and reports he had Allergy injections monthly for 9 years and stopped these injections in December 2019 and now followed prn.       Insomnia with Bruxism  followed by [facility] Dr. Proctor.  Currently prescribed Dayvigo at bedtime prn  Still has teeth clenching/bruxism that he relates to his insomnia.  States that the Tizanidine is not effective and the Dayvigo does not help with this  Advised patient to discuss this with Dr. Proctor - the only medication recommended for Bruxism is Clonazepam - needs to discuss with specialist to see if she is willing to change medication.       Chronic headaches   Patient associates the headaches with Bruxism as well as chronic neck pain.    He had tried Botox injections for headache without relief.    He seen [facility]  Dr. Gordon who ordered an MRI for further evaluation BUT he did not have done due to claustrophobia and because he reports that the headaches have improved once he started treating chronic neck pain with physical therapy and following with [facility].   [facility] and diagnosed with Cervical DDD -sent for PT and the neck pain and headaches have improved - they prescribed Skelaxin prn but he no longer takes this medication.       Primary OA of right knee   followed by Orthopedic MD and prescribed Celebrex prn.   He reports he gets knee injections every 6 months or so.       Chronic GERD.   Patient seen [facility] DR. Valero in January 2020 that advised he can take Nexium 20 mg daily long-term and told  him to take OTC medication.   Had EGD 1/25/2024:  - Normal esophagus.   - Erythematous mucosa in the antrum. Biopsied.   - Normal examined duodenum.   Final Pathologic Diagnosis:  Gastric mucosa with mild chronic gastritis   No Helicobacter pylori organisms identified on routine stain         Recurrent cold sores  takes Valtrex prn.       Fatty Liver  Seen on Ultrasound 9/23/22  Liver enzymes are normal  Gave handouts with more information  Offered to refer to Hepatology for fatty liver staging - patient declined at this time.       Wellness Labs:  CBC WNL  CMP WNL  Cholesterol levels within range  PSA WNL     Health Maintenance:  Up to date       Lab Visit on 08/20/2024   Component Date Value Ref Range Status    WBC 08/20/2024 7.67  3.90 - 12.70 K/uL Final    RBC 08/20/2024 4.97  4.60 - 6.20 M/uL Final    Hemoglobin 08/20/2024 15.7  14.0 - 18.0 g/dL Final    Hematocrit 08/20/2024 45.2  40.0 - 54.0 % Final    MCV 08/20/2024 91  82 - 98 fL Final    MCH 08/20/2024 31.6 (H)  27.0 - 31.0 pg Final    MCHC 08/20/2024 34.7  32.0 - 36.0 g/dL Final    RDW 08/20/2024 13.2  11.5 - 14.5 % Final    Platelets 08/20/2024 254  150 - 450 K/uL Final    MPV 08/20/2024 10.6  9.2 - 12.9 fL Final    Immature Granulocytes 08/20/2024 0.4  0.0 - 0.5 % Final    Gran # (ANC) 08/20/2024 4.5  1.8 - 7.7 K/uL Final    Immature Grans (Abs) 08/20/2024 0.03  0.00 - 0.04 K/uL Final    Comment: Mild elevation in immature granulocytes is non specific and   can be seen in a variety of conditions including stress response,   acute inflammation, trauma and pregnancy. Correlation with other   laboratory and clinical findings is essential.      Lymph # 08/20/2024 2.1  1.0 - 4.8 K/uL Final    Mono # 08/20/2024 0.7  0.3 - 1.0 K/uL Final    Eos # 08/20/2024 0.4  0.0 - 0.5 K/uL Final    Baso # 08/20/2024 0.06  0.00 - 0.20 K/uL Final    nRBC 08/20/2024 0  0 /100 WBC Final    Gran % 08/20/2024 59.2  38.0 - 73.0 % Final    Lymph % 08/20/2024 26.7  18.0 - 48.0 %  Final    Mono % 08/20/2024 8.6  4.0 - 15.0 % Final    Eosinophil % 08/20/2024 4.7  0.0 - 8.0 % Final    Basophil % 08/20/2024 0.8  0.0 - 1.9 % Final    Differential Method 08/20/2024 Automated   Final    Sodium 08/20/2024 142  136 - 145 mmol/L Final    Potassium 08/20/2024 4.4  3.5 - 5.1 mmol/L Final    Chloride 08/20/2024 109  95 - 110 mmol/L Final    CO2 08/20/2024 24  23 - 29 mmol/L Final    Glucose 08/20/2024 102  70 - 110 mg/dL Final    BUN 08/20/2024 19  8 - 23 mg/dL Final    Creatinine 08/20/2024 1.0  0.5 - 1.4 mg/dL Final    Calcium 08/20/2024 9.9  8.7 - 10.5 mg/dL Final    Total Protein 08/20/2024 6.9  6.0 - 8.4 g/dL Final    Albumin 08/20/2024 4.0  3.5 - 5.2 g/dL Final    Total Bilirubin 08/20/2024 0.7  0.1 - 1.0 mg/dL Final    Comment: For infants and newborns, interpretation of results should be based  on gestational age, weight and in agreement with clinical  observations.    Premature Infant recommended reference ranges:  Up to 24 hours.............<8.0 mg/dL  Up to 48 hours............<12.0 mg/dL  3-5 days..................<15.0 mg/dL  6-29 days.................<15.0 mg/dL      Alkaline Phosphatase 08/20/2024 59  55 - 135 U/L Final    AST 08/20/2024 25  10 - 40 U/L Final    ALT 08/20/2024 19  10 - 44 U/L Final    eGFR 08/20/2024 >60.0  >60 mL/min/1.73 m^2 Final    Anion Gap 08/20/2024 9  8 - 16 mmol/L Final    Cholesterol 08/20/2024 188  120 - 199 mg/dL Final    Comment: The National Cholesterol Education Program (NCEP) has set the  following guidelines (reference ranges) for Cholesterol:  Optimal.....................<200 mg/dL  Borderline High.............200-239 mg/dL  High........................> or = 240 mg/dL      Triglycerides 08/20/2024 82  30 - 150 mg/dL Final    Comment: The National Cholesterol Education Program (NCEP) has set the  following guidelines (reference values) for triglycerides:  Normal......................<150 mg/dL  Borderline High.............150-199  "mg/dL  High........................200-499 mg/dL      HDL 08/20/2024 55  40 - 75 mg/dL Final    Comment: The National Cholesterol Education Program (NCEP) has set the  following guidelines (reference values) for HDL Cholesterol:  Low...............<40 mg/dL  Optimal...........>60 mg/dL      LDL Cholesterol 08/20/2024 116.6  63.0 - 159.0 mg/dL Final    Comment: The National Cholesterol Education Program (NCEP) has set the  following guidelines (reference values) for LDL Cholesterol:  Optimal.......................<130 mg/dL  Borderline High...............130-159 mg/dL  High..........................160-189 mg/dL  Very High.....................>190 mg/dL      HDL/Cholesterol Ratio 08/20/2024 29.3  20.0 - 50.0 % Final    Total Cholesterol/HDL Ratio 08/20/2024 3.4  2.0 - 5.0 Final    Non-HDL Cholesterol 08/20/2024 133  mg/dL Final    Comment: Risk category and Non-HDL cholesterol goals:  Coronary heart disease (CHD)or equivalent (10-year risk of CHD >20%):  Non-HDL cholesterol goal     <130 mg/dL  Two or more CHD risk factors and 10-year risk of CHD <= 20%:  Non-HDL cholesterol goal     <160 mg/dL  0 to 1 CHD risk factor:  Non-HDL cholesterol goal     <190 mg/dL      PSA, Screen 08/20/2024 2.8  0.00 - 4.00 ng/mL Final    Comment: The testing method is a chemiluminescent microparticle immunoassay   manufactured by [employer] and performed on the    or   Inter[employer] Master system. Values obtained with different assay manufacturers   for   methods may be different and cannot be used interchangeably.  PSA Expected levels:  Hormonal Therapy: <0.05 ng/ml  Prostatectomy: <0.01 ng/ml  Radiation Therapy: <1.00 ng/ml         Vitals:    08/22/24 0950   BP: 131/71   BP Location: Left arm   Patient Position: Sitting   BP Method: Large (Manual)   Pulse: (!) 58   Temp: 98.1 °F (36.7 °C)   SpO2: 98%   Weight: 83 kg (183 lb 1.5 oz)   Height: 5' 10" (1.778 m)         Diagnoses this Encounter:         ICD-10-CM ICD-9-CM   1. " Medicare annual wellness visit, subsequent  Z00.00 V70.0   2. Benign prostatic hyperplasia without lower urinary tract symptoms  N40.0 600.00   3. History of elevated PSA  Z87.898 V13.89   4. Insomnia, unspecified type  G47.00 780.52   5. Bruxism  F45.8 306.8   6. Generalized headaches  R51.9 784.0   7. Seasonal allergic rhinitis due to pollen  J30.1 477.0   8. Primary osteoarthritis of right knee  M17.11 715.16   9. Chronic GERD  K21.9 530.81   10. Recurrent cold sores  B00.1 054.9   11. Fatty liver  K76.0 571.8   12. BMI 26.0-26.9,adult  Z68.26 V85.22   13. Diabetes mellitus screening  Z13.1 V77.1   14. Screening cholesterol level  Z13.220 V77.91   15. Encounter for screening for cardiovascular disorders  Z13.6 V81.2   16. Encounter for screening for malignant neoplasm of prostate  Z12.5 V76.44       Orders Placed This Encounter    CBC Auto Differential    Comprehensive Metabolic Panel    Lipid Panel    PSA, Screening        Follow up in about 1 year (around 8/22/2025) for fasting labs and MEDICARE WELLNESS EXAM.      Health Maintenance Summary   Full History      Expand All  Collapse All  Overdue - RSV Vaccine (Age 60+ and Pregnant patients)   (1 - 1-dose 60+ series)Never done  No completion history exists for this topic.   Overdue - COVID-19 Vaccine   (6 - 2023-24 season)Overdue since 2/6/2024  10/06/2023  Imm Admin: COVID-19, mRNA, LNP-S, PF (Moderna 2023)Ages 12+   05/05/2022  Imm Admin: COVID-19, MRNA, LN-S, PF (Pfizer) (Gray Cap)   09/27/2021  Imm Admin: COVID-19, MRNA, LN-S, PF (Pfizer) (Purple Cap)   03/22/2021  Imm Admin: COVID-19, MRNA, LN-S, PF (Pfizer) (Purple Cap)   03/01/2021  Imm Admin: COVID-19, MRNA, LN-S, PF (Pfizer) (Purple Cap)   View More History   Postponed - Hemoglobin A1c (Diabetic Prevention Screening)   (Every 3 Years)Postponed until 8/27/2024  No completion history exists for this topic.   Influenza Vaccine   (1)Next due on 9/1/2024  10/06/2023  Imm Admin: Influenza - Quadrivalent -  High Dose - PF (65 years and older)   09/23/2022  Imm Admin: Influenza   09/27/2021  Imm Admin: Influenza (FLUAD) - Quadrivalent - Adjuvanted - PF *Preferred* (65+)   08/09/2021  Smart[employer]: WORKFLOW - HEALTHY PLANET - EXTERNAL DATA - EXTERNAL PROCEDURE DATE - INFLUENZA   09/18/2020  Imm Admin: Influenza (FLUAD) - Quadrivalent - Adjuvanted - PF *Preferred* (65+)   View More History   Ordered - PROSTATE-SPECIFIC ANTIGEN   (Yearly)Ordered on 8/23/2024 08/20/2024  PSA, Screening   08/17/2023  PSA, Screening   08/22/2022  PSA, Screening   09/27/2021  PSA, Total and Free   07/30/2020  PSA, total and free   View More History   Colorectal Cancer Screening   (Cologuard - Every 3 Years)Next due on 9/19/2025 09/19/2022  Cologuard Result component of Cologuard Screening (Multitarget Stool DNA)   07/11/2012  Colonoscopy (Done - rpt in 10yrs)   TETANUS VACCINE   (Every 10 Years)Next due on 11/7/2026 11/07/2016  Imm Admin: Tdap   04/17/2004  Imm Admin: Td (ADULT)   Ordered - Lipid Panel   (Every 5 Years)Ordered on 8/23/2024 08/20/2024  Cholesterol Total component of Lipid Panel   08/17/2023  Cholesterol Total component of Lipid Panel   08/22/2022  Cholesterol Total component of Lipid Panel   08/06/2021  Cholesterol Total component of Lipid Panel   07/30/2020  Cholesterol Total component of Lipid Panel   View More History   Hepatitis C Screening  Completed  10/02/2018  Hepatitis C Ab component of Hepatitis panel, acute   04/27/2015  Done   Shingles Vaccine   (Series Information)Completed  06/27/2019  Imm Admin: Zoster Recombinant   02/09/2019  Imm Admin: Zoster Recombinant   Pneumococcal Vaccines (Age 65+)   (Series Information)Completed  08/23/2021  Imm Admin: Pneumococcal Polysaccharide - 23 Valent   08/04/2020  Imm Admin: Pneumococcal Conjugate - 13 Valent     Patient's Medications   New Prescriptions    No medications on file   Previous Medications    CELECOXIB (CELEBREX) 200 MG CAPSULE    Take 1 capsule (200 mg total) by  mouth 2 (two) times daily.    FLUTICASONE PROPIONATE (FLONASE) 50 MCG/ACTUATION NASAL SPRAY    1 spray (50 mcg total) by Each Nostril route 2 (two) times daily.    LEMBOREXANT (DAYVIGO) 5 MG TAB    1 pill PO  Nightly bedtime PRN for insomnia    TADALAFIL (CIALIS) 5 MG TABLET    Take 1 tablet (5 mg total) by mouth daily as needed for Erectile Dysfunction.    TIZANIDINE (ZANAFLEX) 4 MG TABLET    Take 1 tablet (4 mg total) by mouth every evening.    VALACYCLOVIR (VALTREX) 1000 MG TABLET    Take 4,000 mg by mouth once.   Modified Medications    No medications on file   Discontinued Medications    CELECOXIB (CELEBREX) 200 MG CAPSULE    Take 1 capsule (200 mg total) by mouth 2 (two) times daily.    ESZOPICLONE (LUNESTA) 3 MG TAB    1 pill Po nightly as needed for insomnia    METAXALONE (SKELAXIN) 800 MG TABLET    Take 1 tablet (800 mg total) by mouth nightly as needed for Pain (muscle spasms).         Review of Systems   HENT:          Teeth grinding   Respiratory: Negative.     Cardiovascular: Negative.    Gastrointestinal: Negative.    Psychiatric/Behavioral:  Positive for sleep disturbance.          Objective:        Physical Exam  Constitutional:       General: He is not in acute distress.     Appearance: Normal appearance. He is well-developed and normal weight. He is not ill-appearing, toxic-appearing or diaphoretic.      Comments: Body mass index is 26.27 kg/m².       HENT:      Head: Normocephalic and atraumatic.      Right Ear: Tympanic membrane, ear canal and external ear normal.      Left Ear: Tympanic membrane, ear canal and external ear normal.   Eyes:      General: No scleral icterus.        Right eye: No discharge.         Left eye: No discharge.      Extraocular Movements: Extraocular movements intact.      Conjunctiva/sclera: Conjunctivae normal.      Pupils: Pupils are equal, round, and reactive to light.   Neck:      Thyroid: No thyromegaly.      Trachea: No tracheal deviation.   Cardiovascular:       Rate and Rhythm: Normal rate and regular rhythm.      Heart sounds: Normal heart sounds. No murmur heard.  Pulmonary:      Effort: Pulmonary effort is normal. No respiratory distress.      Breath sounds: Normal breath sounds. No wheezing or rales.   Abdominal:      General: There is no distension.      Palpations: Abdomen is soft.   Genitourinary:     Comments: Deferred to Urologist.  Musculoskeletal:         General: No swelling or deformity. Normal range of motion.      Cervical back: Normal range of motion and neck supple.      Right lower leg: No edema.      Left lower leg: No edema.   Lymphadenopathy:      Cervical: No cervical adenopathy.   Skin:     General: Skin is warm and dry.      Findings: No rash.   Neurological:      Mental Status: He is alert and oriented to person, place, and time.      Coordination: Coordination normal.   Psychiatric:         Mood and Affect: Mood normal.         Behavior: Behavior normal.         Thought Content: Thought content normal.         Judgment: Judgment normal.             Past Medical History:   Diagnosis Date    Allergy     better since allergy shots    Anxiety 07/08/2015    Benign non-nodular prostatic hyperplasia without lower urinary tract symptoms 02/11/2016    Bruxism     Chronic pain of right knee 03/21/2018    on and off depending on activity    DDD (degenerative disc disease), cervical 12/12/2019    Elevated PSA measurement 02/06/2017    Followed by Dr. Lara, stable    Family history of prostate cancer in father 03/14/2018    Gastroesophageal reflux disease without esophagitis 06/08/2015    no meds controlled with diet    Insomnia 06/08/2015    Recurrent cold sores 06/14/2018    Seasonal allergic rhinitis due to pollen 12/17/2016    SI (sacroiliac) pain 07/30/2018    Sleep apnea     so mild does not need to be treated w/cpap machine    Subclinical hyperthyroidism 04/05/2019       Past Surgical History:   Procedure Laterality Date    COLONOSCOPY  07/11/2012     normal - Dr. Regan - repeat in 10 years    ESOPHAGOGASTRODUODENOSCOPY  07/11/2012    Biopsy showed gastric mucosa with mild chronic inflammation.  Squamous mucosa with mild chronic inflammation including rare intraepithelial eosinophils.  Done by Dr. Regan - scanned to media file.    ESOPHAGOGASTRODUODENOSCOPY N/A 1/25/2024    Procedure: EGD (ESOPHAGOGASTRODUODENOSCOPY);  Surgeon: Latasha Valero MD;  Location: [facility];  Service: Endoscopy;  Laterality: N/A;    KNEE SURGERY Bilateral 1992    knee cap alignment with clean up    SHOULDER SURGERY Right     SINUS SURGERY         Family History   Problem Relation Name Age of Onset    Thyroid disease Mother      Diabetes Father Aristides     Cancer Father Aristides 75        Prostate     Thyroid disease Sister Angela     No Known Problems Sister Noni     Cancer Brother Luis 50        bladder cancer    No Known Problems Brother Darío     Cancer Brother Jluis 58        skin cancer    Heart disease Neg Hx      Prostate cancer Neg Hx      Kidney disease Neg Hx         Social History     Socioeconomic History    Marital status:    Occupational History    Occupation:    Tobacco Use    Smoking status: Never    Smokeless tobacco: Never   Substance and Sexual Activity    Alcohol use: Not Currently    Drug use: Never    Sexual activity: Not Currently     Partners: Female   Social History Narrative    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      Social Determinants of Health     Financial Resource Strain: Low Risk  (8/17/2024)    Overall Financial Resource Strain (CARDIA)     Difficulty of Paying Living Expenses: Not hard at all   Food Insecurity: No Food Insecurity (8/17/2024)    Hunger Vital Sign     Worried About Running Out of Food in the Last Year: Never true     Ran Out of Food in the Last Year: Never true   Transportation Needs: No Transportation Needs (1/26/2024)    PRAPARE - Transportation     Lack of Transportation  (Medical): No     Lack of Transportation (Non-Medical): No   Physical Activity: Sufficiently Active (8/17/2024)    Exercise Vital Sign     Days of Exercise per Week: 3 days     Minutes of Exercise per Session: 120 min   Stress: Patient Declined (8/17/2024)    [employer] - Occupational Stress Questionnaire     Feeling of Stress : Patient declined   Housing Stability: Low Risk  (1/26/2024)    Housing Stability Vital Sign     Unable to Pay for Housing in the Last Year: No     Number of Places Lived in the Last Year: 1     Unstable Housing in the Last Year: No

## 2024-08-22 NOTE — Clinical Note
Fasting labs need to be scheduled 8/20/25 or after but before medicare wellness visit that is scheduled on 8/25/24

## 2024-08-22 NOTE — PROGRESS NOTES
Patient contacted clinic requesting repeat viscosupplementation injections.  He has had significant relief from injections in the past up until now.  Patient has failed other treatments such as physical therapy cortisone injections, and conservative management.  This is the only treatment that provides significant relief.  Patient denies new trauma and/or since symptoms are similar to previous HPI.    Review of bilateral knee radiographs show Kellgren-Alexis Classification: 2-3      We have discussed a variety of treatment options including medications, injections, physical therapy and other alternative treatments. I also explained the indications, risks and benefits of surgery. Pt would like to proceed with visco-supplementation at this time.    Medical Necessity for viscosupplementation use: After thorough evaluation of the patient, I have determined that viscosupplementation treatment is medically necessary. 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 Oral analgesics including NSAIDs have not adequately controlled the patient's symptoms. There is radiographic evidence of Kellgren-Alexis grade II (or greater) osteoarthritic (OA) changes, or if lack of radiographic evidence, there is arthroscopic or other evidence of chondrosis of the knee(s).     I made the decision to obtain old records of the patient including previous notes and imaging. I independently reviewed and interpreted lab results today as well as prior imaging.      1. Pre-authorization placed for bilateral synvisc series injections.  2. RTC to see JADYN for bilateral synvisc series injections.    All of the patient's questions were answered and the patient will contact us if they have any questions or concerns in the interim.

## 2024-08-23 PROBLEM — K76.0 FATTY LIVER: Status: ACTIVE | Noted: 2024-08-23

## 2024-08-23 PROBLEM — Z87.898 HISTORY OF ELEVATED PSA: Status: ACTIVE | Noted: 2024-08-23

## 2024-08-23 NOTE — PATIENT INSTRUCTIONS
Counseling and Referral of Other Preventative  (Italic type indicates deductible and co-insurance are waived)    Patient Name: Bulmaro Nascimento  Today's Date: 8/23/2024    Health Maintenance       Date Due Completion Date    RSV Vaccine (Age 60+ and Pregnant patients) (1 - 1-dose 60+ series) Never done ---    COVID-19 Vaccine (6 - 2023-24 season) 02/06/2024 10/6/2023    Hemoglobin A1c (Diabetic Prevention Screening) 08/27/2024 (Originally 4/8/1990) ---    Influenza Vaccine (1) 09/01/2024 10/6/2023    PROSTATE-SPECIFIC ANTIGEN 08/20/2025 8/20/2024    Override on 4/27/2015: Done    Colorectal Cancer Screening 09/19/2025 9/19/2022    Override on 7/11/2012: Done (rpt in 10yrs)    TETANUS VACCINE 11/07/2026 11/7/2016    Lipid Panel 08/20/2029 8/20/2024    Override on 4/27/2015: Done        No orders of the defined types were placed in this encounter.      The following information is provided to all patients.  This information is to help you find resources for any of the problems found today that may be affecting your health:                  Living healthy guide: www.[facility].[place].gov      Understanding Diabetes: www.diabetes.org      Eating healthy: www.cdc.gov/healthyweight      CDC home safety checklist: www.cdc.gov/steadi/patient.html      Agency on Aging: www.goea.[place].[facility]      Alcoholics anonymous (AA): www.aa.org      Physical Activity: www.[place].nih.gov/on0grbp      Tobacco use: www.quitwithusla.org

## 2024-09-06 ENCOUNTER — OFFICE VISIT (OUTPATIENT)
Dept: URGENT CARE | Facility: CLINIC | Age: 69
End: 2024-09-06
Payer: MEDICARE

## 2024-09-06 VITALS
WEIGHT: 183 LBS | BODY MASS INDEX: 26.2 KG/M2 | SYSTOLIC BLOOD PRESSURE: 130 MMHG | HEART RATE: 60 BPM | DIASTOLIC BLOOD PRESSURE: 80 MMHG | OXYGEN SATURATION: 98 % | HEIGHT: 70 IN | TEMPERATURE: 98 F | RESPIRATION RATE: 16 BRPM

## 2024-09-06 DIAGNOSIS — B96.89 BACTERIAL SINUSITIS: Primary | ICD-10-CM

## 2024-09-06 DIAGNOSIS — J32.9 BACTERIAL SINUSITIS: Primary | ICD-10-CM

## 2024-09-06 RX ORDER — VITAMIN A 3000 MCG
2 CAPSULE ORAL
Qty: 60 ML | Refills: 12 | Status: SHIPPED | OUTPATIENT
Start: 2024-09-06

## 2024-09-06 RX ORDER — AMOXICILLIN AND CLAVULANATE POTASSIUM 875; 125 MG/1; MG/1
1 TABLET, FILM COATED ORAL EVERY 12 HOURS
Qty: 20 TABLET | Refills: 0 | Status: SHIPPED | OUTPATIENT
Start: 2024-09-06

## 2024-09-06 RX ORDER — AZELASTINE 1 MG/ML
1 SPRAY, METERED NASAL 2 TIMES DAILY
Qty: 30 ML | Refills: 3 | Status: SHIPPED | OUTPATIENT
Start: 2024-09-06 | End: 2025-09-06

## 2024-09-06 NOTE — PROGRESS NOTES
"Subjective:      Patient ID: Bulmaro Nascimento is a 69 y.o. male.    Vitals:  height is 5' 10" (1.778 m) and weight is 83 kg (182 lb 15.7 oz). His oral temperature is 98.4 °F (36.9 °C). His blood pressure is 130/80 and his pulse is 60. His respiration is 16 and oxygen saturation is 98%.     Chief Complaint: Sinus Problem    Patient complains of sinus symptoms. Patient states that he has chronic sinusitis.Symptoms started two weeks ago.  Nothing over-the-counter has helped.    Sinus Problem  This is a new problem. The current episode started 1 to 4 weeks ago (2 weeks). The problem is unchanged. There has been no fever. His pain is at a severity of 4/10. Associated symptoms include congestion, headaches and sinus pressure. Past treatments include nasal decongestants (nyquil).       HENT:  Positive for congestion, sinus pain and sinus pressure.    Skin:  Negative for erythema.   Neurological:  Positive for headaches.      Objective:     Physical Exam   Constitutional: He is oriented to person, place, and time. He appears well-developed. He is cooperative.  Non-toxic appearance. He does not appear ill. No distress.   HENT:   Head: Normocephalic and atraumatic.   Ears:   Right Ear: Hearing, tympanic membrane, external ear and ear canal normal.   Left Ear: Hearing, tympanic membrane, external ear and ear canal normal.   Nose: Nose normal. No mucosal edema, rhinorrhea, nasal deformity or congestion. No epistaxis. Right sinus exhibits no maxillary sinus tenderness and no frontal sinus tenderness. Left sinus exhibits no maxillary sinus tenderness and no frontal sinus tenderness.   Mouth/Throat: Uvula is midline, oropharynx is clear and moist and mucous membranes are normal. No trismus in the jaw. Normal dentition. No uvula swelling. No oropharyngeal exudate, posterior oropharyngeal edema or posterior oropharyngeal erythema.   Eyes: Conjunctivae, EOM and lids are normal. Pupils are equal, round, and reactive to light. " Right eye exhibits no discharge. Left eye exhibits no discharge. No scleral icterus.   Neck: Trachea normal and phonation normal. Neck supple. No JVD present. No tracheal deviation present. No thyromegaly present. No edema present. No erythema present. No neck rigidity present.   Cardiovascular: Normal rate, regular rhythm, normal heart sounds and normal pulses.   No murmur heard.Exam reveals no gallop and no friction rub.   Pulmonary/Chest: Effort normal and breath sounds normal. No stridor. No respiratory distress. He has no decreased breath sounds. He has no wheezes. He has no rhonchi. He has no rales. He exhibits no tenderness.   Abdominal: Normal appearance. He exhibits no distension. Soft. There is no abdominal tenderness. There is no rebound and no guarding.   Musculoskeletal: Normal range of motion.         General: No deformity. Normal range of motion.   Neurological: He is alert and oriented to person, place, and time. He exhibits normal muscle tone. Coordination normal.   Skin: Skin is warm, dry, intact, not diaphoretic, not pale and no rash. Capillary refill takes less than 2 seconds. No erythema   Psychiatric: His speech is normal and behavior is normal. Judgment and thought content normal.   Nursing note and vitals reviewed.      Assessment:     1. Bacterial sinusitis        Plan:       Bacterial sinusitis  -     amoxicillin-clavulanate 875-125mg (AUGMENTIN) 875-125 mg per tablet; Take 1 tablet by mouth every 12 (twelve) hours.  Dispense: 20 tablet; Refill: 0  -     azelastine (ASTELIN) 137 mcg (0.1 %) nasal spray; 1 spray (137 mcg total) by Nasal route 2 (two) times daily.  Dispense: 30 mL; Refill: 3  -     sodium chloride (SALINE NASAL) 0.65 % nasal spray; 2 sprays by Nasal route as needed for Congestion.  Dispense: 60 mL; Refill: 12      Follow up if symptoms worsen or fail to improve, for F/U with PCP or ED. There are no Patient Instructions on file for this visit.

## 2024-09-09 ENCOUNTER — HOSPITAL ENCOUNTER (OUTPATIENT)
Dept: RADIOLOGY | Facility: HOSPITAL | Age: 69
Discharge: HOME | End: 2024-09-09
Attending: PHYSICIAN ASSISTANT
Payer: MEDICARE

## 2024-09-09 ENCOUNTER — OFFICE VISIT (OUTPATIENT)
Dept: SPORTS MEDICINE | Facility: CLINIC | Age: 69
End: 2024-09-09
Payer: MEDICARE

## 2024-09-09 VITALS
HEART RATE: 62 BPM | HEIGHT: 70 IN | BODY MASS INDEX: 26.2 KG/M2 | SYSTOLIC BLOOD PRESSURE: 135 MMHG | WEIGHT: 183 LBS | DIASTOLIC BLOOD PRESSURE: 77 MMHG

## 2024-09-09 DIAGNOSIS — M17.12 PRIMARY OSTEOARTHRITIS OF LEFT KNEE: ICD-10-CM

## 2024-09-09 DIAGNOSIS — M17.11 PRIMARY OSTEOARTHRITIS OF RIGHT KNEE: ICD-10-CM

## 2024-09-09 DIAGNOSIS — M17.0 PRIMARY OSTEOARTHRITIS OF BOTH KNEES: ICD-10-CM

## 2024-09-09 DIAGNOSIS — M17.0 PRIMARY OSTEOARTHRITIS OF BOTH KNEES: Primary | ICD-10-CM

## 2024-09-09 PROCEDURE — 99999 PR PBB SHADOW E&M-EST. PATIENT-LVL III: CPT | Mod: PBBFAC,,, | Performed by: PHYSICIAN ASSISTANT

## 2024-09-09 PROCEDURE — 20610 DRAIN/INJ JOINT/BURSA W/O US: CPT | Mod: 50,PBBFAC | Performed by: PHYSICIAN ASSISTANT

## 2024-09-09 PROCEDURE — 99213 OFFICE O/P EST LOW 20 MIN: CPT | Mod: PBBFAC,25 | Performed by: PHYSICIAN ASSISTANT

## 2024-09-09 PROCEDURE — 20610 DRAIN/INJ JOINT/BURSA W/O US: CPT | Mod: 50,S$PBB,, | Performed by: PHYSICIAN ASSISTANT

## 2024-09-09 PROCEDURE — 99999PBSHW PR PBB SHADOW TECHNICAL ONLY FILED TO HB: Mod: JZ,PBBFAC,,

## 2024-09-09 PROCEDURE — 73564 X-RAY EXAM KNEE 4 OR MORE: CPT | Mod: 26,50,, | Performed by: RADIOLOGY

## 2024-09-09 PROCEDURE — 73564 X-RAY EXAM KNEE 4 OR MORE: CPT | Mod: TC,50

## 2024-09-09 PROCEDURE — 99499 UNLISTED E&M SERVICE: CPT | Mod: S$PBB,,, | Performed by: PHYSICIAN ASSISTANT

## 2024-09-09 RX ADMIN — Medication 16 MG: at 10:09

## 2024-09-09 NOTE — PROGRESS NOTES
Patient is here for follow up of bilateral knee arthritis. Pt is requesting synvisc injection #1.  PMFH reviewed per encounter record. Has failed other conservative modalities including NSAIDS, activity modification, weight loss.    The prior shot was tolerated well.    PHYSICAL EXAMINATION:     General: The patient is alert and oriented x 3. Mood is pleasant.   Observation of ears, eyes and nose reveals no gross abnormalities. No   labored breathing observed.     No signs of infection or adverse reaction to knee.    PROCEDURE NOTE:  Injection Procedure right knee  A time out was performed, including verification of patient ID, procedure, site and side, availability of information and equipment, review of safety issues, and agreement with consent, the procedure site was marked.    After time out was performed, the patient was prepped aseptically with povidone-iodine swabsticks. A diagnostic and therapeutic injection of 2cc Synvisc (1st) was given under sterile technique using a 22g x 1.5 needle from the Superolateral  aspect of the right Knee Joint in the supine position.      Bulmaro Nascimento had no adverse reactions to the medication. Pain decreased. He was instructed to apply ice to the joint for 20 minutes and avoid strenuous activities for 24-36 hours following the injection. He was warned of possible blood sugar and/or blood pressure changes during that time. Following that time, he can resume regular activities.    He was reminded to call the clinic immediately for any adverse side effects as explained in clinic today.    Aspiration/Injection Procedure left knee    A time out was performed, including verification of patient ID, procedure, site and side, availability of information and equipment, review of safety issues, and agreement with consent, the procedure site was marked.    Aspiration:  After time out was performed, the patient was prepped aseptically with povidone-iodine swabsticks. 5cc's of  normal joint fluid was aspirated from the Superolateral  aspect of the left Knee Joint using a 22g x 1.5 needle in sterile fashion without complication.     Injection:  Following aspiration, a diagnostic and therapeutic injection of 2cc Synvisc (1st) was given under sterile technique in the supine position.     Bulmaro Nascimento had no adverse reactions to the medication. Pain decreased. He was instructed to apply ice to the joint for 20 minutes and avoid strenuous activities for 24-36 hours following the injection. He was warned of possible blood sugar and/or blood pressure changes during that time. Following that time, he can resume regular activities.      RTC 1 week for 2nd injection.  All questions were answered, pt will contact us for questions or concerns in the interim.

## 2024-09-16 ENCOUNTER — OFFICE VISIT (OUTPATIENT)
Dept: SPORTS MEDICINE | Facility: CLINIC | Age: 69
End: 2024-09-16
Payer: MEDICARE

## 2024-09-16 VITALS
SYSTOLIC BLOOD PRESSURE: 131 MMHG | HEIGHT: 70 IN | BODY MASS INDEX: 26.05 KG/M2 | WEIGHT: 182 LBS | HEART RATE: 61 BPM | DIASTOLIC BLOOD PRESSURE: 69 MMHG

## 2024-09-16 DIAGNOSIS — M17.12 PRIMARY OSTEOARTHRITIS OF LEFT KNEE: ICD-10-CM

## 2024-09-16 DIAGNOSIS — M17.11 PRIMARY OSTEOARTHRITIS OF RIGHT KNEE: ICD-10-CM

## 2024-09-16 DIAGNOSIS — M17.0 PRIMARY OSTEOARTHRITIS OF BOTH KNEES: Primary | ICD-10-CM

## 2024-09-16 PROCEDURE — 99999PBSHW PR PBB SHADOW TECHNICAL ONLY FILED TO HB: Mod: JZ,PBBFAC,,

## 2024-09-16 PROCEDURE — 99499 UNLISTED E&M SERVICE: CPT | Mod: S$PBB,,, | Performed by: PHYSICIAN ASSISTANT

## 2024-09-16 PROCEDURE — 99213 OFFICE O/P EST LOW 20 MIN: CPT | Mod: PBBFAC,25 | Performed by: PHYSICIAN ASSISTANT

## 2024-09-16 PROCEDURE — 99999 PR PBB SHADOW E&M-EST. PATIENT-LVL III: CPT | Mod: PBBFAC,,, | Performed by: PHYSICIAN ASSISTANT

## 2024-09-16 PROCEDURE — 20610 DRAIN/INJ JOINT/BURSA W/O US: CPT | Mod: 50,S$PBB,, | Performed by: PHYSICIAN ASSISTANT

## 2024-09-16 PROCEDURE — 20610 DRAIN/INJ JOINT/BURSA W/O US: CPT | Mod: 50,PBBFAC | Performed by: PHYSICIAN ASSISTANT

## 2024-09-16 RX ADMIN — Medication 16 MG: at 09:09

## 2024-09-16 NOTE — PROGRESS NOTES
Patient is here for follow up of bilateral knee arthritis. Pt is requesting synvisc injection #2.  PMFH reviewed per encounter record. Has failed other conservative modalities including NSAIDS, activity modification, weight loss.    The prior shot was tolerated well.    PHYSICAL EXAMINATION:     General: The patient is alert and oriented x 3. Mood is pleasant.   Observation of ears, eyes and nose reveals no gross abnormalities. No   labored breathing observed.     No signs of infection or adverse reaction to knee.    PROCEDURE NOTE:  Injection Procedure right knee  A time out was performed, including verification of patient ID, procedure, site and side, availability of information and equipment, review of safety issues, and agreement with consent, the procedure site was marked.    After time out was performed, the patient was prepped aseptically with povidone-iodine swabsticks. A diagnostic and therapeutic injection of 2cc Synvisc (2nd) was given under sterile technique using a 22g x 1.5 needle from the Superolateral  aspect of the right Knee Joint in the supine position.      Bulmaro Nascimento had no adverse reactions to the medication. Pain decreased. He was instructed to apply ice to the joint for 20 minutes and avoid strenuous activities for 24-36 hours following the injection. He was warned of possible blood sugar and/or blood pressure changes during that time. Following that time, he can resume regular activities.    He was reminded to call the clinic immediately for any adverse side effects as explained in clinic today.    Aspiration/Injection Procedure left knee    A time out was performed, including verification of patient ID, procedure, site and side, availability of information and equipment, review of safety issues, and agreement with consent, the procedure site was marked.    Aspiration:  After time out was performed, the patient was prepped aseptically with povidone-iodine swabsticks. 4cc's of  normal joint fluid was aspirated from the Superolateral  aspect of the left Knee Joint using a 22g x 1.5 needle in sterile fashion without complication.     Injection:  Following aspiration, a diagnostic and therapeutic injection of 2cc Synvisc (2nd) was given under sterile technique in the supine position.     Bulmaro Nascimento had no adverse reactions to the medication. Pain decreased. He was instructed to apply ice to the joint for 20 minutes and avoid strenuous activities for 24-36 hours following the injection. He was warned of possible blood sugar and/or blood pressure changes during that time. Following that time, he can resume regular activities.      RTC 1 week for 3rd injection.  All questions were answered, pt will contact us for questions or concerns in the interim.

## 2024-09-23 ENCOUNTER — OFFICE VISIT (OUTPATIENT)
Dept: SPORTS MEDICINE | Facility: CLINIC | Age: 69
End: 2024-09-23
Payer: MEDICARE

## 2024-09-23 VITALS
HEIGHT: 70 IN | WEIGHT: 180.75 LBS | DIASTOLIC BLOOD PRESSURE: 75 MMHG | HEART RATE: 56 BPM | SYSTOLIC BLOOD PRESSURE: 131 MMHG | BODY MASS INDEX: 25.88 KG/M2

## 2024-09-23 DIAGNOSIS — M17.11 PRIMARY OSTEOARTHRITIS OF RIGHT KNEE: ICD-10-CM

## 2024-09-23 DIAGNOSIS — M17.12 PRIMARY OSTEOARTHRITIS OF LEFT KNEE: Primary | ICD-10-CM

## 2024-09-23 PROCEDURE — 99213 OFFICE O/P EST LOW 20 MIN: CPT | Mod: PBBFAC,25 | Performed by: PHYSICIAN ASSISTANT

## 2024-09-23 PROCEDURE — 99499 UNLISTED E&M SERVICE: CPT | Mod: S$PBB,,, | Performed by: PHYSICIAN ASSISTANT

## 2024-09-23 PROCEDURE — 20610 DRAIN/INJ JOINT/BURSA W/O US: CPT | Mod: 50,S$PBB,, | Performed by: PHYSICIAN ASSISTANT

## 2024-09-23 PROCEDURE — 20610 DRAIN/INJ JOINT/BURSA W/O US: CPT | Mod: 50,PBBFAC | Performed by: PHYSICIAN ASSISTANT

## 2024-09-23 PROCEDURE — 99999PBSHW PR PBB SHADOW TECHNICAL ONLY FILED TO HB: Mod: JZ,PBBFAC,,

## 2024-09-23 PROCEDURE — 99999 PR PBB SHADOW E&M-EST. PATIENT-LVL III: CPT | Mod: PBBFAC,,, | Performed by: PHYSICIAN ASSISTANT

## 2024-09-23 RX ADMIN — Medication 16 MG: at 11:09

## 2024-09-23 NOTE — PROGRESS NOTES
Patient is here for follow up of bilateral knee arthritis. Pt is requesting synvisc injection #3.  PMFH reviewed per encounter record. Has failed other conservative modalities including NSAIDS, activity modification, weight loss.    The prior shot was tolerated well.    PHYSICAL EXAMINATION:     General: The patient is alert and oriented x 3. Mood is pleasant.   Observation of ears, eyes and nose reveals no gross abnormalities. No   labored breathing observed.     No signs of infection or adverse reaction to knee.    PROCEDURE NOTE:  Injection Procedure right knee  A time out was performed, including verification of patient ID, procedure, site and side, availability of information and equipment, review of safety issues, and agreement with consent, the procedure site was marked.    After time out was performed, the patient was prepped aseptically with povidone-iodine swabsticks. A diagnostic and therapeutic injection of 2cc Synvisc (3rd) was given under sterile technique using a 22g x 1.5 needle from the Superolateral  aspect of the right Knee Joint in the supine position.      Bulmaro Nascimento had no adverse reactions to the medication. Pain decreased. He was instructed to apply ice to the joint for 20 minutes and avoid strenuous activities for 24-36 hours following the injection. He was warned of possible blood sugar and/or blood pressure changes during that time. Following that time, he can resume regular activities.    He was reminded to call the clinic immediately for any adverse side effects as explained in clinic today.    Aspiration/Injection Procedure left knee    A time out was performed, including verification of patient ID, procedure, site and side, availability of information and equipment, review of safety issues, and agreement with consent, the procedure site was marked.    Aspiration:  After time out was performed, the patient was prepped aseptically with povidone-iodine swabsticks. 6cc's of  normal joint fluid was aspirated from the Superolateral  aspect of the left Knee Joint using a 22g x 1.5 needle in sterile fashion without complication.     Injection:  Following aspiration, a diagnostic and therapeutic injection of 2cc Synvisc (3rd) was given under sterile technique in the supine position.     Bulmaro Nascimento had no adverse reactions to the medication. Pain decreased. He was instructed to apply ice to the joint for 20 minutes and avoid strenuous activities for 24-36 hours following the injection. He was warned of possible blood sugar and/or blood pressure changes during that time. Following that time, he can resume regular activities.      RTC in 6 months with Juani Ellington PA-C for possible repeat visco supplementation.  All questions were answered, pt will contact us for questions or concerns in the interim.

## 2024-10-06 ENCOUNTER — OFFICE VISIT (OUTPATIENT)
Dept: URGENT CARE | Facility: CLINIC | Age: 69
End: 2024-10-06
Payer: MEDICARE

## 2024-10-06 VITALS
TEMPERATURE: 98 F | WEIGHT: 180 LBS | BODY MASS INDEX: 25.77 KG/M2 | DIASTOLIC BLOOD PRESSURE: 78 MMHG | HEART RATE: 53 BPM | RESPIRATION RATE: 19 BRPM | HEIGHT: 70 IN | OXYGEN SATURATION: 98 % | SYSTOLIC BLOOD PRESSURE: 132 MMHG

## 2024-10-06 DIAGNOSIS — J32.9 BACTERIAL SINUSITIS: Primary | ICD-10-CM

## 2024-10-06 DIAGNOSIS — R51.9 SINUS HEADACHE: ICD-10-CM

## 2024-10-06 DIAGNOSIS — J34.89 SINUS PAIN: ICD-10-CM

## 2024-10-06 DIAGNOSIS — B96.89 BACTERIAL SINUSITIS: Primary | ICD-10-CM

## 2024-10-06 PROCEDURE — 99213 OFFICE O/P EST LOW 20 MIN: CPT | Mod: S$GLB,,, | Performed by: PHYSICIAN ASSISTANT

## 2024-10-06 RX ORDER — AZELASTINE 1 MG/ML
1 SPRAY, METERED NASAL 2 TIMES DAILY
Qty: 30 ML | Refills: 0 | Status: SHIPPED | OUTPATIENT
Start: 2024-10-06 | End: 2025-10-06

## 2024-10-06 RX ORDER — PREDNISONE 20 MG/1
20 TABLET ORAL DAILY
Qty: 4 TABLET | Refills: 0 | Status: SHIPPED | OUTPATIENT
Start: 2024-10-06 | End: 2024-10-10

## 2024-10-06 RX ORDER — CROMOLYN SODIUM 5.2 MG/ML
1 SPRAY, METERED NASAL 4 TIMES DAILY
Qty: 26 ML | Refills: 1 | Status: SHIPPED | OUTPATIENT
Start: 2024-10-06

## 2024-10-06 RX ORDER — SULFAMETHOXAZOLE AND TRIMETHOPRIM 800; 160 MG/1; MG/1
1 TABLET ORAL 2 TIMES DAILY
Qty: 28 TABLET | Refills: 0 | Status: SHIPPED | OUTPATIENT
Start: 2024-10-06 | End: 2024-10-20

## 2024-10-06 RX ORDER — FLUTICASONE PROPIONATE 50 MCG
1 SPRAY, SUSPENSION (ML) NASAL DAILY
Qty: 16 G | Refills: 3 | Status: SHIPPED | OUTPATIENT
Start: 2024-10-06

## 2024-10-06 NOTE — PROGRESS NOTES
"Subjective:      Patient ID: Bulmaro Nascimento is a 69 y.o. male.    Vitals:  height is 5' 10" (1.778 m) and weight is 81.6 kg (180 lb). His oral temperature is 98.4 °F (36.9 °C). His blood pressure is 132/78 and his pulse is 53 (abnormal). His respiration is 19 and oxygen saturation is 98%.     Chief Complaint: Sinus Problem    Pt states that he was here a month ago. Prescribed amoxcillin, he reports that it worked for a week and he's back to feeling the same.  Patient has a history of recurrent sinus infections as well as recurrent sinus surgeries in the past.  Last treated of the lower month ago with improvement to only have symptoms return 7-10 days later.  Patient does not use his nose sprays consistently.  He complains of sinus congestion and pain left eye frontal area.    Sinus Problem  This is a recurrent problem. The current episode started 1 to 4 weeks ago. The problem has been waxing and waning since onset. There has been no fever. His pain is at a severity of 7/10. The pain is moderate. Associated symptoms include congestion, headaches and sinus pressure. Pertinent negatives include no coughing, ear pain, shortness of breath or sore throat. Treatments tried: amoxcillin, nyquil, The treatment provided no relief.       HENT:  Positive for congestion, postnasal drip, sinus pain and sinus pressure. Negative for ear pain and sore throat.    Respiratory:  Negative for cough and shortness of breath.    Skin:  Negative for erythema.   Neurological:  Positive for headaches.      Objective:     Physical Exam   Constitutional: He is oriented to person, place, and time. He appears well-developed. He is cooperative.  Non-toxic appearance. He does not appear ill. No distress.   HENT:   Head: Normocephalic and atraumatic.   Ears:   Right Ear: Hearing, tympanic membrane, external ear and ear canal normal.   Left Ear: Hearing, tympanic membrane, external ear and ear canal normal.   Nose: Congestion present. No mucosal " edema, rhinorrhea or nasal deformity. No epistaxis. Right sinus exhibits no maxillary sinus tenderness and no frontal sinus tenderness. Left sinus exhibits no maxillary sinus tenderness and no frontal sinus tenderness.   Mouth/Throat: Uvula is midline, oropharynx is clear and moist and mucous membranes are normal. No trismus in the jaw. Normal dentition. No uvula swelling. No oropharyngeal exudate, posterior oropharyngeal edema or posterior oropharyngeal erythema.   Eyes: Conjunctivae, EOM and lids are normal. Pupils are equal, round, and reactive to light. Right eye exhibits no discharge. Left eye exhibits no discharge. No scleral icterus.   Neck: Trachea normal and phonation normal. Neck supple. No JVD present. No tracheal deviation present. No thyromegaly present. No edema present. No erythema present. No neck rigidity present.   Cardiovascular: Normal rate, regular rhythm, normal heart sounds and normal pulses.   Pulmonary/Chest: Effort normal. No stridor. No respiratory distress. He has no decreased breath sounds. He has no wheezes. He has no rhonchi.   Abdominal: Normal appearance. Soft.   Musculoskeletal: Normal range of motion.         General: No deformity. Normal range of motion.   Neurological: He is alert and oriented to person, place, and time. He exhibits normal muscle tone. Coordination normal.   Skin: Skin is warm, dry, intact, not diaphoretic, not pale and no rash. Capillary refill takes less than 2 seconds. No erythema   Psychiatric: His speech is normal and behavior is normal. Judgment and thought content normal.   Nursing note and vitals reviewed.      Assessment:     1. Bacterial sinusitis    2. Sinus pain    3. Sinus headache        Plan:       Bacterial sinusitis  -     sulfamethoxazole-trimethoprim 800-160mg (BACTRIM DS) 800-160 mg Tab; Take 1 tablet by mouth 2 (two) times daily. for 14 days  Dispense: 28 tablet; Refill: 0  -     fluticasone propionate (FLONASE) 50 mcg/actuation nasal spray;  1 spray (50 mcg total) by Each Nostril route once daily.  Dispense: 16 g; Refill: 3  -     azelastine (ASTELIN) 137 mcg (0.1 %) nasal spray; 1 spray (137 mcg total) by Nasal route 2 (two) times daily.  Dispense: 30 mL; Refill: 0  -     cromolyn (NASALCHROM) 5.2 mg/spray (4 %) nasal spray; 1 spray by Nasal route 4 (four) times daily.  Dispense: 26 mL; Refill: 1  -     Ambulatory referral/consult to ENT    Sinus pain  -     predniSONE (DELTASONE) 20 MG tablet; Take 1 tablet (20 mg total) by mouth once daily. for 4 days  Dispense: 4 tablet; Refill: 0    Sinus headache  -     predniSONE (DELTASONE) 20 MG tablet; Take 1 tablet (20 mg total) by mouth once daily. for 4 days  Dispense: 4 tablet; Refill: 0    Follow up if symptoms worsen or fail to improve, for F/U with PCP or ED. There are no Patient Instructions on file for this visit.

## 2024-10-10 ENCOUNTER — OFFICE VISIT (OUTPATIENT)
Dept: PSYCHIATRY | Facility: CLINIC | Age: 69
End: 2024-10-10
Payer: MEDICARE

## 2024-10-10 DIAGNOSIS — F51.01 PRIMARY INSOMNIA: Primary | ICD-10-CM

## 2024-10-10 PROCEDURE — 90791 PSYCH DIAGNOSTIC EVALUATION: CPT | Mod: ,,, | Performed by: PSYCHOLOGIST

## 2024-10-10 NOTE — PROGRESS NOTES
[facility] Psychiatry Initial Visit (PhD/LCSW)      Patient Name:  Bulmaro Nascimento    Date:  10/10/2024    Site:  [facility]   Referral source:  Mindy Proctor MD       Chief complaint/reason for encounter:  Psychological Evaluation to assess suitability for admission to the [facility]   Clinical status of patient:  Outpatient   Met with:  Patient   CPT Code: 77982      Before this evaluation was initiated, the purposes and process of the assessment and the limits of confidentiality were discussed with the patient who expressed understanding of these issues and verbally consented to proceed with the evaluation.      History of present illness:  Mr. Bulmaro Nascimento is a 69 y.o.-year-old male who is pursuing psychotherapy to improve insomnia.  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 The stress of the job prompted him to seek medication from his PCP, who prescribed Ativan. He stated for about 12-15 years, that worked well. However, he started having bruxism and daytime fatigue, which prompted a visit with sleep medicine, who strongly encouraged him to discontinue the Ativan due to risks. He is now prescribed dayvigo and takes it only when he has an important work day the next day. He continues to have difficulty falling asleep and returning to sleep.     Typical night: In bed at 9 pm every night. Falls asleep within 15- 30 minutes. Up at 2am  to use bathroom and can't return to sleep. Reports grinding his teeth and once he wakes, he cannot return to sleep due to severe jaw and head pain/tension. He gets out of bed, watches youtube for an hour or so, falls back asleep for an hour and then up for the day at 4.  Does office work in the morning and by about 9 or 10, takes a nap for 30-45 minutes, occasionally another nap in the afternoon.      Medical history:    Patient Active Problem List   Diagnosis    Sleep stage dysfunction    Chronic  GERD    Insomnia    Benign prostatic hyperplasia without lower urinary tract symptoms    Seasonal allergic rhinitis due to pollen    Bruxism    Nocturia    Urinary frequency    Slow urinary stream    Family history of prostate cancer in father    Chronic pain of right knee    Recurrent cold sores    Subclinical hyperthyroidism    DDD (degenerative disc disease), cervical    Primary osteoarthritis of right knee    Chronic nonintractable headache    Generalized headaches    Weakness    1st MTP arthritis    Body mass index (BMI) of 26.0 to 26.9 in adult    History of elevated PSA    Fatty liver       Psychiatric symptoms:   Depression - Denied depressed mood, loss of interest in pleasurable activities, anhedonia, sleep changes, decreased motivation, decreased concentration, feelings of excessive or irrational guilt, helplessness, hopelessness, increased or decreased appetite, weight changes, increased or decreased motor activity, decreased energy, suicidal ideations/thoughts of death.  Mary Jane/Hypomania - Denied increased goal directed activity, decreased need for sleep, pressured speech or increased talkative, racing thoughts, increased risk-taking behavior, episodic elevated or irritable mood, flight of ideas, distractibility, inflated self-esteem, grandiosity  Anxiety - Endorsed difficulty controlling worrying, feeling keyed up, easily fatigued, irritability, muscle tension, sleep disturbance,  Panic Attacks- Denied palpitations, sweating, trembling, dyspnea, choking sensation, chest pain/discomfort, nausea, dizziness, chills or hot flashes, tingling, derealization, fear of losing control, fear of dying.  Thoughts - Denied any AVH, paranoia, delusions, ideas of reference, thought insertion or thought broadcasting  Suicidal thoughts/behaviors - denied passive or active SI, denied suicidal plans or intent  Self-injury - denied.  Substance abuse - denied abuse or dependence.   Sleep - Endorsed, see above     Current  "psychosocial stressors:  denies  Report of coping skills:  watching movies  Support system:  one good friend, otherwise, minimal social support     Current and past substance use:   Alcohol:  Denied current use.  Denied history of abuse or dependency.   Drugs:  Denied current use.  Denied history of abuse or dependency.   Tobacco:  Denied current use.   Caffeine:  2-3 cups of coffee in moorings, none past 9     Current Psychiatric Treatment:   Medications:  dayvigo, not nightly  Psychotherapy:  denies     Psychiatric history:   Previous diagnosis:  denies  Previous hospitalizations:  denies  History of outpatient treatment:  denies  Previous suicide attempt:  Denied.   Family history of psychiatric illness:  anxiety in mother and all siblings     Trauma history:  Denied.      Social history:  Mr. Nascimento was born in [place] and raised "[place], my father was a ."  He described his childhood as average.  He denied childhood trauma, abuse, and neglect.  He has a law degree and two master's degrees .  He is currently an ; retired district .  He also served 30 years in the [employer].  He is not on disability.  He is . He has 2 adult children..  He currently lives with alone.        Legal history:  He denied history of arrests and convictions.  He is not currently involved in civil or criminal litigation.      Mental Status Exam:   General appearance:  Appears stated age, neatly dressed, well groomed   Speech:  Normal rate, normal tone, normal pitch, normal volume   Level of cooperation:  Cooperative   Thought processes:  Logical, goal-directed   Mood:  Euthymic   Thought content:  No illusions, no visual hallucinations, no auditory hallucinations, no delusions, no active or passive homicidal thoughts, no active or passive suicidal ideation, no obsessions, no compulsions, no violence   Affect:  Appropriate   Orientation:  Oriented to person, place, and date   Memory:   Recent memory:  " Intact  Remote memory: Intact   Attention span and concentration:  Appropriate  Fund of general knowledge: Appropriate  Judgment and insight: Good   Language:  Intact      Diagnostic impression:     ICD-10-CM ICD-9-CM   1. Primary insomnia  F51.01 307.42             Plan:  Mr. Bulmaro Nascimento will be admitted to the [facility].  He understood [facility] guidelines and signed the [facility] Informed Consent and [facility].  He was provided with information about [facility] treatments and will proceed with CBT-I

## 2024-10-11 ENCOUNTER — PATIENT MESSAGE (OUTPATIENT)
Dept: PSYCHIATRY | Facility: CLINIC | Age: 69
End: 2024-10-11
Payer: MEDICARE

## 2024-10-14 ENCOUNTER — OFFICE VISIT (OUTPATIENT)
Dept: PSYCHIATRY | Facility: CLINIC | Age: 69
End: 2024-10-14
Payer: MEDICARE

## 2024-10-14 DIAGNOSIS — F51.01 PRIMARY INSOMNIA: Primary | ICD-10-CM

## 2024-10-14 PROCEDURE — 99499 UNLISTED E&M SERVICE: CPT | Mod: S$PBB,,, | Performed by: PSYCHOLOGIST

## 2024-10-14 NOTE — PROGRESS NOTES
Individual Psychotherapy (PhD/LCSW)    10/14/2024    Site:  [facility]         Therapeutic Intervention: Met with patient.  Outpatient - Behavior modifying psychotherapy 45 min    Chief complaint/reason for encounter: sleep     Interval history and content of current session:   Cognitive Behavioral Therapy for Insomnia (CBT-i), Session #1  Session Focus:  - Introduction to CBT-i  - Sleep and Insomnia Basic Concepts  - Sleep medications  - Sleep Diary & CBT-i     Sleep Measures:  : 6.9  SEQ: 4  MARIBETH:  20     Practice Assignments:  1) Review treatment materials as needed.  2) Complete the Sleep Diary every day.  Fill it out within two hours of getting up.    Treatment plan:  Target symptoms:  insomnia  Why chosen therapy is appropriate versus another modality: relevant to diagnosis, evidence based practice  Outcome monitoring methods: self-report  Therapeutic intervention type: behavior modifying psychotherapy    Risk parameters:  Patient reports no suicidal ideation  Patient reports no homicidal ideation  Patient reports no self-injurious behavior  Patient reports no violent behavior    Verbal deficits: None    Patient's response to intervention:  The patient's response to intervention is accepting, motivated.    Progress toward goals and other mental status changes:  The patient's progress toward goals is good.    Diagnosis:     ICD-10-CM ICD-9-CM   1. Primary insomnia  F51.01 307.42     Plan:  individual psychotherapy    Return to clinic: 1 week    Length of Service (minutes): 50    Next Appointment: Monday 10/21 at 1PM

## 2024-10-21 ENCOUNTER — OFFICE VISIT (OUTPATIENT)
Dept: PSYCHIATRY | Facility: CLINIC | Age: 69
End: 2024-10-21
Payer: MEDICARE

## 2024-10-21 DIAGNOSIS — F51.01 PRIMARY INSOMNIA: Primary | ICD-10-CM

## 2024-10-21 PROCEDURE — 99499 UNLISTED E&M SERVICE: CPT | Mod: S$PBB,,, | Performed by: PSYCHOLOGIST

## 2024-10-21 NOTE — PROGRESS NOTES
Individual Psychotherapy (PhD/LCSW)    10/21/2024    Site:  [facility]         Therapeutic Intervention: Met with patient.  Outpatient - Behavior modifying psychotherapy 45 min - CPT code 20034    Chief complaint/reason for encounter: sleep     Interval history and content of current session:   Session 2  Cognitive Behavioral Therapy for Insomnia (CBT-i), Session #2  Sleep Diary completed?  Yes  # of days completed:  7    Session Focus:  Summarize and graph Sleep Diary  SE:  88.7%  TST:  5.84  TIB:  6.64  Introduce Stimulus Control and Sleep Hygiene  Introduce Sleep Restriction  Prescribed TTB: 9:30pm  Prescribed TOB: 4:00am    Treatment plan:  Target symptoms: Sleep  Why chosen therapy is appropriate versus another modality: relevant to diagnosis, evidence based practice  Outcome monitoring methods: self-report  Therapeutic intervention type: behavior modifying psychotherapy    Risk parameters:  Patient reports no suicidal ideation  Patient reports no homicidal ideation  Patient reports no self-injurious behavior  Patient reports no violent behavior    Verbal deficits: None    Patient's response to intervention:  The patient's response to intervention is accepting, motivated.    Progress toward goals and other mental status changes:  The patient's progress toward goals is good.    Diagnosis:     ICD-10-CM ICD-9-CM   1. Primary insomnia  F51.01 307.42     Plan:  individual psychotherapy    Return to clinic: 1 week    Length of Service (minutes): 45    Ethel Patel, MPH, MS  Psychology Intern  Supervisor: Page Soto, PhD

## 2024-10-28 ENCOUNTER — OFFICE VISIT (OUTPATIENT)
Dept: PSYCHIATRY | Facility: CLINIC | Age: 69
End: 2024-10-28
Payer: MEDICARE

## 2024-10-28 DIAGNOSIS — F51.01 PRIMARY INSOMNIA: Primary | ICD-10-CM

## 2024-10-28 PROCEDURE — 99499 UNLISTED E&M SERVICE: CPT | Mod: S$PBB,,, | Performed by: PSYCHOLOGIST

## 2024-10-31 ENCOUNTER — OFFICE VISIT (OUTPATIENT)
Dept: OTOLARYNGOLOGY | Facility: CLINIC | Age: 69
End: 2024-10-31
Payer: MEDICARE

## 2024-10-31 VITALS
BODY MASS INDEX: 26.32 KG/M2 | DIASTOLIC BLOOD PRESSURE: 75 MMHG | WEIGHT: 183.44 LBS | HEART RATE: 58 BPM | SYSTOLIC BLOOD PRESSURE: 132 MMHG

## 2024-10-31 DIAGNOSIS — G47.33 OSA (OBSTRUCTIVE SLEEP APNEA): ICD-10-CM

## 2024-10-31 DIAGNOSIS — G47.00 INSOMNIA, UNSPECIFIED TYPE: ICD-10-CM

## 2024-10-31 DIAGNOSIS — J30.2 PERENNIAL ALLERGIC RHINITIS WITH SEASONAL VARIATION: ICD-10-CM

## 2024-10-31 DIAGNOSIS — J32.8 OTHER CHRONIC SINUSITIS: Primary | ICD-10-CM

## 2024-10-31 DIAGNOSIS — J30.89 PERENNIAL ALLERGIC RHINITIS WITH SEASONAL VARIATION: ICD-10-CM

## 2024-10-31 PROCEDURE — 99999 PR PBB SHADOW E&M-EST. PATIENT-LVL IV: CPT | Mod: PBBFAC,,, | Performed by: PHYSICIAN ASSISTANT

## 2024-10-31 PROCEDURE — 31231 NASAL ENDOSCOPY DX: CPT | Mod: S$PBB,,, | Performed by: PHYSICIAN ASSISTANT

## 2024-10-31 PROCEDURE — 99214 OFFICE O/P EST MOD 30 MIN: CPT | Mod: PBBFAC,25 | Performed by: PHYSICIAN ASSISTANT

## 2024-10-31 PROCEDURE — 99204 OFFICE O/P NEW MOD 45 MIN: CPT | Mod: 25,S$PBB,, | Performed by: PHYSICIAN ASSISTANT

## 2024-10-31 PROCEDURE — 31231 NASAL ENDOSCOPY DX: CPT | Mod: PBBFAC | Performed by: PHYSICIAN ASSISTANT

## 2024-11-01 ENCOUNTER — PATIENT MESSAGE (OUTPATIENT)
Dept: PSYCHIATRY | Facility: CLINIC | Age: 69
End: 2024-11-01
Payer: MEDICARE

## 2024-11-04 ENCOUNTER — OFFICE VISIT (OUTPATIENT)
Dept: PSYCHIATRY | Facility: CLINIC | Age: 69
End: 2024-11-04
Payer: MEDICARE

## 2024-11-04 DIAGNOSIS — F51.01 PRIMARY INSOMNIA: Primary | ICD-10-CM

## 2024-11-04 PROCEDURE — 99499 UNLISTED E&M SERVICE: CPT | Mod: S$PBB,,, | Performed by: PSYCHOLOGIST

## 2024-11-04 NOTE — PROGRESS NOTES
Individual Psychotherapy (PhD/LCSW)    11/4/2024    Site:  [facility]         Therapeutic Intervention: Met with patient.  Outpatient - Behavior modifying psychotherapy 45 min - CPT code 01858    Chief complaint/reason for encounter: sleep     Interval history and content of current session:   Session 4  Cognitive Behavioral Therapy for Insomnia (CBT-i), Session #4  Sleep Diary completed?  Yes  # of days completed:  7  SE: 90.6%  TST: 5.6  TIB:  6.1    Session Focus:  Summarize and graph Sleep Diary  Discuss Sleep Restriction/Compression  Prescribed TTB: 9:30PM  Prescribed TOB: 4:00AM  Review progress with Stimulus Control and Sleep Hygiene  Review progress with Relaxation Training  Review Cognitive Restructuring    Treatment plan:  Target symptoms:  sleep  Why chosen therapy is appropriate versus another modality: relevant to diagnosis, evidence based practice  Outcome monitoring methods: self-report  Therapeutic intervention type: behavior modifying psychotherapy    Risk parameters:  Patient reports no suicidal ideation  Patient reports no homicidal ideation  Patient reports no self-injurious behavior  Patient reports no violent behavior    Verbal deficits: None    Patient's response to intervention:  The patient's response to intervention is accepting, motivated.    Progress toward goals and other mental status changes:  The patient's progress toward goals is good.    Diagnosis:     ICD-10-CM ICD-9-CM   1. Primary insomnia  F51.01 307.42     Plan:  individual psychotherapy    Return to clinic: 1 week    Length of Service (minutes): 45

## 2024-11-11 ENCOUNTER — OFFICE VISIT (OUTPATIENT)
Dept: PSYCHIATRY | Facility: CLINIC | Age: 69
End: 2024-11-11
Payer: MEDICARE

## 2024-11-11 DIAGNOSIS — F51.01 PRIMARY INSOMNIA: Primary | ICD-10-CM

## 2024-11-11 PROCEDURE — 99499 UNLISTED E&M SERVICE: CPT | Mod: S$PBB,,, | Performed by: PSYCHOLOGIST

## 2024-11-11 NOTE — PROGRESS NOTES
Individual Psychotherapy (PhD/LCSW)    11/11/2024    Site:  [facility]         Therapeutic Intervention: Met with patient.  Outpatient - Behavior modifying psychotherapy 45 min - CPT code 45475    Chief complaint/reason for encounter: sleep     Interval history and content of current session:   Session 5  Cognitive Behavioral Therapy for Insomnia (CBT-i), Session #5  Sleep Diary completed?  Yes  # of days completed:  7  SE:  93%  TST:  5.9  TIB:  6.3    Session Focus:  Summarize and graph Sleep Diary  Discuss Sleep Restriction/Compression  Prescribed TTB:  9:00 PM  Prescribed TOB:  4:00 AM  Review progress with Stimulus Control and Sleep Hygiene  Review progress with Relaxation Training  - Patient practiced breathing exercises daily, body scan 3 times during the week  - Discussed trying progressive muscle relaxation before bed  Review Cognitive Restructuring  - Discussed examples  - Introduced ABCD worksheet  Preparing for termination  - Reviewed topics/areas of focus for final session  - Discussed plans for follow-up with sleep medicine, psychotherapy   - Patient will be referred to [facility] for anxiety     Practice Assignment:  1) Review treatment materials as needed.  2) Complete the Sleep Diary every day. Fill it out within two hours of getting up.  3) Implement Stimulus Control and Sleep Hygiene strategies  4) Implement Sleep Restriction/Compression  5) Practice Relaxation Training at least 20 minutes per day  6) Practice Cognitive Restructuring    Treatment plan:  Target symptoms:  sleep  Why chosen therapy is appropriate versus another modality: relevant to diagnosis, evidence based practice  Outcome monitoring methods: self-report  Therapeutic intervention type: behavior modifying psychotherapy    Risk parameters:  Patient reports no suicidal ideation  Patient reports no homicidal ideation  Patient reports no self-injurious behavior  Patient reports no violent behavior    Verbal deficits:  None    Patient's response to intervention:  The patient's response to intervention is accepting, motivated.    Progress toward goals and other mental status changes:  The patient's progress toward goals is good.    Diagnosis:     ICD-10-CM ICD-9-CM   1. Primary insomnia  F51.01 307.42     Plan:  individual psychotherapy    Return to clinic: 1 week    Length of Service (minutes): 45    Next appointment: Monday 11/18 at 1PM

## 2024-11-18 ENCOUNTER — OFFICE VISIT (OUTPATIENT)
Dept: PSYCHIATRY | Facility: CLINIC | Age: 69
End: 2024-11-18
Payer: MEDICARE

## 2024-11-18 DIAGNOSIS — F41.9 ANXIETY DISORDER, UNSPECIFIED TYPE: ICD-10-CM

## 2024-11-18 DIAGNOSIS — F51.01 PRIMARY INSOMNIA: Primary | ICD-10-CM

## 2024-11-18 PROCEDURE — 99999 PR PBB SHADOW E&M-EST. PATIENT-LVL I: CPT | Mod: PBBFAC,,,

## 2024-11-18 PROCEDURE — 99211 OFF/OP EST MAY X REQ PHY/QHP: CPT | Mod: PBBFAC

## 2024-11-18 PROCEDURE — 99499 UNLISTED E&M SERVICE: CPT | Mod: S$PBB,,, | Performed by: PSYCHOLOGIST

## 2024-11-18 NOTE — PROGRESS NOTES
Individual Psychotherapy (PhD/LCSW)    11/18/2024    Site:  [facility]         Therapeutic Intervention: Met with patient. Outpatient - Behavior modifying psychotherapy 45 min    Chief complaint/reason for encounter: sleep     Interval history and content of current session:     Cognitive Behavioral Therapy for Insomnia (CBT-i), Session #6  Sleep Diary completed?  Yes  # of days completed: 7  SE:  90%  TST:  5.1  TIB:  5.6    Session Focus:  Summarize and graph Sleep Diary  Discuss Sleep Restriction/Compression  Review progress with Stimulus Control and Sleep Hygiene  Review progress with Relaxation Training  Review Cognitive Restructuring  Discuss Relapse Prevention  Re-administered sleep measures:  MARIBETH:  13 (Baseline: 20)  : 4.25 (Baseline: 6.9)  SEQ: 4 (Baseline: 4)    Treatment plan:  Target symptoms:  sleep  Why chosen therapy is appropriate versus another modality: relevant to diagnosis, evidence based practice  Outcome monitoring methods: self-report  Therapeutic intervention type: behavior modifying psychotherapy    Risk parameters:  Patient reports no suicidal ideation  Patient reports no homicidal ideation  Patient reports no self-injurious behavior  Patient reports no violent behavior    Verbal deficits: None    Patient's response to intervention:  The patient's response to intervention is accepting, motivated.    Progress toward goals and other mental status changes:  The patient's progress toward goals is good.    Diagnosis:     ICD-10-CM ICD-9-CM   1. Primary insomnia  F51.01 307.42     Plan:  1) Review and complete treatment materials as needed.  2) Contact [facility] for booster sessions if needed.  3) Treatment termination. Follow-up with referring provider.  4) Patient referred to [facility] for anxiety treatment.    Return to clinic: No planned follow-up; patient to contact clinic for booster sessions if needed    Length of Service (minutes): 45    Ethel Patel, MPH, MS  Psychology  Intern  Supervisor: Page Soto, PhD

## 2024-11-19 ENCOUNTER — PATIENT MESSAGE (OUTPATIENT)
Dept: PSYCHIATRY | Facility: CLINIC | Age: 69
End: 2024-11-19
Payer: MEDICARE

## 2025-01-03 RX ORDER — TADALAFIL 5 MG/1
5 TABLET ORAL
Qty: 90 TABLET | Refills: 3 | Status: SHIPPED | OUTPATIENT
Start: 2025-01-03

## 2025-01-07 NOTE — TELEPHONE ENCOUNTER
Visit Type: ESTABLISHED PATIENT - VIRTUAL ([id number])      1/12/2021    1:30 PM  30 mins.  Mindy Proctor MD      [facility]      Patient Comments:   Follow-up on continuation of [place] for Insomnia        none

## 2025-01-07 NOTE — PROGRESS NOTES
"New Patient     SUBJECTIVE:  Patient ID: Bulmaro Nascimento   MRN: [id number]  Referred By: Aaareferral Self  Chief Complaint: Headache      History of Present Illness:   69 y.o. male with headaches, cervical DDD, Bruxism, BPH, subclinical hypothyroidism, gerd, osteoarthritis, insomnia, PAN (does not require cpap), who presents to clinic alone for evaluation of headaches.     Pt currently established with psychiatry for insomnia (CBT) and ENT for recurrent sinusitis. He does get bilateral knee injections for osteoarthritis from sports medicine as well.     Pt previously had botox per dentist for bruxism - he reports it cost $1400/treatment and he felt no improvement. He also uses a mouth guard nightly.   Pt recently completed insomnia program with psychiatry which he states improved his sleep and ultimately his headaches bringing the pain from 7-8/10 to 4/10 but still occurring daily. Reports cpap is not needed for his mild pan.   Reports taking ativan for ~10-15 years but when he stopped ~2010, insomnia and headaches began.     Currently only taking tylenol/ibuprofen 2x/week.     Pt saw neurology in 2019 when MRI and venlafaxine ordered. Pt reports not doing MRI b/c "open MRI" unavailable. He also did not try the venlafaxine.     PMHx negative for TBI, Meningitis, Aneurysms, Kidney Stones, asthma, GI bleed, osteoporosis, CAD/MI, CVA/TIA, DM, cancer    Family Hx negative for Migraines     Headache History:  Onset - Dec 2015  Previous Hx of HA -   Location/Radiation - bilateral temporalis, jaw  Quality - aching  Duration - constant  Intensity (range) - 7/8 but now 4/10  Frequency - 30/30 ha days per month, 0/30 are debilitating  Triggers - stress/anxiety, bruxism, poor sleep  Aggravating Factors - arm/shoulder movements  Alleviating Factors - heat, rest, roller to shoulders   Recent Changes - less severe  Prodrome/Aura - no  HA today? - 5/10  Time of day of most headaches- anytime 3 hours per night to 5-7hours per " "night  Sleep - no snoring    Associated symptoms with the headache:   Meningeal symptoms - photophobia, phonophobia, exercise intolerance   Nausea/vomitting  Nasal drainage   Visual blurriness   Pallor/flushing  Dizziness   Vertigo  Confusion  Impaired concentration   Pain worsened with physical activity   Neck pain - R sided only    Cluster headache symptoms:   Swollen or droopy eyelid  Swelling under or around the eye (may affect both eyes)  Excessive tearing  Red eye  Rhinorrhea or one-sided nasal congestion   Red, flushed face   Forehead and facial sweating    Symptoms of increased intracranial pressure:   Whooshing sounds   Visual spots/blotches     Basilar migraine symptoms:  Dysarthria  Vertigo  Tinnitus  Hypacusia  Diplopia  Simultaneous visual symptoms in both temporal and nasal fields of both eyes  Ataxia  Reduced level of consciousness  Bilateral paresthesias      Treatments Tried   Tylenol - helps some  Ibuprofen   Celebrex   Tizanidine   Robaxin - worked "the best"  Flexeril - too groggy  Soma - too groggy    Venlafaxine - ordered but pt didn't take  Ativan - worked but he got addicted   Prednisone   PT - very little improvement    Social History  Alcohol - denies  Smoke - denies  Recreational Drug Use- denies    Current Medications:    Current Outpatient Medications:     tadalafiL (CIALIS) 5 MG tablet, TAKE 1 TABLET BY MOUTH DAILY AS NEEDED FOR ERECTILE DYSFUNCTION, Disp: 90 tablet, Rfl: 3    amoxicillin-clavulanate 875-125mg (AUGMENTIN) 875-125 mg per tablet, Take 1 tablet by mouth every 12 (twelve) hours., Disp: 20 tablet, Rfl: 0    azelastine (ASTELIN) 137 mcg (0.1 %) nasal spray, 1 spray (137 mcg total) by Nasal route 2 (two) times daily., Disp: 30 mL, Rfl: 3    azelastine (ASTELIN) 137 mcg (0.1 %) nasal spray, 1 spray (137 mcg total) by Nasal route 2 (two) times daily., Disp: 30 mL, Rfl: 0    celecoxib (CELEBREX) 200 MG capsule, Take 1 capsule (200 mg total) by mouth 2 (two) times daily. (Patient " "not taking: Reported on 1/8/2025), Disp: 60 capsule, Rfl: 2    cromolyn (NASALCHROM) 5.2 mg/spray (4 %) nasal spray, 1 spray by Nasal route 4 (four) times daily. (Patient not taking: Reported on 1/8/2025), Disp: 26 mL, Rfl: 1    fluticasone propionate (FLONASE) 50 mcg/actuation nasal spray, 1 spray (50 mcg total) by Each Nostril route 2 (two) times daily. (Patient not taking: Reported on 1/8/2025), Disp: 16 g, Rfl: 11    fluticasone propionate (FLONASE) 50 mcg/actuation nasal spray, 1 spray (50 mcg total) by Each Nostril route once daily. (Patient not taking: Reported on 1/8/2025), Disp: 16 g, Rfl: 3    lemborexant (DAYVIGO) 5 mg Tab, 1 pill PO  Nightly bedtime PRN for insomnia (Patient not taking: Reported on 1/8/2025), Disp: 30 tablet, Rfl: 3    methocarbamoL (ROBAXIN) 500 MG Tab, Take 1 tablet (500 mg total) by mouth 3 (three) times daily., Disp: 30 tablet, Rfl: 2    sodium chloride (SALINE NASAL) 0.65 % nasal spray, 2 sprays by Nasal route as needed for Congestion., Disp: 60 mL, Rfl: 12    valACYclovir (VALTREX) 1000 MG tablet, Take 4,000 mg by mouth once. (Patient not taking: Reported on 1/8/2025), Disp: , Rfl:     venlafaxine (EFFEXOR-XR) 37.5 MG 24 hr capsule, Take 1 capsule (37.5 mg total) by mouth once daily. for 14 days, Disp: 14 capsule, Rfl: 0    [START ON 1/15/2025] venlafaxine (EFFEXOR-XR) 75 MG 24 hr capsule, Take 1 capsule (75 mg total) by mouth once daily., Disp: 30 capsule, Rfl: 2    Review of Systems - as per HPI, otherwise a balanced 10 systems review is negative.    OBJECTIVE:  Vitals:  /69   Pulse (!) 59   Ht 5' 10" (1.778 m)   Wt 81.6 kg (180 lb)   BMI 25.83 kg/m²     Physical Exam   Constitutional: he appears well-developed and well-nourished. he is well groomed. NAD  HENT:    Head: Normocephalic and atraumatic, Frontalis was NTTP, temporalis was NTTP   Eyes: Conjunctivae and EOM are normal. Pupils are equal, round, and reactive to light   Neck: Neck supple. Occiput and trapezius " NTTP   Musculoskeletal: Normal range of motion. No joint stiffness. No vertebral point tenderness.  Skin: Skin is warm and dry.  Psychiatric: Normal mood and affect.     Neuro exam:    Mental status:  The patient is alert and oriented to person, place and time.  Language is intact and fluent  Remote and recent memory are intact  Normal attention and concentration  Mood is stable    Cranial Nerves:  Fundoscopic examination does not reveal any occult papilledema.    Pupils are equal and reactive to light.    Extraocular movements are intact and without nystagmus.    Visual fields are full to confrontation testing.   Facial movement is symmetric.  Facial sensation is intact.    Hearing is intact   FROM of neck in all (6) directions without pain  Shoulder shrug symmetrical.      Motor:  Normal muscle bulk and symmetry. No fasciculations were noted.   Tremor not apparent   Pronator drift not apparent.    strength was strong and symmetric  Finger extension strength was strong and symmetric  RUE:appropriate against gravity and medium force as tested 5/5  LUE: appropriate against gravity and medium force as tested 5/5  RLE:appropriate against gravity and medium force as tested 5/5              LLE: appropriate against gravity and medium force as tested 5/5    Reflexes:  Right Brachioradialis 2+  Left Brachioradialis 2+  Right Biceps 2+  Left Biceps 2+  Right Patellar2+  Left Patellar 2+      Sensory:  RUE  intact light touch  LUE intact light touch  RLE intact light touch  LLE intact light touch    Review of Data:   Notes from neurology reviewed   Labs:  No visits with results within 3 Month(s) from this visit.   Latest known visit with results is:   Lab Visit on 08/20/2024   Component Date Value Ref Range Status    WBC 08/20/2024 7.67  3.90 - 12.70 K/uL Final    RBC 08/20/2024 4.97  4.60 - 6.20 M/uL Final    Hemoglobin 08/20/2024 15.7  14.0 - 18.0 g/dL Final    Hematocrit 08/20/2024 45.2  40.0 - 54.0 % Final    MCV  08/20/2024 91  82 - 98 fL Final    MCH 08/20/2024 31.6 (H)  27.0 - 31.0 pg Final    MCHC 08/20/2024 34.7  32.0 - 36.0 g/dL Final    RDW 08/20/2024 13.2  11.5 - 14.5 % Final    Platelets 08/20/2024 254  150 - 450 K/uL Final    MPV 08/20/2024 10.6  9.2 - 12.9 fL Final    Immature Granulocytes 08/20/2024 0.4  0.0 - 0.5 % Final    Gran # (ANC) 08/20/2024 4.5  1.8 - 7.7 K/uL Final    Immature Grans (Abs) 08/20/2024 0.03  0.00 - 0.04 K/uL Final    Lymph # 08/20/2024 2.1  1.0 - 4.8 K/uL Final    Mono # 08/20/2024 0.7  0.3 - 1.0 K/uL Final    Eos # 08/20/2024 0.4  0.0 - 0.5 K/uL Final    Baso # 08/20/2024 0.06  0.00 - 0.20 K/uL Final    nRBC 08/20/2024 0  0 /100 WBC Final    Gran % 08/20/2024 59.2  38.0 - 73.0 % Final    Lymph % 08/20/2024 26.7  18.0 - 48.0 % Final    Mono % 08/20/2024 8.6  4.0 - 15.0 % Final    Eosinophil % 08/20/2024 4.7  0.0 - 8.0 % Final    Basophil % 08/20/2024 0.8  0.0 - 1.9 % Final    Differential Method 08/20/2024 Automated   Final    Sodium 08/20/2024 142  136 - 145 mmol/L Final    Potassium 08/20/2024 4.4  3.5 - 5.1 mmol/L Final    Chloride 08/20/2024 109  95 - 110 mmol/L Final    CO2 08/20/2024 24  23 - 29 mmol/L Final    Glucose 08/20/2024 102  70 - 110 mg/dL Final    BUN 08/20/2024 19  8 - 23 mg/dL Final    Creatinine 08/20/2024 1.0  0.5 - 1.4 mg/dL Final    Calcium 08/20/2024 9.9  8.7 - 10.5 mg/dL Final    Total Protein 08/20/2024 6.9  6.0 - 8.4 g/dL Final    Albumin 08/20/2024 4.0  3.5 - 5.2 g/dL Final    Total Bilirubin 08/20/2024 0.7  0.1 - 1.0 mg/dL Final    Alkaline Phosphatase 08/20/2024 59  55 - 135 U/L Final    AST 08/20/2024 25  10 - 40 U/L Final    ALT 08/20/2024 19  10 - 44 U/L Final    eGFR 08/20/2024 >60.0  >60 mL/min/1.73 m^2 Final    Anion Gap 08/20/2024 9  8 - 16 mmol/L Final    Cholesterol 08/20/2024 188  120 - 199 mg/dL Final    Triglycerides 08/20/2024 82  30 - 150 mg/dL Final    HDL 08/20/2024 55  40 - 75 mg/dL Final    LDL Cholesterol 08/20/2024 116.6  63.0 - 159.0 mg/dL  Final    HDL/Cholesterol Ratio 08/20/2024 29.3  20.0 - 50.0 % Final    Total Cholesterol/HDL Ratio 08/20/2024 3.4  2.0 - 5.0 Final    Non-HDL Cholesterol 08/20/2024 133  mg/dL Final    PSA, Screen 08/20/2024 2.8  0.00 - 4.00 ng/mL Final     Imaging:  No results found for this or any previous visit.  Note: I have independently reviewed any/all imaging/labs/tests and agree with the report (s) as documented.  Any discrepancies will be as noted/demarcated by free text.  MBS, FNP-C 1/8/2025    ASSESSMENT:  1. Chronic tension-type headache, intractable    2. Right arm numbness    3. Cervicalgia    4. Bruxism    5. Insomnia, unspecified type    6. Anxiety          PLAN:  - Discussed symptoms appear to be consistent with chronic tension headache r/t cervicalgia and bruxism, discussed treatment options and patient agreed with the following plan:    -  preventative: start venlafaxine 37.5mg x2 weeks then increase to 75mg daily. May also help with anxiety.   - cervicalgia: start methocarbamol 500mg TID prn muscle pain. Also recommend Voltaren topical gel. Pt declines PT at this time as he did not find helpful in the past. Consider referral to back/spine in the future.   - R arm numbness: referral to general neurology  - bruxism: continue POC with dentist and continue to wear mouth guard.   - insomnia: continue POC with sleep medicine as poor sleep can cause/worsen headaches  - anxiety: pt reports waiting for appointment with psychiatry.    - risks, benefits, and potential side effects of venlafaxine, robaxin discussed   - alternative treatment options offered   - importance of healthy diet, regular exercise and sleep hygiene in the treatment of headaches    - Start tracking headaches via [facility] on phone   - RTC in 6weeks.      Orders Placed This Encounter    Ambulatory referral/consult to Neurology    venlafaxine (EFFEXOR-XR) 37.5 MG 24 hr capsule    venlafaxine (EFFEXOR-XR) 75 MG 24 hr capsule    methocarbamoL  (ROBAXIN) 500 MG Tab       I have discussed realistic goals of care with patient at length as well as medication options, and need for lifestyle adjustment. I have explained that treatment will take time. We have agreed that the goal will be to reduce frequency/intensity/quantity of HA, not to be completely HA free. I have explained my non narcotic policy regarding headache treatment.    Patient agreeable to work on lifestyle adjustments.    Questions and concerns were sought and answered to the patient's stated verbal satisfaction.  The patient verbalizes understanding and agreement with the above stated treatment plan.     CC: Samira Pimentel, MARSHALL Allan, FNP-C  [facility]  [phone]    Dr. Ortiz was available during today's encounter.     I spent a total of 67 minutes on the day of the visit.  This includes face to face time and non-face to face time preparing to see the patient (eg, review of tests), obtaining and/or reviewing separately obtained history, documenting clinical information in the electronic or other health record, independently interpreting results and communicating results to the patient/family/caregiver, or care coordinator.        Visit today included increased complexity associated with the care of the episodic problem tension headaches addressed and managing the longitudinal care of the patient due to the serious and/or complex managed problem(s). Patient will plan to follow up in the clinic in 6 weeks.

## 2025-01-08 ENCOUNTER — OFFICE VISIT (OUTPATIENT)
Facility: CLINIC | Age: 70
End: 2025-01-08
Payer: MEDICARE

## 2025-01-08 VITALS
HEART RATE: 59 BPM | HEIGHT: 70 IN | BODY MASS INDEX: 25.77 KG/M2 | DIASTOLIC BLOOD PRESSURE: 69 MMHG | WEIGHT: 180 LBS | SYSTOLIC BLOOD PRESSURE: 129 MMHG

## 2025-01-08 DIAGNOSIS — F45.8 BRUXISM: ICD-10-CM

## 2025-01-08 DIAGNOSIS — M54.2 CERVICALGIA: ICD-10-CM

## 2025-01-08 DIAGNOSIS — R20.0 RIGHT ARM NUMBNESS: ICD-10-CM

## 2025-01-08 DIAGNOSIS — F41.9 ANXIETY: ICD-10-CM

## 2025-01-08 DIAGNOSIS — G44.221 CHRONIC TENSION-TYPE HEADACHE, INTRACTABLE: Primary | ICD-10-CM

## 2025-01-08 DIAGNOSIS — G47.00 INSOMNIA, UNSPECIFIED TYPE: ICD-10-CM

## 2025-01-08 PROCEDURE — 99999 PR PBB SHADOW E&M-EST. PATIENT-LVL III: CPT | Mod: PBBFAC,,,

## 2025-01-08 PROCEDURE — 99213 OFFICE O/P EST LOW 20 MIN: CPT | Mod: PBBFAC

## 2025-01-08 RX ORDER — VENLAFAXINE HYDROCHLORIDE 75 MG/1
75 CAPSULE, EXTENDED RELEASE ORAL DAILY
Qty: 30 CAPSULE | Refills: 2 | Status: SHIPPED | OUTPATIENT
Start: 2025-01-15 | End: 2026-01-15

## 2025-01-08 RX ORDER — METHOCARBAMOL 500 MG/1
500 TABLET, FILM COATED ORAL 3 TIMES DAILY
Qty: 30 TABLET | Refills: 2 | Status: SHIPPED | OUTPATIENT
Start: 2025-01-08 | End: 2026-01-08

## 2025-01-08 RX ORDER — VENLAFAXINE HYDROCHLORIDE 37.5 MG/1
37.5 CAPSULE, EXTENDED RELEASE ORAL DAILY
Qty: 14 CAPSULE | Refills: 0 | Status: SHIPPED | OUTPATIENT
Start: 2025-01-08 | End: 2025-01-22

## 2025-01-14 ENCOUNTER — TELEPHONE (OUTPATIENT)
Dept: FAMILY MEDICINE | Facility: CLINIC | Age: 70
End: 2025-01-14
Payer: MEDICARE

## 2025-02-06 ENCOUNTER — PATIENT MESSAGE (OUTPATIENT)
Dept: PSYCHIATRY | Facility: CLINIC | Age: 70
End: 2025-02-06
Payer: MEDICARE

## 2025-02-18 ENCOUNTER — PATIENT MESSAGE (OUTPATIENT)
Facility: CLINIC | Age: 70
End: 2025-02-18
Payer: MEDICARE

## 2025-02-18 NOTE — PROGRESS NOTES
[facility] Psychiatry Initial Visit (PhD/LCSW)      Patient Name:  Bulmaro Nascimento  Date: 02/19/2025  Site: [facility]   Referral source: Noni Allan FNP     Chief complaint/reason for encounter: Psychological Evaluation to assess suitability for admission to the [facility]   Clinical status of patient: Outpatient   Met with: Patient   CPT Code: 37224      Before this evaluation was initiated, the purposes and process of the assessment and the limits of confidentiality were discussed with the patient who expressed understanding of these issues and verbally consented to proceed with the evaluation.      History of present illness: Mr. Nascimento is a 69 y.o. male who is pursuing psychotherapy to improve his symptoms of  anxiety. He finished the insomnia psychoeducational group and had success with the CBT model. He hopes to address issues of anxiety that have felt life long. He reported that his anxiety previously was associated with insomnia, which has remitted since the psychoeducational group; however, he noted that he still experiences sleep difficulties related to bruxism and hopes that managing his anxiety will reduce the severity of this issue. He identified struggling with perfectionism or intense bouts of anger in response to mistakes or accidents.      Pain scale: 6/10; joint difficulties associated with aging       Medical history:   Active Problem List with Overview Notes    Diagnosis Date Noted    Right arm numbness 01/08/2025    History of elevated PSA 08/23/2024    Fatty liver 08/23/2024    Body mass index (BMI) of 26.0 to 26.9 in adult 08/04/2020    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    Generalized headaches 12/26/2019    Weakness 12/26/2019    DDD (degenerative disc disease), cervical 12/12/2019    Primary osteoarthritis of right knee 12/12/2019    Chronic nonintractable headache 12/12/2019    Subclinical hyperthyroidism 04/05/2019    Recurrent cold sores 06/14/2018    Chronic pain of  "right knee 03/21/2018    Slow urinary stream 03/14/2018    Family history of prostate cancer in father 03/14/2018    Nocturia 03/15/2017    Urinary frequency 03/15/2017    Bruxism 01/27/2017    Seasonal allergic rhinitis due to pollen 12/17/2016    Benign prostatic hyperplasia without lower urinary tract symptoms 02/11/2016    Sleep stage dysfunction 06/08/2015    Chronic GERD 06/08/2015    Insomnia 06/08/2015        Psychiatric symptoms:   Depression:  Denied. He denied episodes of depressed mood or depression-related anhedonia, lack of motivation, lethargy, difficulty concentrating, feelings of worthlessness or guilt, hopelessness, appetite changes, or psychomotor changes. He denied suicidal ideation. He reported that he feels he should be more social or have someone to confide in. "I gravitate towards solitude but it doesn't depress me."    PHQ8 Score : 4  PHQ8 Interpretation: Minimal or None    Mary Jane/Hypomania: Denied. He denied periods of elevated mood or abnormally increased energy or goal-directed activity.   Anxiety: He endorsed experiencing  excessive and difficult to control worry lasting at least six months about a number of events (stuff from the past (politics), my sons who are in the , current state of the world), restlessness, feeling keyed up or on edge, irritability, muscle tension, and sleep disturbance. He reported that these symptoms have been present since childhood. These symptoms have caused clinically significant distress, but he noted little impairment outside of insomnia, which he felt was adequately controlled following the Sleep 101 psychoeducational course.     FLAQUITO-7 Score: 6  Interpretation: Mild Anxiety  Thoughts: Denied delusions, hallucinations.   Suicidal thoughts/behaviors:  Denied.   Self-injury: Denied.   Sleep: Reported that a typical night of sleep is 5-6 hours, but it is sometimes 4 hours, and that he typically does nap during the day for about 30-60 minutes, but he " "tries to keep it at 30 minutes.       Current psychosocial stressors: "little things" with a short fuse; sleep (better after insomnia, but bruxism persists); driving; dealing with other    Report of coping skills: outburst when he's by himself; exercise; distraction by watching movies or reading  Leisure: grandkids; no hobbies  Support system: "I don't have one... I don't trust my family in that respect...I can confide in my sons but they're busy."       Current and past substance use:   Alcohol: Denied current use. Denied history of abuse or dependency.   Drugs: Denied current use. Denied history of abuse or dependency.   Tobacco: Denied current use.   Caffeine: 2-3 cups a morning.      Current Psychiatric Treatment:   Medications: Denied.   Psychotherapy: Denied.      Psychiatric history:   Previous diagnosis: Denied.  Previously needed ativan to sleep d/t work stress.   Previous hospitalizations: Denied.   History of outpatient treatment: Denied.      Previous suicide attempt: Denied.   Family history of psychiatric illness: Anxiety, siblings;      Trauma history: Denied.      Social history: Mr. Nascimento was born in [place], [place] and raised in [place], [place] other states d/t his father's service in the [employer]. He was raised by his biological mom and dad along with 5 siblings. He reported fairly positive relationships with family. He described his childhood as "taken care of, food on the table and roof over the heads, but [place] and strict, emotionless father. Stuart mother, possibly narcissistic"  He denied childhood trauma, abuse, and neglect. His highest level of education is a [employer] and several master's degrees. 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 He served in the [employer] for 30 years. He retired from being a district  at the end of 2008. He is not on disability. He is not currently in a relationship. He has 2 children, " two sons (ages 38 and 34), who are also in the . He currently lives alone. He denied that Jew or spirituality is an important part of his day to day life.      Legal history: He denied history of arrests and convictions. He is not currently involved in civil or criminal litigation.      Access to guns: Endorsed, safely and properly stored.      Mental Status Exam:   General appearance: Appears stated age, neatly dressed, well groomed   Speech: Normal rate, normal tone, normal pitch, normal volume   Level of cooperation: Cooperative   Thought processes: Logical, goal-directed   Mood: Euthymic   Thought content: No illusions, no visual hallucinations, no auditory hallucinations, no delusions, no active or passive homicidal thoughts, no active or passive suicidal ideation, no obsessions, no compulsions, no violence   Affect: Appropriate   Orientation: Oriented to person, place, and date   Memory:   Recent memory: Intact   Remote memory: Intact   Attention span and concentration: Intact  Fund of general knowledge: Intact  Abstract reasoning:    Similarities: Abstract   Proverbs: Abstract   Judgment and insight: Fair   Language: Intact      Diagnostic impression:     ICD-10-CM ICD-9-CM   1. Anxiety disorder, unspecified type  F41.9 300.00       Plan: Mr. Nascimento will be admitted to the [facility]. He understood [facility] guidelines and signed the [facility] Informed Consent and [facility] Partnership Agreement. He was provided with information about [facility] treatments and will proceed with CBT-A with Dr. Allan when he [place] from [place] in [place].      Length of Session: 55 minutes

## 2025-02-19 ENCOUNTER — OFFICE VISIT (OUTPATIENT)
Dept: PSYCHIATRY | Facility: CLINIC | Age: 70
End: 2025-02-19
Payer: MEDICARE

## 2025-02-19 DIAGNOSIS — F41.9 ANXIETY DISORDER, UNSPECIFIED TYPE: Primary | ICD-10-CM

## 2025-02-19 PROCEDURE — 99211 OFF/OP EST MAY X REQ PHY/QHP: CPT | Mod: PBBFAC

## 2025-04-06 ENCOUNTER — OFFICE VISIT (OUTPATIENT)
Dept: URGENT CARE | Facility: CLINIC | Age: 70
End: 2025-04-06
Payer: MEDICARE

## 2025-04-06 VITALS
HEART RATE: 69 BPM | TEMPERATURE: 98 F | SYSTOLIC BLOOD PRESSURE: 123 MMHG | HEIGHT: 70 IN | BODY MASS INDEX: 25.75 KG/M2 | WEIGHT: 179.88 LBS | DIASTOLIC BLOOD PRESSURE: 66 MMHG | RESPIRATION RATE: 16 BRPM | OXYGEN SATURATION: 100 %

## 2025-04-06 DIAGNOSIS — J01.90 ACUTE BACTERIAL SINUSITIS: ICD-10-CM

## 2025-04-06 DIAGNOSIS — R51.9 SINUS HEADACHE: ICD-10-CM

## 2025-04-06 DIAGNOSIS — J01.00 ACUTE MAXILLARY SINUSITIS, RECURRENCE NOT SPECIFIED: Primary | ICD-10-CM

## 2025-04-06 DIAGNOSIS — B96.89 ACUTE BACTERIAL SINUSITIS: ICD-10-CM

## 2025-04-06 DIAGNOSIS — R22.0 FACIAL SWELLING: ICD-10-CM

## 2025-04-06 DIAGNOSIS — J34.89 SINUS PRESSURE: ICD-10-CM

## 2025-04-06 DIAGNOSIS — R09.81 SINUS CONGESTION: ICD-10-CM

## 2025-04-06 PROCEDURE — 96372 THER/PROPH/DIAG INJ SC/IM: CPT | Mod: S$GLB,,, | Performed by: PHYSICIAN ASSISTANT

## 2025-04-06 PROCEDURE — 99213 OFFICE O/P EST LOW 20 MIN: CPT | Mod: 25,S$GLB,, | Performed by: PHYSICIAN ASSISTANT

## 2025-04-06 RX ORDER — CROMOLYN SODIUM 5.2 MG/ML
1 SPRAY, METERED NASAL 4 TIMES DAILY
Qty: 26 ML | Refills: 1 | Status: SHIPPED | OUTPATIENT
Start: 2025-04-06 | End: 2025-04-06

## 2025-04-06 RX ORDER — GUAIFENESIN AND DEXTROMETHORPHAN HYDROBROMIDE 1200; 60 MG/1; MG/1
1 TABLET, EXTENDED RELEASE ORAL EVERY 12 HOURS
Qty: 20 TABLET | Refills: 0 | Status: SHIPPED | OUTPATIENT
Start: 2025-04-06 | End: 2025-04-16

## 2025-04-06 RX ORDER — BROMPHENIRAMINE MALEATE, PSEUDOEPHEDRINE HYDROCHLORIDE, AND DEXTROMETHORPHAN HYDROBROMIDE 2; 30; 10 MG/5ML; MG/5ML; MG/5ML
5 SYRUP ORAL EVERY 12 HOURS PRN
Qty: 118 ML | Refills: 0 | Status: SHIPPED | OUTPATIENT
Start: 2025-04-06

## 2025-04-06 RX ORDER — LIDOCAINE HYDROCHLORIDE 10 MG/ML
2.1 INJECTION, SOLUTION INFILTRATION; PERINEURAL
Status: COMPLETED | OUTPATIENT
Start: 2025-04-06 | End: 2025-04-06

## 2025-04-06 RX ORDER — VITAMIN A 3000 MCG
2 CAPSULE ORAL
Qty: 60 ML | Refills: 12 | Status: SHIPPED | OUTPATIENT
Start: 2025-04-06

## 2025-04-06 RX ORDER — PREDNISONE 20 MG/1
20 TABLET ORAL DAILY
Qty: 3 TABLET | Refills: 0 | Status: SHIPPED | OUTPATIENT
Start: 2025-04-06 | End: 2025-04-09

## 2025-04-06 RX ORDER — AMOXICILLIN AND CLAVULANATE POTASSIUM 875; 125 MG/1; MG/1
1 TABLET, FILM COATED ORAL EVERY 12 HOURS
Qty: 28 TABLET | Refills: 0 | Status: SHIPPED | OUTPATIENT
Start: 2025-04-06 | End: 2025-04-20

## 2025-04-06 RX ORDER — CEFTRIAXONE 1 G/1
1 INJECTION, POWDER, FOR SOLUTION INTRAMUSCULAR; INTRAVENOUS ONCE
Status: COMPLETED | OUTPATIENT
Start: 2025-04-06 | End: 2025-04-06

## 2025-04-06 RX ORDER — AZELASTINE 1 MG/ML
1 SPRAY, METERED NASAL 2 TIMES DAILY
Qty: 30 ML | Refills: 0 | Status: SHIPPED | OUTPATIENT
Start: 2025-04-06 | End: 2025-04-06

## 2025-04-06 RX ORDER — DEXAMETHASONE SODIUM PHOSPHATE 10 MG/ML
10 INJECTION INTRAMUSCULAR; INTRAVENOUS
Status: COMPLETED | OUTPATIENT
Start: 2025-04-06 | End: 2025-04-06

## 2025-04-06 RX ORDER — FLUTICASONE PROPIONATE 50 MCG
1 SPRAY, SUSPENSION (ML) NASAL DAILY
Qty: 16 G | Refills: 3 | Status: SHIPPED | OUTPATIENT
Start: 2025-04-06 | End: 2025-04-06

## 2025-04-06 RX ADMIN — DEXAMETHASONE SODIUM PHOSPHATE 10 MG: 10 INJECTION INTRAMUSCULAR; INTRAVENOUS at 03:04

## 2025-04-06 RX ADMIN — CEFTRIAXONE 1 G: 1 INJECTION, POWDER, FOR SOLUTION INTRAMUSCULAR; INTRAVENOUS at 03:04

## 2025-04-06 RX ADMIN — LIDOCAINE HYDROCHLORIDE 2.1 ML: 10 INJECTION, SOLUTION INFILTRATION; PERINEURAL at 03:04

## 2025-04-06 NOTE — PROGRESS NOTES
"Subjective:      Patient ID: Bulmaro Nascimento is a 69 y.o. male.    Vitals:  height is 5' 10" (1.778 m) and weight is 81.6 kg (179 lb 14.3 oz). His oral temperature is 97.8 °F (36.6 °C). His blood pressure is 123/66 and his pulse is 69. His respiration is 16 and oxygen saturation is 100%.     Chief Complaint: Nasal Congestion    Patient complains of nasal congestion. Symptoms have been present for 13 days. Patient complains of sinus congestion blowing green stuff continuously.  Also sinus pressure and sinus headache that has not getting worse.  He states his normal medications as well as added medications from over-the-counter are not helping symptoms at all.    Sinus Problem  The current episode started 1 to 4 weeks ago. The problem is unchanged. There has been no fever. Associated symptoms include congestion and sinus pressure. Pertinent negatives include no chills or diaphoresis. Treatments tried: OTC meds.       Constitution: Positive for activity change, appetite change and fatigue. Negative for chills, sweating and fever.   HENT:  Positive for facial swelling, congestion, postnasal drip, sinus pain and sinus pressure. Negative for facial trauma.    Skin:  Positive for erythema.      Objective:     Physical Exam   Constitutional: He is oriented to person, place, and time. He appears well-developed. He is cooperative.  Non-toxic appearance. No distress.      Comments:  Patient is well known to me and definitely in obvious he has not feeling good.  He has nasal congested with injected eyes bilaterally but no purulent discharge.  He also has some significant swelling over the right maxillary sinus     HENT:   Head: Normocephalic and atraumatic.   Ears:   Right Ear: Hearing, tympanic membrane and external ear normal.   Left Ear: Hearing, tympanic membrane and external ear normal.   Nose: Rhinorrhea and congestion present. No mucosal edema or nasal deformity. No epistaxis. Right sinus exhibits no maxillary sinus " tenderness and no frontal sinus tenderness. Left sinus exhibits no maxillary sinus tenderness and no frontal sinus tenderness.   Mouth/Throat: Uvula is midline and mucous membranes are normal. No trismus in the jaw. Normal dentition. No uvula swelling. Posterior oropharyngeal erythema present. No oropharyngeal exudate or posterior oropharyngeal edema.   Eyes: EOM and lids are normal. Pupils are equal, round, and reactive to light. Right eye exhibits no discharge. Left eye exhibits no discharge. No scleral icterus.      Comments:  Conjunctiva slightly injected bilaterally but with no discharge.   Neck: Trachea normal and phonation normal. Neck supple. No JVD present. No tracheal deviation present. No thyromegaly present. No edema present. No erythema present. No neck rigidity present.   Cardiovascular: Normal rate, regular rhythm and normal pulses.   Pulmonary/Chest: Effort normal. No stridor. No respiratory distress. He has no decreased breath sounds. He has no wheezes.   Abdominal: He exhibits no distension. Soft. There is no rebound.   Musculoskeletal: Normal range of motion.         General: No deformity. Normal range of motion.   Neurological: He is alert and oriented to person, place, and time. He exhibits normal muscle tone. Coordination normal.   Skin: Skin is warm, dry, intact, not diaphoretic, not pale and no rash. Capillary refill takes less than 2 seconds. erythema         Comments:   There is mild-to-moderate redness over bilateral maxillary areas right worse than left   Psychiatric: His speech is normal and behavior is normal. Judgment and thought content normal.   Nursing note and vitals reviewed.      Assessment:     1. Acute maxillary sinusitis, recurrence not specified    2. Acute bacterial sinusitis    3. Sinus pressure    4. Sinus headache    5. Facial swelling    6. Sinus congestion        Plan:       Acute maxillary sinusitis, recurrence not specified  -     cefTRIAXone injection 1 g  -      LIDOcaine HCL 10 mg/ml (1%) injection 2.1 mL  -     amoxicillin-clavulanate 875-125mg (AUGMENTIN) 875-125 mg per tablet; Take 1 tablet by mouth every 12 (twelve) hours. for 14 days  Dispense: 28 tablet; Refill: 0  -     dextromethorphan-guaiFENesin (MUCINEX DM) 60-1,200 mg per 12 hr tablet; Take 1 tablet by mouth every 12 (twelve) hours. for 10 days  Dispense: 20 tablet; Refill: 0    Acute bacterial sinusitis  -     cefTRIAXone injection 1 g  -     LIDOcaine HCL 10 mg/ml (1%) injection 2.1 mL  -     amoxicillin-clavulanate 875-125mg (AUGMENTIN) 875-125 mg per tablet; Take 1 tablet by mouth every 12 (twelve) hours. for 14 days  Dispense: 28 tablet; Refill: 0  -     dextromethorphan-guaiFENesin (MUCINEX DM) 60-1,200 mg per 12 hr tablet; Take 1 tablet by mouth every 12 (twelve) hours. for 10 days  Dispense: 20 tablet; Refill: 0    Sinus pressure  -     cefTRIAXone injection 1 g  -     dexAMETHasone injection 10 mg  -     Discontinue: fluticasone propionate (FLONASE) 50 mcg/actuation nasal spray; 1 spray (50 mcg total) by Each Nostril route once daily.  Dispense: 16 g; Refill: 3  -     Discontinue: azelastine (ASTELIN) 137 mcg (0.1 %) nasal spray; 1 spray (137 mcg total) by Nasal route 2 (two) times daily.  Dispense: 30 mL; Refill: 0  -     sodium chloride (SALINE NASAL) 0.65 % nasal spray; 2 sprays by Nasal route 6 (six) times daily.  Dispense: 60 mL; Refill: 12  -     Discontinue: cromolyn (NASALCHROM) 5.2 mg/spray (4 %) nasal spray; 1 spray by Nasal route 4 (four) times daily.  Dispense: 26 mL; Refill: 1  -     dextromethorphan-guaiFENesin (MUCINEX DM) 60-1,200 mg per 12 hr tablet; Take 1 tablet by mouth every 12 (twelve) hours. for 10 days  Dispense: 20 tablet; Refill: 0  -     brompheniramine-pseudoeph-DM (BROMFED DM) 2-30-10 mg/5 mL Syrp; Take 5 mLs by mouth every 12 (twelve) hours as needed (congestion).  Dispense: 118 mL; Refill: 0  -     predniSONE (DELTASONE) 20 MG tablet; Take 1 tablet (20 mg total) by mouth  once daily. for 3 days  Dispense: 3 tablet; Refill: 0    Sinus headache  -     dexAMETHasone injection 10 mg  -     Discontinue: fluticasone propionate (FLONASE) 50 mcg/actuation nasal spray; 1 spray (50 mcg total) by Each Nostril route once daily.  Dispense: 16 g; Refill: 3  -     Discontinue: azelastine (ASTELIN) 137 mcg (0.1 %) nasal spray; 1 spray (137 mcg total) by Nasal route 2 (two) times daily.  Dispense: 30 mL; Refill: 0  -     sodium chloride (SALINE NASAL) 0.65 % nasal spray; 2 sprays by Nasal route 6 (six) times daily.  Dispense: 60 mL; Refill: 12  -     Discontinue: cromolyn (NASALCHROM) 5.2 mg/spray (4 %) nasal spray; 1 spray by Nasal route 4 (four) times daily.  Dispense: 26 mL; Refill: 1  -     dextromethorphan-guaiFENesin (MUCINEX DM) 60-1,200 mg per 12 hr tablet; Take 1 tablet by mouth every 12 (twelve) hours. for 10 days  Dispense: 20 tablet; Refill: 0  -     brompheniramine-pseudoeph-DM (BROMFED DM) 2-30-10 mg/5 mL Syrp; Take 5 mLs by mouth every 12 (twelve) hours as needed (congestion).  Dispense: 118 mL; Refill: 0  -     predniSONE (DELTASONE) 20 MG tablet; Take 1 tablet (20 mg total) by mouth once daily. for 3 days  Dispense: 3 tablet; Refill: 0    Facial swelling  -     predniSONE (DELTASONE) 20 MG tablet; Take 1 tablet (20 mg total) by mouth once daily. for 3 days  Dispense: 3 tablet; Refill: 0    Sinus congestion  -     dexAMETHasone injection 10 mg  -     Discontinue: fluticasone propionate (FLONASE) 50 mcg/actuation nasal spray; 1 spray (50 mcg total) by Each Nostril route once daily.  Dispense: 16 g; Refill: 3  -     Discontinue: azelastine (ASTELIN) 137 mcg (0.1 %) nasal spray; 1 spray (137 mcg total) by Nasal route 2 (two) times daily.  Dispense: 30 mL; Refill: 0  -     sodium chloride (SALINE NASAL) 0.65 % nasal spray; 2 sprays by Nasal route 6 (six) times daily.  Dispense: 60 mL; Refill: 12  -     Discontinue: cromolyn (NASALCHROM) 5.2 mg/spray (4 %) nasal spray; 1 spray by Nasal  route 4 (four) times daily.  Dispense: 26 mL; Refill: 1  -     dextromethorphan-guaiFENesin (MUCINEX DM) 60-1,200 mg per 12 hr tablet; Take 1 tablet by mouth every 12 (twelve) hours. for 10 days  Dispense: 20 tablet; Refill: 0  -     brompheniramine-pseudoeph-DM (BROMFED DM) 2-30-10 mg/5 mL Syrp; Take 5 mLs by mouth every 12 (twelve) hours as needed (congestion).  Dispense: 118 mL; Refill: 0  -     predniSONE (DELTASONE) 20 MG tablet; Take 1 tablet (20 mg total) by mouth once daily. for 3 days  Dispense: 3 tablet; Refill: 0      Follow up if symptoms worsen or fail to improve, for F/U with PCP or ED.   Patient Instructions   Patient Education       Upper Respiratory Infection ED   General Information   You came to the Emergency Department (ED) for an upper respiratory infection or URI. A URI can affect your nose, throat, ears, and sinuses. A virus is the cause of almost all URIs and antibiotics will not help you feel better more quickly. The common cold is an example of a viral URI.  URIs are easy to spread from person to person, most often through coughing or sneezing. A URI will almost always get better in a week or two without any treatment.  What care is needed at home?   Call your regular doctor to let them know you were in the ED. Make a follow-up appointment if you were told to.  If you smoke, try to quit. Your doctor or nurse can help.  Drink lots of fluids like water, juice, or broth. This will help replace any fluids lost if you have a runny nose or fever. Warm tea or soup can help soothe a sore throat.  If the air in your home feels dry, use a cool mist humidifier. This can help a stuffy nose and make it easier to breathe.  You can also use saline nose drops to relieve stuffiness.  If you decide to take over-the-counter cough or cold medicines, follow the directions on the label carefully. Be sure you do not take more than 1 medicine that contains acetaminophen. Also, if you have a heart problem or high  blood pressure, check with your doctor before you take any of these medicines.  Wash your hands often. Cough or sneeze into a tissue or your elbow instead of your hands. This will help keep others healthy.  When do I need to get emergency help?   Return to the ED if:   You have trouble breathing when talking or sitting still.  When do I need to call the doctor?   You have a fever of 100.4°F (38°C) or higher for several days, chills, a very bad sore throat, or ear or sinus pain.  You develop a new fever after several days of feeling the same or improving.  You develop chest pain when you cough.  You have a cough that lasts more than 10 days.  You cough up blood, or the color of the mucus you cough up changes.  You have new or worsening symptoms.  Last Reviewed Date   2020-09-25  Consumer Information Use and Disclaimer   This information is not specific medical advice and does not replace information you receive from your health care provider. This is only a brief summary of general information. It does NOT include all information about conditions, illnesses, injuries, tests, procedures, treatments, therapies, discharge instructions or life-style choices that may apply to you. You must talk with your health care provider for complete information about your health and treatment options. This information should not be used to decide whether or not to accept your health care providers advice, instructions or recommendations. Only your health care provider has the knowledge and training to provide advice that is right for you.  Copyright   Copyright © 2021 [employer] and its affiliates and/or licensors. All rights reserved.

## 2025-04-25 ENCOUNTER — PATIENT MESSAGE (OUTPATIENT)
Dept: PSYCHIATRY | Facility: CLINIC | Age: 70
End: 2025-04-25
Payer: MEDICARE

## 2025-05-06 ENCOUNTER — OFFICE VISIT (OUTPATIENT)
Dept: PSYCHIATRY | Facility: CLINIC | Age: 70
End: 2025-05-06
Payer: MEDICARE

## 2025-05-06 DIAGNOSIS — F41.9 ANXIETY DISORDER, UNSPECIFIED TYPE: Primary | ICD-10-CM

## 2025-05-06 PROCEDURE — 99499 UNLISTED E&M SERVICE: CPT | Mod: S$PBB,,,

## 2025-05-06 PROCEDURE — 90837 PSYTX W PT 60 MINUTES: CPT | Mod: S$PBB,,,

## 2025-05-06 NOTE — PROGRESS NOTES
[facility]-- Individual Psychotherapy (PhD/LCSW)    5/6/2025    Site:  [facility]         Therapeutic Intervention: Met with patient.  Outpatient - Insight oriented psychotherapy 60 min - CPT code 48221 and Outpatient - Behavior modifying psychotherapy 60 min - CPT code 27422    Chief complaint/reason for encounter: anxiety     Interval history and content of current session:     Cognitive Behavioral Therapy for Anxiety Session 1 Focus:  To learn the importance of record keeping.  To introduce you to the Worry Record, Daily Mood Record, and Progress Record  To learn how to monitor your anxiety.  To complete the self-assessment.     Practice Assignments:  Monitor your anxiety episodes and daily anxiety using the Worry Record and the Daily Mood Record.     Treatment plan:  Target symptoms: Anxiety  Why chosen therapy is appropriate versus another modality: relevant to diagnosis, evidence based practice  Outcome monitoring methods: self-report, checklist/rating scale  Therapeutic intervention type: Cognitive Behavioral Therapy     Risk parameters:  Patient reports no suicidal ideation  Patient reports no homicidal ideation  Patient reports no self-injurious behavior  Patient reports no violent behavior    Verbal deficits: None    Patient's response to intervention:  The patient's response to intervention is motivated.    Progress toward goals and other mental status changes:  The patient's progress toward goals is good.    Diagnosis:     ICD-10-CM ICD-9-CM   1. Anxiety disorder, unspecified type  F41.9 300.00       Plan:  individual psychotherapy    Return to clinic: 1 week    Length of Service (minutes): 60

## 2025-05-13 RX ORDER — TADALAFIL 5 MG/1
5 TABLET ORAL DAILY PRN
Qty: 90 TABLET | Refills: 0 | Status: SHIPPED | OUTPATIENT
Start: 2025-05-13

## 2025-05-14 ENCOUNTER — OFFICE VISIT (OUTPATIENT)
Dept: PSYCHIATRY | Facility: CLINIC | Age: 70
End: 2025-05-14
Payer: MEDICARE

## 2025-05-14 DIAGNOSIS — F51.01 PRIMARY INSOMNIA: ICD-10-CM

## 2025-05-14 DIAGNOSIS — F41.9 ANXIETY DISORDER, UNSPECIFIED TYPE: Primary | ICD-10-CM

## 2025-05-20 ENCOUNTER — OFFICE VISIT (OUTPATIENT)
Dept: PSYCHIATRY | Facility: CLINIC | Age: 70
End: 2025-05-20
Payer: MEDICARE

## 2025-05-20 DIAGNOSIS — F41.9 ANXIETY DISORDER, UNSPECIFIED TYPE: Primary | ICD-10-CM

## 2025-05-20 DIAGNOSIS — F51.01 PRIMARY INSOMNIA: ICD-10-CM

## 2025-05-20 PROCEDURE — 99499 UNLISTED E&M SERVICE: CPT | Mod: S$PBB,,,

## 2025-05-20 PROCEDURE — 90834 PSYTX W PT 45 MINUTES: CPT | Mod: S$PBB,,,

## 2025-05-20 NOTE — PROGRESS NOTES
"[facility]-- Individual Psychotherapy (PhD/LCSW)    5/20/2025    Site:  [facility]         Therapeutic Intervention: Met with patient.  Outpatient - Behavior modifying psychotherapy 45 min - CPT code 42465 and Outpatient - Interactive psychotherapy 45 min - CPT code 48616    Chief complaint/reason for encounter: anxiety     Interval history and content of current session:   Cognitive Behavioral Therapy for Anxiety:   Session 3: Session Focus:  To review your homework.  To learn what causes normal worry.    Practice Assignments:  Continue self-monitoring using the Worry Record and the Daily Mood Record..    Treatment plan:  Target symptoms: Anxiety  Why chosen therapy is appropriate versus another modality: relevant to diagnosis" "evidence based practice  Outcome monitoring methods: self-report, checklist/rating scale  Therapeutic intervention type: Cognitive Behavioral Therapy     Risk parameters:  Patient reports no suicidal ideation  Patient reports no homicidal ideation  Patient reports no self-injurious behavior  Patient reports no violent behavior    Verbal deficits: None    Patient's response to intervention:  The patient's response to intervention is motivated.    Progress toward goals and other mental status changes:  The patient's progress toward goals is good.    Diagnosis:     ICD-10-CM ICD-9-CM   1. Anxiety disorder, unspecified type  F41.9 300.00   2. Primary insomnia  F51.01 307.42       Plan:  individual psychotherapy    Return to clinic: 1 week    Length of Service (minutes): 45        "

## 2025-05-27 ENCOUNTER — OFFICE VISIT (OUTPATIENT)
Dept: PSYCHIATRY | Facility: CLINIC | Age: 70
End: 2025-05-27
Payer: MEDICARE

## 2025-05-27 DIAGNOSIS — F41.9 ANXIETY DISORDER, UNSPECIFIED TYPE: Primary | ICD-10-CM

## 2025-05-27 DIAGNOSIS — F51.01 PRIMARY INSOMNIA: ICD-10-CM

## 2025-05-27 PROCEDURE — 99499 UNLISTED E&M SERVICE: CPT | Mod: S$PBB,,,

## 2025-05-27 PROCEDURE — 90834 PSYTX W PT 45 MINUTES: CPT | Mod: ,,,

## 2025-05-27 NOTE — PATIENT INSTRUCTIONS
Practice Assignments:  Continue to monitor your levels of anxiety using the Worry Event Record and the Daily Mood Record.  Continue to observe your episode of anxiety in terms of the behavioral, physical, and cognitive response components, and the ways in which they interact.  Daily practice of progressive muscle relaxation

## 2025-06-03 ENCOUNTER — OFFICE VISIT (OUTPATIENT)
Dept: PSYCHIATRY | Facility: CLINIC | Age: 70
End: 2025-06-03
Payer: MEDICARE

## 2025-06-03 DIAGNOSIS — F41.9 ANXIETY DISORDER, UNSPECIFIED TYPE: Primary | ICD-10-CM

## 2025-06-03 PROCEDURE — 90837 PSYTX W PT 60 MINUTES: CPT | Mod: ,,,

## 2025-06-03 PROCEDURE — 99499 UNLISTED E&M SERVICE: CPT | Mod: S$PBB,,,

## 2025-06-05 ENCOUNTER — PATIENT MESSAGE (OUTPATIENT)
Dept: SLEEP MEDICINE | Facility: CLINIC | Age: 70
End: 2025-06-05

## 2025-06-05 ENCOUNTER — OFFICE VISIT (OUTPATIENT)
Dept: SLEEP MEDICINE | Facility: CLINIC | Age: 70
End: 2025-06-05
Attending: PSYCHIATRY & NEUROLOGY
Payer: MEDICARE

## 2025-06-05 VITALS
SYSTOLIC BLOOD PRESSURE: 132 MMHG | DIASTOLIC BLOOD PRESSURE: 60 MMHG | WEIGHT: 185.19 LBS | HEART RATE: 63 BPM | BODY MASS INDEX: 26.57 KG/M2 | OXYGEN SATURATION: 96 %

## 2025-06-05 DIAGNOSIS — G47.30 SLEEP APNEA, UNSPECIFIED TYPE: Primary | ICD-10-CM

## 2025-06-05 DIAGNOSIS — G47.00 INSOMNIA, UNSPECIFIED TYPE: ICD-10-CM

## 2025-06-05 PROCEDURE — 99999 PR PBB SHADOW E&M-EST. PATIENT-LVL III: CPT | Mod: PBBFAC,,,

## 2025-06-05 PROCEDURE — 99213 OFFICE O/P EST LOW 20 MIN: CPT | Mod: PBBFAC

## 2025-06-05 RX ORDER — ESZOPICLONE 1 MG/1
1 TABLET, FILM COATED ORAL NIGHTLY
Qty: 30 TABLET | Refills: 5 | Status: SHIPPED | OUTPATIENT
Start: 2025-06-05 | End: 2025-12-02

## 2025-06-06 NOTE — PROGRESS NOTES
"[facility]-- Individual Psychotherapy (PhD/LCSW)    6/10/2025    Site:  [facility]         Therapeutic Intervention: Met with patient.  Outpatient - Behavior modifying psychotherapy 60 min - CPT code 55027    Chief complaint/reason for encounter: anxiety     Interval history and content of current session:   Cognitive Behavioral Therapy for Anxiety:   Session 6: Session Focus:  To review your homework  To learn and practice relaxation techniques  To understand the concepts of catastrophizing and decatastrophizing  To begin using the Worry Record-Real Odds & Coping form    Practice Assignments:  Continue your daily monitoring using the Worry Record and the Daily Mood Record.  Practice relaxation.  Use both types of strategies to counter anxious thoughts: (1) question the evidence, consider alternatives, and rate the realistic odds; and (2) consider the worst that can happen, consider alternatives, and think of ways to cope.  Begin using the new Worry Record-Real Odds & Coping form.       Treatment plan:  Target symptoms: Anxiety  Why chosen therapy is appropriate versus another modality: relevant to diagnosis" "evidence based practice  Outcome monitoring methods: self-report, checklist/rating scale  Therapeutic intervention type: Cognitive Behavioral Therapy     Risk parameters:  Patient reports no suicidal ideation  Patient reports no homicidal ideation  Patient reports no self-injurious behavior  Patient reports no violent behavior    Verbal deficits: None    Patient's response to intervention:  The patient's response to intervention is motivated.    Progress toward goals and other mental status changes:  The patient's progress toward goals is good.    Diagnosis:     ICD-10-CM ICD-9-CM   1. Anxiety disorder, unspecified type  F41.9 300.00   2. Primary insomnia  F51.01 307.42       Plan:  individual psychotherapy    Return to clinic: 1 week    Length of Service (minutes): 60      "

## 2025-06-09 ENCOUNTER — TELEPHONE (OUTPATIENT)
Dept: SLEEP MEDICINE | Facility: OTHER | Age: 70
End: 2025-06-09
Payer: MEDICARE

## 2025-06-10 ENCOUNTER — OFFICE VISIT (OUTPATIENT)
Dept: PSYCHIATRY | Facility: CLINIC | Age: 70
End: 2025-06-10
Payer: MEDICARE

## 2025-06-10 DIAGNOSIS — F41.9 ANXIETY DISORDER, UNSPECIFIED TYPE: Primary | ICD-10-CM

## 2025-06-10 DIAGNOSIS — F51.01 PRIMARY INSOMNIA: ICD-10-CM

## 2025-06-10 PROCEDURE — 99499 UNLISTED E&M SERVICE: CPT | Mod: S$PBB,,,

## 2025-06-10 PROCEDURE — 90837 PSYTX W PT 60 MINUTES: CPT | Mod: ,,,

## 2025-06-13 ENCOUNTER — HOSPITAL ENCOUNTER (OUTPATIENT)
Dept: SLEEP MEDICINE | Facility: HOSPITAL | Age: 70
Discharge: HOME | End: 2025-06-13
Payer: MEDICARE

## 2025-06-13 DIAGNOSIS — G47.33 OSA (OBSTRUCTIVE SLEEP APNEA): Primary | ICD-10-CM

## 2025-06-13 DIAGNOSIS — G47.30 SLEEP APNEA, UNSPECIFIED TYPE: ICD-10-CM

## 2025-06-13 PROCEDURE — 95800 SLP STDY UNATTENDED: CPT

## 2025-06-13 NOTE — PROGRESS NOTES
Per physician orders, patient was given home sleep testing device and instructed on how to apply the device before going to bed tonight. I sized the device and showed the patient using a mirror how the device fits and what it should look like so they can use a mirror when putting it on themselves at home. We reviewed the instruction booklet. Patient verbalized understanding of the instructions and teach back complete. Patient was instructed to return the sleep testing device on either Saturday morning between the hours of 7:00am and 10:00am or on Monday morning between the hours of 5:00am and 9:00am in the drop box that is located to the left as you walk into the main lobby of the hospital. I also educated the patient on sleep apnea, treatments options for sleep apnea, and what to expect after the home sleep study is complete.       Assessment: Neck Size:  14 3/4 inches        Mallampati Score: 1          [place] Sleepiness Scale Score: 1    Home Sleep Testing Device: [employer] THOMAS 620 - S/N: [id number]

## 2025-06-17 ENCOUNTER — OFFICE VISIT (OUTPATIENT)
Dept: PSYCHIATRY | Facility: CLINIC | Age: 70
End: 2025-06-17
Payer: MEDICARE

## 2025-06-17 DIAGNOSIS — F41.9 ANXIETY DISORDER, UNSPECIFIED TYPE: Primary | ICD-10-CM

## 2025-06-17 PROCEDURE — 90837 PSYTX W PT 60 MINUTES: CPT | Mod: ,,,

## 2025-06-17 PROCEDURE — 95800 SLP STDY UNATTENDED: CPT | Mod: 26,,, | Performed by: PSYCHIATRY & NEUROLOGY

## 2025-06-17 PROCEDURE — 99499 UNLISTED E&M SERVICE: CPT | Mod: S$PBB,,,

## 2025-06-17 NOTE — PROGRESS NOTES
"[facility]-- Individual Psychotherapy (PhD/LCSW)    6/17/2025    Site:  [facility]         Therapeutic Intervention: Met with patient.  Outpatient - Behavior modifying psychotherapy 60 min - CPT code 47171    Chief complaint/reason for encounter: anxiety     Interval history and content of current session:   Cognitive Behavioral Therapy for Anxiety:   Session 7: Session Focus:  To review your homework  To learn relaxation techniques  To learn realistic thinking strategies  To practice imagery exposure    Practice Assignments:  Practice relaxation techniques.  Apply realistic thinking strategies by considering the real odds, alternative possibilities, and ways of coping for each worrisome thought.  Practice the imagery exposure exercise at least once a day, using three images of five minutes each. Record your practice using the Exposure Record.  Continue to use the Daily Mood Record.       Treatment plan:  Target symptoms: Anxiety  Why chosen therapy is appropriate versus another modality: relevant to diagnosis" "evidence based practice  Outcome monitoring methods: self-report, checklist/rating scale  Therapeutic intervention type: Cognitive Behavioral Therapy     Risk parameters:  Patient reports no suicidal ideation  Patient reports no homicidal ideation  Patient reports no self-injurious behavior  Patient reports no violent behavior    Verbal deficits: None    Patient's response to intervention:  The patient's response to intervention is motivated.    Progress toward goals and other mental status changes:  The patient's progress toward goals is good.    Diagnosis:     ICD-10-CM ICD-9-CM   1. Anxiety disorder, unspecified type  F41.9 300.00       Plan:  individual psychotherapy    Return to clinic: 1 week    Length of Service (minutes): 60      "

## 2025-06-18 ENCOUNTER — PATIENT MESSAGE (OUTPATIENT)
Dept: SLEEP MEDICINE | Facility: CLINIC | Age: 70
End: 2025-06-18
Payer: MEDICARE

## 2025-06-18 PROBLEM — G47.33 OSA (OBSTRUCTIVE SLEEP APNEA): Status: ACTIVE | Noted: 2025-06-18

## 2025-06-18 NOTE — PROCEDURES
Patient Name Bulmaro Nascimento Study Ordered By Trevor Rodriguez  Date of Night 1 06/13/2025 09:56:46 PM Date of Birth 1955  Identification Number [id number]  Overall AHI (4%)* % time < 90% Sp02 Mean Sp02 % time snoring > 30dB  5 0% 96.2% 0%  PHYSICIAN INTERPRETATION AND COMMENTS: Findings are consistent with mild, obstructive sleep apnea (JAROD) (G47.33),  by overall AHI (apnea hypopnea index). However, findings on this study suggest that the degree of sleep disordered  breathing is in the moderate range, when RDI is measured. This study was technically adequate to allow for interpretation.  CLINICAL HISTORY: 70 year old male presented with: 14.75 inch neck, BMI of 25.8, an [place] sleepiness score of 6, history  of hypertension, heart disease, diabetes, a previous diagnosis of JAROD and symptoms of nocturnal waking up choking and  witnessed apneas. Based on the clinical history, the patient has a high pre-test probability of having Moderate JAROD.  SLEEP STUDY FINDINGS: Patient underwent a 1 night Home Sleep Test and by behavioral criteria, slept for approximately 5.7  hours, with a sleep latency of 6 minutes and a sleep efficiency of 97%. Mild sleep disordered breathing (AHI=5) is noted  based on a 4% hypopnea desaturation criteria. The mean pulse rate is 56.6 BPM, with frequent pulse rate variability (60  events with >= 6 BPM increase/decrease per hour). RDI was 22/hr  TREATMENT CONSIDERATIONS: Consider trial of Auto-titrating CPAP 6-20 cm, mask of patient's choice, and heated  humidification. If patient has difficulty with CPAP adherence or ongoing JAROD symptoms or despite CPAP adherence, then  consider an in-lab titration sleep study in order to determine optimal fixed CPAP setting. Alternatively consider oral  appliance fitted by a dentist specializing in these devices, or surgical consultation for uvulopalatopharyngoplasty (UPPP)  for treatment of obstructive sleep apnea.  DISEASE MANAGEMENT CONSIDERATIONS:  Definitive treatment for JAROD is recommended. Consider Sleep Clinic referral for  JAROD management.  Signature: Date: 06- 17:21:04 EST  Study Review: The raw data of this THOMAS study has been reviewed, with the report confirmed and electronically signed by Mindy Proctor, Read  and interpreted by Mindy Proctor MD, Diplomate, Sleep Medicine, ABPN.. Caution: The diagnosis of the Obstructive Sleep Apnea Syndrome must  be based on all available clinical data, of which this study is only a part. Thus final diagnosis and treatment recommendations should include  information from an examination of the patient by a knowledgeable healthcare provider

## 2025-06-19 ENCOUNTER — TELEPHONE (OUTPATIENT)
Dept: SLEEP MEDICINE | Facility: CLINIC | Age: 70
End: 2025-06-19
Payer: MEDICARE

## 2025-06-19 DIAGNOSIS — G47.33 OSA (OBSTRUCTIVE SLEEP APNEA): Primary | ICD-10-CM

## 2025-06-19 NOTE — TELEPHONE ENCOUNTER
Elliot Stanley,    I have ordered CPAP for Bulmaro Nascimento - can you please fax CPAP prescription, sleep study and clinical notes docs to [facility]?  This is Ney's patient  Please schedule CPAP follow up with Ney in 3 months    Thank you!

## 2025-06-24 ENCOUNTER — PATIENT MESSAGE (OUTPATIENT)
Dept: PSYCHIATRY | Facility: CLINIC | Age: 70
End: 2025-06-24
Payer: MEDICARE

## 2025-06-24 ENCOUNTER — OFFICE VISIT (OUTPATIENT)
Dept: PSYCHIATRY | Facility: CLINIC | Age: 70
End: 2025-06-24
Payer: MEDICARE

## 2025-06-24 DIAGNOSIS — F51.01 PRIMARY INSOMNIA: ICD-10-CM

## 2025-06-24 DIAGNOSIS — F41.9 ANXIETY DISORDER, UNSPECIFIED TYPE: Primary | ICD-10-CM

## 2025-06-24 PROCEDURE — 99499 UNLISTED E&M SERVICE: CPT | Mod: S$PBB,,,

## 2025-06-24 PROCEDURE — 90837 PSYTX W PT 60 MINUTES: CPT | Mod: ,,,

## 2025-06-24 NOTE — PROGRESS NOTES
"[facility]-- Individual Psychotherapy (PhD/LCSW)    6/24/2025    Site:  [facility]         Therapeutic Intervention: Met with patient.  Outpatient - Behavior modifying psychotherapy 60 min - CPT code 97475    Chief complaint/reason for encounter: anxiety     Interval history and content of current session:   Cognitive Behavioral Therapy for Anxiety:   Session 8: Session Focus:  To review homework  To learn and practice relaxation techniques  To fill out Anxious Behaviors form (to continue in next session)   To record your progress on the Behavioral Change form    Practice Assignments:  Tackle the behavioral changes you have identified.  Record your practices on the Behavioral Change form.  Continue to record your daily mood.  Continue to practice relaxation techniques.     Treatment plan:  Target symptoms: Anxiety  Why chosen therapy is appropriate versus another modality: relevant to diagnosis" "evidence based practice  Outcome monitoring methods: self-report, checklist/rating scale  Therapeutic intervention type: Cognitive Behavioral Therapy     Risk parameters:  Patient reports no suicidal ideation  Patient reports no homicidal ideation  Patient reports no self-injurious behavior  Patient reports no violent behavior    Verbal deficits: None    Patient's response to intervention:  The patient's response to intervention is motivated.    Progress toward goals and other mental status changes:  The patient's progress toward goals is good.    Diagnosis:     ICD-10-CM ICD-9-CM   1. Anxiety disorder, unspecified type  F41.9 300.00   2. Primary insomnia  F51.01 307.42       Plan:  individual psychotherapy    Return to clinic: 1 week    Length of Service (minutes): 60      "

## 2025-06-27 NOTE — PROGRESS NOTES
"[facility]-- Individual Psychotherapy (PhD/LCSW)    7/1/2025    Site:  [facility]         Therapeutic Intervention: Met with patient.  Outpatient - Behavior modifying psychotherapy 60 min - CPT code 27275    Chief complaint/reason for encounter: anxiety     Interval history and content of current session:   Cognitive Behavioral Therapy for Anxiety:   Session 8, part two: Session Focus:  To review homework  To learn and practice relaxation techniques  To fill out Anxious Behaviors form  To record your progress on the Behavioral Change form    Practice Assignments:  Tackle the behavioral changes you have identified.  Record your practices on the Behavioral Change form.  Continue to record your daily mood.  Continue to practice relaxation techniques.       Treatment plan:  Target symptoms: Anxiety  Why chosen therapy is appropriate versus another modality: relevant to diagnosis" "evidence based practice  Outcome monitoring methods: self-report, checklist/rating scale  Therapeutic intervention type: Cognitive Behavioral Therapy     Risk parameters:  Patient reports no suicidal ideation  Patient reports no homicidal ideation  Patient reports no self-injurious behavior  Patient reports no violent behavior    Verbal deficits: None    Patient's response to intervention:  The patient's response to intervention is motivated.    Progress toward goals and other mental status changes:  The patient's progress toward goals is good.    Diagnosis:     ICD-10-CM ICD-9-CM   1. Anxiety disorder, unspecified type  F41.9 300.00   2. Primary insomnia  F51.01 307.42       Plan:  individual psychotherapy    Return to clinic: 1 week    Length of Service (minutes): 60        "

## 2025-07-01 ENCOUNTER — OFFICE VISIT (OUTPATIENT)
Dept: PSYCHIATRY | Facility: CLINIC | Age: 70
End: 2025-07-01
Payer: MEDICARE

## 2025-07-01 DIAGNOSIS — F41.9 ANXIETY DISORDER, UNSPECIFIED TYPE: Primary | ICD-10-CM

## 2025-07-01 DIAGNOSIS — F51.01 PRIMARY INSOMNIA: ICD-10-CM

## 2025-07-01 PROCEDURE — 99499 UNLISTED E&M SERVICE: CPT | Mod: S$PBB,,,

## 2025-07-01 PROCEDURE — 90837 PSYTX W PT 60 MINUTES: CPT | Mod: ,,,

## 2025-07-03 ENCOUNTER — PATIENT MESSAGE (OUTPATIENT)
Dept: PSYCHIATRY | Facility: CLINIC | Age: 70
End: 2025-07-03
Payer: MEDICARE

## 2025-07-10 ENCOUNTER — PATIENT MESSAGE (OUTPATIENT)
Dept: SLEEP MEDICINE | Facility: CLINIC | Age: 70
End: 2025-07-10
Payer: MEDICARE

## 2025-07-15 ENCOUNTER — OFFICE VISIT (OUTPATIENT)
Dept: PSYCHIATRY | Facility: CLINIC | Age: 70
End: 2025-07-15
Payer: MEDICARE

## 2025-07-15 DIAGNOSIS — F41.9 ANXIETY DISORDER, UNSPECIFIED TYPE: Primary | ICD-10-CM

## 2025-07-15 DIAGNOSIS — F51.01 PRIMARY INSOMNIA: ICD-10-CM

## 2025-07-15 PROCEDURE — 99499 UNLISTED E&M SERVICE: CPT | Mod: S$PBB,,,

## 2025-07-15 PROCEDURE — 90837 PSYTX W PT 60 MINUTES: CPT | Mod: ,,,

## 2025-07-15 NOTE — PROGRESS NOTES
"[facility]-- Individual Psychotherapy (PhD/LCSW)    7/15/2025    Site:  [facility]         Therapeutic Intervention: Met with patient.  Outpatient - Insight oriented psychotherapy 60 min - CPT code 29483 and Outpatient - Behavior modifying psychotherapy 60 min - CPT code 60433    Chief complaint/reason for encounter: anxiety     Interval history and content of current session:   Cognitive Behavioral Therapy for Anxiety:   Session 9: Session Focus:  To review homework  To learn time management, goal-setting, and problem-solving skills  To begin to use Daily Activities form    Practice Assignments:  Choose some problems that have been bothering you lately, and try the brainstorming technique.  Use time management strategies every day for the next week.       Treatment plan:  Target symptoms: Anxiety  Why chosen therapy is appropriate versus another modality: relevant to diagnosis" "evidence based practice  Outcome monitoring methods: self-report, checklist/rating scale  Therapeutic intervention type: Cognitive Behavioral Therapy     Risk parameters:  Patient reports no suicidal ideation  Patient reports no homicidal ideation  Patient reports no self-injurious behavior  Patient reports no violent behavior    Verbal deficits: None    Patient's response to intervention:  The patient's response to intervention is motivated.    Progress toward goals and other mental status changes:  The patient's progress toward goals is good.    Diagnosis:     ICD-10-CM ICD-9-CM   1. Anxiety disorder, unspecified type  F41.9 300.00   2. Primary insomnia  F51.01 307.42       Plan:  individual psychotherapy    Return to clinic: 1 week    Length of Service (minutes): 60      "

## 2025-07-22 ENCOUNTER — CLINICAL SUPPORT (OUTPATIENT)
Dept: PSYCHIATRY | Facility: CLINIC | Age: 70
End: 2025-07-22
Payer: MEDICARE

## 2025-07-22 DIAGNOSIS — F41.9 ANXIETY DISORDER, UNSPECIFIED TYPE: Primary | ICD-10-CM

## 2025-07-22 PROCEDURE — 99999 PR PBB SHADOW E&M-EST. PATIENT-LVL I: CPT | Mod: PBBFAC,,,

## 2025-07-22 PROCEDURE — 90834 PSYTX W PT 45 MINUTES: CPT | Mod: ,,,

## 2025-07-22 PROCEDURE — 99211 OFF/OP EST MAY X REQ PHY/QHP: CPT | Mod: PBBFAC

## 2025-07-22 PROCEDURE — 99499 UNLISTED E&M SERVICE: CPT | Mod: S$PBB,,,

## 2025-07-22 NOTE — PROGRESS NOTES
"[facility]-- Individual Psychotherapy (PhD/LCSW)    7/22/2025    Site:  [facility]         Therapeutic Intervention: Met with patient.  Outpatient - Behavior modifying psychotherapy 45 min - CPT code 54633    Chief complaint/reason for encounter: anxiety     Interval history and content of current session:   Cognitive Behavioral Therapy for Anxiety:   Session 10: Session Focus:  To evaluate your progress using the Self-Evaluation form  To determine the next steps  To consider methods of maintaining your progress  To consider potential high-risk situations for the future    Treatment plan:  Target symptoms: Anxiety  Why chosen therapy is appropriate versus another modality: relevant to diagnosis" "evidence based practice  Outcome monitoring methods: self-report  Therapeutic intervention type: Cognitive Behavioral Therapy     Risk parameters:  Patient reports no suicidal ideation  Patient reports no homicidal ideation  Patient reports no self-injurious behavior  Patient reports no violent behavior    Verbal deficits: None    Patient's response to intervention:  The patient's response to intervention is motivated.    Progress toward goals and other mental status changes:  The patient's progress toward goals is good.    Diagnosis:     ICD-10-CM ICD-9-CM   1. Anxiety disorder, unspecified type  F41.9 300.00   *Diagnoses corrected on 7/24/25 due to erroneous inclusion of a diagnosis associated with an unrelated referral that was mistakenly attached to this appointment. This version is correct and Mr. Nascimento was only treated for Anxiety by me in this visit.     Plan:  [facility] in September-- discussed with patient as recommended treatment for elements of overcontrol and maladaptive perfectionism, as well as anxiety.     Return to clinic: Patient graduated. Will be contacted for next cohort of [facility].     Length of Service (minutes): 45      "

## 2025-07-24 ENCOUNTER — PATIENT MESSAGE (OUTPATIENT)
Dept: PSYCHIATRY | Facility: CLINIC | Age: 70
End: 2025-07-24
Payer: MEDICARE

## 2025-07-25 ENCOUNTER — HOSPITAL ENCOUNTER (OUTPATIENT)
Dept: RADIOLOGY | Facility: HOSPITAL | Age: 70
Discharge: HOME | End: 2025-07-25
Attending: PHYSICIAN ASSISTANT
Payer: MEDICARE

## 2025-07-25 ENCOUNTER — OFFICE VISIT (OUTPATIENT)
Dept: SPORTS MEDICINE | Facility: CLINIC | Age: 70
End: 2025-07-25
Payer: MEDICARE

## 2025-07-25 VITALS — SYSTOLIC BLOOD PRESSURE: 146 MMHG | DIASTOLIC BLOOD PRESSURE: 71 MMHG | HEART RATE: 61 BPM

## 2025-07-25 DIAGNOSIS — M25.512 ACUTE PAIN OF LEFT SHOULDER: Primary | ICD-10-CM

## 2025-07-25 DIAGNOSIS — M25.512 ACUTE PAIN OF LEFT SHOULDER: ICD-10-CM

## 2025-07-25 PROCEDURE — 99999 PR PBB SHADOW E&M-EST. PATIENT-LVL III: CPT | Mod: PBBFAC,,, | Performed by: PHYSICIAN ASSISTANT

## 2025-07-25 PROCEDURE — 99213 OFFICE O/P EST LOW 20 MIN: CPT | Mod: PBBFAC,25 | Performed by: PHYSICIAN ASSISTANT

## 2025-07-25 PROCEDURE — 73030 X-RAY EXAM OF SHOULDER: CPT | Mod: 26,LT,, | Performed by: RADIOLOGY

## 2025-07-25 PROCEDURE — 73030 X-RAY EXAM OF SHOULDER: CPT | Mod: TC,LT

## 2025-07-25 RX ORDER — CELECOXIB 200 MG/1
200 CAPSULE ORAL 2 TIMES DAILY
Qty: 60 CAPSULE | Refills: 2 | Status: SHIPPED | OUTPATIENT
Start: 2025-07-25

## 2025-07-25 NOTE — PROGRESS NOTES
celebreSubjective:          Chief Complaint: Bulmaro Nascimento is a 70 y.o. male who had concerns including Pain of the Left Shoulder.    History of Present Illness    CHIEF COMPLAINT:  - Left shoulder pain    HPI:  Bulmaro who is RHD presents with left shoulder pain that began approximately 2 weeks ago while doing push-ups during a workout routine. He heard a subtle sound during the third set of 10 push-ups, which were performed on the knees. Pain varies depending on shoulder movement and is worse when neck muscles and trapezius are tight, but improves with use of a vibrator to loosen the muscles. He has difficulty with certain lifting movements, particularly when lifting straight up. He has continued exercising but reduced push-ups by about 75% over the past two weeks. He denies any swelling or bruising in the shoulder area.    For pain relief, he has been taking ibuprofen and applying heat occasionally. He reports a history of numbness and tingling in the arms or hands in the past, but not since this current shoulder issue began. He denies any decreased range of motion or strength in the shoulder, or current numbness and tingling in the arms or hands.    MEDICATIONS:  - Ibuprofen: as needed for pain  - Celebrex: he has taken this in the past, but is currently out          Pain  Pertinent negatives include no abdominal pain, chest pain, chills, congestion, coughing, fever, headaches, joint swelling, myalgias, nausea, numbness, rash, sore throat or vomiting.       Review of Systems   Constitutional: Negative. Negative for chills, fever, weight gain and weight loss.   HENT:  Negative for congestion and sore throat.    Eyes:  Negative for blurred vision and double vision.   Cardiovascular:  Negative for chest pain, leg swelling and palpitations.   Respiratory:  Negative for cough and shortness of breath.    Hematologic/Lymphatic: Does not bruise/bleed easily.   Skin:  Negative for itching, poor wound healing and  rash.   Musculoskeletal:  Positive for joint pain. Negative for back pain, joint swelling, muscle weakness, myalgias and stiffness.   Gastrointestinal:  Negative for abdominal pain, constipation, diarrhea, nausea and vomiting.   Genitourinary: Negative.  Negative for frequency and hematuria.   Neurological:  Negative for dizziness, headaches, numbness, paresthesias and sensory change.   Psychiatric/Behavioral:  Negative for altered mental status and depression. The patient is not nervous/anxious.    Allergic/Immunologic: Negative for hives.       Pain Related Questions  Over the past 3 days, what was your average pain during activity? (I.e. running, jogging, walking, climbing stairs, getting dressed, ect.): 4  Over the past 3 days, what was your highest pain level?: 4  Over the past 3 days, what was your lowest pain level? : 4    Other  How many nights a week are you awakened by your affected body part?: 0      Objective:        General: Bulmaro is well-developed, well-nourished, appears stated age, in no acute distress, alert and oriented to time, place and person.       General    Vitals reviewed.  Constitutional: He is oriented to person, place, and time. He appears well-developed and well-nourished. No distress.   HENT:   Head: Normocephalic.   Eyes: EOM are normal.   Cardiovascular:  Normal rate and regular rhythm.            Pulmonary/Chest: Effort normal. No respiratory distress.   Neurological: He is alert and oriented to person, place, and time. He has normal reflexes. No cranial nerve deficit. Coordination normal.   Psychiatric: He has a normal mood and affect. His behavior is normal. Judgment and thought content normal.         Right Shoulder Exam     Inspection/Observation   Swelling: absent  Bruising: absent  Scars: absent  Deformity: absent  Scapular Winging: absent  Scapular Dyskinesia: negative  Atrophy: absent    Tenderness   The patient is experiencing no tenderness.    Range of Motion   Active  abduction:  160 normal   Passive abduction:  160 normal   Extension:  50 normal   Forward Flexion:  180 normal   Adduction: 60 normal  External Rotation 0 degrees:  40 normal   External Rotation 90 degrees: 90 normal  Internal rotation 0 degrees:  T6 normal   Internal rotation 90 degrees:  40 normal     Muscle Strength   The patient has normal right shoulder strength.    Tests & Signs   Apprehension: negative  Cross arm: negative  Drop arm: negative  Rausch test: negative  Impingement: negative  Sulcus: absent  Lift Off Sign: negative  Active Compression Test ([place]'s Sign): negative  Yergason's Test: negative  Speed's Test: negative  Anterior Drawer Test: 1+     Other   Sensation: normal    Left Shoulder Exam     Inspection/Observation   Swelling: absent  Bruising: absent  Scars: absent  Deformity: absent  Scapular Winging: absent  Scapular Dyskinesia: negative  Atrophy: absent    Tenderness   The patient is experiencing no tenderness.     Range of Motion   Active abduction:  160 normal   Passive abduction:  160 normal   Extension:  50 normal   Forward Flexion:  180 normal   Adduction: 60 normal  External Rotation 0 degrees:  60 normal   External Rotation 90 degrees: 90 normal  Internal rotation 0 degrees:  T10 (pain) abnormal   Internal rotation 90 degrees:  40 normal     Tests & Signs   Apprehension: negative  Cross arm: positive  Drop arm: negative  Rausch test: negative  Impingement: negative  Sulcus: absent  Lift Off Sign: negative  Active Compression test ([place]'s Sign): negative  Yergasons's Test: negative  Speed's Test: negative  Anterior Drawer Test: 1+  Posterior Drawer Test: 1+    Other   Sensation: normal       Muscle Strength   Right Upper Extremity   Shoulder Abduction: 5/5   Shoulder Internal Rotation: 5/5   Shoulder External Rotation: 5/5   Supraspinatus: 5/5   Subscapularis: 5/5   Biceps: 5/5   Left Upper Extremity  Shoulder Abduction: 5/5   Shoulder Internal Rotation: 5/5   Shoulder External  Rotation: 5/5   Supraspinatus: 5/5   Subscapularis: 5/5   Biceps: 5/5     Reflexes     Left Side  Biceps:  2+  Triceps:  2+  Brachioradialis:  2+    Right Side   Biceps:  2+  Triceps:  2+  Brachioradialis:  2+    RADIOGRAPHS: 7/25/25  Left shoulder:  FINDINGS:  Visualized osseous structures appear unremarkable, with no evidence of recent or healing fracture, lytic destructive process, or appreciable glenohumeral arthritic change observed.  No glenohumeral dislocation.  No abnormal soft tissue calcifications.        Assessment:           ICD-10-CM ICD-9-CM   1. Acute pain of left shoulder  M25.512 719.41         Plan:         Assessment & Plan    MEDICATIONS:  - Celebrex 200 mg b.i.d.  -no additional NSAIDs while taking Celebrex    IMAGING:  -I made the decision to obtain old records of the patient including previous notes and imaging. New imaging was ordered today of the extremity or extremities evaluated. I independently reviewed and interpreted the radiographs and/or MRIs today as well as prior imaging.  Reviewed with patient in detail.    HEP:  -HEP 12026 - I, instructed and demonstrated a RC and periscapular HEP. The patient then demonstrated understanding of exercises and proper technique. This program was performed for 15 minutes.       PATIENT INSTRUCTIONS:  - Resume upper extremity exercises, gradually increasing intensity.  - Perform scapular retraction exercises with exercise band, hold for 10 seconds, relax for 10 seconds, repeat.  - Perform external and internal rotation exercises with exercise band, focus on squeezing shoulder blade.  - Perform stretching exercises for posterior shoulder.  - Use both upper extremities when lifting heavy objects.       Return to clinic p.r.n..  Patient declined formal PT at this time.  He would like to try home exercise program first.  If continued pain or worsening of symptoms, would recommend MRI of left shoulder rule out rotator cuff pathology.            This note  was generated with the assistance of ambient listening technology. Verbal consent was obtained by the patient and accompanying visitor(s) for the recording of patient appointment to facilitate this note. I attest to having reviewed and edited the generated note for accuracy, though some syntax or spelling errors may persist. Please contact the author of this note for any clarification.       [facility] patient questionnaires have been collected today.

## 2025-07-28 DIAGNOSIS — Z00.00 ENCOUNTER FOR MEDICARE ANNUAL WELLNESS EXAM: ICD-10-CM

## 2025-08-04 ENCOUNTER — TELEPHONE (OUTPATIENT)
Facility: CLINIC | Age: 70
End: 2025-08-04
Payer: MEDICARE

## 2025-08-25 ENCOUNTER — OFFICE VISIT (OUTPATIENT)
Dept: FAMILY MEDICINE | Facility: CLINIC | Age: 70
End: 2025-08-25
Payer: MEDICARE

## 2025-08-25 VITALS
HEART RATE: 57 BPM | SYSTOLIC BLOOD PRESSURE: 124 MMHG | OXYGEN SATURATION: 99 % | WEIGHT: 184.31 LBS | TEMPERATURE: 98 F | HEIGHT: 70 IN | DIASTOLIC BLOOD PRESSURE: 64 MMHG | BODY MASS INDEX: 26.39 KG/M2

## 2025-08-25 DIAGNOSIS — G47.00 INSOMNIA, UNSPECIFIED TYPE: ICD-10-CM

## 2025-08-25 DIAGNOSIS — R51.9 GENERALIZED HEADACHES: ICD-10-CM

## 2025-08-25 DIAGNOSIS — K76.0 FATTY LIVER: ICD-10-CM

## 2025-08-25 DIAGNOSIS — G89.29 CHRONIC PAIN OF RIGHT KNEE: ICD-10-CM

## 2025-08-25 DIAGNOSIS — B00.1 RECURRENT COLD SORES: ICD-10-CM

## 2025-08-25 DIAGNOSIS — M50.30 DDD (DEGENERATIVE DISC DISEASE), CERVICAL: ICD-10-CM

## 2025-08-25 DIAGNOSIS — F45.8 BRUXISM: ICD-10-CM

## 2025-08-25 DIAGNOSIS — M25.561 CHRONIC PAIN OF RIGHT KNEE: ICD-10-CM

## 2025-08-25 DIAGNOSIS — Z12.11 COLON CANCER SCREENING: ICD-10-CM

## 2025-08-25 DIAGNOSIS — G44.229 CHRONIC TENSION-TYPE HEADACHE, NOT INTRACTABLE: ICD-10-CM

## 2025-08-25 DIAGNOSIS — B96.89 ACUTE BACTERIAL SINUSITIS: ICD-10-CM

## 2025-08-25 DIAGNOSIS — K21.9 CHRONIC GERD: ICD-10-CM

## 2025-08-25 DIAGNOSIS — G47.33 OSA ON CPAP: ICD-10-CM

## 2025-08-25 DIAGNOSIS — J30.1 SEASONAL ALLERGIC RHINITIS DUE TO POLLEN: ICD-10-CM

## 2025-08-25 DIAGNOSIS — M17.11 PRIMARY OSTEOARTHRITIS OF RIGHT KNEE: ICD-10-CM

## 2025-08-25 DIAGNOSIS — J01.90 ACUTE BACTERIAL SINUSITIS: ICD-10-CM

## 2025-08-25 DIAGNOSIS — R97.20 ELEVATED PSA MEASUREMENT: ICD-10-CM

## 2025-08-25 DIAGNOSIS — R97.20 ELEVATED PSA: ICD-10-CM

## 2025-08-25 DIAGNOSIS — N40.0 BENIGN PROSTATIC HYPERPLASIA WITHOUT LOWER URINARY TRACT SYMPTOMS: Primary | ICD-10-CM

## 2025-08-25 PROBLEM — R20.0 RIGHT ARM NUMBNESS: Status: RESOLVED | Noted: 2025-01-08 | Resolved: 2025-08-25

## 2025-08-25 PROBLEM — R35.1 NOCTURIA: Status: RESOLVED | Noted: 2017-03-15 | Resolved: 2025-08-25

## 2025-08-25 PROBLEM — E05.90 SUBCLINICAL HYPERTHYROIDISM: Status: RESOLVED | Noted: 2019-04-05 | Resolved: 2025-08-25

## 2025-08-25 LAB
CTP QC/QA: YES
SARS-COV-2 RDRP RESP QL NAA+PROBE: NEGATIVE

## 2025-08-25 PROCEDURE — 99999PBSHW: Mod: PBBFAC,,,

## 2025-08-25 PROCEDURE — 99999 PR PBB SHADOW E&M-EST. PATIENT-LVL IV: CPT | Mod: PBBFAC,,, | Performed by: NURSE PRACTITIONER

## 2025-08-25 PROCEDURE — G2211 COMPLEX E/M VISIT ADD ON: HCPCS | Mod: ,,, | Performed by: NURSE PRACTITIONER

## 2025-08-25 PROCEDURE — 87635 SARS-COV-2 COVID-19 AMP PRB: CPT | Mod: PBBFAC,PN | Performed by: NURSE PRACTITIONER

## 2025-08-25 PROCEDURE — 99214 OFFICE O/P EST MOD 30 MIN: CPT | Mod: PBBFAC,PN | Performed by: NURSE PRACTITIONER

## 2025-08-25 PROCEDURE — 99215 OFFICE O/P EST HI 40 MIN: CPT | Mod: S$PBB,,, | Performed by: NURSE PRACTITIONER

## 2025-08-25 RX ORDER — HYDROQUINONE 40 MG/G
CREAM TOPICAL
COMMUNITY
Start: 2025-08-13

## 2025-08-25 RX ORDER — AZELASTINE 1 MG/ML
1 SPRAY, METERED NASAL 2 TIMES DAILY
COMMUNITY
Start: 2025-04-06 | End: 2025-08-25

## 2025-08-25 RX ORDER — AMOXICILLIN AND CLAVULANATE POTASSIUM 875; 125 MG/1; MG/1
1 TABLET, FILM COATED ORAL 2 TIMES DAILY
Qty: 20 TABLET | Refills: 0 | Status: SHIPPED | OUTPATIENT
Start: 2025-08-25

## 2025-08-27 RX ORDER — TADALAFIL 5 MG/1
5 TABLET ORAL DAILY PRN
Qty: 90 TABLET | Refills: 0 | Status: SHIPPED | OUTPATIENT
Start: 2025-08-27 | End: 2025-08-29 | Stop reason: SDUPTHER

## 2025-08-29 RX ORDER — TADALAFIL 5 MG/1
5 TABLET ORAL DAILY PRN
Qty: 90 TABLET | Refills: 0 | Status: SHIPPED | OUTPATIENT
Start: 2025-08-29

## 2025-09-02 ENCOUNTER — OFFICE VISIT (OUTPATIENT)
Facility: CLINIC | Age: 70
End: 2025-09-02
Payer: MEDICARE

## 2025-09-02 VITALS
DIASTOLIC BLOOD PRESSURE: 65 MMHG | OXYGEN SATURATION: 96 % | HEART RATE: 61 BPM | SYSTOLIC BLOOD PRESSURE: 139 MMHG | WEIGHT: 186.5 LBS | BODY MASS INDEX: 26.76 KG/M2

## 2025-09-02 DIAGNOSIS — G47.00 INSOMNIA, UNSPECIFIED TYPE: ICD-10-CM

## 2025-09-02 DIAGNOSIS — G47.33 OBSTRUCTIVE SLEEP APNEA: Primary | ICD-10-CM

## 2025-09-02 DIAGNOSIS — F45.8 BRUXISM: ICD-10-CM

## 2025-09-02 PROCEDURE — 99213 OFFICE O/P EST LOW 20 MIN: CPT | Mod: PBBFAC,PO

## 2025-09-02 PROCEDURE — 99999 PR PBB SHADOW E&M-EST. PATIENT-LVL III: CPT | Mod: PBBFAC,,,

## 2025-09-02 RX ORDER — ESZOPICLONE 3 MG/1
3 TABLET, FILM COATED ORAL NIGHTLY
Qty: 30 TABLET | Refills: 1 | Status: SHIPPED | OUTPATIENT
Start: 2025-09-02 | End: 2025-11-01

## 2025-09-05 ENCOUNTER — PATIENT MESSAGE (OUTPATIENT)
Dept: FAMILY MEDICINE | Facility: CLINIC | Age: 70
End: 2025-09-05
Payer: MEDICARE